# Patient Record
Sex: MALE | Race: BLACK OR AFRICAN AMERICAN | NOT HISPANIC OR LATINO
[De-identification: names, ages, dates, MRNs, and addresses within clinical notes are randomized per-mention and may not be internally consistent; named-entity substitution may affect disease eponyms.]

---

## 2017-03-03 ENCOUNTER — RX RENEWAL (OUTPATIENT)
Age: 57
End: 2017-03-03

## 2017-05-01 ENCOUNTER — RX RENEWAL (OUTPATIENT)
Age: 57
End: 2017-05-01

## 2017-05-26 ENCOUNTER — RX RENEWAL (OUTPATIENT)
Age: 57
End: 2017-05-26

## 2017-07-17 ENCOUNTER — APPOINTMENT (OUTPATIENT)
Dept: CARDIOLOGY | Facility: CLINIC | Age: 57
End: 2017-07-17

## 2017-07-31 ENCOUNTER — OUTPATIENT (OUTPATIENT)
Dept: OUTPATIENT SERVICES | Facility: HOSPITAL | Age: 57
LOS: 1 days | End: 2017-07-31

## 2017-08-11 ENCOUNTER — OUTPATIENT (OUTPATIENT)
Dept: OUTPATIENT SERVICES | Facility: HOSPITAL | Age: 57
LOS: 1 days | End: 2017-08-11

## 2017-08-17 DIAGNOSIS — Z01.20 ENCOUNTER FOR DENTAL EXAMINATION AND CLEANING WITHOUT ABNORMAL FINDINGS: ICD-10-CM

## 2017-08-21 ENCOUNTER — RX RENEWAL (OUTPATIENT)
Age: 57
End: 2017-08-21

## 2017-09-01 ENCOUNTER — OUTPATIENT (OUTPATIENT)
Dept: OUTPATIENT SERVICES | Facility: HOSPITAL | Age: 57
LOS: 1 days | End: 2017-09-01

## 2017-09-05 DIAGNOSIS — Z01.20 ENCOUNTER FOR DENTAL EXAMINATION AND CLEANING WITHOUT ABNORMAL FINDINGS: ICD-10-CM

## 2017-09-18 ENCOUNTER — OUTPATIENT (OUTPATIENT)
Dept: OUTPATIENT SERVICES | Facility: HOSPITAL | Age: 57
LOS: 1 days | End: 2017-09-18

## 2017-09-29 ENCOUNTER — OUTPATIENT (OUTPATIENT)
Dept: OUTPATIENT SERVICES | Facility: HOSPITAL | Age: 57
LOS: 1 days | End: 2017-09-29

## 2017-10-03 DIAGNOSIS — Z01.20 ENCOUNTER FOR DENTAL EXAMINATION AND CLEANING WITHOUT ABNORMAL FINDINGS: ICD-10-CM

## 2017-11-20 ENCOUNTER — RX RENEWAL (OUTPATIENT)
Age: 57
End: 2017-11-20

## 2017-12-04 ENCOUNTER — OUTPATIENT (OUTPATIENT)
Dept: OUTPATIENT SERVICES | Facility: HOSPITAL | Age: 57
LOS: 1 days | End: 2017-12-04

## 2017-12-06 DIAGNOSIS — Z01.20 ENCOUNTER FOR DENTAL EXAMINATION AND CLEANING WITHOUT ABNORMAL FINDINGS: ICD-10-CM

## 2017-12-18 ENCOUNTER — OUTPATIENT (OUTPATIENT)
Dept: OUTPATIENT SERVICES | Facility: HOSPITAL | Age: 57
LOS: 1 days | End: 2017-12-18

## 2017-12-19 DIAGNOSIS — Z01.20 ENCOUNTER FOR DENTAL EXAMINATION AND CLEANING WITHOUT ABNORMAL FINDINGS: ICD-10-CM

## 2018-03-01 ENCOUNTER — OUTPATIENT (OUTPATIENT)
Dept: OUTPATIENT SERVICES | Facility: HOSPITAL | Age: 58
LOS: 1 days | End: 2018-03-01

## 2018-03-16 DIAGNOSIS — R69 ILLNESS, UNSPECIFIED: ICD-10-CM

## 2018-05-29 ENCOUNTER — RX RENEWAL (OUTPATIENT)
Age: 58
End: 2018-05-29

## 2018-06-01 ENCOUNTER — RX RENEWAL (OUTPATIENT)
Age: 58
End: 2018-06-01

## 2018-06-06 ENCOUNTER — OUTPATIENT (OUTPATIENT)
Dept: OUTPATIENT SERVICES | Facility: HOSPITAL | Age: 58
LOS: 1 days | End: 2018-06-06

## 2018-06-12 DIAGNOSIS — Z01.20 ENCOUNTER FOR DENTAL EXAMINATION AND CLEANING WITHOUT ABNORMAL FINDINGS: ICD-10-CM

## 2018-07-25 ENCOUNTER — OUTPATIENT (OUTPATIENT)
Dept: OUTPATIENT SERVICES | Facility: HOSPITAL | Age: 58
LOS: 1 days | End: 2018-07-25

## 2018-07-27 DIAGNOSIS — Z01.20 ENCOUNTER FOR DENTAL EXAMINATION AND CLEANING WITHOUT ABNORMAL FINDINGS: ICD-10-CM

## 2018-10-01 ENCOUNTER — OUTPATIENT (OUTPATIENT)
Dept: OUTPATIENT SERVICES | Facility: HOSPITAL | Age: 58
LOS: 1 days | End: 2018-10-01
Payer: MEDICAID

## 2018-10-01 PROCEDURE — G9001: CPT

## 2018-10-05 ENCOUNTER — APPOINTMENT (OUTPATIENT)
Dept: ENDOVASCULAR SURGERY | Facility: CLINIC | Age: 58
End: 2018-10-05

## 2018-10-11 DIAGNOSIS — Z71.89 OTHER SPECIFIED COUNSELING: ICD-10-CM

## 2018-10-17 ENCOUNTER — RX RENEWAL (OUTPATIENT)
Age: 58
End: 2018-10-17

## 2018-11-12 ENCOUNTER — APPOINTMENT (OUTPATIENT)
Dept: CARDIOLOGY | Facility: CLINIC | Age: 58
End: 2018-11-12
Payer: MEDICARE

## 2018-11-12 VITALS
DIASTOLIC BLOOD PRESSURE: 78 MMHG | WEIGHT: 166 LBS | HEART RATE: 70 BPM | BODY MASS INDEX: 22 KG/M2 | OXYGEN SATURATION: 100 % | SYSTOLIC BLOOD PRESSURE: 154 MMHG | HEIGHT: 73 IN

## 2018-11-12 PROCEDURE — 93000 ELECTROCARDIOGRAM COMPLETE: CPT

## 2018-11-12 PROCEDURE — 99213 OFFICE O/P EST LOW 20 MIN: CPT

## 2018-11-12 RX ORDER — ENALAPRIL MALEATE 20 MG/1
20 TABLET ORAL EVERY OTHER DAY
Qty: 15 | Refills: 3 | Status: DISCONTINUED | COMMUNITY
Start: 2018-05-08 | End: 2018-11-12

## 2018-11-12 NOTE — HISTORY OF PRESENT ILLNESS
[FreeTextEntry1] : Lost for 2 years to followup but seeing his nephrologist. Overall feels well. No complaints. Taking his medicaitons and now on Labetalol and Nifedipine for BP control. BP slightly elevated but no complaints

## 2018-11-12 NOTE — DISCUSSION/SUMMARY
[FreeTextEntry1] : 58 year old man with HTN here for followup. Never went for repat TTE in 2016 but no complaints\par -increase Labetalol to 300 mg TID\par -continue nifidepine\par -FU in 6 monhts

## 2018-11-12 NOTE — PHYSICAL EXAM
[General Appearance - Well Developed] : well developed [Normal Appearance] : normal appearance [Well Groomed] : well groomed [General Appearance - Well Nourished] : well nourished [No Deformities] : no deformities [General Appearance - In No Acute Distress] : no acute distress [Normal Jugular Venous A Waves Present] : normal jugular venous A waves present [Normal Jugular Venous V Waves Present] : normal jugular venous V waves present [No Jugular Venous Hunt A Waves] : no jugular venous hunt A waves [Respiration, Rhythm And Depth] : normal respiratory rhythm and effort [Exaggerated Use Of Accessory Muscles For Inspiration] : no accessory muscle use [Auscultation Breath Sounds / Voice Sounds] : lungs were clear to auscultation bilaterally [Heart Rate And Rhythm] : heart rate and rhythm were normal [Heart Sounds] : normal S1 and S2 [Murmurs] : no murmurs present [Abdomen Soft] : soft [Abdomen Tenderness] : non-tender [Abdomen Mass (___ Cm)] : no abdominal mass palpated [Abnormal Walk] : normal gait [Gait - Sufficient For Exercise Testing] : the gait was sufficient for exercise testing [Nail Clubbing] : no clubbing of the fingernails [Cyanosis, Localized] : no localized cyanosis [Petechial Hemorrhages (___cm)] : no petechial hemorrhages [] : no ischemic changes [FreeTextEntry1] : Left AV fistula [Oriented To Time, Place, And Person] : oriented to person, place, and time [Affect] : the affect was normal [Mood] : the mood was normal [No Anxiety] : not feeling anxious

## 2018-11-12 NOTE — REVIEW OF SYSTEMS
[Shortness Of Breath] : no shortness of breath [Dyspnea on exertion] : not dyspnea during exertion [Chest Pain] : no chest pain [see HPI] : see HPI [Negative] : Heme/Lymph

## 2018-11-29 ENCOUNTER — APPOINTMENT (OUTPATIENT)
Dept: VASCULAR SURGERY | Facility: CLINIC | Age: 58
End: 2018-11-29

## 2018-12-03 ENCOUNTER — RX RENEWAL (OUTPATIENT)
Age: 58
End: 2018-12-03

## 2018-12-11 ENCOUNTER — RX RENEWAL (OUTPATIENT)
Age: 58
End: 2018-12-11

## 2018-12-11 RX ORDER — RAMELTEON 8 MG/1
8 TABLET, FILM COATED ORAL
Qty: 30 | Refills: 3 | Status: DISCONTINUED | COMMUNITY
Start: 2017-12-12 | End: 2018-12-11

## 2018-12-12 DIAGNOSIS — G47.00 INSOMNIA, UNSPECIFIED: ICD-10-CM

## 2018-12-17 ENCOUNTER — RX RENEWAL (OUTPATIENT)
Age: 58
End: 2018-12-17

## 2018-12-24 ENCOUNTER — RX RENEWAL (OUTPATIENT)
Age: 58
End: 2018-12-24

## 2018-12-26 ENCOUNTER — RX RENEWAL (OUTPATIENT)
Age: 58
End: 2018-12-26

## 2018-12-31 ENCOUNTER — RX RENEWAL (OUTPATIENT)
Age: 58
End: 2018-12-31

## 2019-02-25 ENCOUNTER — RX RENEWAL (OUTPATIENT)
Age: 59
End: 2019-02-25

## 2019-03-01 ENCOUNTER — OUTPATIENT (OUTPATIENT)
Dept: OUTPATIENT SERVICES | Facility: HOSPITAL | Age: 59
LOS: 1 days | End: 2019-03-01
Payer: MEDICARE

## 2019-03-11 ENCOUNTER — OUTPATIENT (OUTPATIENT)
Dept: OUTPATIENT SERVICES | Facility: HOSPITAL | Age: 59
LOS: 1 days | End: 2019-03-11
Payer: MEDICARE

## 2019-03-11 ENCOUNTER — APPOINTMENT (OUTPATIENT)
Dept: RADIOLOGY | Facility: IMAGING CENTER | Age: 59
End: 2019-03-11
Payer: MEDICARE

## 2019-03-11 DIAGNOSIS — Z00.8 ENCOUNTER FOR OTHER GENERAL EXAMINATION: ICD-10-CM

## 2019-03-11 PROCEDURE — 71046 X-RAY EXAM CHEST 2 VIEWS: CPT | Mod: 26

## 2019-03-11 PROCEDURE — 71046 X-RAY EXAM CHEST 2 VIEWS: CPT

## 2019-03-25 ENCOUNTER — INPATIENT (INPATIENT)
Facility: HOSPITAL | Age: 59
LOS: 2 days | Discharge: HOME | End: 2019-03-28
Attending: HOSPITALIST | Admitting: HOSPITALIST
Payer: MEDICARE

## 2019-03-25 ENCOUNTER — APPOINTMENT (OUTPATIENT)
Dept: CT IMAGING | Facility: HOSPITAL | Age: 59
End: 2019-03-25

## 2019-03-25 VITALS
SYSTOLIC BLOOD PRESSURE: 184 MMHG | HEART RATE: 67 BPM | OXYGEN SATURATION: 100 % | DIASTOLIC BLOOD PRESSURE: 98 MMHG | RESPIRATION RATE: 22 BRPM

## 2019-03-25 DIAGNOSIS — N18.6 END STAGE RENAL DISEASE: ICD-10-CM

## 2019-03-25 DIAGNOSIS — R06.02 SHORTNESS OF BREATH: ICD-10-CM

## 2019-03-25 DIAGNOSIS — I16.0 HYPERTENSIVE URGENCY: ICD-10-CM

## 2019-03-25 LAB
ALBUMIN SERPL ELPH-MCNC: 4.5 G/DL — SIGNIFICANT CHANGE UP (ref 3.3–5)
ALP SERPL-CCNC: 121 U/L — HIGH (ref 40–120)
ALT FLD-CCNC: 20 U/L — SIGNIFICANT CHANGE UP (ref 4–41)
ANION GAP SERPL CALC-SCNC: 18 MMO/L — HIGH (ref 7–14)
AST SERPL-CCNC: 29 U/L — SIGNIFICANT CHANGE UP (ref 4–40)
BASE EXCESS BLDV CALC-SCNC: 5.4 MMOL/L — SIGNIFICANT CHANGE UP
BASOPHILS # BLD AUTO: 0.03 K/UL — SIGNIFICANT CHANGE UP (ref 0–0.2)
BASOPHILS NFR BLD AUTO: 0.8 % — SIGNIFICANT CHANGE UP (ref 0–2)
BILIRUB SERPL-MCNC: 0.4 MG/DL — SIGNIFICANT CHANGE UP (ref 0.2–1.2)
BLOOD GAS VENOUS - CREATININE: 10.6 MG/DL — HIGH (ref 0.5–1.3)
BUN SERPL-MCNC: 30 MG/DL — HIGH (ref 7–23)
CALCIUM SERPL-MCNC: 9.8 MG/DL — SIGNIFICANT CHANGE UP (ref 8.4–10.5)
CHLORIDE BLDV-SCNC: 95 MMOL/L — LOW (ref 96–108)
CHLORIDE SERPL-SCNC: 93 MMOL/L — LOW (ref 98–107)
CK MB BLD-MCNC: 1.9 — SIGNIFICANT CHANGE UP (ref 0–2.5)
CK MB BLD-MCNC: 3.87 NG/ML — SIGNIFICANT CHANGE UP (ref 1–6.6)
CK SERPL-CCNC: 203 U/L — HIGH (ref 30–200)
CO2 SERPL-SCNC: 24 MMOL/L — SIGNIFICANT CHANGE UP (ref 22–31)
CREAT SERPL-MCNC: 9.35 MG/DL — HIGH (ref 0.5–1.3)
EOSINOPHIL # BLD AUTO: 0.19 K/UL — SIGNIFICANT CHANGE UP (ref 0–0.5)
EOSINOPHIL NFR BLD AUTO: 4.9 % — SIGNIFICANT CHANGE UP (ref 0–6)
GAS PNL BLDV: 135 MMOL/L — LOW (ref 136–146)
GLUCOSE BLDV-MCNC: 93 — SIGNIFICANT CHANGE UP (ref 70–99)
GLUCOSE SERPL-MCNC: 90 MG/DL — SIGNIFICANT CHANGE UP (ref 70–99)
HCO3 BLDV-SCNC: 28 MMOL/L — HIGH (ref 20–27)
HCT VFR BLD CALC: 38.1 % — LOW (ref 39–50)
HCT VFR BLDV CALC: 37.6 % — LOW (ref 39–51)
HGB BLD-MCNC: 11.4 G/DL — LOW (ref 13–17)
HGB BLDV-MCNC: 12.2 G/DL — LOW (ref 13–17)
IMM GRANULOCYTES NFR BLD AUTO: 0.3 % — SIGNIFICANT CHANGE UP (ref 0–1.5)
INR BLD: 1.03 — SIGNIFICANT CHANGE UP (ref 0.88–1.17)
LACTATE BLDV-MCNC: 1.6 MMOL/L — SIGNIFICANT CHANGE UP (ref 0.5–2)
LYMPHOCYTES # BLD AUTO: 1.1 K/UL — SIGNIFICANT CHANGE UP (ref 1–3.3)
LYMPHOCYTES # BLD AUTO: 28.6 % — SIGNIFICANT CHANGE UP (ref 13–44)
MCHC RBC-ENTMCNC: 24.8 PG — LOW (ref 27–34)
MCHC RBC-ENTMCNC: 29.9 % — LOW (ref 32–36)
MCV RBC AUTO: 83 FL — SIGNIFICANT CHANGE UP (ref 80–100)
MONOCYTES # BLD AUTO: 0.52 K/UL — SIGNIFICANT CHANGE UP (ref 0–0.9)
MONOCYTES NFR BLD AUTO: 13.5 % — SIGNIFICANT CHANGE UP (ref 2–14)
NEUTROPHILS # BLD AUTO: 1.99 K/UL — SIGNIFICANT CHANGE UP (ref 1.8–7.4)
NEUTROPHILS NFR BLD AUTO: 51.9 % — SIGNIFICANT CHANGE UP (ref 43–77)
NRBC # FLD: 0 K/UL — LOW (ref 25–125)
PCO2 BLDV: 50 MMHG — SIGNIFICANT CHANGE UP (ref 41–51)
PH BLDV: 7.4 PH — SIGNIFICANT CHANGE UP (ref 7.32–7.43)
PLATELET # BLD AUTO: 220 K/UL — SIGNIFICANT CHANGE UP (ref 150–400)
PMV BLD: 10.3 FL — SIGNIFICANT CHANGE UP (ref 7–13)
PO2 BLDV: 26 MMHG — LOW (ref 35–40)
POTASSIUM BLDV-SCNC: 4.7 MMOL/L — HIGH (ref 3.4–4.5)
POTASSIUM SERPL-MCNC: 5 MMOL/L — SIGNIFICANT CHANGE UP (ref 3.5–5.3)
POTASSIUM SERPL-SCNC: 5 MMOL/L — SIGNIFICANT CHANGE UP (ref 3.5–5.3)
PROT SERPL-MCNC: 8.4 G/DL — HIGH (ref 6–8.3)
PROTHROM AB SERPL-ACNC: 11.5 SEC — SIGNIFICANT CHANGE UP (ref 9.8–13.1)
RBC # BLD: 4.59 M/UL — SIGNIFICANT CHANGE UP (ref 4.2–5.8)
RBC # FLD: 15.7 % — HIGH (ref 10.3–14.5)
SAO2 % BLDV: 44.1 % — LOW (ref 60–85)
SODIUM SERPL-SCNC: 135 MMOL/L — SIGNIFICANT CHANGE UP (ref 135–145)
TROPONIN T, HIGH SENSITIVITY: 324 NG/L — CRITICAL HIGH (ref ?–14)
TROPONIN T, HIGH SENSITIVITY: 360 NG/L — CRITICAL HIGH (ref ?–14)
WBC # BLD: 3.84 K/UL — SIGNIFICANT CHANGE UP (ref 3.8–10.5)
WBC # FLD AUTO: 3.84 K/UL — SIGNIFICANT CHANGE UP (ref 3.8–10.5)

## 2019-03-25 PROCEDURE — 71046 X-RAY EXAM CHEST 2 VIEWS: CPT | Mod: 26

## 2019-03-25 PROCEDURE — 99223 1ST HOSP IP/OBS HIGH 75: CPT

## 2019-03-25 PROCEDURE — 71250 CT THORAX DX C-: CPT | Mod: 26

## 2019-03-25 PROCEDURE — 99222 1ST HOSP IP/OBS MODERATE 55: CPT | Mod: GC

## 2019-03-25 PROCEDURE — 99222 1ST HOSP IP/OBS MODERATE 55: CPT

## 2019-03-25 RX ORDER — LABETALOL HCL 100 MG
300 TABLET ORAL THREE TIMES A DAY
Qty: 0 | Refills: 0 | Status: DISCONTINUED | OUTPATIENT
Start: 2019-03-25 | End: 2019-03-27

## 2019-03-25 RX ORDER — HYDRALAZINE HCL 50 MG
10 TABLET ORAL ONCE
Qty: 0 | Refills: 0 | Status: COMPLETED | OUTPATIENT
Start: 2019-03-25 | End: 2019-03-25

## 2019-03-25 RX ORDER — CALCIUM ACETATE 667 MG
1334 TABLET ORAL
Qty: 0 | Refills: 0 | Status: DISCONTINUED | OUTPATIENT
Start: 2019-03-25 | End: 2019-03-28

## 2019-03-25 RX ORDER — LABETALOL HCL 100 MG
1 TABLET ORAL
Qty: 0 | Refills: 0 | COMMUNITY

## 2019-03-25 RX ORDER — ISOSORBIDE DINITRATE 5 MG/1
10 TABLET ORAL ONCE
Qty: 0 | Refills: 0 | Status: COMPLETED | OUTPATIENT
Start: 2019-03-25 | End: 2019-03-25

## 2019-03-25 RX ORDER — NIFEDIPINE 30 MG
90 TABLET, EXTENDED RELEASE 24 HR ORAL DAILY
Qty: 0 | Refills: 0 | Status: DISCONTINUED | OUTPATIENT
Start: 2019-03-25 | End: 2019-03-26

## 2019-03-25 RX ORDER — NIFEDIPINE 30 MG
90 TABLET, EXTENDED RELEASE 24 HR ORAL ONCE
Qty: 0 | Refills: 0 | Status: COMPLETED | OUTPATIENT
Start: 2019-03-25 | End: 2019-03-25

## 2019-03-25 RX ORDER — ASPIRIN/CALCIUM CARB/MAGNESIUM 324 MG
325 TABLET ORAL ONCE
Qty: 0 | Refills: 0 | Status: COMPLETED | OUTPATIENT
Start: 2019-03-25 | End: 2019-03-25

## 2019-03-25 RX ADMIN — Medication 10 MILLIGRAM(S): at 11:32

## 2019-03-25 RX ADMIN — Medication 325 MILLIGRAM(S): at 13:30

## 2019-03-25 RX ADMIN — Medication 90 MILLIGRAM(S): at 15:40

## 2019-03-25 RX ADMIN — ISOSORBIDE DINITRATE 10 MILLIGRAM(S): 5 TABLET ORAL at 15:40

## 2019-03-25 RX ADMIN — Medication 10 MILLIGRAM(S): at 13:30

## 2019-03-25 NOTE — CONSULT NOTE ADULT - ASSESSMENT
58 year old male with history of ESRD on HD TTS (Nephrologist: Dr. Valdovinos, Center: [facility]), HTN, Hepatitis C, latent TB, CHF who presented to the hospital with SOB and HTN, found to be fluid overloaded. Nephrology consulted as patient is ESRD on HD.    ESRD on HD  Patient is ESRD on HD and gets dialyzed TTS. Patient to be dialyzed today and tomorrow for fluid removal. Continue to monitor electrolytes and fluid balance along with BP. Monitor weight pre and post dialysis.      HTN Urgency  Patient presented in HTN urgency likely volume-mediated. Patient states he has been taking his medication without missing doses so I do not believe there is a non-compliance issue. However he does state he has been drinking more water and eating more salt so I believe this is causing volume expansion in this patient. He makes minimal urine and has been taking less fluid off during dialysis which all could have lead to this admission. Patient does not have very impressive LE swelling, however does have extremely diminished lung sounds, B-lines on US along with non-collapsable IVC on US exam as well. This all suggests volume as the problem for this HTN. Will continue to monitor BP during dialysis as well as symptoms.

## 2019-03-25 NOTE — H&P ADULT - PROBLEM SELECTOR PLAN 4
oupt surveillance in 3-6 months will likely require oupt surveillance in 3-6 months, but would confirm with pulmonology

## 2019-03-25 NOTE — CONSULT NOTE ADULT - ATTENDING COMMENTS
will arrange for HD today and then again to his scheduled HD tomorrow  discussed with the dialysis unit and will be added to the schedule

## 2019-03-25 NOTE — H&P ADULT - PROBLEM SELECTOR PLAN 3
-will order TTE -will order TTE  -cardiology documented carotid bruits auscultated; will order BL duplex -will order TTE  -troponinemia likely stemming from esrd, no indication to terat for ACS per cardiology  -cardiology documented carotid bruits auscultated; will order BL duplex

## 2019-03-25 NOTE — H&P ADULT - NSICDXPASTMEDICALHX_GEN_ALL_CORE_FT
PAST MEDICAL HISTORY:  CHF (congestive heart failure) Mild    Chronic renal failure     End Stage Renal Disease on Dialysis since 2009 - Tues, Thurs, Sat    Hepatitis C     HTN (hypertension)     TB (tuberculosis) Latent
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
## 2019-03-25 NOTE — ED ADULT NURSE NOTE - OBJECTIVE STATEMENT
p./t is a 58y old male with hx ESRD on HD, last dialysis treatment on Saturday c/o of cough and mild sob for few  days, p/t denies any chest pain

## 2019-03-25 NOTE — ED PROVIDER NOTE - OBJECTIVE STATEMENT
57 y/o male pmh htn, ESRD on HD t.th.sat c/o sob x1 day. Pt had dialysis on Saturday, completed entire session. Pt admits to developing sob last night while resting at home. Pt admits to worsening sob when lying down, states was unable to sleep due to sob. Pt admits to feeling cold. Denies chest pain, diaphoresis, palpitations, abd pain, n/v/d, numbness, tingling, weakness, dizziness, syncope, fever or chills. Denies recent travel or sick contacts. Denies URI symptoms. Of note, pt had CXR x1 week ago which showed 2.3cm cyst in R lung, pt has script fot CT chest today.

## 2019-03-25 NOTE — H&P ADULT - PROBLEM SELECTOR PLAN 1
-pt to undergo dialysis this evening  -will resume usual home BP med regimen, and adjust as needed  -reinforce appropriate salt/fluid intake

## 2019-03-25 NOTE — H&P ADULT - PROBLEM SELECTOR PLAN 2
-pt to undergo dialysis this evening  -will resume usual home BP med regimen, and adjust as needed -pt to undergo dialysis this evening  -will resume usual home BP med regimen, and adjust as needed    Of note, V5 with delta-wave like findings. Given non-shortened [place] interval, and confinement to 1 lead, ok to continue av gume blocking agents. Discussed with cardiology NP overnight. EKG reviewed by full cardiology team in daytime. Will repeat ekg in am -pt to undergo dialysis this evening  -will resume usual home BP med regimen, and adjust as needed    Of note, EKG shows lead V5 with delta-wave like findings. Given non-shortened RI interval, and confinement to 1 lead, ok to continue av gume blocking agents. Discussed with cardiology NP overnight. EKG already reviewed by full cardiology team in daytime. Will repeat ekg in am

## 2019-03-25 NOTE — ED PROVIDER NOTE - ATTENDING CONTRIBUTION TO CARE
Bong: 59 yo male with a h/o htn, ESRD on HD (T, Th, S) last dialyzed on Saturday c/o SOB that began last night. SOB is worse with laying flat. No associated chest pain, LE edema or pain. No abdominal pain, nausea or vomiting. No cough, fevers or chills. Pt endorsing that he thinks they took off less fluid then usual at his last HD. Exam: GENERAL: well appearing, NAD, HEENT: MMM, PERRLA, CARDIO: +S1/S2, no murmurs, rubs or gallops, LUNGS: CTA B/L, no wheezing, rales or rhonchi, + tachypnea with minimal exertion ABD: soft, nontender, BSx4 quadrants, no guarding or rigidity. EXT: No LE edema or calf TTP, 2+ distal pulses x 4 extremities. NEURO: AxOx3, SKIN: no rashes or lesions, well perfused A/P- 59 yo male with SOB. Lungs CTA b/l, so less likely fluid overload. no PE risk factors or hypoxia but pt tachypneic and no chest pain with unremarkable EKG. will obtain cbc, cmp, troponin, CXR, and reassess.

## 2019-03-25 NOTE — H&P ADULT - NSHPREVIEWOFSYSTEMS_GEN_ALL_CORE
Review of Systems:   CONSTITUTIONAL: No fever, weight loss, or fatigue  EYES: No eye pain, visual disturbances, or discharge  ENMT:  No difficulty hearing, tinnitus, vertigo; No sinus or throat pain  NECK: No pain or stiffness  RESPIRATORY:  cough, orthopnea  CARDIOVASCULAR: No chest pain currently, No palpitations, dizziness, or leg swelling  GASTROINTESTINAL: No abdominal or epigastric pain. No nausea, vomiting, or hematemesis; No diarrhea or constipation. No melena or hematochezia.  NEUROLOGICAL: No headaches, memory loss, loss of strength, numbness, or tremors  SKIN: No itching, burning, rashes, or lesions   LYMPH NODES: No enlarged glands  ENDOCRINE: No heat or cold intolerance; No hair loss  MUSCULOSKELETAL: No joint pain or swelling; No muscle, back, or extremity pain

## 2019-03-25 NOTE — CONSULT NOTE ADULT - SUBJECTIVE AND OBJECTIVE BOX
[facility] Division of Kidney Diseases & Hypertension  INITIAL CONSULT NOTE  [phone]--------------------------------------------------------------------------------  HPI: 58 year old male with history of ESRD on HD TTS (Nephrologist: Dr. Valdovinos, Center: [facility]), HTN, Hepatitis C, latent TB, CHF who presented to the hospital with SOB and HTN. Patient stated for the last week he has noticed that he has had more trouble breathing and has not been able to sleep or lay flat. He states that one week ago his outpatient dialysis dry weight was changed from 69 kg to 70 kg and he also thinks he has been eating more salt and drinking more water in the last week as well. He notes that he believes that because they increased his dry weight, they have not been pulling off ass much fluid from him, and thus he started to get short of breath. Otherwise patient complains of some chest pain. No other symptoms noted. Nephrology consulted as patient is ESRD on HD.    Patient seen and examined at bedside. He appears to be in mild/moderate distress, cannot lay flat and BP noted to be 170-230s. He states he called dialysis unit to come in early however they did not have a chair for him and so he came to the hospital in the hopes of being dialyzed early.        PAST HISTORY  --------------------------------------------------------------------------------  PAST MEDICAL & SURGICAL HISTORY:  CHF (congestive heart failure): Mild  TB (tuberculosis): Latent  Hepatitis C  Chronic renal failure  HTN (hypertension)  End Stage Renal Disease on Dialysis: since 2009 - [facility]a, Sat  S/P appendectomy: at age 7  AVF (arteriovenous fistula): placed 2009 - Left side    FAMILY HISTORY:  No pertinent family history in first degree relatives    PAST SOCIAL HISTORY: No recent drug use noted. Patient has high salt diet.    ALLERGIES & MEDICATIONS  --------------------------------------------------------------------------------  Allergies    adhesives (Other)  No Known Drug Allergies    Intolerances      Standing Inpatient Medications    PRN Inpatient Medications      REVIEW OF SYSTEMS  --------------------------------------------------------------------------------  Gen: No  fevers/chills, weakness  Head/Eyes/Ears/Mouth: No headache; Normal hearing; Normal vision w/o blurriness;   Respiratory: Has dyspnea  CV: Mild right-sided chest pain  GI: No abdominal pain, diarrhea, constipation, nausea, vomiting  : Makes minimal urine  MSK: No joint pain/swelling;   Neuro: No dizziness/lightheadedness, weakness, seizures    All other systems were reviewed and are negative, except as noted.    VITALS/PHYSICAL EXAM  --------------------------------------------------------------------------------  T(C): 36.7 (03-25-19 @ 15:57), Max: 37.1 (03-25-19 @ 08:49)  HR: 72 (03-25-19 @ 15:57) (64 - 72)  BP: 145/72 (03-25-19 @ 15:57) (145/72 - 246/100)  RR: 18 (03-25-19 @ 15:57) (18 - 22)  SpO2: 100% (03-25-19 @ 15:57) (100% - 100%)  Wt(kg): --        Physical Exam:  	Gen: Mild distress, sitting up  	HEENT: Anicteric  	Pulm: Diminished lung sounds.  	CV:  S1S2  	Abd: +BS, soft   	Ext: 1+ edema of LE  	Neuro: No focal deficits  	Skin: Warm  	Vascular access: LUE AVF    LABS/STUDIES  --------------------------------------------------------------------------------              11.4   3.84  >-----------<  220      [03-25-19 @ 08:37]              38.1     135  |  93  |  30  ----------------------------<  90      [03-25-19 @ 08:37]  5.0   |  24  |  9.35        Ca     9.8     [03-25-19 @ 08:37]    TPro  8.4  /  Alb  4.5  /  TBili  0.4  /  DBili  x   /  AST  29  /  ALT  20  /  AlkPhos  121  [03-25-19 @ 08:37]    PT/INR: PT 11.5 , INR 1.03       [03-25-19 @ 08:37]          [03-25-19 @ 10:30]    Creatinine Trend:  SCr 9.35 [03-25 @ 08:37]

## 2019-03-25 NOTE — CONSULT NOTE ADULT - ASSESSMENT
58M HTN, ESRD on HD complaining of SOB for the past week - most notably orthopnea w/ ER exam reveal hypertension to 220's / 110's, systolic murmur and b/l carotid bruits.    #SOB - classic complaint of orthopnea. Significant HTN may be driving this. Patient mildly fluid up on exam via HJR notable on exam. Prior echo notable for eccentric hypertrophy w/ can occur in a chronically fluid overloaded state. Lungs CTA  - recommend administering hydralazine and isordil to help bring his pressures down. Re-evaluate symptoms after BP   - patient with notable systolic murmur --> possible AS however may have concomitant carotid artery stenosis (un-equal bruit). Would check and echo. Previous echo does not note significant valvulopathy.  - hST downtrended. Patient w/o chest pain. Likely chronically elevated in setting of ESRD.    TOM Oconnor MD  Cardiology Fellow  [id number] 58M HTN, ESRD on HD complaining of SOB for the past week - most notably orthopnea w/ ER exam reveal hypertension to 220's / 110's, systolic murmur and b/l carotid bruits.    #SOB - classic complaint of orthopnea. Fluid overload w/ significant HTN may be driving this. Patient mildly fluid up on exam via HJR notable on exam. Prior echo notable for eccentric hypertrophy w/ can occur in a chronically fluid overloaded state. Lungs CTA  - recommend administering hydralazine and isordil to help bring his pressures down. Re-evaluate symptoms after BP   - patient with notable systolic murmur --> possible AS however may have concomitant carotid artery stenosis (un-equal bruit). Would check and echo. Previous echo does not note significant valvulopathy.  - please check echo and carotid duplex  - hST downtrended. Patient w/o chest pain. Likely chronically elevated in setting of ESRD.    TOM Oconnor MD  Cardiology Fellow  [id number]

## 2019-03-25 NOTE — CONSULT NOTE ADULT - SUBJECTIVE AND OBJECTIVE BOX
Patient seen and evaluated @ [facility] ER   Chief Complaint: SOB    HPI:  58M pmhx HTN, ESRD on HD complaining of SOB for the past week. He notes that his main complaint is difficulty breathing when sleeping that forces him to sit up. Reports no sleep because of it. He notes being compliant with his meds and dialysis -- last session on Saturday, completed entire session. Notes he recently had his dry weight increased from 69kg to 70kg. He also notes chronic dyspnea with ascension of stairs -- flat terrain causes no symptoms. Denies and chest pain, palpitations or pressures recently. No URI symptoms.     PMH:   CHF (congestive heart failure)  TB (tuberculosis)  Hepatitis C  Chronic renal failure  HTN (hypertension)  End Stage Renal Disease on Dialysis  Benign Essential Hypertension    PSH:   S/P appendectomy  AVF (arteriovenous fistula)    Medications:   hydrALAZINE Injectable 10 milliGRAM(s) IV Push Once  isosorbide   dinitrate Tablet (ISORDIL) 10 milliGRAM(s) Oral once  NIFEdipine XL 90 milliGRAM(s) Oral Once    Allergies:  adhesives (Other)  No Known Drug Allergies    FAMILY HISTORY:  No pertinent family history in first degree relatives    Social History:  Smoking: Former  Alcohol: Social  Drugs: no    Review of Systems:  REVIEW OF SYSTEMS:    CONSTITUTIONAL: No weakness, fevers or chills  EYES/ENT: No visual changes;  No dysphagia  NECK: No pain or stiffness  RESPIRATORY: No cough, wheezing, hemoptysis; No shortness of breath  CARDIOVASCULAR: No chest pain or palpitations; No lower extremity edema  GASTROINTESTINAL: No abdominal or epigastric pain. No nausea, vomiting, or hematemesis; No diarrhea or constipation. No melena or hematochezia.  BACK: No back pain  GENITOURINARY: No dysuria, frequency or hematuria  NEUROLOGICAL: No numbness or weakness  SKIN: No itching, burning, rashes, or lesions   All other review of systems is negative unless indicated above.  [x ] 10 point review of systems is otherwise negative except as mentioned above            [ ]Unable to obtain    Physical Exam:  T(C): 36.2 (03-25-19 @ 10:39), Max: 37.1 (03-25-19 @ 08:49)  HR: 65 (03-25-19 @ 12:57) (64 - 67)  BP: 227/83 (03-25-19 @ 12:57) (184/98 - 246/100)  RR: 18 (03-25-19 @ 12:57) (18 - 22)  SpO2: 100% (03-25-19 @ 12:57) (100% - 100%)  Wt(kg): --  GENERAL: No acute distress, well-developed  HEAD:  Atraumatic, Normocephalic  ENT: EOMI, PERRLA, conjunctiva and sclera clear, Neck supple, +HJR , moist mucosa  CHEST/LUNG: Clear to auscultation bilaterally; No wheeze, equal breath sounds bilaterally   BACK: No spinal tenderness  HEART: +s1s2 / + systolic murmur w/ b/l carotid bruits (L>R)  ABDOMEN: Soft, Nontender, Nondistended; Bowel sounds present  EXTREMITIES:  No clubbing, cyanosis, or edema  PSYCH: Nl behavior, nl affect  NEUROLOGY: AAOx3, non-focal, cranial nerves intact  SKIN: Normal color, No rashes or lesions      Daily     Daily     Cardiovascular Diagnostic Testing:  ECG: Probably NSR     Echo:    Stress Testing:    Cath:    Interpretation of Telemetry:    Imaging:    Labs:                        11.4   3.84  )-----------( 220      ( 25 Mar 2019 08:37 )             38.1     03-25    135  |  93<L>  |  30<H>  ----------------------------<  90  5.0   |  24  |  9.35<H>    Ca    9.8      25 Mar 2019 08:37    TPro  8.4<H>  /  Alb  4.5  /  TBili  0.4  /  DBili  x   /  AST  29  /  ALT  20  /  AlkPhos  121<H>  03-25    PT/INR - ( 25 Mar 2019 08:37 )   PT: 11.5 SEC;   INR: 1.03       CARDIAC MARKERS ( 25 Mar 2019 10:30 )  x     / x     / 203 u/L / 3.87 ng/mL / x

## 2019-03-25 NOTE — ED ADULT NURSE REASSESSMENT NOTE - NS ED NURSE REASSESS COMMENT FT1
Pt received awake and alert b/p elevated md aware no orders received at this time. Pt sinus o monitor. Pt denies chest pain reports sob when lying flat and with activity. Pt  appears comfortable in bed 02 sat 100 room air.  Pt with left av fistula in place. awaiting ct of chest results at this time.

## 2019-03-25 NOTE — H&P ADULT - NSHPLABSRESULTS_GEN_ALL_CORE
Vital Signs Last 24 Hrs  T(C): 36.9 (25 Mar 2019 19:55), Max: 37.1 (25 Mar 2019 08:49)  T(F): 98.4 (25 Mar 2019 19:55), Max: 98.7 (25 Mar 2019 08:49)  HR: 73 (25 Mar 2019 19:55) (64 - 77)  BP: 154/83 (25 Mar 2019 19:55) (145/72 - 246/100)  BP(mean): --  RR: 18 (25 Mar 2019 19:55) (18 - 22)  SpO2: 100% (25 Mar 2019 19:33) (100% - 100%)                            11.4   3.84  )-----------( 220      ( 25 Mar 2019 08:37 )             38.1     03-25    135  |  93<L>  |  30<H>  ----------------------------<  90  5.0   |  24  |  9.35<H>    Ca    9.8      25 Mar 2019 08:37    TPro  8.4<H>  /  Alb  4.5  /  TBili  0.4  /  DBili  x   /  AST  29  /  ALT  20  /  AlkPhos  121<H>  03-25    CAPILLARY BLOOD GLUCOSE        PT/INR - ( 25 Mar 2019 08:37 )   PT: 11.5 SEC;   INR: 1.03

## 2019-03-25 NOTE — ED PROVIDER NOTE - PMH
CHF (congestive heart failure)  Mild  Chronic renal failure    End Stage Renal Disease on Dialysis  since 2009 - Tues, Thurs, Sat  Hepatitis C    HTN (hypertension)    TB (tuberculosis)  Latent

## 2019-03-25 NOTE — H&P ADULT - PROBLEM SELECTOR PLAN 6
IMPROVE VTE Individual Risk Assessment    RISK                                                          Points  [] Previous VTE                                           3  [] Thrombophilia                                        2  [] Lower limb paralysis                              2   [] Current Cancer                                       2   [] Immobilization > 24 hrs                        1  [] ICU/CCU stay > 24 hours                       1  [] Age > 60                                                   1    IMPROVE VTE Score: 0  no scd given slightly more swollen left leg; will order duplex

## 2019-03-25 NOTE — H&P ADULT - NSHPPHYSICALEXAM_GEN_ALL_CORE
PHYSICAL EXAM:      Constitutional: NAD, well-groomed, well-developed  HEENT:  EOMI, Normal Hearing  Neck: No LAD, No JVD  Back: Normal spine flexure, No CVA tenderness  Respiratory: CTAB  Cardiovascular: S1 and S2, RRR  Gastrointestinal: BS+, soft, NT/ND  Extremities: No peripheral edema  Vascular: 2+ peripheral pulses  Neurological: A/O x 3, no focal deficits  Psychiatric: Normal mood, normal affect  Musculoskeletal: 5/5 strength b/l upper and lower extremities  Skin: No rashes

## 2019-03-25 NOTE — ED ADULT TRIAGE NOTE - CHIEF COMPLAINT QUOTE
Pt walk in c/o Shortness of breath since Last night worsening, Dialysis Pt. Last treatment Saturday.  Left Av fistula with Bruit/thrill. Denies HA dizziness CP palpitation

## 2019-03-25 NOTE — H&P ADULT - NSICDXPASTSURGICALHX_GEN_ALL_CORE_FT
PAST SURGICAL HISTORY:  AVF (arteriovenous fistula) placed 2009 - Left side    S/P appendectomy at age 7

## 2019-03-25 NOTE — H&P ADULT - HISTORY OF PRESENT ILLNESS
58M pmhx HTN, ESRD on HD complaining of SOB for the past week. He notes that his main complaint is difficulty breathing when sleeping that forces him to sit up. Reports no sleep because of it. He notes being compliant with his meds and dialysis -- last session on Saturday, completed entire session. Notes he recently had his dry weight increased from 69kg to 70kg. Notes recent URI symptoms, denies current fever, RVP negative. CT chest negative for fluid overload,  HS Trop initially 360, then 324 58M pmhx HTN, ESRD on HD complaining of SOB for the past week. He notes that his main complaint is difficulty breathing when sleeping that forces him to sit up. Reports no sleep because of it. Initial , after 2 rounds IV hydralazine, nifedipine x1, isordil x1 . 2016 TTE with LVH, but normal bivntricular function, no valvulopathy. Pt endorses  compliance with his meds and dialysis -- last session on Saturday, completed entire session.  Notes he recently had his dry weight increased from 69kg to 70kg. Notes recent URI symptoms, denies current fever, RVP negative. CT chest negative for fluid overload, + right upper  lobe pulmonary nodules, largest 2.5 x 1.5 cm. Reports his fluid and salt intake increased over last several weeks. HS Trop initially 360, then 324. Chest pain only when supine. EKG NSR. Denies unilateral limb swelling or h/o blood clots, hemoptysis

## 2019-03-26 DIAGNOSIS — R74.8 ABNORMAL LEVELS OF OTHER SERUM ENZYMES: ICD-10-CM

## 2019-03-26 DIAGNOSIS — Z29.9 ENCOUNTER FOR PROPHYLACTIC MEASURES, UNSPECIFIED: ICD-10-CM

## 2019-03-26 DIAGNOSIS — R06.01 ORTHOPNEA: ICD-10-CM

## 2019-03-26 DIAGNOSIS — Z79.899 OTHER LONG TERM (CURRENT) DRUG THERAPY: ICD-10-CM

## 2019-03-26 DIAGNOSIS — I10 ESSENTIAL (PRIMARY) HYPERTENSION: ICD-10-CM

## 2019-03-26 DIAGNOSIS — R91.1 SOLITARY PULMONARY NODULE: ICD-10-CM

## 2019-03-26 LAB
ALBUMIN SERPL ELPH-MCNC: 4 G/DL — SIGNIFICANT CHANGE UP (ref 3.3–5)
ALP SERPL-CCNC: 108 U/L — SIGNIFICANT CHANGE UP (ref 40–120)
ALT FLD-CCNC: 16 U/L — SIGNIFICANT CHANGE UP (ref 4–41)
ANION GAP SERPL CALC-SCNC: 11 MMO/L — SIGNIFICANT CHANGE UP (ref 7–14)
AST SERPL-CCNC: 27 U/L — SIGNIFICANT CHANGE UP (ref 4–40)
BILIRUB SERPL-MCNC: 0.4 MG/DL — SIGNIFICANT CHANGE UP (ref 0.2–1.2)
BUN SERPL-MCNC: 22 MG/DL — SIGNIFICANT CHANGE UP (ref 7–23)
CALCIUM SERPL-MCNC: 9.4 MG/DL — SIGNIFICANT CHANGE UP (ref 8.4–10.5)
CHLORIDE SERPL-SCNC: 95 MMOL/L — LOW (ref 98–107)
CO2 SERPL-SCNC: 30 MMOL/L — SIGNIFICANT CHANGE UP (ref 22–31)
CREAT SERPL-MCNC: 7.42 MG/DL — HIGH (ref 0.5–1.3)
GLUCOSE SERPL-MCNC: 85 MG/DL — SIGNIFICANT CHANGE UP (ref 70–99)
HCT VFR BLD CALC: 37.8 % — LOW (ref 39–50)
HGB BLD-MCNC: 11.7 G/DL — LOW (ref 13–17)
MAGNESIUM SERPL-MCNC: 2.1 MG/DL — SIGNIFICANT CHANGE UP (ref 1.6–2.6)
MCHC RBC-ENTMCNC: 24.7 PG — LOW (ref 27–34)
MCHC RBC-ENTMCNC: 31 % — LOW (ref 32–36)
MCV RBC AUTO: 79.9 FL — LOW (ref 80–100)
NRBC # FLD: 0 K/UL — SIGNIFICANT CHANGE UP (ref 0–0)
PHOSPHATE SERPL-MCNC: 3.2 MG/DL — SIGNIFICANT CHANGE UP (ref 2.5–4.5)
PLATELET # BLD AUTO: 224 K/UL — SIGNIFICANT CHANGE UP (ref 150–400)
PMV BLD: 9.8 FL — SIGNIFICANT CHANGE UP (ref 7–13)
POTASSIUM SERPL-MCNC: 4.7 MMOL/L — SIGNIFICANT CHANGE UP (ref 3.5–5.3)
POTASSIUM SERPL-SCNC: 4.7 MMOL/L — SIGNIFICANT CHANGE UP (ref 3.5–5.3)
PROT SERPL-MCNC: 7.6 G/DL — SIGNIFICANT CHANGE UP (ref 6–8.3)
RBC # BLD: 4.73 M/UL — SIGNIFICANT CHANGE UP (ref 4.2–5.8)
RBC # FLD: 15.8 % — HIGH (ref 10.3–14.5)
SODIUM SERPL-SCNC: 136 MMOL/L — SIGNIFICANT CHANGE UP (ref 135–145)
WBC # BLD: 3.49 K/UL — LOW (ref 3.8–10.5)
WBC # FLD AUTO: 3.49 K/UL — LOW (ref 3.8–10.5)

## 2019-03-26 PROCEDURE — 99233 SBSQ HOSP IP/OBS HIGH 50: CPT | Mod: GC

## 2019-03-26 PROCEDURE — 93970 EXTREMITY STUDY: CPT | Mod: 26

## 2019-03-26 PROCEDURE — 93306 TTE W/DOPPLER COMPLETE: CPT | Mod: 26

## 2019-03-26 PROCEDURE — 99232 SBSQ HOSP IP/OBS MODERATE 35: CPT

## 2019-03-26 PROCEDURE — 99232 SBSQ HOSP IP/OBS MODERATE 35: CPT | Mod: GC

## 2019-03-26 PROCEDURE — 93880 EXTRACRANIAL BILAT STUDY: CPT | Mod: 26

## 2019-03-26 RX ORDER — DOCUSATE SODIUM 100 MG
100 CAPSULE ORAL
Qty: 0 | Refills: 0 | Status: DISCONTINUED | OUTPATIENT
Start: 2019-03-26 | End: 2019-03-28

## 2019-03-26 RX ORDER — SENNA PLUS 8.6 MG/1
2 TABLET ORAL AT BEDTIME
Qty: 0 | Refills: 0 | Status: DISCONTINUED | OUTPATIENT
Start: 2019-03-26 | End: 2019-03-28

## 2019-03-26 RX ORDER — NIFEDIPINE 30 MG
120 TABLET, EXTENDED RELEASE 24 HR ORAL DAILY
Qty: 0 | Refills: 0 | Status: DISCONTINUED | OUTPATIENT
Start: 2019-03-27 | End: 2019-03-28

## 2019-03-26 RX ORDER — NIFEDIPINE 30 MG
30 TABLET, EXTENDED RELEASE 24 HR ORAL ONCE
Qty: 0 | Refills: 0 | Status: COMPLETED | OUTPATIENT
Start: 2019-03-26 | End: 2019-03-26

## 2019-03-26 RX ADMIN — Medication 1334 MILLIGRAM(S): at 14:33

## 2019-03-26 RX ADMIN — Medication 30 MILLIGRAM(S): at 23:08

## 2019-03-26 RX ADMIN — Medication 1334 MILLIGRAM(S): at 18:31

## 2019-03-26 RX ADMIN — Medication 300 MILLIGRAM(S): at 14:33

## 2019-03-26 RX ADMIN — Medication 90 MILLIGRAM(S): at 06:34

## 2019-03-26 RX ADMIN — SENNA PLUS 2 TABLET(S): 8.6 TABLET ORAL at 23:09

## 2019-03-26 RX ADMIN — Medication 100 MILLIGRAM(S): at 23:09

## 2019-03-26 RX ADMIN — Medication 300 MILLIGRAM(S): at 06:34

## 2019-03-26 RX ADMIN — Medication 300 MILLIGRAM(S): at 23:08

## 2019-03-26 RX ADMIN — Medication 1334 MILLIGRAM(S): at 09:32

## 2019-03-26 NOTE — PROGRESS NOTE ADULT - PROBLEM SELECTOR PLAN 5
IMPROVE VTE Individual Risk Assessment  RISK                                                          Points  [] Previous VTE                                           3  [] Thrombophilia                                        2  [] Lower limb paralysis                              2   [] Current Cancer                                       2   [] Immobilization > 24 hrs                        1  [] ICU/CCU stay > 24 hours                       1  [] Age > 60                                                   1    IMPROVE VTE Score: 0  no scd given slightly more swollen left leg; will order duplex IMPROVE VTE Individual Risk Assessment  RISK                                                          Points  [] Previous VTE                                           3  [] Thrombophilia                                        2  [] Lower limb paralysis                              2   [] Current Cancer                                       2   [] Immobilization > 24 hrs                        1  [] ICU/CCU stay > 24 hours                       1  [] Age > 60                                                   1    IMPROVE VTE Score: 0

## 2019-03-26 NOTE — PROGRESS NOTE ADULT - PROBLEM SELECTOR PLAN 1
Presented in symptomatic hypertensive urgency. BP improved s/p doses of IV hydralazine and HD yesterday evening, but elevated this morning  - continue nifedipine 90mg XL daily  - continue labetalol 300mg TID, may need to increase for optimal BP control    Of note, EKG shows lead V5 with delta-wave like findings. Given non-shortened CT interval, and confinement to 1 lead, ok to continue av gume blocking agents. Discussed with cardiology NP overnight. EKG already reviewed by full cardiology team in daytime. Will repeat ekg in am Presented in symptomatic hypertensive urgency. BP improved s/p doses of IV hydralazine and HD yesterday evening, but elevated this morning.  - increase to nifedipine 120mg XL daily  - continue labetalol 300mg TID, may need to increase for optimal BP control  - continue routine HD    Of note, EKG shows lead V5 with delta-wave like findings. Given non-shortened WA interval, and confinement to 1 lead, ok to continue av gume blocking agents. Discussed with cardiology NP overnight. EKG already reviewed by full cardiology team in daytime. Will repeat ekg in am

## 2019-03-26 NOTE — PROGRESS NOTE ADULT - ASSESSMENT
58M HTN, ESRD on HD complaining of SOB for the past week and admitted with hypertensive urgency and mild fluid overload    #SOB - orthopnea appears to be resolved. Exam w/ no notable JVD / HJR today. Lungs CTA. Vascular ultrasound appreciated.   - cont with dialysis as per renal  - remains hypertensive. Would consider ACE-I and/or Coreg to lessen pill burden.     Signing off for now. Please call with questions.    TOM Oconnor MD

## 2019-03-26 NOTE — PROGRESS NOTE ADULT - PROBLEM SELECTOR PLAN 2
Elevated but relatively stable troponin levels. Suspect poor excretion due to ESRD plus component of demand cardiac ischemia in the setting of hypertensive urgency.   Last TTE done in 2016 --> LVEF 67%  - obtain TTE and carotid duplex Elevated but relatively stable troponin levels. Pt without ACS. Suspect poor excretion due to ESRD plus component of demand cardiac ischemia in the setting of hypertensive urgency. No arrhythmic events on telemetry noted.   Last TTE done in 2016 --> LVEF 67%. Carotid duplex negative for hemodynamically  significant stenoses  - f/u TTE  - d/c telemetry.

## 2019-03-26 NOTE — PROGRESS NOTE ADULT - ASSESSMENT
58 year old male with history of ESRD on HD TTS (Nephrologist: Dr. Valdovinos, [facility]), HTN, Hepatitis C, latent TB, CHF who presented to the hospital with SOB and HTN, found to be fluid overloaded. Nephrology consulted as patient is ESRD on HD.    ESRD on HD  Patient is ESRD on HD and gets dialyzed TTS. Patient to be dialyzed today on schedule.  continue to monitor electrolytes and fluid balance along with BP. Monitor weight pre and post dialysis.  weight improved      HTN Urgency  bp improved continue ultrafiltration with HD.  if adding ACE I then would need to monitor potassium closely.

## 2019-03-26 NOTE — PROGRESS NOTE ADULT - SUBJECTIVE AND OBJECTIVE BOX
Interval History: Feels much better after dialysis yesterday    Review Of Systems:  Constitutional: [ ] Fever [ ] Chills [ ] Fatigue [ ] Weight change   HEENT: [ ] Blurred vision [ ] Eye Pain [ ] Headache [ ] Runny nose [ ] Sore Throat   Respiratory: [ ] Cough [ ] Wheezing [ ] Shortness of breath  Cardiovascular: [ ] Chest Pain [ ] Palpitations [ ] GALLAGHER [ ] PND [ ] Orthopnea  Gastrointestinal: [ ] Abdominal Pain [ ] Diarrhea [ ] Constipation [ ] Hemorrhoids [ ] Nausea [ ] Vomiting  Genitourinary: [ ] Nocturia [ ] Dysuria [ ] Incontinence  Extremities: [ ] Swelling [ ] Joint Pain  Neurologic: [ ] Focal deficit [ ] Paresthesias [ ] Syncope  Lymphatic: [ ] Swelling [ ] Lymphadenopathy   Skin: [ ] Rash [ ] Ecchymoses [ ] Wounds [ ] Lesions  Psychiatry: [ ] Depression [ ] Suicidal/Homicidal Ideation [ ] Anxiety [ ] Sleep Disturbances  [ x] 10 point review of systems is otherwise negative except as mentioned above            [ ]Unable to obtain    Medications:  calcium acetate 1334 milliGRAM(s) Oral three times a day with meals  labetalol 300 milliGRAM(s) Oral three times a day  NIFEdipine XL 90 milliGRAM(s) Oral daily      Vitals:  ICU Vital Signs Last 24 Hrs  T(C): 36.9 (26 Mar 2019 09:35), Max: 37.1 (26 Mar 2019 00:21)  T(F): 98.4 (26 Mar 2019 09:35), Max: 98.7 (26 Mar 2019 00:21)  HR: 65 (26 Mar 2019 10:09) (65 - 81)  BP: 182/99 (26 Mar 2019 10:09) (145/72 - 227/83)  BP(mean): --  ABP: --  ABP(mean): --  RR: 18 (26 Mar 2019 10:09) (18 - 18)  SpO2: 100% (26 Mar 2019 10:09) (98% - 100%)    Daily     Daily   I&O's Summary    25 Mar 2019 07:01  -  26 Mar 2019 07:00  --------------------------------------------------------  IN: 300 mL / OUT: 3000 mL / NET: -2700 mL        Physical Exam:  Appearance:  NAD [  ] Intubated [  ] Sedated  HENT: NC/AT  Cardiovascular: S1, S2, [  ] LE Edema Present, [  ] JVD Present  Respiratory: Clear to auscultation bilaterally,  [   ] Transmitted Breath Sounds From Vent/Trach  Gastrointestinal: Soft, Non-tender, Non-distended, BS+  Psychiatry: [ x ] AAOx3  [x   ] Follows Commands  [   ] Unable to assess  Skin: Intact,  [  ] Line Sites CDI, [  ] Wound sites CDI    Labs:                        11.7   3.49  )-----------( 224      ( 26 Mar 2019 07:05 )             37.8     03-26    136  |  95<L>  |  22  ----------------------------<  85  4.7   |  30  |  7.42<H>    Ca    9.4      26 Mar 2019 07:05  Phos  3.2     03-26  Mg     2.1     03-26    TPro  7.6  /  Alb  4.0  /  TBili  0.4  /  DBili  x   /  AST  27  /  ALT  16  /  AlkPhos  108  03-26    PT/INR - ( 25 Mar 2019 08:37 )   PT: 11.5 SEC;   INR: 1.03          CARDIAC MARKERS ( 25 Mar 2019 10:30 )  x     / x     / 203 u/L / 3.87 ng/mL / x        Interpretation of Telemetry:

## 2019-03-26 NOTE — PROGRESS NOTE ADULT - PROBLEM SELECTOR PLAN 3
Gets routine HD on T/Th/Sat. Received HD last night with removal of   -will resume usual home BP med regimen, and adjust as needed  -reinforce appropriate salt/fluid intake Gets routine HD on T/Th/Sat. Received HD last night   - plan for routine HD today. Renal following, recs reviewed and appreciated.  - reinforce appropriate salt/fluid intake  - continue calcium acetate for hyperphosphatemia

## 2019-03-26 NOTE — PROGRESS NOTE ADULT - ASSESSMENT
Mr. Lopez is a 59 yo man with PMHx notable for HTN and ESRD on HD with worsening orthopnea and dyspnea in the setting of hypertensive urgency. Mr. Lopez is a 59 yo man with PMHx notable for HTN and ESRD on HD with worsening orthopnea and dyspnea in the setting of hypertensive urgency. Patient still with suboptimal control of BP but symptomatically improved. Mr. Lopez is a 57 yo man with PMHx notable for HTN and ESRD on HD with worsening orthopnea and dyspnea in the setting of hypertensive urgency. Patient still with suboptimal control of BP but symptomatically improved. LE venous duplex negative for DVT.

## 2019-03-26 NOTE — PROGRESS NOTE ADULT - SUBJECTIVE AND OBJECTIVE BOX
[facility] Division of Kidney Diseases & Hypertension  FOLLOW UP NOTE  --------------------------------------------------------------------------------    HPI: 58 year old male with history of ESRD on HD TTS (Nephrologist: Dr. Valdovinos, Center: [facility] ([facility])), HTN, Hepatitis C, latent TB, CHF who presented to the hospital with SOB and HTN. Patient stated for the last week he has noticed that he has had more trouble breathing and has not been able to sleep or lay flat. He states that one week ago his outpatient dialysis dry weight was changed from 69 kg to 70 kg and he also thinks he has been eating more salt and drinking more water in the last week as well. He notes that he believes that because they increased his dry weight, they have not been pulling off ass much fluid from him, and thus he started to get short of breath. Otherwise patient complains of some chest pain. No other symptoms noted. Nephrology consulted as patient is ESRD on HD.      Patient seen and evaluated at bedside this morning.  The patient is feeling so much better than yesterday.  His breathing is substantially better.  No cp this morning.      PAST HISTORY  --------------------------------------------------------------------------------  No significant changes to PMH, PSH, FHx, SHx, unless otherwise noted    ALLERGIES & MEDICATIONS  --------------------------------------------------------------------------------  Allergies    adhesives (Other)  No Known Drug Allergies    Intolerances      Standing Inpatient Medications  calcium acetate 1334 milliGRAM(s) Oral three times a day with meals  labetalol 300 milliGRAM(s) Oral three times a day  NIFEdipine XL 30 milliGRAM(s) Oral once    PRN Inpatient Medications      REVIEW OF SYSTEMS  --------------------------------------------------------------------------------  Gen: no fever  Respiratory: no sob  CV: no cp  GI: no abd pain  MSK: no pain    VITALS/PHYSICAL EXAM  --------------------------------------------------------------------------------  T(C): 36.9 (03-26-19 @ 09:35), Max: 37.1 (03-26-19 @ 00:21)  HR: 65 (03-26-19 @ 10:09) (65 - 81)  BP: 182/99 (03-26-19 @ 10:09) (145/72 - 227/83)  RR: 18 (03-26-19 @ 10:09) (18 - 18)  SpO2: 100% (03-26-19 @ 10:09) (98% - 100%)  CVP(mm Hg): --        03-25-19 @ 07:01  -  03-26-19 @ 07:00  --------------------------------------------------------  IN: 300 mL / OUT: 3000 mL / NET: -2700 mL      Physical Exam:    	Gen: no distress  	HEENT: Anicteric  	Pulm: cta bl  	CV:  S1S2  	Abd: +BS, soft   	Ext: 1+ edema of LE  	Neuro: No focal deficits  	Skin: Warm  	Vascular access: LUE AVF    LABS/STUDIES  --------------------------------------------------------------------------------              11.7   3.49  >-----------<  224      [03-26-19 @ 07:05]              37.8     136  |  95  |  22  ----------------------------<  85      [03-26-19 @ 07:05]  4.7   |  30  |  7.42        Ca     9.4     [03-26-19 @ 07:05]      Mg     2.1     [03-26-19 @ 07:05]      Phos  3.2     [03-26-19 @ 07:05]    TPro  7.6  /  Alb  4.0  /  TBili  0.4  /  DBili  x   /  AST  27  /  ALT  16  /  AlkPhos  108  [03-26-19 @ 07:05]    PT/INR: PT 11.5 , INR 1.03       [03-25-19 @ 08:37]          [03-25-19 @ 10:30]    Creatinine Trend:  SCr 7.42 [03-26 @ 07:05]  SCr 9.35 [03-25 @ 08:37]

## 2019-03-26 NOTE — PROGRESS NOTE ADULT - SUBJECTIVE AND OBJECTIVE BOX
Patient is a 58y old  Male who presents with a chief complaint of orthopnea (25 Mar 2019 20:20)    SUBJECTIVE / OVERNIGHT EVENTS:      MEDICATIONS  (STANDING):  calcium acetate 1334 milliGRAM(s) Oral three times a day with meals  labetalol 300 milliGRAM(s) Oral three times a day  NIFEdipine XL 90 milliGRAM(s) Oral daily        Vital Signs Last 24 Hrs  T(C): 36.9 (26 Mar 2019 09:35), Max: 37.1 (26 Mar 2019 00:21)  T(F): 98.4 (26 Mar 2019 09:35), Max: 98.7 (26 Mar 2019 00:21)  HR: 67 (26 Mar 2019 09:35) (65 - 81)  BP: 182/84 (26 Mar 2019 09:35) (145/72 - 246/100)  BP(mean): --  RR: 18 (26 Mar 2019 09:35) (18 - 18)  SpO2: 100% (26 Mar 2019 09:35) (98% - 100%)  CAPILLARY BLOOD GLUCOSE        I&O's Summary    25 Mar 2019 07:01  -  26 Mar 2019 07:00  --------------------------------------------------------  IN: 300 mL / OUT: 3000 mL / NET: -2700 mL          PHYSICAL EXAM  GENERAL: NAD, well-developed  HEAD:  Atraumatic, Normocephalic  EYES: EOMI, PERRLA, conjunctiva and sclera clear  NECK: Supple, No JVD  CHEST/LUNG: Clear to auscultation bilaterally; No wheeze  HEART: Regular rate and rhythm; No murmurs, rubs, or gallops  ABDOMEN: Soft, Nontender, Nondistended; Bowel sounds present  EXTREMITIES:  2+ Peripheral Pulses, No clubbing, cyanosis, or edema  PSYCH: AAOx3  SKIN: No rashes or lesions    LABS:                        11.7   3.49  )-----------( 224      ( 26 Mar 2019 07:05 )             37.8     03-26    136  |  95<L>  |  22  ----------------------------<  85  4.7   |  30  |  7.42<H>    Ca    9.4      26 Mar 2019 07:05  Phos  3.2     03-26  Mg     2.1     03-26    TPro  7.6  /  Alb  4.0  /  TBili  0.4  /  DBili  x   /  AST  27  /  ALT  16  /  AlkPhos  108  03-26    PT/INR - ( 25 Mar 2019 08:37 )   PT: 11.5 SEC;   INR: 1.03            CARDIAC MARKERS ( 25 Mar 2019 10:30 )  x     / x     / 203 u/L / 3.87 ng/mL / x              RADIOLOGY & ADDITIONAL TESTS:    Imaging Personally Reviewed:  Consultant(s) Notes Reviewed:    Care Discussed with Consultants/Other Providers: Patient is a 58y old  Male who presents with a chief complaint of orthopnea (25 Mar 2019 20:20)    SUBJECTIVE / OVERNIGHT EVENTS:  Patient seen and examined. Feels better compared to presentation. He comments that BP is still high and suffers from constipation. He reports no BMs for over 3 days. He says his constipation may be the cause of his intermittent chest pain.    MEDICATIONS  (STANDING):  calcium acetate 1334 milliGRAM(s) Oral three times a day with meals  labetalol 300 milliGRAM(s) Oral three times a day  NIFEdipine XL 90 milliGRAM(s) Oral daily        Vital Signs Last 24 Hrs  T(C): 36.9 (26 Mar 2019 09:35), Max: 37.1 (26 Mar 2019 00:21)  T(F): 98.4 (26 Mar 2019 09:35), Max: 98.7 (26 Mar 2019 00:21)  HR: 67 (26 Mar 2019 09:35) (65 - 81)  BP: 182/84 (26 Mar 2019 09:35) (145/72 - 246/100)  BP(mean): --  RR: 18 (26 Mar 2019 09:35) (18 - 18)  SpO2: 100% (26 Mar 2019 09:35) (98% - 100%)        I&O's Summary    25 Mar 2019 07:01  -  26 Mar 2019 07:00  --------------------------------------------------------  IN: 300 mL / OUT: 3000 mL / NET: -2700 mL          PHYSICAL EXAM  GENERAL: thin framed in NAD, well-developed  CHEST/LUNG: Clear to auscultation bilaterally; No wheeze  HEART: Regular rate and rhythm; No murmurs, rubs, or gallops  ABDOMEN: Soft, Nontender, Nondistended; Bowel sounds present  EXTREMITIES:  2+ Peripheral Pulses, No clubbing, cyanosis, or edema. Left forearm aneursymal AV fistua with palpable thrill  PSYCH: AAOx3      LABS:                        11.7   3.49  )-----------( 224      ( 26 Mar 2019 07:05 )             37.8     03-26    136  |  95<L>  |  22  ----------------------------<  85  4.7   |  30  |  7.42<H>    Ca    9.4      26 Mar 2019 07:05  Phos  3.2     03-26  Mg     2.1     03-26    TPro  7.6  /  Alb  4.0  /  TBili  0.4  /  DBili  x   /  AST  27  /  ALT  16  /  AlkPhos  108  03-26    PT/INR - ( 25 Mar 2019 08:37 )   PT: 11.5 SEC;   INR: 1.03         CARDIAC MARKERS ( 25 Mar 2019 10:30 )  x     / x     / 203 u/L / 3.87 ng/mL / x          RADIOLOGY & ADDITIONAL TESTS:    Procedure: Duplex Real-time grayscale/color Doppler  ultrasonography used to interrogate extracranial arteries bilaterally.  Indications: Occlusion and stenosis of bilateral carotid  arteries (I65.23)  ------------------------------------------------------------------------  VELOCITY:  ------------------------------------------------------------------------  RIGHT:    Subclavian: 91 / 7    Prox CCA: 66/8    Mid CCA: 63/10    Dist CCA: 64/13    Prox ICA: 44/13    Mid ICA: 58 / 17    Distal ICA: 79 / 25    ECA: 57 / 8    Vertebral: 50 / 15    ICA/CCA: 1.2  ------------------------------------------------------------------------    Subclavian: Normal    CCA Plaque: Mild intimal    ICA Plaque: Mild intimal    Vertebral: Antegrade flow  ------------------------------------------------------------------------  LEFT:    Subclavian: 104 / 31    Prox CCA: 83/8    Mid CCA: 67/10    Dist CCA: 66/10    Prox ICA: 42/13    Mid ICA: 69 / 17    Distal ICA: 76 / 23    ECA: 76 /    Vertebral: 51 / 8    ICA/CCA: 0.9  ------------------------------------------------------------------------    Subclavian: Normal    CCA Plaque: Mild intimal    ICA Plaque: Mild intimal    Vertebral: Antegrade flow  ------------------------------------------------------------------------  Right Findings: Right Common Carotid Artery:  There is  mild intimal thickening noted throughout the right common  carotid artery. No hemodynamically significant stenosis.  Right Internal Carotid Artery:  B-mode and spectral  analyses consistent with a diameter reduction of 16-49%.  There is mild heterogenous plaque within the proximal  vessel. No hemodynamically significant stenosis.  No evidence of significant atherosclerosis in the external carotid artery.  Right Vertebral Artery:  Antegrade flow with normal velocities.  Left Findings: Left Common Carotid Artery:  There is mild  intimal thickening noted throughout the vessel. No  hemodynamically significant stenosis.  Left Internal Carotid Artery:  B-mode and spectral  analyses consistent with diameter reduction of 16-49%.  There is mild heterogenous plaque within the proximal  vessel. No hemodynamically significant stenosis.  No evidence of significant atherosclerosis in the external  carotid artery.  Left Vertebral Artery:  Antegrade flow with normal velocities.  ------------------------------------------------------------------------  Summary/Impressions:  Mild heterogenous plaque noted within the proximal right  and left internal carotid arteries, consistent with 16-49%  stenoses in both proximal vessels.  No hemodynamically  significant stenoses noted.      Procedure: Real-time grayscale and color Duplex  ultrasonography was used to interrogate the deep veins of  the bilateral lower extremities.  Indications: Localized swelling, mass and lump, lower  limb, bilateral (R22.43)  ------------------------------------------------------------------------  RESULT:  ------------------------------------------------------------------------  RIGHT:  Deep venous thrombosis: No  Superficial venous thrombosis: No  Deep venous insufficiency: No  Superficial venous insufficiency: No  ------------------------------------------------------------------------  LEFT:  Deep venous thrombosis: No  Superficial venous thrombosis: No  Deep venous insufficiency: No  Superficial venous insufficiency: No  ------------------------------------------------------------------------  Right Findings: No evidence of thrombosis in the right  lower extremity.  The right common femoral, femoral,  popliteal, gastrocnemius, posterior tibial, and peroneal  veins appear sonographically normal and compressible  without evidence of thrombosis.  Normal phasic Doppler waveforms noted in the right common  femoral vein.  No evidence of superficial vein thrombosis.  The right  great saphenous and short saphenous veins are patent, and  demonstrate full compressibility.  No evidence of deep and superficial venous insufficiency  in the right lower extremity.  Normal valve closure times  (VCT) with proximal and distal augmentation noted within  the common femoral, femoral, popliteal, great saphenous,  and short saphenous veins.  Left Findings: No evidence of thrombosis in the left lower  extremity.  The left common femoral, femoral, popliteal,  gastrocnemius, posterior tibial, and peroneal veins appear  sonographically normal and compressible without evidence  of thrombosis.  Normal phasic Doppler waveforms noted in the left common  femoral vein.  No evidence of superficial vein thrombosis.  The left  great saphenous and short saphenous veins are patent, and  demonstrate full compressibility.  No evidence of deep and superficial venous insufficiency  in the left lower extremity.  Normal valve closure times  (VCT) with proximal and distal augmentation noted within  the common femoral, femoral, popliteal, great saphenous,  and short saphenous veins.  ------------------------------------------------------------------------  Summary/Impressions:  1.  No evidence of deep vein thrombosis in the right and left lower extremities.  2.  No evidence of deep and superficial venous  insufficiency noted in the right and left lower extremities.    Imaging Personally Reviewed:  Consultant(s) Notes Reviewed:    Care Discussed with Consultants/Other Providers:

## 2019-03-26 NOTE — PROGRESS NOTE ADULT - PROBLEM SELECTOR PLAN 4
will likely require oupt surveillance in 3-6 months, but would confirm with pulmonology will likely require oupt surveillance in 3-6 months

## 2019-03-27 DIAGNOSIS — Z71.89 OTHER SPECIFIED COUNSELING: ICD-10-CM

## 2019-03-27 DIAGNOSIS — R06.00 DYSPNEA, UNSPECIFIED: ICD-10-CM

## 2019-03-27 PROCEDURE — 93010 ELECTROCARDIOGRAM REPORT: CPT

## 2019-03-27 PROCEDURE — 99233 SBSQ HOSP IP/OBS HIGH 50: CPT

## 2019-03-27 RX ORDER — POLYETHYLENE GLYCOL 3350 17 G/17G
17 POWDER, FOR SOLUTION ORAL DAILY
Qty: 0 | Refills: 0 | Status: DISCONTINUED | OUTPATIENT
Start: 2019-03-27 | End: 2019-03-28

## 2019-03-27 RX ORDER — LABETALOL HCL 100 MG
400 TABLET ORAL THREE TIMES A DAY
Qty: 0 | Refills: 0 | Status: DISCONTINUED | OUTPATIENT
Start: 2019-03-27 | End: 2019-03-28

## 2019-03-27 RX ORDER — HYDRALAZINE HCL 50 MG
25 TABLET ORAL EVERY 8 HOURS
Qty: 0 | Refills: 0 | Status: DISCONTINUED | OUTPATIENT
Start: 2019-03-27 | End: 2019-03-28

## 2019-03-27 RX ADMIN — Medication 1334 MILLIGRAM(S): at 17:50

## 2019-03-27 RX ADMIN — Medication 1334 MILLIGRAM(S): at 08:10

## 2019-03-27 RX ADMIN — Medication 100 MILLIGRAM(S): at 05:35

## 2019-03-27 RX ADMIN — Medication 120 MILLIGRAM(S): at 05:36

## 2019-03-27 RX ADMIN — Medication 100 MILLIGRAM(S): at 17:50

## 2019-03-27 RX ADMIN — Medication 400 MILLIGRAM(S): at 22:55

## 2019-03-27 RX ADMIN — Medication 300 MILLIGRAM(S): at 08:10

## 2019-03-27 RX ADMIN — Medication 5 MILLIGRAM(S): at 14:11

## 2019-03-27 RX ADMIN — Medication 25 MILLIGRAM(S): at 15:00

## 2019-03-27 RX ADMIN — SENNA PLUS 2 TABLET(S): 8.6 TABLET ORAL at 22:54

## 2019-03-27 RX ADMIN — Medication 300 MILLIGRAM(S): at 12:55

## 2019-03-27 RX ADMIN — Medication 25 MILLIGRAM(S): at 22:54

## 2019-03-27 RX ADMIN — Medication 1334 MILLIGRAM(S): at 12:55

## 2019-03-27 NOTE — PROGRESS NOTE ADULT - SUBJECTIVE AND OBJECTIVE BOX
Patient is a 58y old  Male who presents with a chief complaint of orthopnea (26 Mar 2019 12:30)      SUBJECTIVE / OVERNIGHT EVENTS: Pt c/o shortness of breath when he lays down.     MEDICATIONS  (STANDING):  calcium acetate 1334 milliGRAM(s) Oral three times a day with meals  docusate sodium 100 milliGRAM(s) Oral two times a day  labetalol 300 milliGRAM(s) Oral three times a day  NIFEdipine  milliGRAM(s) Oral daily  senna 2 Tablet(s) Oral at bedtime    MEDICATIONS  (PRN):      Vital Signs Last 24 Hrs  T(C): 36.7 (27 Mar 2019 12:54), Max: 36.7 (26 Mar 2019 14:32)  T(F): 98 (27 Mar 2019 12:54), Max: 98.1 (26 Mar 2019 14:32)  HR: 69 (27 Mar 2019 12:54) (63 - 76)  BP: 185/84 (27 Mar 2019 12:54) (156/93 - 191/100)  BP(mean): --  RR: 18 (27 Mar 2019 12:54) (18 - 18)  SpO2: 100% (27 Mar 2019 12:54) (100% - 100%)  CAPILLARY BLOOD GLUCOSE        I&O's Summary    26 Mar 2019 07:01  -  27 Mar 2019 07:00  --------------------------------------------------------  IN: 400 mL / OUT: 2800 mL / NET: -2400 mL        PHYSICAL EXAM:  GENERAL: NAD  HEAD:  Atraumatic, Normocephalic  EYES: EOMI, PERRLA, conjunctiva and sclera clear  NECK: Supple, no JVD  CHEST/LUNG: Clear to auscultation bilaterally; No wheeze  HEART: Regular rate and rhythm; No murmurs, rubs, or gallops  ABDOMEN: Soft, Nontender, Nondistended; Bowel sounds present  EXTREMITIES:  2+ Peripheral Pulses, No clubbing, cyanosis, or edema  PSYCH: AAOx3  NEUROLOGY: non-focal  SKIN: No rashes or lesions    LABS:                        11.7   3.49  )-----------( 224      ( 26 Mar 2019 07:05 )             37.8     03-26    136  |  95<L>  |  22  ----------------------------<  85  4.7   |  30  |  7.42<H>    Ca    9.4      26 Mar 2019 07:05  Phos  3.2     03-26  Mg     2.1     03-26    TPro  7.6  /  Alb  4.0  /  TBili  0.4  /  DBili  x   /  AST  27  /  ALT  16  /  AlkPhos  108  03-26              RADIOLOGY & ADDITIONAL TESTS:    Imaging Personally Reviewed:  TTE:  1. Mitral annular calcification, otherwise normal mitral  valve. Mild mitral regurgitation.  2. Calcified trileaflet aortic valve with mildly decreased  opening. Peak transaortic valve gradient equals 22 mm Hg,  mean transaortic valve gradient equals 12 mm Hg, consistent  with mild aortic stenosis. Mild aortic regurgitation.  3. Moderately dilated left atrium.  LA volume index = 47  cc/m2.  4. Mild left ventricular enlargement.  5. Normal left ventricular systolic function. No segmental  wall motion abnormalities.  6. Normal right ventricular size and function.    Consultant(s) Notes Reviewed:      Care Discussed with Consultants/Other Providers: spoke w/ Dr Key from renal, will add hydralazine for better BP control

## 2019-03-27 NOTE — PROGRESS NOTE ADULT - PROBLEM SELECTOR PLAN 1
Hypertensive urgency. BP improved s/p HD, but elevated this morning.  - continue nifedipine 120mg XL daily  - increase Labetalol 400mg TID   - start Hydralazine 25mg PO q8h   - continue UF with HD  - monitor BP

## 2019-03-27 NOTE — PROGRESS NOTE ADULT - PROBLEM SELECTOR PLAN 6
IMPROVE VTE Individual Risk Assessment  RISK                                                          Points  [] Previous VTE                                           3  [] Thrombophilia                                        2  [] Lower limb paralysis                              2   [] Current Cancer                                       2   [] Immobilization > 24 hrs                        1  [] ICU/CCU stay > 24 hours                       1  [] Age > 60                                                   1    IMPROVE VTE Score: 0

## 2019-03-27 NOTE — PROGRESS NOTE ADULT - PROBLEM SELECTOR PLAN 3
Elevated but relatively stable troponin levels. Pt without ACS. Suspect poor excretion due to ESRD plus component of demand cardiac ischemia in the setting of hypertensive urgency. No arrhythmic events on telemetry noted.   - TTE w/ normal LV function, no WMA, mild valvular disease

## 2019-03-27 NOTE — PROGRESS NOTE ADULT - PROBLEM SELECTOR PLAN 4
Gets routine HD on T/Th/Sat. Received extra HD on Monday.   - Routine HD T/TH/St   - reinforce appropriate salt/fluid intake  - continue calcium acetate for hyperphosphatemia

## 2019-03-27 NOTE — PROGRESS NOTE ADULT - PROBLEM SELECTOR PLAN 2
- persistent dyspnea likely d/t uncontrolled HTN   - blood pressure control, adding Hydralazine for afterload reduction   - does not appear fluid overloaded, UF w/ HD as per renal   - TTE unremarkable   - LE dopplers negative for DVT   - monitor symptoms

## 2019-03-27 NOTE — PROGRESS NOTE ADULT - ASSESSMENT
Mr. Lopez is a 59 yo man with PMHx notable for HTN and ESRD on HD with worsening orthopnea and dyspnea in the setting of hypertensive urgency. Patient still with suboptimal control of BP but symptomatically improved. LE venous duplex negative for DVT.

## 2019-03-28 ENCOUNTER — TRANSCRIPTION ENCOUNTER (OUTPATIENT)
Age: 59
End: 2019-03-28

## 2019-03-28 VITALS
TEMPERATURE: 98 F | DIASTOLIC BLOOD PRESSURE: 83 MMHG | SYSTOLIC BLOOD PRESSURE: 148 MMHG | RESPIRATION RATE: 18 BRPM | HEART RATE: 61 BPM

## 2019-03-28 LAB
ANION GAP SERPL CALC-SCNC: 15 MMO/L — HIGH (ref 7–14)
BUN SERPL-MCNC: 30 MG/DL — HIGH (ref 7–23)
CALCIUM SERPL-MCNC: 9.6 MG/DL — SIGNIFICANT CHANGE UP (ref 8.4–10.5)
CHLORIDE SERPL-SCNC: 93 MMOL/L — LOW (ref 98–107)
CO2 SERPL-SCNC: 27 MMOL/L — SIGNIFICANT CHANGE UP (ref 22–31)
CREAT SERPL-MCNC: 8.64 MG/DL — HIGH (ref 0.5–1.3)
GLUCOSE SERPL-MCNC: 87 MG/DL — SIGNIFICANT CHANGE UP (ref 70–99)
HCT VFR BLD CALC: 36.2 % — LOW (ref 39–50)
HGB BLD-MCNC: 11.2 G/DL — LOW (ref 13–17)
MAGNESIUM SERPL-MCNC: 2.2 MG/DL — SIGNIFICANT CHANGE UP (ref 1.6–2.6)
MCHC RBC-ENTMCNC: 24.8 PG — LOW (ref 27–34)
MCHC RBC-ENTMCNC: 30.9 % — LOW (ref 32–36)
MCV RBC AUTO: 80.1 FL — SIGNIFICANT CHANGE UP (ref 80–100)
NRBC # FLD: 0 K/UL — SIGNIFICANT CHANGE UP (ref 0–0)
PHOSPHATE SERPL-MCNC: 3.8 MG/DL — SIGNIFICANT CHANGE UP (ref 2.5–4.5)
PLATELET # BLD AUTO: 211 K/UL — SIGNIFICANT CHANGE UP (ref 150–400)
PMV BLD: 10.7 FL — SIGNIFICANT CHANGE UP (ref 7–13)
POTASSIUM SERPL-MCNC: 4.5 MMOL/L — SIGNIFICANT CHANGE UP (ref 3.5–5.3)
POTASSIUM SERPL-SCNC: 4.5 MMOL/L — SIGNIFICANT CHANGE UP (ref 3.5–5.3)
RBC # BLD: 4.52 M/UL — SIGNIFICANT CHANGE UP (ref 4.2–5.8)
RBC # FLD: 15.7 % — HIGH (ref 10.3–14.5)
SODIUM SERPL-SCNC: 135 MMOL/L — SIGNIFICANT CHANGE UP (ref 135–145)
WBC # BLD: 3.26 K/UL — LOW (ref 3.8–10.5)
WBC # FLD AUTO: 3.26 K/UL — LOW (ref 3.8–10.5)

## 2019-03-28 PROCEDURE — 99232 SBSQ HOSP IP/OBS MODERATE 35: CPT

## 2019-03-28 PROCEDURE — 99239 HOSP IP/OBS DSCHRG MGMT >30: CPT

## 2019-03-28 RX ORDER — LABETALOL HCL 100 MG
1 TABLET ORAL
Qty: 0 | Refills: 0 | COMMUNITY

## 2019-03-28 RX ORDER — HYDRALAZINE HCL 50 MG
1 TABLET ORAL
Qty: 90 | Refills: 0
Start: 2019-03-28 | End: 2019-04-26

## 2019-03-28 RX ORDER — NIFEDIPINE 30 MG
1 TABLET, EXTENDED RELEASE 24 HR ORAL
Qty: 0 | Refills: 0 | COMMUNITY

## 2019-03-28 RX ORDER — LABETALOL HCL 100 MG
2 TABLET ORAL
Qty: 180 | Refills: 0
Start: 2019-03-28 | End: 2019-04-26

## 2019-03-28 RX ORDER — NIFEDIPINE 30 MG
2 TABLET, EXTENDED RELEASE 24 HR ORAL
Qty: 60 | Refills: 0
Start: 2019-03-28 | End: 2019-04-26

## 2019-03-28 RX ORDER — LABETALOL HCL 100 MG
3 TABLET ORAL
Qty: 0 | Refills: 0 | DISCHARGE
Start: 2019-03-28 | End: 2019-04-26

## 2019-03-28 RX ADMIN — Medication 120 MILLIGRAM(S): at 11:55

## 2019-03-28 RX ADMIN — Medication 25 MILLIGRAM(S): at 11:55

## 2019-03-28 RX ADMIN — Medication 400 MILLIGRAM(S): at 11:55

## 2019-03-28 NOTE — DISCHARGE NOTE PROVIDER - NSDCCPCAREPLAN_GEN_ALL_CORE_FT
PRINCIPAL DISCHARGE DIAGNOSIS  Diagnosis: SOB (shortness of breath)  Assessment and Plan of Treatment: It was believed that your shortness of breath was secodnary to your elevated blood pressure. Your echocardiogram, carotid dopplers and lower extremity dopplers were normal. Please follow up with your primary care doctor.      SECONDARY DISCHARGE DIAGNOSES  Diagnosis: Hypertensive urgency  Assessment and Plan of Treatment: Your blood pressure was very high when you came into the hospital. Your medications were adjusted. TAKE THE NEW MEDICATIONS AS PRESCRIBED- Labetalaol 400mg three times aday, Hydralazine 25mg every 8 hours, Nifedipine 120mg once a day. It is very important that you follow up with your primary care doctor within 5 days of discharge for blood pressure management and further care.    Diagnosis: ESRD on hemodialysis  Assessment and Plan of Treatment: Your dialysis has been reinstated. Close follow up with your nephrologist and primary care physician.    Diagnosis: Lung nodule  Assessment and Plan of Treatment: On your CT scan of the chest, we found a few scattered cysts in the periphery of the right upper lobe, largest measuring 2.5 x 1.5 cm. This should be followed up outpatient and may require repeat imaging. Please discuss with your primary care doctor.

## 2019-03-28 NOTE — DISCHARGE NOTE PROVIDER - NSDCFUADDINST_GEN_ALL_CORE_FT
Please follow up with your primary care physician within 5 days of discharge. Your blood pressure should be closely monitored. Take all medications as prescribed. Read all the discharge note carefully. For new or worsening symptoms, please return to the hospital.

## 2019-03-28 NOTE — PROGRESS NOTE ADULT - SUBJECTIVE AND OBJECTIVE BOX
[facility] DIVISION OF KIDNEY DISEASES AND HYPERTENSION -- FOLLOW UP NOTE  --------------------------------------------------------------------------------    Patient seen and examined. Had HD this morning without any complications. Currently feels well and wants to go home.         PAST HISTORY  --------------------------------------------------------------------------------  No significant changes to PMH, PSH, FHx, SHx, unless otherwise noted    ALLERGIES & MEDICATIONS  --------------------------------------------------------------------------------  Allergies    adhesives (Other)  No Known Drug Allergies    Intolerances      Standing Inpatient Medications  calcium acetate 1334 milliGRAM(s) Oral three times a day with meals  docusate sodium 100 milliGRAM(s) Oral two times a day  hydrALAZINE 25 milliGRAM(s) Oral every 8 hours  labetalol 400 milliGRAM(s) Oral three times a day  NIFEdipine  milliGRAM(s) Oral daily  polyethylene glycol 3350 17 Gram(s) Oral daily  senna 2 Tablet(s) Oral at bedtime    PRN Inpatient Medications      REVIEW OF SYSTEMS  --------------------------------------------------------------------------------  Gen: No weight changes, fatigue, fevers/chills, weakness  Skin: No rashes  Head/Eyes/Ears/Mouth: No headache; Normal hearing; Normal vision   Respiratory: No dyspnea, cough, wheezing  CV: No chest pain, PND, orthopnea  GI: No abdominal pain, diarrhea, constipation, nausea, vomiting  MSK: No joint pain/swelling; no back pain; no edema  Neuro: No dizziness/lightheadedness, weakness  Heme: No easy bruising or bleeding  Endo: No heat/cold intolerance    All other systems were reviewed and are negative, except as noted.    VITALS/PHYSICAL EXAM  --------------------------------------------------------------------------------  T(C): 36.8 (03-28-19 @ 09:20), Max: 36.8 (03-27-19 @ 22:48)  HR: 61 (03-28-19 @ 09:20) (61 - 69)  BP: 148/83 (03-28-19 @ 09:25) (138/77 - 195/81)  RR: 18 (03-28-19 @ 09:20) (18 - 18)  SpO2: 100% (03-28-19 @ 09:20) (100% - 100%)  Wt(kg): --        Physical Exam:  	Gen: NAD, well-appearing  	HEENT: PERRL, supple neck, clear oropharynx  	Pulm: CTA B/L  	CV: RRR, S1S2; no rub  	Back: No spinal or CVA tenderness  	Abd: +BS, soft, nontender/nondistended  	: No suprapubic tenderness  	UE: Warm,  no edema  	LE: Warm,  no edema  	Neuro: No focal deficits, intact gait  	Psych: Normal affect and mood  	Skin: Warm, without rashes  	Vascular access: L UE AVF with aneurysms + thrill and + bruit    LABS/STUDIES  --------------------------------------------------------------------------------              11.2   3.26  >-----------<  211      [03-28-19 @ 06:12]              36.2     135  |  93  |  30  ----------------------------<  87      [03-28-19 @ 06:12]  4.5   |  27  |  8.64        Ca     9.6     [03-28-19 @ 06:12]      Mg     2.2     [03-28-19 @ 06:12]      Phos  3.8     [03-28-19 @ 06:12]        Creatinine Trend:  SCr 8.64 [03-28 @ 06:12]  SCr 7.42 [03-26 @ 07:05]  SCr 9.35 [03-25 @ 08:37]

## 2019-03-28 NOTE — PROGRESS NOTE ADULT - PROBLEM SELECTOR PROBLEM 1
ESRD on hemodialysis
ESRD on hemodialysis
Essential hypertension
Hypertensive urgency
Hypertensive urgency

## 2019-03-28 NOTE — PROGRESS NOTE ADULT - PROBLEM SELECTOR PLAN 1
Hypertensive urgency resolved. BP has improved but remains elevated at times.   - continue nifedipine 120mg XL daily  - continue Labetalol 400mg TID   - Continue Hydralazine 25mg PO q8h   - continue UF with HD  - monitor BP

## 2019-03-28 NOTE — DISCHARGE NOTE PROVIDER - HOSPITAL COURSE
HPI: 58M pmhx HTN, ESRD on HD complaining of SOB for the past week. He notes that his main complaint is difficulty breathing when sleeping that forces him to sit up. Reports no sleep because of it. Initial , after 2 rounds IV hydralazine, nifedipine x1, isordil x1 . 2016 TTE with LVH, but normal bivntricular function, no valvulopathy. Pt endorses  compliance with his meds and dialysis -- last session on Saturday, completed entire session.  Notes he recently had his dry weight increased from 69kg to 70kg. Notes recent URI symptoms, denies current fever, RVP negative. CT chest negative for fluid overload, + right upper  lobe pulmonary nodules, largest 2.5 x 1.5 cm. Reports his fluid and salt intake increased over last several weeks. HS Trop initially 360, then 324. Chest pain only when supine. EKG NSR.     Admitted to telemetry service for dyspnea and HTN urgency.         -Hypertensive urgency- improved with HD and HTN medication adjustment.     -Dyspnea- persistent dyspnea likely d/t uncontrolled HTN. TTE unremarkable. LE dopplers negative. BP regimen adjusted.     -Troponin level elevated- Elevated but relatively stable troponin levels. Pt without ACS. Suspect poor excretion due to ESRD plus component of demand cardiac ischemia in the setting of hypertensive urgency. No arrhythmic events on telemetry noted. TTE w/ normal LV function, no WMA, mild valvular disease.     -ESRD on hemodialysis- Gets routine HD on T/Th/Sat. Last inpatient dialysis on 3/28/19. Outpatient dialysis reinstated prior to discharge.     -Lung nodule on CT scan as below- Will require outpatient surveillance in 3-6 months.         Imaging:     CXR: Borderline cardiomegaly with clear lungs.        TTE:    1. Mitral annular calcification, otherwise normal mitral valve. Mild mitral regurgitation.    2. Calcified trileaflet aortic valve with mildly decreased opening. Peak transaortic valve gradient equals 22 mm Hg, mean transaortic valve gradient equals 12 mm Hg, consistent with mild aortic stenosis. Mild aortic regurgitation.    3. Moderately dilated left atrium.  LA volume index = 47cc/m2.    4. Mild left ventricular enlargement.    5. Normal left ventricular systolic function. No segmental wall motion abnormalities.    6. Normal right ventricular size and function.    EF 59%        CT CHEST:    A few scattered cysts in the periphery of the right upper lobe, largest measuring 2.5 x 1.5 cm. Mild bibasilar linear atelectasis. No focal pulmonary consolidation or pulmonary nodule. Patent central airways.        LE US:    1.  No evidence of deep vein thrombosis in the right and left lower extremities.    2.  No evidence of deep and superficial venous insufficiency noted in the right and left lower extremities.        Carotid US: Mild heterogenous plaque noted within the proximal right and left internal carotid arteries, consistent with 16-49% stenoses in both proximal vessels.  No hemodynamically significant stenoses noted.        Case discussed with Dr. Gould on 3/28/19. The patient is medically stable for discharge and has appropriate follow up. HPI: 58M pmhx HTN, ESRD on HD complaining of SOB for the past week. He notes that his main complaint is difficulty breathing when sleeping that forces him to sit up. Reports no sleep because of it. Initial , after 2 rounds IV hydralazine, nifedipine x1, isordil x1 . 2016 TTE with LVH, but normal bivntricular function, no valvulopathy. Pt endorses  compliance with his meds and dialysis -- last session on Saturday, completed entire session.  Notes he recently had his dry weight increased from 69kg to 70kg. Notes recent URI symptoms, denies current fever, RVP negative. CT chest negative for fluid overload, + right upper  lobe pulmonary nodules, largest 2.5 x 1.5 cm. Reports his fluid and salt intake increased over last several weeks. HS Trop initially 360, then 324. Chest pain only when supine. EKG NSR.     Admitted to telemetry service for dyspnea and HTN urgency.         -Hypertensive urgency- improved with UF and HTN medication adjustment. Dose of Labetalol and Procardia were increased, Hydralazine was added.     -Dyspnea- persistent dyspnea likely d/t uncontrolled HTN. TTE unremarkable. LE dopplers negative. BP regimen adjusted and dyspnea resolved.     -Troponin level elevated- Elevated but relatively stable troponin levels. Pt without ACS. Suspect poor excretion due to ESRD plus component of demand cardiac ischemia in the setting of hypertensive urgency. No arrhythmic events on telemetry noted. TTE w/ normal LV function, no WMA, mild valvular disease.     -ESRD on hemodialysis- Gets routine HD on T/Th/Sat. Last inpatient dialysis on 3/28/19. Outpatient dialysis reinstated prior to discharge.     -Lung nodule on CT scan as below- Will require outpatient surveillance in 3-6 months.         Imaging:     CXR: Borderline cardiomegaly with clear lungs.        TTE:    1. Mitral annular calcification, otherwise normal mitral valve. Mild mitral regurgitation.    2. Calcified trileaflet aortic valve with mildly decreased opening. Peak transaortic valve gradient equals 22 mm Hg, mean transaortic valve gradient equals 12 mm Hg, consistent with mild aortic stenosis. Mild aortic regurgitation.    3. Moderately dilated left atrium.  LA volume index = 47cc/m2.    4. Mild left ventricular enlargement.    5. Normal left ventricular systolic function. No segmental wall motion abnormalities.    6. Normal right ventricular size and function.    EF 59%        CT CHEST:    A few scattered cysts in the periphery of the right upper lobe, largest measuring 2.5 x 1.5 cm. Mild bibasilar linear atelectasis. No focal pulmonary consolidation or pulmonary nodule. Patent central airways.        LE US:    1.  No evidence of deep vein thrombosis in the right and left lower extremities.    2.  No evidence of deep and superficial venous insufficiency noted in the right and left lower extremities.        Carotid US: Mild heterogenous plaque noted within the proximal right and left internal carotid arteries, consistent with 16-49% stenoses in both proximal vessels.  No hemodynamically significant stenoses noted.        Case discussed with Dr. Gould on 3/28/19. The patient is medically stable for discharge and has appropriate follow up.

## 2019-03-28 NOTE — PROGRESS NOTE ADULT - PROBLEM SELECTOR PLAN 2
- persistent dyspnea likely d/t uncontrolled HTN, now resolved   - blood pressure control as above   - UF w/ HD as per renal   - TTE unremarkable   - LE dopplers negative for DVT   - monitor symptoms

## 2019-03-28 NOTE — PROGRESS NOTE ADULT - ASSESSMENT
58 year old male with history of ESRD on HD TTS (Nephrologist: Dr. Valdovinos, Center: [facility]), HTN, Hepatitis C, latent TB, CHF who presented to the hospital with SOB and HTN, found to be fluid overloaded. Nephrology consulted as patient is ESRD on HD.    ESRD on HD  Patient is ESRD on HD and gets dialyzed TTS. Patient was dialyzed today without any issues. Weight improved. Will follow upfor next session on Saturday at [facility].      HTN Urgency  BP improved after ultrafiltration with HD.

## 2019-03-28 NOTE — DISCHARGE NOTE NURSING/CASE MANAGEMENT/SOCIAL WORK - NSDCCRNAME_GEN_ALL_CORE_FT
[facility] [address] [place] [address]  on a TTS 2ndshift.resume tx on Saturday 03/30/2019

## 2019-03-28 NOTE — PROGRESS NOTE ADULT - PROBLEM SELECTOR PROBLEM 2
Hypertensive urgency
Hypertensive urgency
Dyspnea, unspecified type
Dyspnea, unspecified type
Troponin level elevated

## 2019-03-28 NOTE — PROGRESS NOTE ADULT - SUBJECTIVE AND OBJECTIVE BOX
Patient is a 58y old  Male who presents with a chief complaint of orthopnea (28 Mar 2019 11:39)      SUBJECTIVE / OVERNIGHT EVENTS: Patient seen this AM at 10:00. Had a bowel movement and feels much better, no shortness of breath.     MEDICATIONS  (STANDING):  calcium acetate 1334 milliGRAM(s) Oral three times a day with meals  docusate sodium 100 milliGRAM(s) Oral two times a day  hydrALAZINE 25 milliGRAM(s) Oral every 8 hours  labetalol 400 milliGRAM(s) Oral three times a day  NIFEdipine  milliGRAM(s) Oral daily  polyethylene glycol 3350 17 Gram(s) Oral daily  senna 2 Tablet(s) Oral at bedtime    MEDICATIONS  (PRN):      Vital Signs Last 24 Hrs  T(C): 36.8 (28 Mar 2019 09:25), Max: 36.8 (27 Mar 2019 22:48)  T(F): 98.2 (28 Mar 2019 09:25), Max: 98.3 (27 Mar 2019 22:48)  HR: 61 (28 Mar 2019 09:25) (61 - 67)  BP: 148/83 (28 Mar 2019 09:25) (138/77 - 195/81)  BP(mean): --  RR: 18 (28 Mar 2019 09:25) (18 - 18)  SpO2: 100% (28 Mar 2019 09:20) (100% - 100%)  CAPILLARY BLOOD GLUCOSE        I&O's Summary    28 Mar 2019 07:01  -  28 Mar 2019 16:09  --------------------------------------------------------  IN: 500 mL / OUT: 1700 mL / NET: -1200 mL        PHYSICAL EXAM:  GENERAL: NAD, well-developed  HEAD:  Atraumatic, Normocephalic  EYES: EOMI, PERRLA, conjunctiva and sclera clear  NECK: Supple, No JVD  CHEST/LUNG: Clear to auscultation bilaterally; No wheeze  HEART: Regular rate and rhythm; No murmurs, rubs, or gallops  ABDOMEN: Soft, Nontender, Nondistended; Bowel sounds present  EXTREMITIES:  2+ Peripheral Pulses, No clubbing, cyanosis, or edema  PSYCH: AAOx3  NEUROLOGY: non-focal  SKIN: No rashes or lesions    LABS:                        11.2   3.26  )-----------( 211      ( 28 Mar 2019 06:12 )             36.2     03-28    135  |  93<L>  |  30<H>  ----------------------------<  87  4.5   |  27  |  8.64<H>    Ca    9.6      28 Mar 2019 06:12  Phos  3.8     03-28  Mg     2.2     03-28                RADIOLOGY & ADDITIONAL TESTS:    Imaging Personally Reviewed:    Consultant(s) Notes Reviewed:      Care Discussed with Consultants/Other Providers:

## 2019-04-04 DIAGNOSIS — Z76.89 PERSONS ENCOUNTERING HEALTH SERVICES IN OTHER SPECIFIED CIRCUMSTANCES: ICD-10-CM

## 2019-05-31 ENCOUNTER — NON-APPOINTMENT (OUTPATIENT)
Age: 59
End: 2019-05-31

## 2019-05-31 ENCOUNTER — APPOINTMENT (OUTPATIENT)
Dept: CARDIOLOGY | Facility: CLINIC | Age: 59
End: 2019-05-31
Payer: MEDICARE

## 2019-05-31 VITALS
SYSTOLIC BLOOD PRESSURE: 165 MMHG | BODY MASS INDEX: 19.75 KG/M2 | TEMPERATURE: 98 F | HEART RATE: 65 BPM | OXYGEN SATURATION: 100 % | WEIGHT: 149 LBS | RESPIRATION RATE: 15 BRPM | HEIGHT: 73 IN | DIASTOLIC BLOOD PRESSURE: 83 MMHG

## 2019-05-31 PROCEDURE — 99214 OFFICE O/P EST MOD 30 MIN: CPT

## 2019-05-31 PROCEDURE — 93000 ELECTROCARDIOGRAM COMPLETE: CPT

## 2019-05-31 NOTE — REVIEW OF SYSTEMS
[see HPI] : see HPI [Negative] : Heme/Lymph [Shortness Of Breath] : no shortness of breath [Dyspnea on exertion] : not dyspnea during exertion [Chest Pain] : no chest pain

## 2019-05-31 NOTE — PHYSICAL EXAM
[General Appearance - Well Developed] : well developed [Normal Appearance] : normal appearance [Well Groomed] : well groomed [General Appearance - Well Nourished] : well nourished [No Deformities] : no deformities [General Appearance - In No Acute Distress] : no acute distress [Normal Jugular Venous A Waves Present] : normal jugular venous A waves present [Normal Jugular Venous V Waves Present] : normal jugular venous V waves present [No Jugular Venous Hunt A Waves] : no jugular venous hunt A waves [Respiration, Rhythm And Depth] : normal respiratory rhythm and effort [Exaggerated Use Of Accessory Muscles For Inspiration] : no accessory muscle use [Auscultation Breath Sounds / Voice Sounds] : lungs were clear to auscultation bilaterally [Heart Rate And Rhythm] : heart rate and rhythm were normal [Heart Sounds] : normal S1 and S2 [Murmurs] : no murmurs present [Abdomen Soft] : soft [Abdomen Tenderness] : non-tender [Abdomen Mass (___ Cm)] : no abdominal mass palpated [Abnormal Walk] : normal gait [Gait - Sufficient For Exercise Testing] : the gait was sufficient for exercise testing [Nail Clubbing] : no clubbing of the fingernails [Cyanosis, Localized] : no localized cyanosis [Petechial Hemorrhages (___cm)] : no petechial hemorrhages [] : no ischemic changes [Oriented To Time, Place, And Person] : oriented to person, place, and time [Affect] : the affect was normal [Mood] : the mood was normal [No Anxiety] : not feeling anxious [FreeTextEntry1] : Left AV fistula

## 2019-05-31 NOTE — DISCUSSION/SUMMARY
[FreeTextEntry1] : 59 year old with hypertension\par Continue Labetalol 400 TID\par Continue Nifedipine 60 mg daily\par FU in 6 months

## 2019-05-31 NOTE — HISTORY OF PRESENT ILLNESS
[FreeTextEntry1] : Here to followup\par No complaints\par On Labetalol 400 mg TID and Nifedipine 60 mg daily\par No hydralazine (could not tolerate)\par Had TTE in March 2019 that showed normal LV function

## 2019-06-01 PROCEDURE — G9005: CPT

## 2019-06-06 ENCOUNTER — RX CHANGE (OUTPATIENT)
Age: 59
End: 2019-06-06

## 2019-06-18 ENCOUNTER — RX RENEWAL (OUTPATIENT)
Age: 59
End: 2019-06-18

## 2019-07-31 ENCOUNTER — APPOINTMENT (OUTPATIENT)
Dept: HEPATOLOGY | Facility: CLINIC | Age: 59
End: 2019-07-31
Payer: MEDICARE

## 2019-07-31 ENCOUNTER — APPOINTMENT (OUTPATIENT)
Dept: HEPATOLOGY | Facility: CLINIC | Age: 59
End: 2019-07-31

## 2019-07-31 ENCOUNTER — LABORATORY RESULT (OUTPATIENT)
Age: 59
End: 2019-07-31

## 2019-07-31 VITALS
SYSTOLIC BLOOD PRESSURE: 173 MMHG | BODY MASS INDEX: 19.88 KG/M2 | DIASTOLIC BLOOD PRESSURE: 84 MMHG | HEIGHT: 73 IN | HEART RATE: 67 BPM | WEIGHT: 150 LBS | RESPIRATION RATE: 15 BRPM | TEMPERATURE: 99.5 F

## 2019-07-31 PROCEDURE — 99205 OFFICE O/P NEW HI 60 MIN: CPT

## 2019-07-31 NOTE — PHYSICAL EXAM
[General Appearance - Alert] : alert [General Appearance - Well Nourished] : well nourished [Sclera] : the sclera and conjunctiva were normal [Outer Ear] : the ears and nose were normal in appearance [Neck Appearance] : the appearance of the neck was normal [Heart Rate And Rhythm] : heart rate was normal and rhythm regular [Apical Impulse] : the apical impulse was normal [Arterial Pulses Carotid] : carotid pulses were normal with no bruits [Heart Sounds] : normal S1 and S2 [Heart Sounds Gallop] : no gallops [Bowel Sounds] : normal bowel sounds [Abdomen Tenderness] : non-tender [Abdomen Soft] : soft [Abdomen Mass (___ Cm)] : no abdominal mass palpated [Abnormal Walk] : normal gait [Abdomen Hernia] : no hernia was discovered [No CVA Tenderness] : no ~M costovertebral angle tenderness [Skin Color & Pigmentation] : normal skin color and pigmentation [] : no rash [Cranial Nerves] : cranial nerves 2-12 were intact [Sensation] : the sensory exam was normal to light touch and pinprick [Oriented To Time, Place, And Person] : oriented to person, place, and time [Motor Exam] : the motor exam was normal [Affect] : the affect was normal [FreeTextEntry1] : Left arm AV fistula

## 2019-07-31 NOTE — HISTORY OF PRESENT ILLNESS
[FreeTextEntry1] : 59 year old male with past medical hx of ESRD on HD (Tues/Thur/Sat), HTN, hx of hepatitis C. He was apparently told to have hepatitis C in 2009 when he started having HD.\par Never been treated for HCV. He is also unsure he has active hepatitis C or not.\par He reports that his nephrologist suggested renal transplant in the past but he declined as he was afraid potential complications. He is now willing get re-evaluated.\par \par He gives hx of tattoo about 40 years ago. Denies any IVDU. He had multiple partners longtime ago. He is unsure how he acquired hepatitis C.\par \par Denies alcohol or smoking.\par \par Hx of appendectomy 8 years ago.\par No family hx of liver disease.

## 2019-07-31 NOTE — ASSESSMENT
[FreeTextEntry1] : Hx of hepatitis C, nevet been treated ( no recent records to confirm active HCV), ESRD on HD, HTN\par \par PLAN\par - Screen for hepatitis A, B, and C. I will also check HCV RNA, and HCV genotype\par -Schedule an US of the abdomen\par -Schedule a Fibroscan\par -I will refer for evaluation for renal transplant. He is willing to pursue.\par -If no significant fibrosis, and patient is a candidate for kidney transplant than potentially HCV can be treated post kidney transplant. \par -Patient will follow up with his PCP for o medical problem.\par We will review options for treatment plan after obtaining US abdomen and Fibroscan.

## 2019-08-01 LAB
ALBUMIN SERPL ELPH-MCNC: 4.4 G/DL
ALP BLD-CCNC: 92 U/L
ALT SERPL-CCNC: 20 U/L
ANION GAP SERPL CALC-SCNC: 15 MMOL/L
AST SERPL-CCNC: 30 U/L
BASOPHILS # BLD AUTO: 0.02 K/UL
BASOPHILS NFR BLD AUTO: 0.5 %
BILIRUB SERPL-MCNC: 0.3 MG/DL
BUN SERPL-MCNC: 30 MG/DL
CALCIUM SERPL-MCNC: 10 MG/DL
CHLORIDE SERPL-SCNC: 98 MMOL/L
CO2 SERPL-SCNC: 27 MMOL/L
CREAT SERPL-MCNC: 9.64 MG/DL
EOSINOPHIL # BLD AUTO: 0.16 K/UL
EOSINOPHIL NFR BLD AUTO: 3.8 %
GLUCOSE SERPL-MCNC: 84 MG/DL
HBV CORE IGG+IGM SER QL: NONREACTIVE
HBV SURFACE AB SER QL: NONREACTIVE
HBV SURFACE AG SER QL: NONREACTIVE
HCT VFR BLD CALC: 40.5 %
HCV RNA SERPL NAA DL=5-ACNC: ABNORMAL IU/ML
HCV RNA SERPL NAA+PROBE-LOG IU: 5.37 LOGIU/ML
HEPATITIS A IGG ANTIBODY: NONREACTIVE
HGB BLD-MCNC: 12.3 G/DL
IMM GRANULOCYTES NFR BLD AUTO: 0.2 %
INR PPP: 1.05 RATIO
LYMPHOCYTES # BLD AUTO: 1.51 K/UL
LYMPHOCYTES NFR BLD AUTO: 35.8 %
MAN DIFF?: NORMAL
MCHC RBC-ENTMCNC: 25.7 PG
MCHC RBC-ENTMCNC: 30.4 GM/DL
MCV RBC AUTO: 84.6 FL
MONOCYTES # BLD AUTO: 0.82 K/UL
MONOCYTES NFR BLD AUTO: 19.4 %
NEUTROPHILS # BLD AUTO: 1.7 K/UL
NEUTROPHILS NFR BLD AUTO: 40.3 %
PLATELET # BLD AUTO: 216 K/UL
POTASSIUM SERPL-SCNC: 5 MMOL/L
PROT SERPL-MCNC: 7.6 G/DL
PT BLD: 12 SEC
RBC # BLD: 4.79 M/UL
RBC # FLD: 16.2 %
SODIUM SERPL-SCNC: 140 MMOL/L
WBC # FLD AUTO: 4.22 K/UL

## 2019-08-05 LAB — HCV GENTYP BLD NAA+PROBE: NORMAL

## 2019-08-09 LAB
HCV AB SER QL: REACTIVE
HCV S/CO RATIO: 13.39 S/CO

## 2019-08-23 ENCOUNTER — OUTPATIENT (OUTPATIENT)
Dept: OUTPATIENT SERVICES | Facility: HOSPITAL | Age: 59
LOS: 1 days | End: 2019-08-23
Payer: MEDICARE

## 2019-08-23 ENCOUNTER — APPOINTMENT (OUTPATIENT)
Dept: ULTRASOUND IMAGING | Facility: IMAGING CENTER | Age: 59
End: 2019-08-23
Payer: MEDICARE

## 2019-08-23 DIAGNOSIS — I10 ESSENTIAL (PRIMARY) HYPERTENSION: ICD-10-CM

## 2019-08-23 PROCEDURE — 76700 US EXAM ABDOM COMPLETE: CPT

## 2019-08-23 PROCEDURE — 76700 US EXAM ABDOM COMPLETE: CPT | Mod: 26

## 2019-08-26 ENCOUNTER — APPOINTMENT (OUTPATIENT)
Dept: PULMONOLOGY | Facility: CLINIC | Age: 59
End: 2019-08-26
Payer: MEDICARE

## 2019-08-26 VITALS
RESPIRATION RATE: 18 BRPM | TEMPERATURE: 98 F | HEART RATE: 63 BPM | HEIGHT: 73 IN | SYSTOLIC BLOOD PRESSURE: 165 MMHG | OXYGEN SATURATION: 99 % | BODY MASS INDEX: 20.41 KG/M2 | DIASTOLIC BLOOD PRESSURE: 82 MMHG | WEIGHT: 154 LBS

## 2019-08-26 VITALS — BODY MASS INDEX: 20.41 KG/M2 | WEIGHT: 154 LBS | HEIGHT: 73 IN

## 2019-08-26 DIAGNOSIS — R93.89 ABNORMAL FINDINGS ON DIAGNOSTIC IMAGING OF OTHER SPECIFIED BODY STRUCTURES: ICD-10-CM

## 2019-08-26 PROCEDURE — 94010 BREATHING CAPACITY TEST: CPT

## 2019-08-26 PROCEDURE — 94726 PLETHYSMOGRAPHY LUNG VOLUMES: CPT

## 2019-08-26 PROCEDURE — 94729 DIFFUSING CAPACITY: CPT

## 2019-08-26 PROCEDURE — ZZZZZ: CPT

## 2019-08-26 PROCEDURE — 99204 OFFICE O/P NEW MOD 45 MIN: CPT | Mod: 25

## 2019-08-26 NOTE — REVIEW OF SYSTEMS
[Dyspnea] : dyspnea [Hypertension] : ~T hypertension [As Noted in HPI] : as noted in HPI [Heartburn] : heartburn [Negative] : Sleep Disorder

## 2019-08-26 NOTE — HISTORY OF PRESENT ILLNESS
[FreeTextEntry1] : 58yo M with PMH ESRD, HCV, HTN presents for f/u to abnormal chest CT obtained in 3/2019 while hospitalized at [facility] with orthopnea 2/2 fluid overload and renal failure.\par \par ~~~~~\par 3/25/19 Chest CT:\par LUNGS AND LARGE AIRWAYS: A few scattered in the periphery of the right upper \par lobe, largest measuring 2.5 x 1.5 cm. Mild bibasilar linear atelectasis. No \par focal pulmonary consolidation or pulmonary nodule. Patent central airways. \par ~~~~~\par \par He presents with dyspnea on exertion and orthopnea. He denies fevers, chills, chest pain, cough, wheeze. Denies history of lung disease. Lifelong nonsmoker. He notes that sometimes when he lays down, particularly during dialysis, he gets tightness in his chest and frequently belches when he sits up. He notes some heartburn and reflux.

## 2019-08-26 NOTE — END OF VISIT
[FreeTextEntry3] : I directly supervised nurse practitioner Alexander Whalen and was present during key points of his history and physical. I agree with his history, physical and assessment.\par

## 2019-08-26 NOTE — PHYSICAL EXAM
[Normal Appearance] : normal appearance [General Appearance - Well Developed] : well developed [General Appearance - Well Nourished] : well nourished [No Deformities] : no deformities [Well Groomed] : well groomed [General Appearance - In No Acute Distress] : no acute distress [Normal Conjunctiva] : the conjunctiva exhibited no abnormalities [Eyelids - No Xanthelasma] : the eyelids demonstrated no xanthelasmas [Normal Oropharynx] : normal oropharynx [Neck Appearance] : the appearance of the neck was normal [Thyroid Diffuse Enlargement] : the thyroid was not enlarged [Neck Cervical Mass (___cm)] : no neck mass was observed [Jugular Venous Distention Increased] : there was no jugular-venous distention [Thyroid Nodule] : there were no palpable thyroid nodules [Heart Rate And Rhythm] : heart rate and rhythm were normal [Heart Sounds] : normal S1 and S2 [Murmurs] : no murmurs present [Respiration, Rhythm And Depth] : normal respiratory rhythm and effort [Exaggerated Use Of Accessory Muscles For Inspiration] : no accessory muscle use [Auscultation Breath Sounds / Voice Sounds] : lungs were clear to auscultation bilaterally [Abdomen Soft] : soft [Abdomen Tenderness] : non-tender [Abdomen Mass (___ Cm)] : no abdominal mass palpated [Abnormal Walk] : normal gait [Nail Clubbing] : no clubbing of the fingernails [Gait - Sufficient For Exercise Testing] : the gait was sufficient for exercise testing [Petechial Hemorrhages (___cm)] : no petechial hemorrhages [Cyanosis, Localized] : no localized cyanosis [Skin Turgor] : normal skin turgor [Skin Color & Pigmentation] : normal skin color and pigmentation [] : no rash [Deep Tendon Reflexes (DTR)] : deep tendon reflexes were 2+ and symmetric [Sensation] : the sensory exam was normal to light touch and pinprick [No Focal Deficits] : no focal deficits [Impaired Insight] : insight and judgment were intact [Oriented To Time, Place, And Person] : oriented to person, place, and time [Affect] : the affect was normal

## 2019-08-26 NOTE — ASSESSMENT
[FreeTextEntry1] : 1. Abnormal chest CT: Findings consistent with blebs. No associated pathology, no need for further monitoring or treatment.\par \par 2. ?GERD: Advised patient that if symptoms of chest burning and tightness while lying down continues, he consults GI for treatment of GERD.

## 2019-09-03 ENCOUNTER — APPOINTMENT (OUTPATIENT)
Dept: HEPATOLOGY | Facility: CLINIC | Age: 59
End: 2019-09-03

## 2019-09-09 ENCOUNTER — RX RENEWAL (OUTPATIENT)
Age: 59
End: 2019-09-09

## 2019-09-12 ENCOUNTER — RX RENEWAL (OUTPATIENT)
Age: 59
End: 2019-09-12

## 2019-09-23 ENCOUNTER — CHART COPY (OUTPATIENT)
Age: 59
End: 2019-09-23

## 2019-09-23 ENCOUNTER — APPOINTMENT (OUTPATIENT)
Dept: HEPATOLOGY | Facility: CLINIC | Age: 59
End: 2019-09-23
Payer: MEDICARE

## 2019-09-23 VITALS
SYSTOLIC BLOOD PRESSURE: 142 MMHG | RESPIRATION RATE: 16 BRPM | WEIGHT: 148 LBS | HEIGHT: 73 IN | TEMPERATURE: 97.7 F | BODY MASS INDEX: 19.61 KG/M2 | HEART RATE: 73 BPM | DIASTOLIC BLOOD PRESSURE: 71 MMHG

## 2019-09-23 PROCEDURE — ZZZZZ: CPT

## 2019-09-23 PROCEDURE — 99214 OFFICE O/P EST MOD 30 MIN: CPT | Mod: 25

## 2019-09-23 PROCEDURE — 91200 LIVER ELASTOGRAPHY: CPT

## 2019-09-25 NOTE — PHYSICAL EXAM
[General Appearance - Alert] : alert [General Appearance - Well Nourished] : well nourished [Sclera] : the sclera and conjunctiva were normal [Outer Ear] : the ears and nose were normal in appearance [Neck Appearance] : the appearance of the neck was normal [Apical Impulse] : the apical impulse was normal [Heart Rate And Rhythm] : heart rate was normal and rhythm regular [Heart Sounds] : normal S1 and S2 [Heart Sounds Gallop] : no gallops [Arterial Pulses Carotid] : carotid pulses were normal with no bruits [Bowel Sounds] : normal bowel sounds [Abdomen Soft] : soft [Abdomen Tenderness] : non-tender [Abdomen Mass (___ Cm)] : no abdominal mass palpated [Abdomen Hernia] : no hernia was discovered [No CVA Tenderness] : no ~M costovertebral angle tenderness [Abnormal Walk] : normal gait [] : no rash [Skin Color & Pigmentation] : normal skin color and pigmentation [Cranial Nerves] : cranial nerves 2-12 were intact [Sensation] : the sensory exam was normal to light touch and pinprick [Motor Exam] : the motor exam was normal [Oriented To Time, Place, And Person] : oriented to person, place, and time [Affect] : the affect was normal [FreeTextEntry1] : Left arm AV fistula

## 2019-09-25 NOTE — ASSESSMENT
[FreeTextEntry1] : 59 year old male with past medical hx of ESRD on HD, HTN, hepatitis C, genotype 1b, low grade fibrosis ( F0-F1).\par \par PLAN\par -If no significant fibrosis, and patient is a candidate for kidney transplant than potentially HCV can be treated post kidney transplant. However patient is very adamant to get treated now. Risks and benefits discussed, and he will willing to move forward. Will plan for Zepatier x 12 weeks vs Mavyret x 8 weeks depending on what his insurance provider approves. Either is fine with me.\par \par -Patient will follow up with his PCP for o medical problem.\par \par \par RTC 4 weeks after starting treatment. Follow up with be coordinated by Catina Haskins, Clinical Pharmacist.

## 2019-09-25 NOTE — HISTORY OF PRESENT ILLNESS
[FreeTextEntry1] : 59 year old male with past medical hx of ESRD on HD (Tues/Thur/Sat), HTN, hx of hepatitis C. He was apparently told to have hepatitis C in 2009 when he started having HD.\par \par He reports that his nephrologist suggested renal transplant in the past but he declined as he was afraid potential complications. He is now willing get re-evaluated.\par \par He gives hx of tattoo about 40 years ago. Denies any IVDU. He had multiple partners longtime ago. He is unsure how he acquired hepatitis C.\par \par Denies alcohol or smoking.\par \par Hx of appendectomy 8 years ago.\par \par No family hx of liver disease. \par 9-: Since last seen he had labs done that showed, GT1B, and HCV viral load of 831134 IU/mL. He remains immune to hepatitis B. \par US of the abdomen  (Aug 24 2019) showed unremarkable appearance of the liver. \par Liver elastograhy ( 9/23/2019) showed  a median liver stiffness score of 6.6, and CAP of 192.  This is c/w F0-F1.

## 2019-10-01 ENCOUNTER — EMERGENCY (EMERGENCY)
Facility: HOSPITAL | Age: 59
LOS: 1 days | Discharge: LEFT BEFORE DISCHARGE | End: 2019-10-01
Admitting: EMERGENCY MEDICINE

## 2019-10-01 ENCOUNTER — OUTPATIENT (OUTPATIENT)
Dept: OUTPATIENT SERVICES | Facility: HOSPITAL | Age: 59
LOS: 1 days | End: 2019-10-01

## 2019-10-01 VITALS
OXYGEN SATURATION: 99 % | DIASTOLIC BLOOD PRESSURE: 89 MMHG | RESPIRATION RATE: 18 BRPM | SYSTOLIC BLOOD PRESSURE: 183 MMHG | TEMPERATURE: 98 F | HEART RATE: 60 BPM

## 2019-10-01 NOTE — ED ADULT TRIAGE NOTE - CHIEF COMPLAINT QUOTE
Pt brought in by EMS from dialysis, was able to complete dialysis but started having chest pain. Pt noted to be hypertensive in triage but states he did not take medications today. Pt denies SOB. Pt states after burping the chest pain resolved.

## 2019-10-02 DIAGNOSIS — Z71.89 OTHER SPECIFIED COUNSELING: ICD-10-CM

## 2019-10-15 DIAGNOSIS — Z86.19 PERSONAL HISTORY OF OTHER INFECTIOUS AND PARASITIC DISEASES: ICD-10-CM

## 2019-11-22 ENCOUNTER — RX RENEWAL (OUTPATIENT)
Age: 59
End: 2019-11-22

## 2019-12-06 ENCOUNTER — APPOINTMENT (OUTPATIENT)
Dept: CARDIOLOGY | Facility: CLINIC | Age: 59
End: 2019-12-06
Payer: MEDICARE

## 2019-12-06 ENCOUNTER — NON-APPOINTMENT (OUTPATIENT)
Age: 59
End: 2019-12-06

## 2019-12-06 VITALS
HEART RATE: 61 BPM | BODY MASS INDEX: 19.61 KG/M2 | OXYGEN SATURATION: 99 % | HEIGHT: 73 IN | SYSTOLIC BLOOD PRESSURE: 164 MMHG | WEIGHT: 148 LBS | RESPIRATION RATE: 16 BRPM | DIASTOLIC BLOOD PRESSURE: 82 MMHG

## 2019-12-06 PROCEDURE — 93000 ELECTROCARDIOGRAM COMPLETE: CPT

## 2019-12-06 PROCEDURE — 99213 OFFICE O/P EST LOW 20 MIN: CPT

## 2019-12-06 NOTE — DISCUSSION/SUMMARY
[FreeTextEntry1] : 59 year old man with HTN ESRD on treatment for  hep C\par Doing well\par No complaints\par Continue present meds\par HD per renal\par FU in 6 months

## 2019-12-06 NOTE — PHYSICAL EXAM
[General Appearance - Well Developed] : well developed [Normal Appearance] : normal appearance [General Appearance - Well Nourished] : well nourished [Well Groomed] : well groomed [General Appearance - In No Acute Distress] : no acute distress [No Deformities] : no deformities [No Jugular Venous Hunt A Waves] : no jugular venous hunt A waves [Normal Jugular Venous V Waves Present] : normal jugular venous V waves present [Normal Jugular Venous A Waves Present] : normal jugular venous A waves present [Exaggerated Use Of Accessory Muscles For Inspiration] : no accessory muscle use [Respiration, Rhythm And Depth] : normal respiratory rhythm and effort [Heart Sounds] : normal S1 and S2 [Auscultation Breath Sounds / Voice Sounds] : lungs were clear to auscultation bilaterally [Heart Rate And Rhythm] : heart rate and rhythm were normal [Murmurs] : no murmurs present [Abdomen Tenderness] : non-tender [Abdomen Soft] : soft [Gait - Sufficient For Exercise Testing] : the gait was sufficient for exercise testing [Abnormal Walk] : normal gait [Abdomen Mass (___ Cm)] : no abdominal mass palpated [Cyanosis, Localized] : no localized cyanosis [Nail Clubbing] : no clubbing of the fingernails [Oriented To Time, Place, And Person] : oriented to person, place, and time [] : no ischemic changes [FreeTextEntry1] : Left AV fistula [Petechial Hemorrhages (___cm)] : no petechial hemorrhages [Mood] : the mood was normal [No Anxiety] : not feeling anxious [Affect] : the affect was normal

## 2019-12-09 LAB
ALBUMIN SERPL ELPH-MCNC: 4.6 G/DL
ALP BLD-CCNC: 137 U/L
ALT SERPL-CCNC: 9 U/L
ANION GAP SERPL CALC-SCNC: 16 MMOL/L
AST SERPL-CCNC: 18 U/L
BILIRUB SERPL-MCNC: 0.3 MG/DL
BUN SERPL-MCNC: 30 MG/DL
CALCIUM SERPL-MCNC: 10.4 MG/DL
CHLORIDE SERPL-SCNC: 96 MMOL/L
CO2 SERPL-SCNC: 26 MMOL/L
CREAT SERPL-MCNC: 8.97 MG/DL
HCV RNA SERPL NAA DL=5-ACNC: NOT DETECTED IU/ML
HCV RNA SERPL NAA+PROBE-LOG IU: NOT DETECTED LOG10IU/ML
POTASSIUM SERPL-SCNC: 4.5 MMOL/L
PROT SERPL-MCNC: 7.5 G/DL
SODIUM SERPL-SCNC: 138 MMOL/L

## 2019-12-13 ENCOUNTER — RX RENEWAL (OUTPATIENT)
Age: 59
End: 2019-12-13

## 2020-03-09 ENCOUNTER — APPOINTMENT (OUTPATIENT)
Dept: HEPATOLOGY | Facility: CLINIC | Age: 60
End: 2020-03-09
Payer: MEDICARE

## 2020-03-09 VITALS
SYSTOLIC BLOOD PRESSURE: 154 MMHG | BODY MASS INDEX: 20.54 KG/M2 | TEMPERATURE: 98.4 F | DIASTOLIC BLOOD PRESSURE: 71 MMHG | RESPIRATION RATE: 16 BRPM | HEART RATE: 72 BPM | WEIGHT: 155 LBS | HEIGHT: 73 IN

## 2020-03-09 DIAGNOSIS — B18.2 CHRONIC VIRAL HEPATITIS C: ICD-10-CM

## 2020-03-09 PROCEDURE — 99214 OFFICE O/P EST MOD 30 MIN: CPT

## 2020-03-09 RX ORDER — GLECAPREVIR AND PIBRENTASVIR 40; 100 MG/1; MG/1
100-40 TABLET, FILM COATED ORAL
Qty: 84 | Refills: 1 | Status: DISCONTINUED | COMMUNITY
Start: 2019-10-18 | End: 2020-03-09

## 2020-03-09 RX ORDER — VIT B COMP NO.3/FOLIC/C/BIOTIN 1 MG-60 MG
1 TABLET ORAL DAILY
Qty: 30 | Refills: 5 | Status: DISCONTINUED | COMMUNITY
Start: 2018-04-12 | End: 2020-03-09

## 2020-03-09 RX ORDER — TRAZODONE HYDROCHLORIDE 100 MG/1
100 TABLET ORAL
Qty: 90 | Refills: 3 | Status: DISCONTINUED | COMMUNITY
Start: 2018-12-12 | End: 2020-03-09

## 2020-03-10 ENCOUNTER — FORM ENCOUNTER (OUTPATIENT)
Age: 60
End: 2020-03-10

## 2020-03-11 ENCOUNTER — OUTPATIENT (OUTPATIENT)
Dept: OUTPATIENT SERVICES | Facility: HOSPITAL | Age: 60
LOS: 1 days | End: 2020-03-11
Payer: MEDICARE

## 2020-03-11 ENCOUNTER — APPOINTMENT (OUTPATIENT)
Dept: RADIOLOGY | Facility: IMAGING CENTER | Age: 60
End: 2020-03-11
Payer: MEDICARE

## 2020-03-11 DIAGNOSIS — N18.6 END STAGE RENAL DISEASE: ICD-10-CM

## 2020-03-11 PROCEDURE — 71046 X-RAY EXAM CHEST 2 VIEWS: CPT

## 2020-03-11 PROCEDURE — 71046 X-RAY EXAM CHEST 2 VIEWS: CPT | Mod: 26

## 2020-03-12 LAB
ALBUMIN SERPL ELPH-MCNC: 4.6 G/DL
ALP BLD-CCNC: 108 U/L
ALT SERPL-CCNC: 12 U/L
ANION GAP SERPL CALC-SCNC: 20 MMOL/L
AST SERPL-CCNC: 23 U/L
BASOPHILS # BLD AUTO: 0.05 K/UL
BASOPHILS NFR BLD AUTO: 1 %
BILIRUB SERPL-MCNC: 0.3 MG/DL
BUN SERPL-MCNC: 45 MG/DL
CALCIUM SERPL-MCNC: 10.3 MG/DL
CHLORIDE SERPL-SCNC: 95 MMOL/L
CO2 SERPL-SCNC: 25 MMOL/L
CREAT SERPL-MCNC: 11.33 MG/DL
EOSINOPHIL # BLD AUTO: 0.14 K/UL
EOSINOPHIL NFR BLD AUTO: 2.9 %
GLUCOSE SERPL-MCNC: 77 MG/DL
HCT VFR BLD CALC: 38.5 %
HCV RNA SERPL NAA DL=5-ACNC: NOT DETECTED IU/ML
HCV RNA SERPL NAA+PROBE-LOG IU: NOT DETECTED LOG10IU/ML
HGB BLD-MCNC: 11.9 G/DL
IMM GRANULOCYTES NFR BLD AUTO: 0.2 %
INR PPP: 1.09 RATIO
LYMPHOCYTES # BLD AUTO: 1.57 K/UL
LYMPHOCYTES NFR BLD AUTO: 32.8 %
MAN DIFF?: NORMAL
MCHC RBC-ENTMCNC: 24.8 PG
MCHC RBC-ENTMCNC: 30.9 GM/DL
MCV RBC AUTO: 80.2 FL
MONOCYTES # BLD AUTO: 0.69 K/UL
MONOCYTES NFR BLD AUTO: 14.4 %
NEUTROPHILS # BLD AUTO: 2.33 K/UL
NEUTROPHILS NFR BLD AUTO: 48.7 %
PLATELET # BLD AUTO: 233 K/UL
POTASSIUM SERPL-SCNC: 4.9 MMOL/L
PROT SERPL-MCNC: 7.9 G/DL
PT BLD: 12.3 SEC
RBC # BLD: 4.8 M/UL
RBC # FLD: 15.7 %
SODIUM SERPL-SCNC: 141 MMOL/L
WBC # FLD AUTO: 4.79 K/UL

## 2020-03-15 LAB
AFPL3 RESULTS RECEIVED: NORMAL
ALPHA-1-FETOPROTEIN-L3: NORMAL %
ALPHA-1-FETOPROTEIN: 0.9 NG/ML

## 2020-03-23 ENCOUNTER — APPOINTMENT (OUTPATIENT)
Dept: HEPATOLOGY | Facility: CLINIC | Age: 60
End: 2020-03-23

## 2020-09-01 ENCOUNTER — LABORATORY RESULT (OUTPATIENT)
Age: 60
End: 2020-09-01

## 2020-09-01 ENCOUNTER — APPOINTMENT (OUTPATIENT)
Dept: DISASTER EMERGENCY | Facility: CLINIC | Age: 60
End: 2020-09-01

## 2020-09-04 ENCOUNTER — APPOINTMENT (OUTPATIENT)
Dept: ENDOVASCULAR SURGERY | Facility: CLINIC | Age: 60
End: 2020-09-04
Payer: MEDICARE

## 2020-09-09 ENCOUNTER — FORM ENCOUNTER (OUTPATIENT)
Age: 60
End: 2020-09-09

## 2020-09-09 ENCOUNTER — APPOINTMENT (OUTPATIENT)
Dept: HEPATOLOGY | Facility: CLINIC | Age: 60
End: 2020-09-09

## 2020-09-10 ENCOUNTER — APPOINTMENT (OUTPATIENT)
Dept: ENDOVASCULAR SURGERY | Facility: CLINIC | Age: 60
End: 2020-09-10
Payer: MEDICARE

## 2020-09-10 VITALS
HEIGHT: 73 IN | HEART RATE: 69 BPM | SYSTOLIC BLOOD PRESSURE: 164 MMHG | WEIGHT: 155 LBS | OXYGEN SATURATION: 99 % | TEMPERATURE: 97.9 F | RESPIRATION RATE: 15 BRPM | BODY MASS INDEX: 20.54 KG/M2 | DIASTOLIC BLOOD PRESSURE: 80 MMHG

## 2020-09-10 PROCEDURE — 36901Z: CUSTOM

## 2020-09-10 NOTE — PAST MEDICAL HISTORY
[FreeTextEntry1] : Malignant Hyperthermia Screening Tool and Risk of Bleeding Assessment \par \par Mr. KLAUS DUNN denies family of unexpected death following Anesthesia or Exercise.\par Denies Family history of Malignant Hyperthermia, Muscle or Neuromuscular disorder and High Temperature  following exercise.\par \par Mr. KLAUS DUNN denies history of Muscle Spasm, Dark or Chocolate- Colored and Unanticipated fever immediately following anaesthesia or serious exercise.\par Mr. KLAUS DUNN also denies bleeding tendencies/Risks of Bleeding.\par

## 2020-09-10 NOTE — HISTORY OF PRESENT ILLNESS
[] : left radiocephalic fistula [FreeTextEntry1] : alert and oriented x 3 \par \par  no reported falls \par Covid testing - not detected  9/1/2020\par  took blood pressure meds this morning  [FreeTextEntry4] : yesterday [FreeTextEntry5] : yesterday at 11 pm [FreeTextEntry6] : Dr German

## 2020-11-09 ENCOUNTER — NON-APPOINTMENT (OUTPATIENT)
Age: 60
End: 2020-11-09

## 2020-11-09 ENCOUNTER — APPOINTMENT (OUTPATIENT)
Dept: CARDIOLOGY | Facility: CLINIC | Age: 60
End: 2020-11-09
Payer: MEDICARE

## 2020-11-09 VITALS
TEMPERATURE: 97.9 F | HEART RATE: 66 BPM | DIASTOLIC BLOOD PRESSURE: 79 MMHG | SYSTOLIC BLOOD PRESSURE: 155 MMHG | BODY MASS INDEX: 20.58 KG/M2 | HEIGHT: 73 IN | OXYGEN SATURATION: 100 %

## 2020-11-09 VITALS — BODY MASS INDEX: 20.58 KG/M2 | WEIGHT: 156 LBS

## 2020-11-09 PROCEDURE — 93000 ELECTROCARDIOGRAM COMPLETE: CPT

## 2020-11-09 PROCEDURE — 99214 OFFICE O/P EST MOD 30 MIN: CPT

## 2020-11-09 NOTE — DISCUSSION/SUMMARY
[FreeTextEntry1] : 59 year old man with HTN ESRD on treatment for  hep C\par Doing well\par No complaints\par Continue present meds (Increased Nifedipine to 90 mg BID and increased Labetalol to 600 mg TID)\par HD per renal\par FU in 6 months

## 2020-11-09 NOTE — HISTORY OF PRESENT ILLNESS
[FreeTextEntry1] : Here for followup\par Currently on meds for Hep C-->will know if curative in March 2020\par On Labetalol 400 TID and Nifedipine 60 mg BID\par EKG reviewed and normal\par No chest pain, dyspnea or edema

## 2021-02-16 ENCOUNTER — RX RENEWAL (OUTPATIENT)
Age: 61
End: 2021-02-16

## 2021-02-24 ENCOUNTER — OUTPATIENT (OUTPATIENT)
Dept: OUTPATIENT SERVICES | Facility: HOSPITAL | Age: 61
LOS: 1 days | End: 2021-02-24
Payer: MEDICARE

## 2021-02-24 VITALS
HEIGHT: 73 IN | RESPIRATION RATE: 15 BRPM | OXYGEN SATURATION: 100 % | TEMPERATURE: 98 F | HEART RATE: 67 BPM | WEIGHT: 156.97 LBS | SYSTOLIC BLOOD PRESSURE: 165 MMHG | DIASTOLIC BLOOD PRESSURE: 81 MMHG

## 2021-02-24 DIAGNOSIS — Z01.818 ENCOUNTER FOR OTHER PREPROCEDURAL EXAMINATION: ICD-10-CM

## 2021-02-24 DIAGNOSIS — N18.6 END STAGE RENAL DISEASE: ICD-10-CM

## 2021-02-24 LAB
ANION GAP SERPL CALC-SCNC: 14 MMOL/L — SIGNIFICANT CHANGE UP (ref 5–17)
BUN SERPL-MCNC: 41 MG/DL — HIGH (ref 7–23)
CALCIUM SERPL-MCNC: 9.6 MG/DL — SIGNIFICANT CHANGE UP (ref 8.4–10.5)
CHLORIDE SERPL-SCNC: 94 MMOL/L — LOW (ref 96–108)
CO2 SERPL-SCNC: 29 MMOL/L — SIGNIFICANT CHANGE UP (ref 22–31)
CREAT SERPL-MCNC: 8.06 MG/DL — HIGH (ref 0.5–1.3)
GLUCOSE SERPL-MCNC: 100 MG/DL — HIGH (ref 70–99)
HCT VFR BLD CALC: 36.4 % — LOW (ref 39–50)
HGB BLD-MCNC: 11.1 G/DL — LOW (ref 13–17)
MCHC RBC-ENTMCNC: 23 PG — LOW (ref 27–34)
MCHC RBC-ENTMCNC: 30.5 GM/DL — LOW (ref 32–36)
MCV RBC AUTO: 75.5 FL — LOW (ref 80–100)
NRBC # BLD: 0 /100 WBCS — SIGNIFICANT CHANGE UP (ref 0–0)
PLATELET # BLD AUTO: 292 K/UL — SIGNIFICANT CHANGE UP (ref 150–400)
POTASSIUM SERPL-MCNC: 5.3 MMOL/L — SIGNIFICANT CHANGE UP (ref 3.5–5.3)
POTASSIUM SERPL-SCNC: 5.3 MMOL/L — SIGNIFICANT CHANGE UP (ref 3.5–5.3)
RBC # BLD: 4.82 M/UL — SIGNIFICANT CHANGE UP (ref 4.2–5.8)
RBC # FLD: 18.6 % — HIGH (ref 10.3–14.5)
SODIUM SERPL-SCNC: 137 MMOL/L — SIGNIFICANT CHANGE UP (ref 135–145)
WBC # BLD: 5.81 K/UL — SIGNIFICANT CHANGE UP (ref 3.8–10.5)
WBC # FLD AUTO: 5.81 K/UL — SIGNIFICANT CHANGE UP (ref 3.8–10.5)

## 2021-02-24 PROCEDURE — 85027 COMPLETE CBC AUTOMATED: CPT

## 2021-02-24 PROCEDURE — G0463: CPT

## 2021-02-24 PROCEDURE — 80048 BASIC METABOLIC PNL TOTAL CA: CPT

## 2021-02-24 NOTE — H&P PST ADULT - HISTORY OF PRESENT ILLNESS
58M pmhx HTN, ESRD on HD complaining of SOB for the past week. He notes that his main complaint is difficulty breathing when sleeping that forces him to sit up. Reports no sleep because of it. Initial , after 2 rounds IV hydralazine, nifedipine x1, isordil x1 . 2016 TTE with LVH, but normal bivntricular function, no valvulopathy. Pt endorses  compliance with his meds and dialysis -- last session on Saturday, completed entire session.  Notes he recently had his dry weight increased from 69kg to 70kg. Notes recent URI symptoms, denies current fever, RVP negative. CT chest negative for fluid overload, + right upper  lobe pulmonary nodules, largest 2.5 x 1.5 cm. Reports his fluid and salt intake increased over last several weeks. HS Trop initially 360, then 324. Chest pain only when supine. EKG NSR. Denies unilateral limb swelling or h/o blood clots, hemoptysis    T Th Sat 61 YO M PMH ESRD on HD T/Th/Sat, CHF, HTN, presents to PST for REPAIR LEFT ARM AV FISTULA ANEURYSMS 3/10/21. Denies any palpitations, N/V, Covid symptoms (fever, chills, cough) or exposure.      ***Covid test scheduled for 3/7/21 at [address]

## 2021-03-05 ENCOUNTER — RESULT REVIEW (OUTPATIENT)
Age: 61
End: 2021-03-05

## 2021-03-05 ENCOUNTER — APPOINTMENT (OUTPATIENT)
Dept: ENDOVASCULAR SURGERY | Facility: CLINIC | Age: 61
End: 2021-03-05
Payer: MEDICARE

## 2021-03-05 VITALS
RESPIRATION RATE: 16 BRPM | HEIGHT: 73 IN | DIASTOLIC BLOOD PRESSURE: 89 MMHG | HEART RATE: 79 BPM | BODY MASS INDEX: 20.67 KG/M2 | WEIGHT: 156 LBS | TEMPERATURE: 98.3 F | SYSTOLIC BLOOD PRESSURE: 175 MMHG

## 2021-03-05 PROCEDURE — 36902Z: CUSTOM

## 2021-03-05 NOTE — HISTORY OF PRESENT ILLNESS
[] : left radiocephalic fistula [FreeTextEntry1] : alert and oriented x 3 \par accompanied by\par  no reported falls \par  took blood pressure meds this morning \par scheduled for aneurysm resection next week [FreeTextEntry4] : yesterday [FreeTextEntry5] : yesterday at 11 pm [FreeTextEntry6] : Dr German

## 2021-03-05 NOTE — PAST MEDICAL HISTORY
[Increasing age ( >40 years old)] : Increasing age ( >40 years old) [No therapy indicated for cases scheduled for less than one hour] : No therapy indicated for cases scheduled for less than one hour. [FreeTextEntry1] : Malignant Hyperthermia Screening Tool and Risk of Bleeding Assessment \par \par Mr. KLAUS DUNN denies family of unexpected death following Anesthesia or Exercise.\par Denies Family history of Malignant Hyperthermia, Muscle or Neuromuscular disorder and High Temperature  following exercise.\par \par Mr. KLAUS DUNN denies history of Muscle Spasm, Dark or Chocolate- Colored and Unanticipated fever immediately following anaesthesia or serious exercise.\par Mr. KLAUS DUNN also denies bleeding tendencies/Risks of Bleeding.\par

## 2021-03-07 ENCOUNTER — APPOINTMENT (OUTPATIENT)
Dept: DISASTER EMERGENCY | Facility: CLINIC | Age: 61
End: 2021-03-07

## 2021-03-07 LAB — SARS-COV-2 N GENE NPH QL NAA+PROBE: NOT DETECTED

## 2021-03-08 ENCOUNTER — NON-APPOINTMENT (OUTPATIENT)
Age: 61
End: 2021-03-08

## 2021-03-08 ENCOUNTER — APPOINTMENT (OUTPATIENT)
Dept: CARDIOLOGY | Facility: CLINIC | Age: 61
End: 2021-03-08
Payer: MEDICARE

## 2021-03-08 VITALS
OXYGEN SATURATION: 98 % | HEIGHT: 73 IN | SYSTOLIC BLOOD PRESSURE: 156 MMHG | DIASTOLIC BLOOD PRESSURE: 82 MMHG | BODY MASS INDEX: 21.64 KG/M2 | TEMPERATURE: 97 F | HEART RATE: 71 BPM

## 2021-03-08 VITALS — BODY MASS INDEX: 21.64 KG/M2 | WEIGHT: 164 LBS

## 2021-03-08 PROCEDURE — 99214 OFFICE O/P EST MOD 30 MIN: CPT

## 2021-03-08 PROCEDURE — 93000 ELECTROCARDIOGRAM COMPLETE: CPT

## 2021-03-08 NOTE — DISCUSSION/SUMMARY
[FreeTextEntry1] : 59 year old man with HTN ESRD on treatment for  hep C, now with dyspnea\par #HTN- On Labetalol 400 TID and Nifedipine 60 mg BID. BP is stable\par #Dyspnea and edema- He is complaining of shortness of breath band some edema despite increased HD sessions. He is cutting out salt; but will order a pharm nuc given his HTN/CKD\par #if nuclear stress test is ok, can proceed with surgery.

## 2021-03-08 NOTE — HISTORY OF PRESENT ILLNESS
[FreeTextEntry1] : Here for followup\par Currently on meds for Hep C-->will know if curative in March 2020\par #HTN- On Labetalol 400 TID and Nifedipine 60 mg BID. BP is stable\par #Dyspnea and edema- He is complaining of shortness of breath band some edema despite increased HD sessions. He is cutting out salt; but will order a pharm nuc given his HTN/CKD\par \par

## 2021-03-10 ENCOUNTER — APPOINTMENT (OUTPATIENT)
Dept: VASCULAR SURGERY | Facility: HOSPITAL | Age: 61
End: 2021-03-10

## 2021-03-19 ENCOUNTER — OUTPATIENT (OUTPATIENT)
Dept: OUTPATIENT SERVICES | Facility: HOSPITAL | Age: 61
LOS: 1 days | End: 2021-03-19

## 2021-03-19 ENCOUNTER — APPOINTMENT (OUTPATIENT)
Dept: CV DIAGNOSTICS | Facility: HOSPITAL | Age: 61
End: 2021-03-19
Payer: MEDICARE

## 2021-03-19 DIAGNOSIS — Z01.818 ENCOUNTER FOR OTHER PREPROCEDURAL EXAMINATION: ICD-10-CM

## 2021-03-19 DIAGNOSIS — R06.89 OTHER ABNORMALITIES OF BREATHING: ICD-10-CM

## 2021-03-22 PROCEDURE — 78452 HT MUSCLE IMAGE SPECT MULT: CPT | Mod: 26

## 2021-03-22 PROCEDURE — 93016 CV STRESS TEST SUPVJ ONLY: CPT | Mod: GC

## 2021-03-22 PROCEDURE — 93018 CV STRESS TEST I&R ONLY: CPT | Mod: GC

## 2021-03-24 ENCOUNTER — APPOINTMENT (OUTPATIENT)
Dept: VASCULAR SURGERY | Facility: CLINIC | Age: 61
End: 2021-03-24

## 2021-03-26 ENCOUNTER — NON-APPOINTMENT (OUTPATIENT)
Age: 61
End: 2021-03-26

## 2021-03-31 ENCOUNTER — OUTPATIENT (OUTPATIENT)
Dept: OUTPATIENT SERVICES | Facility: HOSPITAL | Age: 61
LOS: 1 days | End: 2021-03-31
Payer: MEDICARE

## 2021-03-31 ENCOUNTER — APPOINTMENT (OUTPATIENT)
Dept: RADIOLOGY | Facility: IMAGING CENTER | Age: 61
End: 2021-03-31
Payer: MEDICARE

## 2021-03-31 ENCOUNTER — RESULT REVIEW (OUTPATIENT)
Age: 61
End: 2021-03-31

## 2021-03-31 DIAGNOSIS — Z00.8 ENCOUNTER FOR OTHER GENERAL EXAMINATION: ICD-10-CM

## 2021-03-31 DIAGNOSIS — R76.11 NONSPECIFIC REACTION TO TUBERCULIN SKIN TEST W/OUT ACTIVE TUBERCULOSIS: ICD-10-CM

## 2021-03-31 PROCEDURE — 71046 X-RAY EXAM CHEST 2 VIEWS: CPT

## 2021-03-31 PROCEDURE — 71046 X-RAY EXAM CHEST 2 VIEWS: CPT | Mod: 26

## 2021-04-11 ENCOUNTER — APPOINTMENT (OUTPATIENT)
Dept: DISASTER EMERGENCY | Facility: CLINIC | Age: 61
End: 2021-04-11

## 2021-04-12 LAB — SARS-COV-2 N GENE NPH QL NAA+PROBE: NOT DETECTED

## 2021-04-14 ENCOUNTER — OUTPATIENT (OUTPATIENT)
Dept: OUTPATIENT SERVICES | Facility: HOSPITAL | Age: 61
LOS: 1 days | Discharge: HOME | End: 2021-04-14
Payer: MEDICARE

## 2021-04-14 DIAGNOSIS — R07.9 CHEST PAIN, UNSPECIFIED: ICD-10-CM

## 2021-04-14 LAB
ANION GAP SERPL CALC-SCNC: 13 MMOL/L — SIGNIFICANT CHANGE UP (ref 7–14)
BUN SERPL-MCNC: 41 MG/DL — HIGH (ref 7–23)
CALCIUM SERPL-MCNC: 9.6 MG/DL — SIGNIFICANT CHANGE UP (ref 8.4–10.5)
CHLORIDE SERPL-SCNC: 96 MMOL/L — LOW (ref 98–107)
CO2 SERPL-SCNC: 28 MMOL/L — SIGNIFICANT CHANGE UP (ref 22–31)
CREAT SERPL-MCNC: 8.61 MG/DL — HIGH (ref 0.5–1.3)
GLUCOSE SERPL-MCNC: 97 MG/DL — SIGNIFICANT CHANGE UP (ref 70–99)
HCT VFR BLD CALC: 34.9 % — LOW (ref 39–50)
HGB BLD-MCNC: 10.5 G/DL — LOW (ref 13–17)
MCHC RBC-ENTMCNC: 22.5 PG — LOW (ref 27–34)
MCHC RBC-ENTMCNC: 30.1 GM/DL — LOW (ref 32–36)
MCV RBC AUTO: 74.7 FL — LOW (ref 80–100)
NRBC # BLD: 0 /100 WBCS — SIGNIFICANT CHANGE UP
NRBC # FLD: 0 K/UL — SIGNIFICANT CHANGE UP
PLATELET # BLD AUTO: 279 K/UL — SIGNIFICANT CHANGE UP (ref 150–400)
POTASSIUM SERPL-MCNC: 5.1 MMOL/L — SIGNIFICANT CHANGE UP (ref 3.5–5.3)
POTASSIUM SERPL-MCNC: 5.7 MMOL/L — HIGH (ref 3.5–5.3)
POTASSIUM SERPL-SCNC: 5.1 MMOL/L — SIGNIFICANT CHANGE UP (ref 3.5–5.3)
POTASSIUM SERPL-SCNC: 5.7 MMOL/L — HIGH (ref 3.5–5.3)
RBC # BLD: 4.67 M/UL — SIGNIFICANT CHANGE UP (ref 4.2–5.8)
RBC # FLD: 18.9 % — HIGH (ref 10.3–14.5)
SODIUM SERPL-SCNC: 137 MMOL/L — SIGNIFICANT CHANGE UP (ref 135–145)
WBC # BLD: 6.03 K/UL — SIGNIFICANT CHANGE UP (ref 3.8–10.5)
WBC # FLD AUTO: 6.03 K/UL — SIGNIFICANT CHANGE UP (ref 3.8–10.5)

## 2021-04-14 PROCEDURE — 93306 TTE W/DOPPLER COMPLETE: CPT | Mod: 26

## 2021-04-14 PROCEDURE — 93010 ELECTROCARDIOGRAM REPORT: CPT

## 2021-04-14 RX ORDER — ASPIRIN/CALCIUM CARB/MAGNESIUM 324 MG
81 TABLET ORAL DAILY
Refills: 0 | Status: DISCONTINUED | OUTPATIENT
Start: 2021-04-15 | End: 2021-04-29

## 2021-04-14 RX ORDER — SODIUM ZIRCONIUM CYCLOSILICATE 10 G/10G
10 POWDER, FOR SUSPENSION ORAL ONCE
Refills: 0 | Status: DISCONTINUED | OUTPATIENT
Start: 2021-04-14 | End: 2021-04-14

## 2021-04-14 RX ORDER — ASPIRIN/CALCIUM CARB/MAGNESIUM 324 MG
1 TABLET ORAL
Qty: 30 | Refills: 0
Start: 2021-04-14 | End: 2021-05-13

## 2021-04-14 RX ORDER — SODIUM CHLORIDE 9 MG/ML
3 INJECTION INTRAMUSCULAR; INTRAVENOUS; SUBCUTANEOUS EVERY 8 HOURS
Refills: 0 | Status: DISCONTINUED | OUTPATIENT
Start: 2021-04-14 | End: 2021-04-29

## 2021-04-14 RX ORDER — ATORVASTATIN CALCIUM 80 MG/1
1 TABLET, FILM COATED ORAL
Qty: 30 | Refills: 0
Start: 2021-04-14 | End: 2021-05-13

## 2021-04-14 RX ORDER — CALCIUM CARBONATE 500(1250)
2 TABLET ORAL
Qty: 0 | Refills: 0 | DISCHARGE

## 2021-04-14 RX ADMIN — SODIUM CHLORIDE 3 MILLILITER(S): 9 INJECTION INTRAMUSCULAR; INTRAVENOUS; SUBCUTANEOUS at 16:08

## 2021-04-14 NOTE — H&P CARDIOLOGY - GASTROINTESTINAL DETAILS
Updated Zeynep and note sent to Dr. Bales also . Edna Jaime LPN ....................12/27/2018  5:17 PM     normal/soft/nontender/no distention/bowel sounds normal/no rebound tenderness/no guarding/no rigidity

## 2021-04-14 NOTE — H&P CARDIOLOGY - OTHER
03/19/21 NST: IMPRESSIONS: Abnormal Study. Myocardial Perfusion SPECT results are abnormal. Review of raw data shows: The study is of adequate   technical quality The left ventricle was markedly dilated at baseline. There is a small, moderate to severe defect in the inferior wall that is fixed, suggestive of infarct. No clear evidence of ischemia. Post-stress gated wall motion analysis was performed (LVEF = 31 %;LVEDV = 292 ml.). There is severe inferior wall hypokinesis. The remaining segments were moderately diffusely hypocontractile. The RV appeared enlarged and mildly diffusely hypocontractile.

## 2021-04-14 NOTE — CHART NOTE - NSCHARTNOTEFT_GEN_A_CORE
Patient's lab noted to have a K level of 5.7(not hemolyzed) and patient had HD full session yesterday 4/13. Will check repeat K level. Spoke with primary nephrologist Dr. Valdovinos and recommended to give 1x dose of Lokelma 10mg prior to discharge. Patient is to have his scheduled HD tomorrow 4/15. Instructed patient that he must monitor his potassium intake and to go for HD as scheduled in the morning. As per Dr. Lux patient can be discharged home tonight as he did not received any sedation during the cardiac cath. Patient's lab noted to have a K level of 5.7(not hemolyzed) and patient had HD full session yesterday 4/13. Will check repeat K level. Spoke with primary nephrologist Dr. Valdovinos and recommended to give 1x dose of Lokelma 10mg prior to discharge. Patient is to have his scheduled HD tomorrow 4/15. Instructed patient that he must monitor his potassium intake and to go for HD as scheduled in the morning. As per Dr. Lux patient can be discharged home tonight with car service as he did not received any sedation during the cardiac cath. Patient's lab noted to have a K level of 5.7(not hemolyzed) and patient had HD full session yesterday 4/13. Will check repeat K level. Spoke with primary nephrologist Dr. Valdovinos and recommended to give 1x dose of Lokelma 10mg prior to discharge. Patient is to have his scheduled HD tomorrow 4/15. Instructed patient that he must monitor his potassium intake and to go for HD as scheduled in the morning. As per Dr. Lux patient can be discharged home tonight with car service as he did not receive any sedation during the cardiac cath. Patient's lab noted to have a K level of 5.7(not hemolyzed) and patient had HD full session yesterday 4/13. Will check repeat K level. Spoke with primary nephrologist Dr. Valdovinos and recommended to give 1x dose of Lokelma 10mg prior to discharge. Patient is to have his scheduled HD tomorrow 4/15. Instructed patient that he must monitor his potassium intake and to go for HD as scheduled in the morning. As per Dr. Lux patient can be discharged home tonight with car service as he did not receive any sedation during the cardiac cath.    ADDENDUM: Repeat K noted to be 5.1. Will hold off on Lokelma for now as patient is to get scheduled HD tomorrow morning.

## 2021-04-14 NOTE — CHART NOTE - NSCHARTNOTEFT_GEN_A_CORE
cath results reviewed with Dr KARLO Lux, TTE to be done prior to discharge and patient to be medically managed for single vessel CAD. To start daily ASA and statin therapy which was e-prescribed to patients preferred pharmacy of choice. Patient advised to follow up with his cardiologist in 1-2 weeks.   K noted to be 5.7, not hemolyzed, last HD session 4/13; repeat K ordered for confirm; if still hyperkalemic will discuss plan for possible lokelma prior to discharge in anticipation for HD as outpatient tomorrow

## 2021-04-14 NOTE — H&P CARDIOLOGY - HISTORY OF PRESENT ILLNESS
60 year old M with PMH of ESRD on HD (Tues/Thur/Sat and last HD on Tuesday 4/13 for 3hr and 15 mins), HTN, Hepatitis C(s/p completed his antiviral treatment for hepatitis with Mavyret from 10/21/19 and completed 8 weeks of treatment on 12/21/19) presented today to [facility] for LHC for abnormal stress test and TTE showing an EF of 31%. Patient stated that he has been having GALLAGHER for few months and is only able to walk 2-3 blocks and then would have to stop multiple times to catch his breath. Patient stated that he told his cardiologist this and was told to get a stress test which came back positive for ischemia with a low EF. Patient stated that he was scheduled for a vascular procedure to reduce the swelling in his left AVF but that is put off due to the abnormal stress test. Patient otherwise denied any CP, fevers, chills, N/V/D/C, abdominal pain, melena, hematochezia, recent travel, sick contact, body aches, cough, pleuritic or positional chest pain.     COVID PCR not detected on 04/11/21

## 2021-04-20 ENCOUNTER — NON-APPOINTMENT (OUTPATIENT)
Age: 61
End: 2021-04-20

## 2021-04-30 ENCOUNTER — RX RENEWAL (OUTPATIENT)
Age: 61
End: 2021-04-30

## 2021-05-11 ENCOUNTER — APPOINTMENT (OUTPATIENT)
Dept: DISASTER EMERGENCY | Facility: CLINIC | Age: 61
End: 2021-05-11

## 2021-05-11 LAB — SARS-COV-2 N GENE NPH QL NAA+PROBE: NOT DETECTED

## 2021-05-14 ENCOUNTER — APPOINTMENT (OUTPATIENT)
Dept: CV DIAGNOSITCS | Facility: HOSPITAL | Age: 61
End: 2021-05-14

## 2021-05-14 ENCOUNTER — OUTPATIENT (OUTPATIENT)
Dept: OUTPATIENT SERVICES | Facility: HOSPITAL | Age: 61
LOS: 1 days | End: 2021-05-14

## 2021-05-14 DIAGNOSIS — I35.0 NONRHEUMATIC AORTIC (VALVE) STENOSIS: ICD-10-CM

## 2021-05-21 ENCOUNTER — APPOINTMENT (OUTPATIENT)
Dept: CARDIOLOGY | Facility: CLINIC | Age: 61
End: 2021-05-21
Payer: MEDICARE

## 2021-05-21 VITALS — BODY MASS INDEX: 20.45 KG/M2 | WEIGHT: 155 LBS

## 2021-05-21 VITALS
HEART RATE: 66 BPM | TEMPERATURE: 97.2 F | OXYGEN SATURATION: 100 % | BODY MASS INDEX: 20.45 KG/M2 | DIASTOLIC BLOOD PRESSURE: 84 MMHG | SYSTOLIC BLOOD PRESSURE: 166 MMHG | HEIGHT: 73 IN

## 2021-05-21 PROCEDURE — 93000 ELECTROCARDIOGRAM COMPLETE: CPT

## 2021-05-21 PROCEDURE — 99215 OFFICE O/P EST HI 40 MIN: CPT

## 2021-05-21 RX ORDER — NIFEDIPINE 90 MG/1
90 TABLET, EXTENDED RELEASE ORAL
Qty: 180 | Refills: 3 | Status: DISCONTINUED | COMMUNITY
Start: 2019-06-06 | End: 2021-05-21

## 2021-05-21 NOTE — HISTORY OF PRESENT ILLNESS
[FreeTextEntry1] : TTE 4/14/2021:\par 1. Mitral annular calcification, otherwise normal mitral\par valve. Mild-moderate mitral regurgitation.\par 2. Calcified trileaflet aortic valve with decreased\par opening. Peak transaortic valve gradient equals 39 mm Hg,\par mean transaortic valve gradient equals 19 mm Hg, estimated\par aortic valve area equals 1.3 sqcm (by continuity equation),\par consistent with moderate aortic stenosis. Mild aortic\par regurgitation.\par 3. Moderately dilated left atrium.  LA volume index = 46\par cc/m2.\par 4. Mild left ventricular enlargement.\par 5. Low normal left ventricular systolic function. No\par segmental wall motion abnormalities.\par 6. Normal right ventricular size and function.\par \par 4/14/21:\par INTERVENTIONAL IMPRESSIONS: Severe stenosis LCX and RAMUS.\par Hemodynamics as listed.\par Unable to cross the aortic valve , valve appears to be very calcified.\par Recommend TTE to assess for aortic valve disease, will plan for\par revascularization based on echo findings.\par \par Upon further discussion with Dr. Lux and the fact that he could not cross the AV, he is concerned that the valve is more stenotic than the TTE lets on. He was scheduled for a SAVANA but it did not get done last week. Still with dyspnea. \par \par #HTN- On Labetalol 400 TID and Nifedipine 90 mg BID. BP is stable\par #Dyspnea and edema- He is complaining of shortness of breath band some edema despite increased HD sessions. \par Plan for SAVANA\par Then decision re: PCI versus CABG\par \par \par

## 2021-05-21 NOTE — DISCUSSION/SUMMARY
[FreeTextEntry1] : 59 year old man with HTN ESRD on treatment for  hep C, now with dyspnea. With significant stenosis of LCX and Ramus but with possible significant AS. \par #HTN- On Labetalol 400 TID and Nifedipine 90 mg BID. BP is stable\par #Dyspnea and edema- He is complaining of shortness of breath band some edema despite increased HD sessions. \par Plan for SAVANA\par Then decision re: PCI versus CABG\par #FU in 2 months

## 2021-05-26 ENCOUNTER — APPOINTMENT (OUTPATIENT)
Dept: DISASTER EMERGENCY | Facility: OTHER | Age: 61
End: 2021-05-26

## 2021-06-16 ENCOUNTER — APPOINTMENT (OUTPATIENT)
Dept: DISASTER EMERGENCY | Facility: OTHER | Age: 61
End: 2021-06-16

## 2021-08-21 LAB — HGB BLD-MCNC: 8.9 G/DL — LOW (ref 13–17)

## 2021-10-01 ENCOUNTER — OUTPATIENT (OUTPATIENT)
Dept: OUTPATIENT SERVICES | Facility: HOSPITAL | Age: 61
LOS: 1 days | End: 2021-10-01
Payer: MEDICARE

## 2021-10-19 ENCOUNTER — INPATIENT (INPATIENT)
Facility: HOSPITAL | Age: 61
LOS: 4 days | Discharge: HOME | End: 2021-10-24
Attending: INTERNAL MEDICINE | Admitting: INTERNAL MEDICINE
Payer: MEDICARE

## 2021-10-19 VITALS — HEIGHT: 73 IN

## 2021-10-19 DIAGNOSIS — I50.21 ACUTE SYSTOLIC (CONGESTIVE) HEART FAILURE: ICD-10-CM

## 2021-10-19 DIAGNOSIS — Z29.9 ENCOUNTER FOR PROPHYLACTIC MEASURES, UNSPECIFIED: ICD-10-CM

## 2021-10-19 DIAGNOSIS — I21.4 NON-ST ELEVATION (NSTEMI) MYOCARDIAL INFARCTION: ICD-10-CM

## 2021-10-19 DIAGNOSIS — N18.6 END STAGE RENAL DISEASE: ICD-10-CM

## 2021-10-19 DIAGNOSIS — E78.5 HYPERLIPIDEMIA, UNSPECIFIED: ICD-10-CM

## 2021-10-19 DIAGNOSIS — I10 ESSENTIAL (PRIMARY) HYPERTENSION: ICD-10-CM

## 2021-10-19 LAB
ALBUMIN SERPL ELPH-MCNC: 4.7 G/DL — SIGNIFICANT CHANGE UP (ref 3.3–5)
ALP SERPL-CCNC: 148 U/L — HIGH (ref 40–120)
ALT FLD-CCNC: 13 U/L — SIGNIFICANT CHANGE UP (ref 4–41)
ANION GAP SERPL CALC-SCNC: 15 MMOL/L — HIGH (ref 7–14)
APTT BLD: 34.3 SEC — SIGNIFICANT CHANGE UP (ref 27–36.3)
AST SERPL-CCNC: 18 U/L — SIGNIFICANT CHANGE UP (ref 4–40)
BASOPHILS # BLD AUTO: 0.03 K/UL — SIGNIFICANT CHANGE UP (ref 0–0.2)
BASOPHILS NFR BLD AUTO: 0.5 % — SIGNIFICANT CHANGE UP (ref 0–2)
BILIRUB SERPL-MCNC: 0.6 MG/DL — SIGNIFICANT CHANGE UP (ref 0.2–1.2)
BLOOD GAS VENOUS COMPREHENSIVE RESULT: SIGNIFICANT CHANGE UP
BUN SERPL-MCNC: 17 MG/DL — SIGNIFICANT CHANGE UP (ref 7–23)
CALCIUM SERPL-MCNC: 10 MG/DL — SIGNIFICANT CHANGE UP (ref 8.4–10.5)
CHLORIDE SERPL-SCNC: 96 MMOL/L — LOW (ref 98–107)
CK MB CFR SERPL CALC: 3.7 NG/ML — SIGNIFICANT CHANGE UP
CK MB CFR SERPL CALC: 4 NG/ML — SIGNIFICANT CHANGE UP
CO2 SERPL-SCNC: 27 MMOL/L — SIGNIFICANT CHANGE UP (ref 22–31)
CREAT SERPL-MCNC: 4.59 MG/DL — HIGH (ref 0.5–1.3)
EOSINOPHIL # BLD AUTO: 0.21 K/UL — SIGNIFICANT CHANGE UP (ref 0–0.5)
EOSINOPHIL NFR BLD AUTO: 3.2 % — SIGNIFICANT CHANGE UP (ref 0–6)
GLUCOSE SERPL-MCNC: 103 MG/DL — HIGH (ref 70–99)
HCT VFR BLD CALC: 30.5 % — LOW (ref 39–50)
HGB BLD-MCNC: 9.4 G/DL — LOW (ref 13–17)
IANC: 4.48 K/UL — SIGNIFICANT CHANGE UP (ref 1.5–8.5)
IMM GRANULOCYTES NFR BLD AUTO: 0.3 % — SIGNIFICANT CHANGE UP (ref 0–1.5)
INR BLD: 1.18 RATIO — HIGH (ref 0.88–1.16)
LYMPHOCYTES # BLD AUTO: 1.17 K/UL — SIGNIFICANT CHANGE UP (ref 1–3.3)
LYMPHOCYTES # BLD AUTO: 18 % — SIGNIFICANT CHANGE UP (ref 13–44)
MAGNESIUM SERPL-MCNC: 2.1 MG/DL — SIGNIFICANT CHANGE UP (ref 1.6–2.6)
MCHC RBC-ENTMCNC: 23.2 PG — LOW (ref 27–34)
MCHC RBC-ENTMCNC: 30.8 GM/DL — LOW (ref 32–36)
MCV RBC AUTO: 75.3 FL — LOW (ref 80–100)
MONOCYTES # BLD AUTO: 0.59 K/UL — SIGNIFICANT CHANGE UP (ref 0–0.9)
MONOCYTES NFR BLD AUTO: 9.1 % — SIGNIFICANT CHANGE UP (ref 2–14)
NEUTROPHILS # BLD AUTO: 4.48 K/UL — SIGNIFICANT CHANGE UP (ref 1.8–7.4)
NEUTROPHILS NFR BLD AUTO: 68.9 % — SIGNIFICANT CHANGE UP (ref 43–77)
NRBC # BLD: 0 /100 WBCS — SIGNIFICANT CHANGE UP
NRBC # FLD: 0 K/UL — SIGNIFICANT CHANGE UP
NT-PROBNP SERPL-SCNC: HIGH PG/ML
PLATELET # BLD AUTO: 289 K/UL — SIGNIFICANT CHANGE UP (ref 150–400)
POTASSIUM SERPL-MCNC: 3.8 MMOL/L — SIGNIFICANT CHANGE UP (ref 3.5–5.3)
POTASSIUM SERPL-SCNC: 3.8 MMOL/L — SIGNIFICANT CHANGE UP (ref 3.5–5.3)
PROT SERPL-MCNC: 8.3 G/DL — SIGNIFICANT CHANGE UP (ref 6–8.3)
PROTHROM AB SERPL-ACNC: 13.5 SEC — SIGNIFICANT CHANGE UP (ref 10.6–13.6)
RBC # BLD: 4.05 M/UL — LOW (ref 4.2–5.8)
RBC # FLD: 19.6 % — HIGH (ref 10.3–14.5)
SARS-COV-2 RNA SPEC QL NAA+PROBE: SIGNIFICANT CHANGE UP
SODIUM SERPL-SCNC: 138 MMOL/L — SIGNIFICANT CHANGE UP (ref 135–145)
TROPONIN T, HIGH SENSITIVITY RESULT: 460 NG/L — CRITICAL HIGH
TROPONIN T, HIGH SENSITIVITY RESULT: 502 NG/L — CRITICAL HIGH
WBC # BLD: 6.5 K/UL — SIGNIFICANT CHANGE UP (ref 3.8–10.5)
WBC # FLD AUTO: 6.5 K/UL — SIGNIFICANT CHANGE UP (ref 3.8–10.5)

## 2021-10-19 PROCEDURE — 99223 1ST HOSP IP/OBS HIGH 75: CPT | Mod: GC

## 2021-10-19 PROCEDURE — 99222 1ST HOSP IP/OBS MODERATE 55: CPT | Mod: GC

## 2021-10-19 PROCEDURE — 71045 X-RAY EXAM CHEST 1 VIEW: CPT | Mod: 26

## 2021-10-19 PROCEDURE — 99291 CRITICAL CARE FIRST HOUR: CPT | Mod: 25

## 2021-10-19 PROCEDURE — 93010 ELECTROCARDIOGRAM REPORT: CPT

## 2021-10-19 RX ORDER — ASPIRIN/CALCIUM CARB/MAGNESIUM 324 MG
81 TABLET ORAL DAILY
Refills: 0 | Status: DISCONTINUED | OUTPATIENT
Start: 2021-10-19 | End: 2021-10-25

## 2021-10-19 RX ORDER — LABETALOL HCL 100 MG
600 TABLET ORAL THREE TIMES A DAY
Refills: 0 | Status: DISCONTINUED | OUTPATIENT
Start: 2021-10-19 | End: 2021-10-25

## 2021-10-19 RX ORDER — NITROGLYCERIN 6.5 MG
0.4 CAPSULE, EXTENDED RELEASE ORAL ONCE
Refills: 0 | Status: COMPLETED | OUTPATIENT
Start: 2021-10-19 | End: 2021-10-19

## 2021-10-19 RX ORDER — NIFEDIPINE 30 MG
90 TABLET, EXTENDED RELEASE 24 HR ORAL
Refills: 0 | Status: DISCONTINUED | OUTPATIENT
Start: 2021-10-19 | End: 2021-10-25

## 2021-10-19 RX ORDER — CLOPIDOGREL BISULFATE 75 MG/1
75 TABLET, FILM COATED ORAL DAILY
Refills: 0 | Status: DISCONTINUED | OUTPATIENT
Start: 2021-10-20 | End: 2021-10-22

## 2021-10-19 RX ORDER — LABETALOL HCL 100 MG
200 TABLET ORAL ONCE
Refills: 0 | Status: COMPLETED | OUTPATIENT
Start: 2021-10-19 | End: 2021-10-19

## 2021-10-19 RX ORDER — CLOPIDOGREL BISULFATE 75 MG/1
600 TABLET, FILM COATED ORAL ONCE
Refills: 0 | Status: COMPLETED | OUTPATIENT
Start: 2021-10-19 | End: 2021-10-19

## 2021-10-19 RX ORDER — HEPARIN SODIUM 5000 [USP'U]/ML
4400 INJECTION INTRAVENOUS; SUBCUTANEOUS EVERY 6 HOURS
Refills: 0 | Status: DISCONTINUED | OUTPATIENT
Start: 2021-10-19 | End: 2021-10-20

## 2021-10-19 RX ORDER — HEPARIN SODIUM 5000 [USP'U]/ML
4400 INJECTION INTRAVENOUS; SUBCUTANEOUS ONCE
Refills: 0 | Status: COMPLETED | OUTPATIENT
Start: 2021-10-19 | End: 2021-10-19

## 2021-10-19 RX ORDER — ATORVASTATIN CALCIUM 80 MG/1
80 TABLET, FILM COATED ORAL AT BEDTIME
Refills: 0 | Status: DISCONTINUED | OUTPATIENT
Start: 2021-10-19 | End: 2021-10-25

## 2021-10-19 RX ORDER — SENNA PLUS 8.6 MG/1
2 TABLET ORAL AT BEDTIME
Refills: 0 | Status: DISCONTINUED | OUTPATIENT
Start: 2021-10-19 | End: 2021-10-25

## 2021-10-19 RX ORDER — HEPARIN SODIUM 5000 [USP'U]/ML
INJECTION INTRAVENOUS; SUBCUTANEOUS
Qty: 25000 | Refills: 0 | Status: DISCONTINUED | OUTPATIENT
Start: 2021-10-19 | End: 2021-10-20

## 2021-10-19 RX ORDER — NIFEDIPINE 30 MG
90 TABLET, EXTENDED RELEASE 24 HR ORAL ONCE
Refills: 0 | Status: COMPLETED | OUTPATIENT
Start: 2021-10-19 | End: 2021-10-19

## 2021-10-19 RX ORDER — CALCIUM ACETATE 667 MG
1334 TABLET ORAL
Refills: 0 | Status: DISCONTINUED | OUTPATIENT
Start: 2021-10-19 | End: 2021-10-25

## 2021-10-19 RX ADMIN — Medication 200 MILLIGRAM(S): at 14:40

## 2021-10-19 RX ADMIN — ATORVASTATIN CALCIUM 80 MILLIGRAM(S): 80 TABLET, FILM COATED ORAL at 22:08

## 2021-10-19 RX ADMIN — SENNA PLUS 2 TABLET(S): 8.6 TABLET ORAL at 21:17

## 2021-10-19 RX ADMIN — CLOPIDOGREL BISULFATE 600 MILLIGRAM(S): 75 TABLET, FILM COATED ORAL at 17:43

## 2021-10-19 RX ADMIN — HEPARIN SODIUM 900 UNIT(S)/HR: 5000 INJECTION INTRAVENOUS; SUBCUTANEOUS at 19:58

## 2021-10-19 RX ADMIN — Medication 0.4 MILLIGRAM(S): at 11:35

## 2021-10-19 RX ADMIN — HEPARIN SODIUM 4400 UNIT(S): 5000 INJECTION INTRAVENOUS; SUBCUTANEOUS at 19:58

## 2021-10-19 RX ADMIN — Medication 600 MILLIGRAM(S): at 21:17

## 2021-10-19 RX ADMIN — Medication 90 MILLIGRAM(S): at 14:41

## 2021-10-19 NOTE — H&P ADULT - PROBLEM SELECTOR PLAN 1
- Admit to medicine on telemetry  - Troponin 460 > 502  - EKG without obvious ST segment change  - Will Plavix load and heparinize patient for NSTEMI  - Start [id number] daily and Lipitor 40 daily  - House cardiology consult called  - Check TTE  - Defer ischemic eval to cardiology - will make NPO in event of Cath given known 95% stenosis of LCx  - Loud systolic murmur on exam - ?Aortic stenosis? - defer to cardio for further management after TTE

## 2021-10-19 NOTE — H&P ADULT - NSHPPHYSICALEXAM_GEN_ALL_CORE
PHYSICAL EXAM:  GENERAL: NAD, well-developed  HEAD:  Atraumatic, Normocephalic  EYES: EOMI, PERRLA, conjunctiva and sclera clear  NECK: Supple + KAROL  CHEST/LUNG: scant rales  HEART: Regular rate and rhythm +Systolic murmur, rubs, or gallops  ABDOMEN: Soft, Nontender, Nondistended; Bowel sounds present  EXTREMITIES:  2+ Peripheral Pulses, Left fore arm avf +thrill/burit, +pedal edema, No clubbing, cyanosis,   PSYCH: AAOx3  NEUROLOGY: non-focal  SKIN: No rashes or lesions

## 2021-10-19 NOTE — H&P ADULT - PROBLEM SELECTOR PLAN 2
- Patient clinially volume overloaded and CXR with evidence of volume overload  - Patient anuric currently  - Would utilize HD/PUF for volume management  - Daily weight and I+O  - Continue betablocker

## 2021-10-19 NOTE — ED ADULT NURSE NOTE - OBJECTIVE STATEMENT
Received pt to TRB A&OX4 ambulatory at baseline reports started having CP and SOB last night before bed, woke up this morning and pain persisted. Pt went to scheduled dialysis appointment, was fully dialyzed and felt mild improvement of symptoms. Reports current 3/10 CP aand mild exertional SOb. LAVF observed, right forearm 18G obtained. Labs sent, meds administered as ordered, will continue to monitor.

## 2021-10-19 NOTE — H&P ADULT - NSHPLABSRESULTS_GEN_ALL_CORE
9.4    6.50  )-----------( 289      ( 19 Oct 2021 11:46 )             30.5     10-19    138  |  96<L>  |  17  ----------------------------<  103<H>  3.8   |  27  |  4.59<H>    Ca    10.0      19 Oct 2021 11:46  Mg     2.10     10-19    TPro  8.3  /  Alb  4.7  /  TBili  0.6  /  DBili  x   /  AST  18  /  ALT  13  /  AlkPhos  148<H>  10-19    EKG NSR 81 c poor r wave progression, LVH    Trop 460 > 502  BNP > 56944  Lactate 3.8    CXR Mild asymmetric pulmonary vascular redistribution/congestion on the left.  New patchy right lower lung opacities, nonspecific findings, which could be due to atelectasis, pneumonia, or asymmetric pulmonary edema.  New bilateral small pleural effusions, larger on the right, with likely associated passive atelectasis.

## 2021-10-19 NOTE — H&P ADULT - HISTORY OF PRESENT ILLNESS
61 y.o male pmh of ESRD on HD (t/th/sat) via left forearm AVF, HTN, Hep C s/p treatment, CAD c LCx 95% stenosis presents with chest pain and sob. Patient states that he has had constant chest pain for 2 months that gets worse when he exerts himself, the degree of exertion required has decreased over time. At the same time he has SOB/GALLAGHER which also has gotten worse over time. + PND, + Edema, + cough with clear sputum. Chest pain typically is left lower chest up to 8/10, lasts 10 minutes at a time before resolving. Symptoms worse when laying flat, improve when upright. He has woken up on floor one time about a week ago. last HD was this AM prior to arrival, completed whole shift. Denies fever, chills, nausea, vomiting, abd pain, diarrhea, brbpr, melena, dysuria, hematuria, dizziness, syncope, loc, vision loss, blurry vision.

## 2021-10-19 NOTE — CONSULT NOTE ADULT - ASSESSMENT
This is a 62yo man with HTN, ESRD on iHD via LUE AVF, HepC s/p Mavyret, CAD (s/p LHC for abnormal stress test in 4/2021 with Dr. Joselin Lux revealing 95% lesion on pLCx, recommended medical management given chronic with normal LV function on TTE) who presents with several weeks of exertional chest pain and SOB with acute worsening starting yesterday morning. Found to have unstable angina vs NSTEMI.     #NSTEMI    Recommendations:  1. Aspirin 325 mg, clopidogrel 600 mg, and heparin bolus and drip per AVS protocol  2. Order TTE  3. Telemetry monitoring    4. Continue home antihypertensives   5. Plan for cardiac catheterization tomorrow. NPO at midnight. Case will be discussed with interventional cardiology.   6. Please consult nephrology regarding inpatient iHD.     Final recommendations pending attending attestation    Abdullahi Bernardo MD  Department of Cardiology  Cardiology Fellow, PGY4  Cell: [phone]  This is a 62yo man with HTN, ESRD on iHD via LUE AVF, HepC s/p Mavyret, CAD (s/p LHC for abnormal stress test in 4/2021 with Dr. Joselin Lux revealing 95% lesion on pLCx, recommended medical management given chronic with normal LV function on TTE) who presents with several weeks of exertional chest pain and SOB with acute worsening starting yesterday morning. Found to have unstable angina vs NSTEMI. Also concern for symptomatic AS likely underestimated on prior TTE.     #NSTEMI  #AS    Recommendations:  1. Aspirin 325 mg, clopidogrel 600 mg, and heparin bolus and drip per AVS protocol  2. Order TTE  3. Telemetry monitoring    4. Continue home antihypertensives   5. Patient needs SAVANA to assess aortic valve for AS. NPO at midnight.   6. Plan for cardiac catheterization after SAVANA.   7. Please consult nephrology regarding inpatient iHD.     Final recommendations pending attending attestation    Abdullahi Bernardo MD  Department of Cardiology  Cardiology Fellow, PGY4  Cell: [phone]  This is a 60yo man with HTN, ESRD on iHD via LUE AVF, HepC s/p Mavyret, CAD (s/p LHC for abnormal stress test in 4/2021 with Dr. Joselin Lux revealing 95% lesion on pLCx, no intervention done) who presents with several weeks of exertional chest pain and SOB with acute worsening starting yesterday morning. Found to have unstable angina vs NSTEMI. Also concern for symptomatic AS likely underestimated on prior TTE.     #NSTEMI  #AS    Recommendations:  1. Aspirin 325 mg, clopidogrel 600 mg, and heparin bolus and drip per AVS protocol  2. Order TTE  3. Telemetry monitoring    4. Continue home antihypertensives   5. Patient needs SAVANA to assess aortic valve for AS. NPO at midnight.   6. Plan for cardiac catheterization after SAVANA.   7. Please consult nephrology regarding inpatient iHD.     Final recommendations pending attending attestation    Abdullahi Bernardo MD  Department of Cardiology  Cardiology Fellow, PGY4  Cell: [phone]

## 2021-10-19 NOTE — CONSULT NOTE ADULT - SUBJECTIVE AND OBJECTIVE BOX
Cardiology Consult Note    Patient seen and evaluated at bedside    Chief Complaint: Chest pain    HPI:      PMHx:   Benign Essential Hypertension  End Stage Renal Disease on Dialysis  HTN (hypertension)  Chronic renal failure  Hepatitis C  TB (tuberculosis)  CHF (congestive heart failure)    PSHx:   AVF (arteriovenous fistula)  S/P appendectomy      Allergies:  adhesives (Other)  No Known Drug Allergies      Home Meds:    Current Medications:       FAMILY HISTORY:      Social History:  Smoking History:  Alcohol Use:  Drug Use:    REVIEW OF SYSTEMS:  Constitutional:     [x ] negative [ ] fevers [ ] chills [ ] weight loss [ ] weight gain  HEENT:                  [x ] negative [ ] dry eyes [ ] eye irritation [ ] postnasal drip [ ] nasal congestion  CV:                         [ x] negative  [ ] chest pain [ ] orthopnea [ ] palpitations [ ] murmur  Resp:                     [x ] negative [ ] cough [ ] shortness of breath [ ] dyspnea [ ] wheezing [ ] sputum [ ]hemoptysis  GI:                          [ x] negative [ ] nausea [ ] vomiting [ ] diarrhea [ ] constipation [ ] abd pain [ ] dysphagia   :                        [ x] negative [ ] dysuria [ ] nocturia [ ] hematuria [ ] increased urinary frequency  Musculoskeletal: [x ] negative [ ] back pain [ ] myalgias [ ] arthralgias [ ] fracture  Skin:                       [ x] negative [ ] rash [ ] itch  Neurological:        [ x] negative [ ] headache [ ] dizziness [ ] syncope [ ] weakness [ ] numbness  Psychiatric:           [ x] negative [ ] anxiety [ ] depression  Endocrine:            [ x] negative [ ] diabetes [ ] thyroid problem  Heme/Lymph:      [ x] negative [ ] anemia [ ] bleeding problem  Allergic/Immune: [ x] negative [ ] itchy eyes [ ] nasal discharge [ ] hives [ ] angioedema    [ x] All other systems negative  [ ] Unable to assess ROS due to      Physical Exam:  T(F): 97.6 (10-19), Max: 97.6 (10-19)  HR: 74 (10-19) (74 - 81)  BP: 168/99 (10-19) (168/99 - 175/89)  RR: 18 (10-19)  SpO2: 100% (10-19)  GENERAL: No acute distress, well-developed  HEAD:  Atraumatic, Normocephalic  ENT: EOMI, PERRLA, conjunctiva and sclera clear, Neck supple, No JVD, moist mucosa  CHEST/LUNG: Clear to auscultation bilaterally; No wheeze, equal breath sounds bilaterally   BACK: No spinal tenderness  HEART: Regular rate and rhythm; No murmurs, rubs, or gallops  ABDOMEN: Soft, Nontender, Nondistended; Bowel sounds present  EXTREMITIES:  No clubbing, cyanosis, or edema  PSYCH: Nl behavior, nl affect  NEUROLOGY: AAOx3, non-focal, cranial nerves intact  SKIN: Normal color, No rashes or lesions  LINES:    Cardiovascular Diagnostic Testing:    ECG: Personally reviewed:    Echo: Personally reviewed:    Stress Testing:    Cath:    Imaging:    CXR: Personally reviewed    Labs: Personally reviewed                        9.4    6.50  )-----------( 289      ( 19 Oct 2021 11:46 )             30.5     10-19    138  |  96<L>  |  17  ----------------------------<  103<H>  3.8   |  27  |  4.59<H>    Ca    10.0      19 Oct 2021 11:46  Mg     2.10     10-19    TPro  8.3  /  Alb  4.7  /  TBili  0.6  /  DBili  x   /  AST  18  /  ALT  13  /  AlkPhos  148<H>  10-19    PT/INR - ( 19 Oct 2021 11:46 )   PT: 13.5 sec;   INR: 1.18 ratio         PTT - ( 19 Oct 2021 11:46 )  PTT:34.3 sec  Serum Pro-Brain Natriuretic Peptide: >75586 pg/mL (10-19 @ 11:46)         Cardiology Consult Note    Patient seen and evaluated at bedside    Chief Complaint: Chest pain    HPI:  This is a 62yo man with HTN, ESRD on iHD via LUE AVF, HepC s/p Mavyret, CAD (s/p LHC for abnormal stress test in 4/2021 with Dr. Joselin Lux revealing 95% lesion on pLCx, recommended medical management) who presents with several weeks of exertional chest pain and SOB with acute worsening starting yesterday morning. Patient describes pain as burning and pressure like at the left side of his chest, worse with laying flat. Patient had HD today and chest pain persisted so dialysis center activated EMS.     In the ED patient was HDS, but ECG unchanged from priors. HS troponin 460 and CKMB 4.0. On evaluation patient reported 3/10 chest pain at rest.     PMHx:   Benign Essential Hypertension  End Stage Renal Disease on Dialysis  HTN (hypertension)  Chronic renal failure  Hepatitis C  TB (tuberculosis)  CHF (congestive heart failure)    PSHx:   AVF (arteriovenous fistula)  S/P appendectomy      Allergies:  adhesives (Other)  No Known Drug Allergies      Home Medications:  calcium acetate 667 mg oral capsule: 2 cap(s) orally 3 times a day (with meals) (19 Oct 2021 17:38)  calcium carbonate 500 mg (200 mg elemental calcium) oral tablet, chewable: 1 tab(s) orally once a day, As Needed (19 Oct 2021 17:38)  labetalol 200 mg oral tablet: 3 tab(s) orally 3 times a day (19 Oct 2021 17:38)  NIFEdipine 90 mg oral tablet, extended release: 1 tab(s) orally 2 times a day (19 Oct 2021 17:38)  Senna: orally once a day (at bedtime) (19 Oct 2021 17:38)    Current Medications:   MEDICATIONS  (STANDING):  clopidogrel Tablet 600 milliGRAM(s) Oral once    MEDICATIONS  (PRN):    FAMILY HISTORY:  Noncontributory    Social History:  Denies toxic habits    REVIEW OF SYSTEMS:  Constitutional:     [x ] negative [ ] fevers [ ] chills [ ] weight loss [ ] weight gain  HEENT:                  [x ] negative [ ] dry eyes [ ] eye irritation [ ] postnasal drip [ ] nasal congestion  CV:                         [ ] negative  [x] chest pain [ ] orthopnea [ ] palpitations [ ] murmur  Resp:                     [ ] negative [ ] cough [x] shortness of breath [ ] dyspnea [ ] wheezing [ ] sputum [ ]hemoptysis  GI:                          [ x] negative [ ] nausea [ ] vomiting [ ] diarrhea [ ] constipation [ ] abd pain [ ] dysphagia   :                        [ x] negative [ ] dysuria [ ] nocturia [ ] hematuria [ ] increased urinary frequency  Musculoskeletal: [x ] negative [ ] back pain [ ] myalgias [ ] arthralgias [ ] fracture  Skin:                       [ x] negative [ ] rash [ ] itch  Neurological:        [ x] negative [ ] headache [ ] dizziness [ ] syncope [ ] weakness [ ] numbness  Psychiatric:           [ x] negative [ ] anxiety [ ] depression  Endocrine:            [ x] negative [ ] diabetes [ ] thyroid problem  Heme/Lymph:      [ x] negative [ ] anemia [ ] bleeding problem  Allergic/Immune: [ x] negative [ ] itchy eyes [ ] nasal discharge [ ] hives [ ] angioedema    Physical Exam:  T(F): 97.6 (10-19), Max: 97.6 (10-19)  HR: 74 (10-19) (74 - 81)  BP: 168/99 (10-19) (168/99 - 175/89)  RR: 18 (10-19)  SpO2: 100% (10-19)  GENERAL: No acute distress, well-developed  HEAD:  Atraumatic, Normocephalic  ENT: EOMI, PERRLA, conjunctiva and sclera clear, Neck supple, No JVD, moist mucosa  CHEST/LUNG: Clear to auscultation bilaterally; No wheeze, equal breath sounds bilaterally   HEART: Regular rate and rhythm; 3/6 systolic murmur best appreciated at LSB, no rubs or gallops  ABDOMEN: Soft, Nontender, Nondistended; Bowel sounds present  EXTREMITIES:  No clubbing, cyanosis, or edema  PSYCH: Nl behavior, nl affect  NEUROLOGY: AAOx3, non-focal, cranial nerves intact  SKIN: Normal color, No rashes or lesions    Cardiovascular Diagnostic Testing:    ECG: Personally reviewed:  NSR, BRENT, LVH    Echo: Personally reviewed:  4/14/21  DIMENSIONS:  Dimensions:     Normal Values:  LA:     4.6 cm    2.0 - 4.0 cm  Ao:     3.7 cm    2.0 - 3.8 cm  SEPTUM: 0.7 cm    0.6 - 1.2 cm  PWT:    0.8 cm    0.6 - 1.1 cm  LVIDd:  6.3 cm    3.0 - 5.6 cm  LVIDs:  4.5 cm    1.8 - 4.0 cm  Derived Variables:  LVMI: 97 g/m2  RWT: 0.25  Fractional short: 29 %  Ejection Fraction (Teicholtz): 54 %  ------------------------------------------------------------------------  OBSERVATIONS:  Mitral Valve: Mitral annular calcification, otherwise  normal mitral valve. Mild-moderate mitral regurgitation.  Aortic Root: Normal aortic root.  Aortic Valve: Calcified trileaflet aortic valve with  decreased opening. Peak transaortic valve gradient equals  39 mm Hg, mean transaortic valve gradient equals 19 mm Hg,  estimated aortic valve area equals 1.3 sqcm (by continuity  equation), consistent with moderate aortic stenosis. Mild  aortic regurgitation.  Left Atrium:Moderately dilated left atrium.  LA volume  index = 46 cc/m2.  Left Ventricle: Low normal left ventricular systolic  function. No segmental wall motion abnormalities. Mild left  ventricular enlargement.  Right Heart: Normal right atrium. Normal rightventricular  size and function. Normal tricuspid valve. Mild tricuspid  regurgitation. Normal pulmonic valve.  Minimal pulmonic  regurgitation.  Pericardium/PleuraNormal pericardium with no pericardial  effusion.  ------------------------------------------------------------------------  CONCLUSIONS:  1. Mitral annular calcification, otherwise normal mitral  valve. Mild-moderate mitral regurgitation.  2. Calcified trileaflet aortic valve with decreased  opening. Peak transaortic valve gradient equals 39 mm Hg,  mean transaortic valve gradient equals 19 mm Hg, estimated  aortic valve area equals 1.3 sqcm (by continuity equation),  consistent with moderate aortic stenosis. Mild aortic  regurgitation.  3. Moderately dilated left atrium.  LA volume index = 46  cc/m2.  4. Mild left ventricular enlargement.  5. Low normal left ventricular systolic function. No  segmental wall motion abnormalities.  6. Normal right ventricular size and function.    Cath:  4/14/21  PROCEDURE:  --  Right heart catheterization.  --  Left coronary angiography.  --  Right coronary angiography.  --  Sonosite - Diagnostic.  TECHNIQUE: The risks and alternatives of the procedures and conscious  sedation were explained to the patient and informed consent was obtained.  Cardiac catheterization performed electively.  Local anesthetic given. Right femoral artery access. Local anesthetic  given. A 4fr Sheath [place] was inserted in the vessel, utilizing the  modified Seldinger technique. Right femoral vein access. Local anesthetic  given. A 5fr Sheath [place] was inserted in the vessel, utilizing the  modified Seldingertechnique. Right heart catheterization. The procedure  was performed utilizing a 5 Fr. X 110cm Bln Wedge Pressure Cath catheter  under fluoroscopic guidance. Measurements of pressures were obtained. Left  coronary artery angiography. The vessel was injected utilizing a 4 Fr JL  4.0 catheter and positioned in the vessel ostium under fluoroscopic  guidance. Angiography was performed in multiple projections using  hand-injection of contrast. Right coronary artery angiography. The vessel  was injected utilizing a 4 Fr JR 4.0 catheter and positioned in the vessel  ostia under fluoroscopic guidance. Angiography was performed in multiple  projections using hand-injection of contrast. Sonosite - Diagnostic.  RADIATION EXPOSURE: 6.1 min.  CONTRAST GIVEN: 120 ml Optiray.  MEDICATIONS GIVEN: 2% Lidocaine, 10 ml, subcutaneously. 0.9% Normal saline,  9 ml, IV.  VENTRICLES: No left ventriculogram was performed.  CORONARY VESSELS: The coronary circulation is right dominant.  LM:   --  LM: The vessel waslarge sized and mildly calcified. Angiography  showed mild atherosclerosis with no flow limiting lesions.  LAD:   --  LAD: Angiography showed mild atherosclerosis with no flow  limiting lesions.  --  D1: Angiography showed mild atherosclerosis with no flow limiting  lesions.  CX:   --  Proximal circumflex: There was a discrete 95 % stenosis. The  lesion was complex. There was BERNADINE grade 2 flow through the vessel  (partial perfusion).  RI:   --  Ostial ramus intermedius: There was a discrete 95 % stenosis at a  site with no prior intervention. There was BERNADINE grade 3 flow through the  vessel (brisk flow).  RCA:   --  RCA: Angiography showed mild atherosclerosis with no flow  limiting lesions.  COMPLICATIONS: [facility]  DIAGNOSTIC IMPRESSIONS: Severe stenosis LCX and RAMUS.  Hemodynamics as listed.  Unable to cross the aortic valve , valve appears to be very calcified.  Recommend TTE to assess for aortic valve disease, will plan for  revascularization based on echo findings.  INTERVENTIONAL IMPRESSIONS: Severe stenosis LCX and RAMUS.  Hemodynamics as listed.  Unable to cross the aortic valve , valve appears to be very calcified.  Recommend TTE to assess for aortic valve disease, will plan for  revascularization based on echo findings.  Prepared and signed by  Joselin Lux M.D.      Imaging:    CXR: Personally reviewed    Labs: Personally reviewed                        9.4    6.50  )-----------( 289      ( 19 Oct 2021 11:46 )             30.5     10-19    138  |  96<L>  |  17  ----------------------------<  103<H>  3.8   |  27  |  4.59<H>    Ca    10.0      19 Oct 2021 11:46  Mg     2.10     10-19    TPro  8.3  /  Alb  4.7  /  TBili  0.6  /  DBili  x   /  AST  18  /  ALT  13  /  AlkPhos  148<H>  10-19    PT/INR - ( 19 Oct 2021 11:46 )   PT: 13.5 sec;   INR: 1.18 ratio         PTT - ( 19 Oct 2021 11:46 )  PTT:34.3 sec  Serum Pro-Brain Natriuretic Peptide: >59314 pg/mL (10-19 @ 11:46)    Troponin T, High Sensitivity Result: 460  Troponin T, High Sensitivity Result: 502  CKMB Units: 4.0 ng/mL   CKMB Units: 3.7 ng/mL             Cardiology Consult Note    Patient seen and evaluated at bedside    Chief Complaint: Chest pain    HPI:  This is a 60yo man with HTN, ESRD on iHD via LUE AVF, HepC s/p Mavyret, CAD (s/p LHC for abnormal stress test in 4/2021 with Dr. Joselin Lux revealing 95% lesion on pLCx, recommended medical management for now given concern for severe AS) who presents with several weeks of exertional chest pain and SOB with acute worsening starting yesterday morning. Patient describes pain as burning and pressure like at the left side of his chest, worse with laying flat. Patient had HD today and chest pain persisted so dialysis center activated EMS.     In the ED patient was HDS, but ECG unchanged from priors. HS troponin 460 and CKMB 4.0. On evaluation patient reported 3/10 chest pain at rest.     PMHx:   Benign Essential Hypertension  End Stage Renal Disease on Dialysis  HTN (hypertension)  Chronic renal failure  Hepatitis C  TB (tuberculosis)  CHF (congestive heart failure)    PSHx:   AVF (arteriovenous fistula)  S/P appendectomy      Allergies:  adhesives (Other)  No Known Drug Allergies      Home Medications:  calcium acetate 667 mg oral capsule: 2 cap(s) orally 3 times a day (with meals) (19 Oct 2021 17:38)  calcium carbonate 500 mg (200 mg elemental calcium) oral tablet, chewable: 1 tab(s) orally once a day, As Needed (19 Oct 2021 17:38)  labetalol 200 mg oral tablet: 3 tab(s) orally 3 times a day (19 Oct 2021 17:38)  NIFEdipine 90 mg oral tablet, extended release: 1 tab(s) orally 2 times a day (19 Oct 2021 17:38)  Senna: orally once a day (at bedtime) (19 Oct 2021 17:38)    Current Medications:   MEDICATIONS  (STANDING):  clopidogrel Tablet 600 milliGRAM(s) Oral once    MEDICATIONS  (PRN):    FAMILY HISTORY:  Noncontributory    Social History:  Denies toxic habits    REVIEW OF SYSTEMS:  Constitutional:     [x ] negative [ ] fevers [ ] chills [ ] weight loss [ ] weight gain  HEENT:                  [x ] negative [ ] dry eyes [ ] eye irritation [ ] postnasal drip [ ] nasal congestion  CV:                         [ ] negative  [x] chest pain [ ] orthopnea [ ] palpitations [ ] murmur  Resp:                     [ ] negative [ ] cough [x] shortness of breath [ ] dyspnea [ ] wheezing [ ] sputum [ ]hemoptysis  GI:                          [ x] negative [ ] nausea [ ] vomiting [ ] diarrhea [ ] constipation [ ] abd pain [ ] dysphagia   :                        [ x] negative [ ] dysuria [ ] nocturia [ ] hematuria [ ] increased urinary frequency  Musculoskeletal: [x ] negative [ ] back pain [ ] myalgias [ ] arthralgias [ ] fracture  Skin:                       [ x] negative [ ] rash [ ] itch  Neurological:        [ x] negative [ ] headache [ ] dizziness [ ] syncope [ ] weakness [ ] numbness  Psychiatric:           [ x] negative [ ] anxiety [ ] depression  Endocrine:            [ x] negative [ ] diabetes [ ] thyroid problem  Heme/Lymph:      [ x] negative [ ] anemia [ ] bleeding problem  Allergic/Immune: [ x] negative [ ] itchy eyes [ ] nasal discharge [ ] hives [ ] angioedema    Physical Exam:  T(F): 97.6 (10-19), Max: 97.6 (10-19)  HR: 74 (10-19) (74 - 81)  BP: 168/99 (10-19) (168/99 - 175/89)  RR: 18 (10-19)  SpO2: 100% (10-19)  GENERAL: No acute distress, well-developed  HEAD:  Atraumatic, Normocephalic  ENT: EOMI, PERRLA, conjunctiva and sclera clear, Neck supple, No JVD, moist mucosa  CHEST/LUNG: Clear to auscultation bilaterally; No wheeze, equal breath sounds bilaterally   HEART: Regular rate and rhythm; 3/6 systolic murmur best appreciated at LSB, no rubs or gallops  ABDOMEN: Soft, Nontender, Nondistended; Bowel sounds present  EXTREMITIES:  No clubbing, cyanosis, or edema  PSYCH: Nl behavior, nl affect  NEUROLOGY: AAOx3, non-focal, cranial nerves intact  SKIN: Normal color, No rashes or lesions    Cardiovascular Diagnostic Testing:    ECG: Personally reviewed:  NSR, BRENT, LVH    Echo: Personally reviewed:  4/14/21  DIMENSIONS:  Dimensions:     Normal Values:  LA:     4.6 cm    2.0 - 4.0 cm  Ao:     3.7 cm    2.0 - 3.8 cm  SEPTUM: 0.7 cm    0.6 - 1.2 cm  PWT:    0.8 cm    0.6 - 1.1 cm  LVIDd:  6.3 cm    3.0 - 5.6 cm  LVIDs:  4.5 cm    1.8 - 4.0 cm  Derived Variables:  LVMI: 97 g/m2  RWT: 0.25  Fractional short: 29 %  Ejection Fraction (Teicholtz): 54 %  ------------------------------------------------------------------------  OBSERVATIONS:  Mitral Valve: Mitral annular calcification, otherwise  normal mitral valve. Mild-moderate mitral regurgitation.  Aortic Root: Normal aortic root.  Aortic Valve: Calcified trileaflet aortic valve with  decreased opening. Peak transaortic valve gradient equals  39 mm Hg, mean transaortic valve gradient equals 19 mm Hg,  estimated aortic valve area equals 1.3 sqcm (by continuity  equation), consistent with moderate aortic stenosis. Mild  aortic regurgitation.  Left Atrium:Moderately dilated left atrium.  LA volume  index = 46 cc/m2.  Left Ventricle: Low normal left ventricular systolic  function. No segmental wall motion abnormalities. Mild left  ventricular enlargement.  Right Heart: Normal right atrium. Normal rightventricular  size and function. Normal tricuspid valve. Mild tricuspid  regurgitation. Normal pulmonic valve.  Minimal pulmonic  regurgitation.  Pericardium/PleuraNormal pericardium with no pericardial  effusion.  ------------------------------------------------------------------------  CONCLUSIONS:  1. Mitral annular calcification, otherwise normal mitral  valve. Mild-moderate mitral regurgitation.  2. Calcified trileaflet aortic valve with decreased  opening. Peak transaortic valve gradient equals 39 mm Hg,  mean transaortic valve gradient equals 19 mm Hg, estimated  aortic valve area equals 1.3 sqcm (by continuity equation),  consistent with moderate aortic stenosis. Mild aortic  regurgitation.  3. Moderately dilated left atrium.  LA volume index = 46  cc/m2.  4. Mild left ventricular enlargement.  5. Low normal left ventricular systolic function. No  segmental wall motion abnormalities.  6. Normal right ventricular size and function.    Cath:  4/14/21  PROCEDURE:  --  Right heart catheterization.  --  Left coronary angiography.  --  Right coronary angiography.  --  Sonosite - Diagnostic.  TECHNIQUE: The risks and alternatives of the procedures and conscious  sedation were explained to the patient and informed consent was obtained.  Cardiac catheterization performed electively.  Local anesthetic given. Right femoral artery access. Local anesthetic  given. A 4fr Sheath [place] was inserted in the vessel, utilizing the  modified Seldinger technique. Right femoral vein access. Local anesthetic  given. A 5fr Sheath [place] was inserted in the vessel, utilizing the  modified Seldingertechnique. Right heart catheterization. The procedure  was performed utilizing a 5 Fr. X 110cm Bln Wedge Pressure Cath catheter  under fluoroscopic guidance. Measurements of pressures were obtained. Left  coronary artery angiography. The vessel was injected utilizing a 4 Fr JL  4.0 catheter and positioned in the vessel ostium under fluoroscopic  guidance. Angiography was performed in multiple projections using  hand-injection of contrast. Right coronary artery angiography. The vessel  was injected utilizing a 4 Fr JR 4.0 catheter and positioned in the vessel  ostia under fluoroscopic guidance. Angiography was performed in multiple  projections using hand-injection of contrast. Sonosite - Diagnostic.  RADIATION EXPOSURE: 6.1 min.  CONTRAST GIVEN: 120 ml Optiray.  MEDICATIONS GIVEN: 2% Lidocaine, 10 ml, subcutaneously. 0.9% Normal saline,  9 ml, IV.  VENTRICLES: No left ventriculogram was performed.  CORONARY VESSELS: The coronary circulation is right dominant.  LM:   --  LM: The vessel waslarge sized and mildly calcified. Angiography  showed mild atherosclerosis with no flow limiting lesions.  LAD:   --  LAD: Angiography showed mild atherosclerosis with no flow  limiting lesions.  --  D1: Angiography showed mild atherosclerosis with no flow limiting  lesions.  CX:   --  Proximal circumflex: There was a discrete 95 % stenosis. The  lesion was complex. There was BERNADINE grade 2 flow through the vessel  (partial perfusion).  RI:   --  Ostial ramus intermedius: There was a discrete 95 % stenosis at a  site with no prior intervention. There was BERNADINE grade 3 flow through the  vessel (brisk flow).  RCA:   --  RCA: Angiography showed mild atherosclerosis with no flow  limiting lesions.  [facility]S: [facility]  DIAGNOSTIC IMPRESSIONS: Severe stenosis LCX and RAMUS.  Hemodynamics as listed.  Unable to cross the aortic valve , valve appears to be very calcified.  Recommend TTE to assess for aortic valve disease, will plan for  revascularization based on echo findings.  INTERVENTIONAL IMPRESSIONS: Severe stenosis LCX and RAMUS.  Hemodynamics as listed.  Unable to cross the aortic valve , valve appears to be very calcified.  Recommend TTE to assess for aortic valve disease, will plan for  revascularization based on echo findings.  Prepared and signed by  Joselin Lux M.D.      Imaging:    CXR: Personally reviewed    Labs: Personally reviewed                        9.4    6.50  )-----------( 289      ( 19 Oct 2021 11:46 )             30.5     10-19    138  |  96<L>  |  17  ----------------------------<  103<H>  3.8   |  27  |  4.59<H>    Ca    10.0      19 Oct 2021 11:46  Mg     2.10     10-19    TPro  8.3  /  Alb  4.7  /  TBili  0.6  /  DBili  x   /  AST  18  /  ALT  13  /  AlkPhos  148<H>  10-19    PT/INR - ( 19 Oct 2021 11:46 )   PT: 13.5 sec;   INR: 1.18 ratio         PTT - ( 19 Oct 2021 11:46 )  PTT:34.3 sec  Serum Pro-Brain Natriuretic Peptide: >89722 pg/mL (10-19 @ 11:46)    Troponin T, High Sensitivity Result: 460  Troponin T, High Sensitivity Result: 502  CKMB Units: 4.0 ng/mL   CKMB Units: 3.7 ng/mL

## 2021-10-19 NOTE — ED ADULT NURSE NOTE - CHIEF COMPLAINT QUOTE
pt coming from dialysis, c/o chest pain since last.  pt received full dialysis and dialysis center called 911.  pt was found to be a STEMI by EMS and received asa 324mg

## 2021-10-19 NOTE — ED PROVIDER NOTE - NS ED ROS FT
Constitutional: no fevers; no chills  HEENT: no visual changes, no sore throat, no rhinorrhea  CV: cp; no palpitations  Resp: sob; no cough  GI: no abd pain, no nausea, no vomiting, no diarrhea, no constipation  : no dysuria, no hematuria  MSK: no myalgias; no arthralgias  skin: no rashes  neuro: no HA, no numbness; no weakness, no tingling  ROS statement: all other ROS negative except as per HPI

## 2021-10-19 NOTE — ED ADULT TRIAGE NOTE - CHIEF COMPLAINT QUOTE
pt coming from dialysis, c/o chest pain since last.  pt received full dialysis and dialysis center called 911. pt coming from dialysis, c/o chest pain since last.  pt received full dialysis and dialysis center called 911.  pt was found to be a STEMI by EMS and received asa 324mg

## 2021-10-19 NOTE — H&P ADULT - ASSESSMENT
61 y.o male pmh of ESRD on HD (t/th/sat) via left forearm AVF, HTN, Hep C s/p treatment, CAD c LCx 95% stenosis presents with chest pain and sob admitted with NSTEMI.

## 2021-10-19 NOTE — ED PROVIDER NOTE - CLINICAL SUMMARY MEDICAL DECISION MAKING FREE TEXT BOX
Koffi Mansfield MD. pt with esrd hd tthsat, htn, cad (95% lcx stenosis medically managed 4/2021 cath) presents for 1 day of sob, cp. cp constant, exertional. having worsening valerio and orthopnea. pt also hypertensive here. ekg w/o STEMI. pt already received asa 325 via ems prior to arrival. does not appear overtly fluid overloaded. still concern for acs, hf. less likely dissection or pe as pt is not hypoxic or tachycardic. will obtain labs, cxr, ekg, nitro, reassess

## 2021-10-19 NOTE — H&P ADULT - NSHPSOCIALHISTORY_GEN_ALL_CORE
Lives with roomates, previously worked as a manager for a security compnay  never smoked  no etoh  no recreational use

## 2021-10-19 NOTE — H&P ADULT - ATTENDING COMMENTS
62yo man with HTN, ESRD on HD via LUE AVF, HepC s/p Mavyret, CAD (s/p LHC for abnormal stress test in 4/2021 with Dr. Joselin Lux revealing 95% lesion on pLCx, no intervention done) who presents with several weeks of exertional chest pain and SOB with acute worsening starting yesterday morning. Found to have unstable angina vs NSTEMI. Also concern for symptomatic AS likely underestimated on prior TTE.  CXR c/w volume overload.   HS trop >500  -heparin gtt, plavix and aspirin load  -npo post midnight for SAVANA to assess AS, planned for cath after SAVANA  - dapt, high intensity statin, c/w home htn agents  -house nephrology to follow for dialysis

## 2021-10-19 NOTE — ED PROVIDER NOTE - OBJECTIVE STATEMENT
62 yo M PMHx ESRD on HD (T/Th/Sat, last dialyzed TODAY), HTN, CAD (s/p cath 4/2021 with 95% LCx stenosis), HCV (s/p tx), presents to ED c/o left sided chest pain and SOB since last night. The CP is worse w/ exertion and has been having gradual worsening GALLAGHER. Pt continued to have CP today and completed dialysis however, CP persisted and thus, EMS was called. He has endorses orthopnea. Denies nausea, vomiting, diaphoresis, dizziness, fevers, cough. EMS provided  prior to arrival.

## 2021-10-19 NOTE — H&P ADULT - NSHPREVIEWOFSYSTEMS_GEN_ALL_CORE
CONSTITUTIONAL: No fever; No chills; No night sweats; No weight loss; No fatigue  EYES: No eye pain; No blurry vision  ENMT:  No difficulty hearing; No sinus or throat pain  NECK: No pain or stiffness  RESPIRATORY: see above  CARDIOVASCULAR: See above  GASTROINTESTINAL: No abdominal pain; No nausea; No vomiting; No hematemesis; No diarrhea; No constipation. No melena or hematochezia.  GENITOURINARY: No dysuria; No frequency; No hematuria; No incontinence  NEUROLOGICAL: No headaches; No loss of strength; No numbness  SKIN: No itching/burning; No rashes or lesions   MUSCULOSKELETAL: No joint pain or swelling; No muscle, back, or extremity pain  HEME/LYMPH: No easy bruising, or bleeding gums

## 2021-10-19 NOTE — ED PROVIDER NOTE - ATTENDING CONTRIBUTION TO CARE
I (Eulogio) agree with above, I performed a history and physical. Counseled charissa medical staff, physician assistant, and/or medical student on medical decision making as documented. Medical decisions and treatment interventions were made in real time during the patient encounter. Additionally and/or with the following exceptions: the patient presents to the emergency department with chest pain since last night. Was brought in as a STEMI notification, however ekg obtained immediately upon arrival did not meet stemi criteria. The patient has known CAD which apparently was non-stentable as per chart review. Pain did not improve with nitro, was 3/10 while in the ED. Trops elevated to ~500, cardiology consulted and the patient was admitted for possible cath vs. medical management of NSTEMI. The patient's condition was not amenable to outpatient treatment due either the lack of feasibility of outpatient care coordination, possibility for further decompensation with adverse outcome if discharge, or treatments and diagnostic  modalities only available during an inpatient hospitalization.

## 2021-10-19 NOTE — ED PROVIDER NOTE - PHYSICAL EXAMINATION
PHYSICAL EXAM:  GENERAL: non-toxic appearing; in no respiratory distress  HEAD Atraumatic, Normocephalic  NECK: No JVD; trachea midline  EYES: PERRL, EOMs intact b/l w/out deficits; normal conjunctiva  CHEST/LUNG: bibasilar crackles;   HEART: RRR no murmur/gallops/rubs  ABDOMEN: +BS, soft, NT, ND  EXTREMITIES: No LE edema, +2 radial pulses b/l, +2 DP/PT pulses b/l  MUSCULOSKELETAL: FROM of all 4 extremities;  NERVOUS SYSTEM:  A&Ox3, No motor deficits or sensory deficits; CNII-XII intact; no focal neurologic deficits  SKIN:  Warm and dry as visualized

## 2021-10-20 DIAGNOSIS — E83.39 OTHER DISORDERS OF PHOSPHORUS METABOLISM: ICD-10-CM

## 2021-10-20 DIAGNOSIS — N18.9 CHRONIC KIDNEY DISEASE, UNSPECIFIED: ICD-10-CM

## 2021-10-20 LAB
APTT BLD: 41.5 SEC — HIGH (ref 27–36.3)
APTT BLD: 44.2 SEC — HIGH (ref 27–36.3)
HCT VFR BLD CALC: 26.5 % — LOW (ref 39–50)
HGB BLD-MCNC: 8.3 G/DL — LOW (ref 13–17)
MCHC RBC-ENTMCNC: 23.6 PG — LOW (ref 27–34)
MCHC RBC-ENTMCNC: 31.3 GM/DL — LOW (ref 32–36)
MCV RBC AUTO: 75.3 FL — LOW (ref 80–100)
NRBC # BLD: 0 /100 WBCS — SIGNIFICANT CHANGE UP
NRBC # FLD: 0 K/UL — SIGNIFICANT CHANGE UP
PLATELET # BLD AUTO: 237 K/UL — SIGNIFICANT CHANGE UP (ref 150–400)
RBC # BLD: 3.52 M/UL — LOW (ref 4.2–5.8)
RBC # FLD: 19.3 % — HIGH (ref 10.3–14.5)
WBC # BLD: 5.05 K/UL — SIGNIFICANT CHANGE UP (ref 3.8–10.5)
WBC # FLD AUTO: 5.05 K/UL — SIGNIFICANT CHANGE UP (ref 3.8–10.5)

## 2021-10-20 PROCEDURE — 99222 1ST HOSP IP/OBS MODERATE 55: CPT | Mod: GC

## 2021-10-20 PROCEDURE — 99233 SBSQ HOSP IP/OBS HIGH 50: CPT

## 2021-10-20 PROCEDURE — 99231 SBSQ HOSP IP/OBS SF/LOW 25: CPT | Mod: GC

## 2021-10-20 RX ORDER — ERYTHROPOIETIN 10000 [IU]/ML
10000 INJECTION, SOLUTION INTRAVENOUS; SUBCUTANEOUS
Refills: 0 | Status: DISCONTINUED | OUTPATIENT
Start: 2021-10-20 | End: 2021-10-25

## 2021-10-20 RX ORDER — HEPARIN SODIUM 5000 [USP'U]/ML
5000 INJECTION INTRAVENOUS; SUBCUTANEOUS EVERY 8 HOURS
Refills: 0 | Status: DISCONTINUED | OUTPATIENT
Start: 2021-10-20 | End: 2021-10-25

## 2021-10-20 RX ADMIN — Medication 1334 MILLIGRAM(S): at 17:45

## 2021-10-20 RX ADMIN — Medication 81 MILLIGRAM(S): at 12:07

## 2021-10-20 RX ADMIN — HEPARIN SODIUM 1050 UNIT(S)/HR: 5000 INJECTION INTRAVENOUS; SUBCUTANEOUS at 02:49

## 2021-10-20 RX ADMIN — Medication 90 MILLIGRAM(S): at 05:40

## 2021-10-20 RX ADMIN — CLOPIDOGREL BISULFATE 75 MILLIGRAM(S): 75 TABLET, FILM COATED ORAL at 12:07

## 2021-10-20 RX ADMIN — Medication 600 MILLIGRAM(S): at 14:18

## 2021-10-20 RX ADMIN — Medication 90 MILLIGRAM(S): at 17:45

## 2021-10-20 RX ADMIN — Medication 600 MILLIGRAM(S): at 21:18

## 2021-10-20 RX ADMIN — HEPARIN SODIUM 5000 UNIT(S): 5000 INJECTION INTRAVENOUS; SUBCUTANEOUS at 21:20

## 2021-10-20 RX ADMIN — Medication 1334 MILLIGRAM(S): at 12:08

## 2021-10-20 RX ADMIN — ATORVASTATIN CALCIUM 80 MILLIGRAM(S): 80 TABLET, FILM COATED ORAL at 21:19

## 2021-10-20 RX ADMIN — Medication 600 MILLIGRAM(S): at 05:40

## 2021-10-20 NOTE — DIETITIAN INITIAL EVALUATION ADULT. - ORAL INTAKE PTA/DIET HISTORY
Patient reports good appetite and PO intake PTA. Patient reports regular interaction with RD at dialysis, verbalizes understanding of renal restrictions. Denies use of oral nutrition/micronutrient supplements at home. Confirms NKFA.

## 2021-10-20 NOTE — CONSULT NOTE ADULT - PROBLEM SELECTOR RECOMMENDATION 9
Pt. with ESRD on HD three times a week (TTS) presented to [facility] for CP and SOB. Last HD was on Tuesday 10/19 via LUE AVF. Pt. clinically stable. HD consent obtained from pt, placed in chart. Labs reviewed. Will arrange for HD tomorrow. Dose meds as per HD. Pt. with ESRD on HD three times a week (TTS) presented to [facility] for CP and SOB. Last HD was on Tuesday 10/19 via LUE AVF. Pt. clinically stable. HD consent obtained from pt, placed in chart. Labs reviewed. Will arrange for maintenance HD tomorrow. Dose meds as per HD

## 2021-10-20 NOTE — DIETITIAN INITIAL EVALUATION ADULT. - OTHER INFO
Patient reports good appetite in-house. Endorses constipation, states his last BM was 10/18. Noted order for senna. States no difficulty chewing/swallowing. Patient states his usual body weight is 72.7kg consistent with dosing weight of 72.6kg. Patient amenable to addition of oral nutrition supplement to optimize PO intake for increased nutrient needs on hemodialysis. Low sodium nutrition therapy provided. Discussed limiting salt intake by refraining from adding salt to foods, limiting eating out, and avoiding foods high in sodium.

## 2021-10-20 NOTE — PROGRESS NOTE ADULT - SUBJECTIVE AND OBJECTIVE BOX
Patient seen and examined at bedside.    Overnight Events:   Pt feels well, no chest pain, pressure, shortness of breath or palpitation. NPO for SAVANA today     REVIEW OF SYSTEMS:  Constitutional:     [x ] negative [ ] fevers [ ] chills [ ] weight loss [ ] weight gain  HEENT:                  [x ] negative [ ] dry eyes [ ] eye irritation [ ] postnasal drip [ ] nasal congestion  CV:                         [ x] negative  [ ] chest pain [ ] orthopnea [ ] palpitations [ ] murmur  Resp:                     [ x] negative [ ] cough [ ] shortness of breath [ ] dyspnea [ ] wheezing [ ] sputum [ ]hemoptysis  GI:                          [ x] negative [ ] nausea [ ] vomiting [ ] diarrhea [ ] constipation [ ] abd pain [ ] dysphagia   :                        [ x] negative [ ] dysuria [ ] nocturia [ ] hematuria [ ] increased urinary frequency  Musculoskeletal: [ x] negative [ ] back pain [ ] myalgias [ ] arthralgias [ ] fracture  Skin:                       [ x] negative [ ] rash [ ] itch  Neurological:        [x ] negative [ ] headache [ ] dizziness [ ] syncope [ ] weakness [ ] numbness  Psychiatric:           [ x] negative [ ] anxiety [ ] depression  Endocrine:            [ x] negative [ ] diabetes [ ] thyroid problem  Heme/Lymph:      [ x] negative [ ] anemia [ ] bleeding problem  Allergic/Immune: [ x] negative [ ] itchy eyes [ ] nasal discharge [ ] hives [ ] angioedema    [ x] All other systems negative  [ ] Unable to assess ROS due to    Current Meds:  aspirin enteric coated 81 milliGRAM(s) Oral daily  atorvastatin 80 milliGRAM(s) Oral at bedtime  calcium acetate 1334 milliGRAM(s) Oral three times a day with meals  clopidogrel Tablet 75 milliGRAM(s) Oral daily  heparin   Injectable 4400 Unit(s) IV Push every 6 hours PRN  heparin  Infusion.  Unit(s)/Hr IV Continuous <Continuous>  labetalol 600 milliGRAM(s) Oral three times a day  NIFEdipine XL 90 milliGRAM(s) Oral two times a day  senna 2 Tablet(s) Oral at bedtime      PAST MEDICAL & SURGICAL HISTORY:  End Stage Renal Disease on Dialysis  since 2009 - Tues, Thurs, Sat    HTN (hypertension)    Chronic renal failure    Hepatitis C    TB (tuberculosis)  Latent    CHF (congestive heart failure)  Mild    AVF (arteriovenous fistula)  placed 2009 - Left side    S/P appendectomy  at age 7        Vitals:  T(F): 98.6 (10-20), Max: 98.6 (10-20)  HR: 70 (10-20) (70 - 81)  BP: 134/77 (10-20) (134/77 - 175/89)  RR: 17 (10-20)  SpO2: 100% (10-20)  I&O's Summary    19 Oct 2021 07:01  -  20 Oct 2021 07:00  --------------------------------------------------------  IN: 52.5 mL / OUT: 200 mL / NET: -147.5 mL        Physical Exam:  Appearance: No acute distress  HENT: No JVD   Cardiovascular: RRR, S1/S2, + systolic murmur at the base   Respiratory: CTABL  Gastrointestinal: soft, NT ND, +BS  Musculoskeletal: No clubbing, no edema   Neurologic: Non-focal  Skin: No rashes, ecchymoses, or cyanosis                          8.3    5.05  )-----------( 237      ( 20 Oct 2021 01:56 )             26.5     10-19    138  |  96<L>  |  17  ----------------------------<  103<H>  3.8   |  27  |  4.59<H>    Ca    10.0      19 Oct 2021 11:46  Mg     2.10     10-19    TPro  8.3  /  Alb  4.7  /  TBili  0.6  /  DBili  x   /  AST  18  /  ALT  13  /  AlkPhos  148<H>  10-19    PT/INR - ( 19 Oct 2021 11:46 )   PT: 13.5 sec;   INR: 1.18 ratio         PTT - ( 20 Oct 2021 01:56 )  PTT:44.2 sec  CARDIAC MARKERS ( 19 Oct 2021 13:08 )  x     / x     / x     / x     / 3.7 ng/mL  CARDIAC MARKERS ( 19 Oct 2021 11:46 )  x     / x     / x     / x     / 4.0 ng/mL      Serum Pro-Brain Natriuretic Peptide: >43540 pg/mL (10-19 @ 11:46)          Cardiovascular Testings:   4/14/21  DIMENSIONS:  Dimensions:     Normal Values:  LA:     4.6 cm    2.0 - 4.0 cm  Ao:     3.7 cm    2.0 - 3.8 cm  SEPTUM: 0.7 cm    0.6 - 1.2 cm  PWT:    0.8 cm    0.6 - 1.1 cm  LVIDd:  6.3 cm    3.0 - 5.6 cm  LVIDs:  4.5 cm    1.8 - 4.0 cm  Derived Variables:  LVMI: 97 g/m2  RWT: 0.25  Fractional short: 29 %  Ejection Fraction (Teicholtz): 54 %  ------------------------------------------------------------------------  OBSERVATIONS:  Mitral Valve: Mitral annular calcification, otherwise  normal mitral valve. Mild-moderate mitral regurgitation.  Aortic Root: Normal aortic root.  Aortic Valve: Calcified trileaflet aortic valve with  decreased opening. Peak transaortic valve gradient equals  39 mm Hg, mean transaortic valve gradient equals 19 mm Hg,  estimated aortic valve area equals 1.3 sqcm (by continuity  equation), consistent with moderate aortic stenosis. Mild  aortic regurgitation.  Left Atrium:Moderately dilated left atrium.  LA volume  index = 46 cc/m2.  Left Ventricle: Low normal left ventricular systolic  function. No segmental wall motion abnormalities. Mild left  ventricular enlargement.  Right Heart: Normal right atrium. Normal rightventricular  size and function. Normal tricuspid valve. Mild tricuspid  regurgitation. Normal pulmonic valve.  Minimal pulmonic  regurgitation.  Pericardium/PleuraNormal pericardium with no pericardial  effusion.  ------------------------------------------------------------------------  CONCLUSIONS:  1. Mitral annular calcification, otherwise normal mitral  valve. Mild-moderate mitral regurgitation.  2. Calcified trileaflet aortic valve with decreased  opening. Peak transaortic valve gradient equals 39 mm Hg,  mean transaortic valve gradient equals 19 mm Hg, estimated  aortic valve area equals 1.3 sqcm (by continuity equation),  consistent with moderate aortic stenosis. Mild aortic  regurgitation.  3. Moderately dilated left atrium.  LA volume index = 46  cc/m2.  4. Mild left ventricular enlargement.  5. Low normal left ventricular systolic function. No  segmental wall motion abnormalities.  6. Normal right ventricular size and function.    Cath:  4/14/21  PROCEDURE:  --  Right heart catheterization.  --  Left coronary angiography.  --  Right coronary angiography.  --  Sonosite - Diagnostic.  TECHNIQUE: The risks and alternatives of the procedures and conscious  sedation were explained to the patient and informed consent was obtained.  Cardiac catheterization performed electively.  Local anesthetic given. Right femoral artery access. Local anesthetic  given. A 4fr [place] was inserted in the vessel, utilizing the  modified Seldinger technique. Right femoral vein access. Local anesthetic  given. A 5fr [place] was inserted in the vessel, utilizing the  modified Seldingertechnique. Right heart catheterization. The procedure  was performed utilizing a 5 Fr. X 110cm Bln Wedge Pressure Cath catheter  under fluoroscopic guidance. Measurements of pressures were obtained. Left  coronary artery angiography. The vessel was injected utilizing a 4 Fr JL  4.0 catheter and positioned in the vessel ostium under fluoroscopic  guidance. Angiography was performed in multiple projections using  hand-injection of contrast. Right coronary artery angiography. The vessel  was injected utilizing a 4 Fr JR 4.0 catheter and positioned in the vessel  ostia under fluoroscopic guidance. Angiography was performed in multiple  projections using hand-injection of contrast. Sonosite - Diagnostic.  RADIATION EXPOSURE: 6.1 min.  CONTRAST GIVEN: 120 ml Optiray.  MEDICATIONS GIVEN: 2% Lidocaine, 10 ml, subcutaneously. 0.9% Normal saline,  9 ml, IV.  VENTRICLES: No left ventriculogram was performed.  CORONARY VESSELS: The coronary circulation is right dominant.  LM:   --  LM: The vessel waslarge sized and mildly calcified. Angiography  showed mild atherosclerosis with no flow limiting lesions.  LAD:   --  LAD: Angiography showed mild atherosclerosis with no flow  limiting lesions.  --  D1: Angiography showed mild atherosclerosis with no flow limiting  lesions.  CX:   --  Proximal circumflex: There was a discrete 95 % stenosis. The  lesion was complex. There was BERNADINE grade 2 flow through the vessel  (partial perfusion).  RI:   --  Ostial ramus intermedius: There was a discrete 95 % stenosis at a  site with no prior intervention. There was BERNADINE grade 3 flow through the  vessel (brisk flow).  RCA:   --  RCA: Angiography showed mild atherosclerosis with no flow  limiting lesions.  COM[facility]S: [facility]  DIAGNOSTIC IMPRESSIONS: Severe stenosis LCX and RAMUS.  Hemodynamics as listed.  Unable to cross the aortic valve , valve appears to be very calcified.  Recommend TTE to assess for aortic valve disease, will plan for  revascularization based on echo findings.  INTERVENTIONAL IMPRESSIONS: Severe stenosis LCX and RAMUS.  Hemodynamics as listed.  Unable to cross the aortic valve , valve appears to be very calcified.  Recommend TTE to assess for aortic valve disease, will plan for  revascularization based on echo findings.  Prepared and signed by  Joselin Lux M.D.    Interpretation of Telemetry: SR with GUYs

## 2021-10-20 NOTE — CONSULT NOTE ADULT - PROBLEM SELECTOR RECOMMENDATION 4
Continue current dose phos binders. Monitor phos level.    If any questions, please feel free to contact me     Karla Barboza  Nephrology Fellow  [facility] Pager: [phone]  [facility] Pager: [phone] Pt. on oral phoshate binders with meals. Check serum phosphorus.    If any questions, please feel free to contact me     Karla Barboza  Nephrology Fellow  [facility] Pager: [phone]  [facility] Pager: [phone]

## 2021-10-20 NOTE — PROGRESS NOTE ADULT - ASSESSMENT
61 M with ESRD on iHD via LUE AVF, HTN, Hep C s/p Mavyret, CAD with stable anginal symptoms and known stenosis in the pCx and Ramus unrevascularized due to concurrent mod-severe AS pending further imaging to guide hybrid revascularization plan.     1. Elevated troponin in the setting severe VHD   -on heparin gtt,  cp free currently   -NPO for SAVANA today for AS assessment     2. Stable angina with known stenosis in Cx and Ramus   -would cont DAPT with ASA and Plavix at this time, if elected for CABG/AVR based on SAVANA result today, would DC plavix at that time   -cont medical therapy and statin     3. HTN in the setting of ESRD   -cont Procardia XL 90 mg BID and Labetalol 600 mg TID   -nephrology following     Thank you for allowing us to participate in the care of your patient. If you have any questions or concerns please do not hesitate to contact us 24/7.   All Cardiology service information can be found 24/7 on [email or web address], password: debi Adrian MD  PGY-5 Cardiology Fellow, [facility]-NS/STEVE   61 M with ESRD on iHD via LUE AVF, HTN, Hep C s/p Mavyret, CAD with stable anginal symptoms and known stenosis in the pCx and Ramus unrevascularized due to concurrent mod-severe AS pending further imaging to guide hybrid revascularization plan.     1. Elevated troponin in the setting severe VHD   -cp free currently   -NPO for SAVANA today pending anesthesia vs tomorrow for AS assessment     2. Stable angina with known stenosis in Cx and Ramus   -would cont DAPT with ASA and Plavix at this time, if elected for CABG/AVR based on SAVANA result today, would DC plavix at that time   -cont medical therapy and statin     3. HTN in the setting of ESRD   -cont Procardia XL 90 mg BID and Labetalol 600 mg TID   -nephrology following     Thank you for allowing us to participate in the care of your patient. If you have any questions or concerns please do not hesitate to contact us 24/7.   All Cardiology service information can be found 24/7 on [email or web address], password: debi Adrian MD  PGY-5 Cardiology Fellow, [facility]-NS/STEVE

## 2021-10-20 NOTE — DIETITIAN INITIAL EVALUATION ADULT. - PROBLEM SELECTOR PLAN 1
- Admit to medicine on telemetry  - Troponin 460 > 502  - EKG without obvious ST segment change  - Will Plavix load and heparinize patient for NSTEMI  - Start [id number] daily and Lipitor 40 daily  - House cardiology consult called  - Check TTE  - Defer ischemic eval to cardiology - will make NPO in event of Cath given known 95% stenosis of LCx  - Loud systolic murmur on exam - ?Aortic stenosis? - defer to cardio for further management after TTE

## 2021-10-20 NOTE — PROGRESS NOTE ADULT - PROBLEM SELECTOR PLAN 2
- Patient clinially volume overloaded and CXR with evidence of volume overload  - Patient anuric currently  - Would utilize HD/PUF for volume management, nephrology consult appreciated   - Daily weight and I+O  - Continue betablocker

## 2021-10-20 NOTE — DIETITIAN NUTRITION RISK NOTIFICATION - TREATMENT: THE FOLLOWING DIET HAS BEEN RECOMMENDED
Diet, NPO after Midnight:      NPO Start Date: 20-Oct-2021,   NPO Start Time: 23:59 (10-20-21 @ 11:06) [Active]  Diet, DASH/TLC:   Sodium & Cholesterol Restricted (10-20-21 @ 10:52) [Active]

## 2021-10-20 NOTE — CONSULT NOTE ADULT - PROBLEM SELECTOR RECOMMENDATION 2
BP at target range. Monitor BP on current BP medication. Low salt diet. BP at target range. Low salt diet. Monitor BP on current BP medications

## 2021-10-20 NOTE — CONSULT NOTE ADULT - ATTENDING COMMENTS
The patient was seen and examined with the Cardiology Consultation Teaching Service.    He is a 61-year-old man with coronary artery disease (known severe lesion in the proximal CX and Ramus), stage 5 chronic kidney disease on hemodialysis, and hypertension who presented with acute chest pain and dyspnea. He underwent hemodialysis today, but his chest pain persisted.     He reports that he has frequent typical angina symptoms with walking less than one block or upstairs. This has been going on for several months and seems to be progressive. He believes there may be a positional element to his chest pain, and that it is worse when he reclines and improves when he sits up.     Angiography was performed in April, and severe proximal lesions in the CX and ramus were identified. The aortic valve appeared heavily calcified and could not be crossed in the cath lab. PCI was deferred due to concerns for significant aortic valve disease, and the possible need for AVR. Subsequent echocardiography demonstrated moderate AS.    He denies chest pain currently at rest.  No current dyspnea; no orthopnea or PND.  No dizziness or palpitations; no syncope    Comfortable-appearing man in no acute distress  Alert and oriented  Afebrile  Vital signs stable  Hypertensive  JVP is not elevated  Clear lungs  Loud, harsh systolic murmur at the base  Soft, non-tender, non-distended abdomen  Extremities are warm and perfused  No peripheral edema     Microcytic anemia  GFR 13  hs-troponin 502  CK-MB is normal  pro-BNP >56K    ECG demonstrates sinus rhythm with LVH and secondary ST-T changes  CXR with mild pulmonary congestion    Echocardiography in April demonstrated borderline LV systolic function. The LA was moderately dilated. There is moderate AS and mild-moderate MR.     Impression and Recommendations:  61-year-old man with coronary artery disease (known severe lesion in the proximal CX and Ramus), stage 5 chronic kidney disease on hemodialysis, moderate aortic stenosis, and hypertension who presents after several months of persistent typical angina and dyspnea on exertion, now with a more persistent episode that did not resolve with hemodialysis.    The patient has known coronary disease that is not revascularized, and his chest pain syndrome is certainly angina. Based on his prior records, I suspect that this is continuation of the patient's prior stable albeit severe coronary disease, and his valve disease. Currently his ECG does not definitively demonstrate ischemic changes, and while his troponin is elevated, this is less specific in dialysis patients. His CK-MB is normal. His pro-BNP is also markedly elevated and his CXR demonstrates mild pulmonary congestion, and this is after HD today.    Although the prior echocardiography grades the patient's AS at moderate, there is still concern regarding the severity of the AS given the inability to cross in the lab. Plan for SAVANA to evaluate further. This will help determine whether CABG+AVR or PCI is appropriate.     Continue aspirin and high-potency statin. Hold off unfractionated heparin, but would start if recurrent chest pain occurs. Would defer further clopidogrel as CABG/AVR may be indicated. Continue labetalol and nifedipine for blood pressure control.     Sae Ramirez MD  Cardiology  [phone]
Pt. with ESRD on HD TIW. Pt. clinically stable during rounds today. Labs reviewed. Plan for maintenance HD tomorrow. Monitor BP and labs.

## 2021-10-20 NOTE — CONSULT NOTE ADULT - PROBLEM SELECTOR RECOMMENDATION 3
Patient with anemia in the setting of ESRD. Hemoglobin below target range. Will resume Retacrit 10,000 unit TIW with HD. Monitor hemoglobin. Patient with anemia in the setting of ESRD. Hemoglobin below target range. Will resume Retacrit 10,000 unit TIW with HD. Monitor hemoglobin

## 2021-10-20 NOTE — DIETITIAN INITIAL EVALUATION ADULT. - CHIEF COMPLAINT
The patient is a 61 y.o male pmh of ESRD on HD (t/th/sat) via left forearm AVF, HTN, Hep C s/p treatment, CAD c LCx 95% stenosis presents with chest pain and sob admitted with NSTEMI.

## 2021-10-20 NOTE — PROGRESS NOTE ADULT - PROBLEM SELECTOR PLAN 1
- Admit to medicine on telemetry  - Troponin 460 > 502  - EKG without obvious ST segment change  - Will Plavix load and heparinize patient for NSTEMI, cardiology consult appreciated , stop heparin gtt  - Start [id number] daily and Lipitor 40 daily  -plan for SAVANA to evaluate degree of AS in am

## 2021-10-20 NOTE — CONSULT NOTE ADULT - SUBJECTIVE AND OBJECTIVE BOX
[facility] DIVISION OF KIDNEY DISEASES AND HYPERTENSION -- [phone]  -- INITIAL CONSULT NOTE  --------------------------------------------------------------------------------  HPI: Mr Lopez is a 60 y/o male with past medical history of ESRD on HD TTS, hypertension, CAD s/p cath, HCV s/p treatment presented to [facility] from dialysis center c/o of worsening SOB and chest pain. Admitted for further work up. Nephrology consulted for ESRD management. Patient gets HD at [facility], nephrologist Dr Bonifacio Castro. Last HD was on 10/19/21.     Patient was seen and examined at bedside. Reported feeling ok. Denies SOB, fever, chills, nausea, vomiting, diarrhea or LE edema.     PAST HISTORY  --------------------------------------------------------------------------------  PAST MEDICAL & SURGICAL HISTORY:  End Stage Renal Disease on Dialysis  since 2009 - Tues, Thurs, Sat    HTN (hypertension)    Chronic renal failure    Hepatitis C    TB (tuberculosis)  Latent    CHF (congestive heart failure)  Mild    AVF (arteriovenous fistula)  placed 2009 - Left side    S/P appendectomy  at age 7    FAMILY HISTORY:    PAST SOCIAL HISTORY:    ALLERGIES & MEDICATIONS  --------------------------------------------------------------------------------  Allergies    adhesives (Other)  No Known Drug Allergies    Intolerances    Standing Inpatient Medications  aspirin enteric coated 81 milliGRAM(s) Oral daily  atorvastatin 80 milliGRAM(s) Oral at bedtime  calcium acetate 1334 milliGRAM(s) Oral three times a day with meals  clopidogrel Tablet 75 milliGRAM(s) Oral daily  labetalol 600 milliGRAM(s) Oral three times a day  NIFEdipine XL 90 milliGRAM(s) Oral two times a day  senna 2 Tablet(s) Oral at bedtime    PRN Inpatient Medications    REVIEW OF SYSTEMS  --------------------------------------------------------------------------------  Gen: No fevers/chills  Respiratory: No dyspnea, cough  CV: + chest pain  GI: No abdominal pain, diarrhea  MSK: No  edema    All other systems were reviewed and are negative, except as noted.    VITALS/PHYSICAL EXAM  --------------------------------------------------------------------------------  T(C): 37 (10-20-21 @ 05:35), Max: 37 (10-20-21 @ 05:35)  HR: 70 (10-20-21 @ 05:35) (70 - 80)  BP: 134/77 (10-20-21 @ 05:35) (134/77 - 169/90)  RR: 17 (10-20-21 @ 05:35) (16 - 20)  SpO2: 100% (10-20-21 @ 05:35) (98% - 100%)  Wt(kg): --  Height (cm): 185.4 (10-19-21 @ 11:35)  Weight (kg): 72.575 (10-19-21 @ 18:36)  BMI (kg/m2): 21.1 (10-19-21 @ 18:36)  BSA (m2): 1.96 (10-19-21 @ 18:36)    10-19-21 @ 07:01  -  10-20-21 @ 07:00  --------------------------------------------------------  IN: 52.5 mL / OUT: 200 mL / NET: -147.5 mL    Physical Exam:  	Gen: NAD  	HEENT: MMM  	Pulm: CTA B/L, no crackles or wheezing noted   	CV: S1S2  	Abd: Soft, +BS   	Ext: No LE edema B/L  	Neuro: Awake  	Skin: Warm and dry  	Vascular access: LUE AVF with good bruit and palpable thrills     LABS/STUDIES  --------------------------------------------------------------------------------              8.3    5.05  >-----------<  237      [10-20-21 @ 01:56]              26.5     138  |  96  |  17  ----------------------------<  103      [10-19-21 @ 11:46]  3.8   |  27  |  4.59        Ca     10.0     [10-19-21 @ 11:46]      Mg     2.10     [10-19-21 @ 11:46]    TPro  8.3  /  Alb  4.7  /  TBili  0.6  /  DBili  x   /  AST  18  /  ALT  13  /  AlkPhos  148  [10-19-21 @ 11:46]    PT/INR: PT 13.5 , INR 1.18       [10-19-21 @ 11:46]  PTT: 41.5       [10-20-21 @ 09:38]      Creatinine Trend:  SCr 4.59 [10-19 @ 11:46]             [facility] DIVISION OF KIDNEY DISEASES AND HYPERTENSION -- [phone]  -- INITIAL CONSULT NOTE  --------------------------------------------------------------------------------  HPI: Mr Lopez is a 60 y/o male with past medical history of ESRD on HD TTS, hypertension, CAD s/p cath, HCV s/p treatment presented to [facility] from dialysis center c/o of worsening SOB and chest pain. Admitted for further work up. Nephrology consulted for ESRD management. Patient gets HD at [facility], nephrologist Dr Bonifacio Castro. Last HD was on 10/19/21.     Patient was seen and examined at bedside. Reported feeling ok. Denies SOB, fever, chills, nausea, vomiting, diarrhea or LE edema.     PAST HISTORY  --------------------------------------------------------------------------------  PAST MEDICAL & SURGICAL HISTORY:  End Stage Renal Disease on Dialysis  since 2009 - Tues, Thurs, Sat    HTN (hypertension)    Chronic renal failure    Hepatitis C    TB (tuberculosis)  Latent    CHF (congestive heart failure)  Mild    AVF (arteriovenous fistula)  placed 2009 - Left side    S/P appendectomy  at age 7    FAMILY HISTORY:    PAST SOCIAL HISTORY:    ALLERGIES & MEDICATIONS  --------------------------------------------------------------------------------  Allergies    adhesives (Other)  No Known Drug Allergies    Intolerances    Standing Inpatient Medications  aspirin enteric coated 81 milliGRAM(s) Oral daily  atorvastatin 80 milliGRAM(s) Oral at bedtime  calcium acetate 1334 milliGRAM(s) Oral three times a day with meals  clopidogrel Tablet 75 milliGRAM(s) Oral daily  labetalol 600 milliGRAM(s) Oral three times a day  NIFEdipine XL 90 milliGRAM(s) Oral two times a day  senna 2 Tablet(s) Oral at bedtime    PRN Inpatient Medications    REVIEW OF SYSTEMS  --------------------------------------------------------------------------------  Gen: No fevers/chills  Respiratory: No dyspnea, cough  CV: + chest pain  GI: No abdominal pain, diarrhea  MSK: No  edema    All other systems were reviewed and are negative, except as noted.    VITALS/PHYSICAL EXAM  --------------------------------------------------------------------------------  T(C): 37 (10-20-21 @ 05:35), Max: 37 (10-20-21 @ 05:35)  HR: 70 (10-20-21 @ 05:35) (70 - 80)  BP: 134/77 (10-20-21 @ 05:35) (134/77 - 169/90)  RR: 17 (10-20-21 @ 05:35) (16 - 20)  SpO2: 100% (10-20-21 @ 05:35) (98% - 100%)  Wt(kg): --  Height (cm): 185.4 (10-19-21 @ 11:35)  Weight (kg): 72.575 (10-19-21 @ 18:36)  BMI (kg/m2): 21.1 (10-19-21 @ 18:36)  BSA (m2): 1.96 (10-19-21 @ 18:36)    10-19-21 @ 07:01  -  10-20-21 @ 07:00  --------------------------------------------------------  IN: 52.5 mL / OUT: 200 mL / NET: -147.5 mL    Physical Exam:  	Gen: NAD  	HEENT: MMM  	Pulm: CTA B/L, no crackles or wheezing noted   	CV: S1S2  	Abd: Soft, +BS   	Ext: No LE edema B/L  	Neuro: Awake  	Skin: Warm and dry  	Vascular access: LUE AVF with good bruit and palpable thrills     LABS/STUDIES  --------------------------------------------------------------------------------              8.3    5.05  >-----------<  237      [10-20-21 @ 01:56]              26.5     138  |  96  |  17  ----------------------------<  103      [10-19-21 @ 11:46]  3.8   |  27  |  4.59        Ca     10.0     [10-19-21 @ 11:46]      Mg     2.10     [10-19-21 @ 11:46]    TPro  8.3  /  Alb  4.7  /  TBili  0.6  /  DBili  x   /  AST  18  /  ALT  13  /  AlkPhos  148  [10-19-21 @ 11:46]    Creatinine Trend:  SCr 4.59 [10-19 @ 11:46]

## 2021-10-20 NOTE — PROGRESS NOTE ADULT - ATTENDING COMMENTS
The patient was seen and examined with the Cardiology Consultation Teaching Service.     No overnight events    No chest pain  No dyspnea, orthopnea or PND  No palpitations or dizziness     Comfortable-appearing man in no acute distress  Alert and oriented  Afebrile  Vital signs stable  Hypertensive  JVP is not elevated  Clear lungs  Harsh late-peaking systolic murmur at base  Extremities are warm and perfused  No peripheral edema    Worsening microcytic anemia  GFR 13    hs-troponin 502  CK-MB is normal  pro-BNP >56K    ECG demonstrates sinus rhythm with LVH and secondary ST-T changes  Telemetry demonstrates sinus rhythm without significant arrhythmia    Echocardiography in April demonstrated borderline LV systolic function. The LA was moderately dilated. There is moderate AS and mild-moderate MR.     Impression and Recommendations:  61-year-old man with coronary artery disease (known severe lesion in the proximal CX and Ramus), stage 5 chronic kidney disease on hemodialysis, moderate aortic stenosis, and hypertension who presents after several months of persistent typical angina and dyspnea on exertion, now with a more persistent episode that did not resolve with hemodialysis.    The patient has known coronary disease that is not revascularized, and his chest pain syndrome is certainly angina. Based on his prior records, I suspect that this is continuation of the patient's prior stable albeit severe coronary disease, and his valve disease. Currently his ECG does not definitively demonstrate ischemic changes, and while his troponin is elevated, this is less specific in dialysis patients. His CK-MB is normal. His pro-BNP is also markedly elevated. His breathing is stable.    Although the prior echocardiography grades the patient's AS at moderate, there is still concern regarding the severity of the AS given the inability to cross in the lab. Plan for [facility] to evaluate further. This will help determine whether CABG+AVR or PCI is appropriate.     Continue aspirin and high-potency statin. Stop heparin. Would defer further clopidogrel as CABG/AVR may be indicated. Continue labetalol and nifedipine for blood pressure control.     Sae Ramirez MD  Cardiology  [phone] The patient was seen and examined with the Cardiology Consultation Teaching Service.     No overnight events    No chest pain  No dyspnea, orthopnea or PND  No palpitations or dizziness     Comfortable-appearing man in no acute distress  Alert and oriented  Afebrile  Vital signs stable  Hypertensive  JVP is not elevated  Clear lungs  Harsh late-peaking systolic murmur at base  Extremities are warm and perfused  No peripheral edema    Worsening microcytic anemia  GFR 13    hs-troponin 502  CK-MB is normal  pro-BNP >56K    ECG demonstrates sinus rhythm with LVH and secondary ST-T changes  Telemetry demonstrates sinus rhythm without significant arrhythmia    Echocardiography in April demonstrated borderline LV systolic function. The LA was moderately dilated. There is moderate AS and mild-moderate MR.     Impression and Recommendations:  61-year-old man with coronary artery disease (known severe lesion in the proximal CX and Ramus), stage 5 chronic kidney disease on hemodialysis, moderate aortic stenosis, and hypertension who presents after several months of persistent typical angina and dyspnea on exertion, now with a more persistent episode that did not resolve with hemodialysis.    The patient has known coronary disease that is not revascularized, and his chest pain syndrome is certainly angina. Based on his prior records, I suspect that this is continuation of the patient's prior stable albeit severe coronary disease, and his valve disease. Currently his ECG does not definitively demonstrate ischemic changes, and while his troponin is elevated, this is less specific in dialysis patients. His CK-MB is normal. His pro-BNP is also markedly elevated. His breathing is stable.    Although the prior echocardiography grades the patient's AS at moderate, there is still concern regarding the severity of the AS given the inability to cross in the lab. Plan for [facility] to evaluate further. This will help determine whether CABG+AVR or PCI is appropriate.     Continue aspirin and high-potency statin. Stop heparin. Would defer further clopidogrel as CABG/AVR may be indicated. Continue labetalol and nifedipine for blood pressure control.     Sae Ramirez MD  Cardiology  [phone].

## 2021-10-20 NOTE — PROGRESS NOTE ADULT - SUBJECTIVE AND OBJECTIVE BOX
Patient is feeling good   D/w cardiology no need for Heparin GTT  Plan to [facility] tomorrow     .  VITAL SIGNS:  T(C): 36.5 (10-20-21 @ 11:55), Max: 37 (10-20-21 @ 05:35)  T(F): 97.7 (10-20-21 @ 11:55), Max: 98.6 (10-20-21 @ 05:35)  HR: 63 (10-20-21 @ 11:55) (63 - 80)  BP: 108/63 (10-20-21 @ 11:55) (108/63 - 154/88)  BP(mean): --  RR: 17 (10-20-21 @ 11:55) (16 - 20)  SpO2: 97% (10-20-21 @ 11:55) (97% - 100%)  Wt(kg): --    PHYSICAL EXAM:    Constitutional: WDWN resting comfortably in bed; NAD  Head: NC/AT  Eyes: PERRL, EOMI, clear conjunctiva  Neck: supple; no JVD or thyromegaly  Respiratory: CTA B/L; no W/R/R, no retractions  Cardiac: +S1/S2 loud systolic murmur   Gastrointestinal: soft, NT/ND; no rebound or guarding; +BSx4  Genitourinary: normal external genitalia  Back: spine midline, no bony tenderness or step-offs; no CVAT B/L  Extremities: WWP, no clubbing or cyanosis; no peripheral edema  Musculoskeletal: NROM x4; no joint swelling, tenderness or erythema  Neurologic: AAOx3; CNII-XII grossly intact; no focal deficits  Psychiatric: affect and characteristics of appearance, verbalizations, behaviors are appropriate    .  LABS:                         8.3    5.05  )-----------( 237      ( 20 Oct 2021 01:56 )             26.5     10-19    138  |  96<L>  |  17  ----------------------------<  103<H>  3.8   |  27  |  4.59<H>    Ca    10.0      19 Oct 2021 11:46  Mg     2.10     10-19    TPro  8.3  /  Alb  4.7  /  TBili  0.6  /  DBili  x   /  AST  18  /  ALT  13  /  AlkPhos  148<H>  10-19    PT/INR - ( 19 Oct 2021 11:46 )   PT: 13.5 sec;   INR: 1.18 ratio         PTT - ( 20 Oct 2021 09:38 )  PTT:41.5 sec    CARDIAC MARKERS ( 19 Oct 2021 13:08 )  x     / x     / x     / x     / 3.7 ng/mL  CARDIAC MARKERS ( 19 Oct 2021 11:46 )  x     / x     / x     / x     / 4.0 ng/mL            RADIOLOGY, EKG & ADDITIONAL TESTS: Reviewed.

## 2021-10-20 NOTE — DIETITIAN INITIAL EVALUATION ADULT. - PERTINENT MEDS FT
MEDICATIONS  (STANDING):  aspirin enteric coated 81 milliGRAM(s) Oral daily  atorvastatin 80 milliGRAM(s) Oral at bedtime  calcium acetate 1334 milliGRAM(s) Oral three times a day with meals  clopidogrel Tablet 75 milliGRAM(s) Oral daily  epoetin hillary-epbx (RETACRIT) Injectable 47937 Unit(s) IV Push <User Schedule>  heparin   Injectable 5000 Unit(s) SubCutaneous every 8 hours  labetalol 600 milliGRAM(s) Oral three times a day  NIFEdipine XL 90 milliGRAM(s) Oral two times a day  senna 2 Tablet(s) Oral at bedtime    MEDICATIONS  (PRN):

## 2021-10-21 LAB
ANION GAP SERPL CALC-SCNC: 15 MMOL/L — HIGH (ref 7–14)
ANION GAP SERPL CALC-SCNC: 19 MMOL/L — HIGH (ref 7–14)
BUN SERPL-MCNC: 21 MG/DL — SIGNIFICANT CHANGE UP (ref 7–23)
BUN SERPL-MCNC: 50 MG/DL — HIGH (ref 7–23)
CALCIUM SERPL-MCNC: 9.3 MG/DL — SIGNIFICANT CHANGE UP (ref 8.4–10.5)
CALCIUM SERPL-MCNC: 9.6 MG/DL — SIGNIFICANT CHANGE UP (ref 8.4–10.5)
CHLORIDE SERPL-SCNC: 95 MMOL/L — LOW (ref 98–107)
CHLORIDE SERPL-SCNC: 95 MMOL/L — LOW (ref 98–107)
CO2 SERPL-SCNC: 22 MMOL/L — SIGNIFICANT CHANGE UP (ref 22–31)
CO2 SERPL-SCNC: 27 MMOL/L — SIGNIFICANT CHANGE UP (ref 22–31)
CREAT SERPL-MCNC: 3.45 MG/DL — HIGH (ref 0.5–1.3)
CREAT SERPL-MCNC: 8.9 MG/DL — HIGH (ref 0.5–1.3)
GLUCOSE SERPL-MCNC: 82 MG/DL — SIGNIFICANT CHANGE UP (ref 70–99)
GLUCOSE SERPL-MCNC: 98 MG/DL — SIGNIFICANT CHANGE UP (ref 70–99)
HCT VFR BLD CALC: 26.8 % — LOW (ref 39–50)
HGB BLD-MCNC: 8.2 G/DL — LOW (ref 13–17)
MAGNESIUM SERPL-MCNC: 2 MG/DL — SIGNIFICANT CHANGE UP (ref 1.6–2.6)
MAGNESIUM SERPL-MCNC: 2.1 MG/DL — SIGNIFICANT CHANGE UP (ref 1.6–2.6)
MCHC RBC-ENTMCNC: 23.4 PG — LOW (ref 27–34)
MCHC RBC-ENTMCNC: 30.6 GM/DL — LOW (ref 32–36)
MCV RBC AUTO: 76.4 FL — LOW (ref 80–100)
NRBC # BLD: 0 /100 WBCS — SIGNIFICANT CHANGE UP
NRBC # FLD: 0 K/UL — SIGNIFICANT CHANGE UP
PHOSPHATE SERPL-MCNC: 3 MG/DL — SIGNIFICANT CHANGE UP (ref 2.5–4.5)
PHOSPHATE SERPL-MCNC: 4.2 MG/DL — SIGNIFICANT CHANGE UP (ref 2.5–4.5)
PLATELET # BLD AUTO: 260 K/UL — SIGNIFICANT CHANGE UP (ref 150–400)
POTASSIUM SERPL-MCNC: 4.4 MMOL/L — SIGNIFICANT CHANGE UP (ref 3.5–5.3)
POTASSIUM SERPL-MCNC: 5.2 MMOL/L — SIGNIFICANT CHANGE UP (ref 3.5–5.3)
POTASSIUM SERPL-SCNC: 4.4 MMOL/L — SIGNIFICANT CHANGE UP (ref 3.5–5.3)
POTASSIUM SERPL-SCNC: 5.2 MMOL/L — SIGNIFICANT CHANGE UP (ref 3.5–5.3)
RBC # BLD: 3.51 M/UL — LOW (ref 4.2–5.8)
RBC # FLD: 18.9 % — HIGH (ref 10.3–14.5)
SODIUM SERPL-SCNC: 136 MMOL/L — SIGNIFICANT CHANGE UP (ref 135–145)
SODIUM SERPL-SCNC: 137 MMOL/L — SIGNIFICANT CHANGE UP (ref 135–145)
WBC # BLD: 5.2 K/UL — SIGNIFICANT CHANGE UP (ref 3.8–10.5)
WBC # FLD AUTO: 5.2 K/UL — SIGNIFICANT CHANGE UP (ref 3.8–10.5)

## 2021-10-21 PROCEDURE — 93325 DOPPLER ECHO COLOR FLOW MAPG: CPT | Mod: 26,GC

## 2021-10-21 PROCEDURE — 90935 HEMODIALYSIS ONE EVALUATION: CPT | Mod: GC

## 2021-10-21 PROCEDURE — 99232 SBSQ HOSP IP/OBS MODERATE 35: CPT

## 2021-10-21 PROCEDURE — 93320 DOPPLER ECHO COMPLETE: CPT | Mod: 26,GC

## 2021-10-21 PROCEDURE — 93312 ECHO TRANSESOPHAGEAL: CPT | Mod: 26

## 2021-10-21 PROCEDURE — 99231 SBSQ HOSP IP/OBS SF/LOW 25: CPT | Mod: GC

## 2021-10-21 RX ORDER — CHLORHEXIDINE GLUCONATE 213 G/1000ML
1 SOLUTION TOPICAL DAILY
Refills: 0 | Status: DISCONTINUED | OUTPATIENT
Start: 2021-10-21 | End: 2021-10-25

## 2021-10-21 RX ADMIN — ERYTHROPOIETIN 10000 UNIT(S): 10000 INJECTION, SOLUTION INTRAVENOUS; SUBCUTANEOUS at 07:44

## 2021-10-21 RX ADMIN — Medication 90 MILLIGRAM(S): at 21:03

## 2021-10-21 RX ADMIN — Medication 81 MILLIGRAM(S): at 21:03

## 2021-10-21 RX ADMIN — CHLORHEXIDINE GLUCONATE 1 APPLICATION(S): 213 SOLUTION TOPICAL at 12:36

## 2021-10-21 RX ADMIN — HEPARIN SODIUM 5000 UNIT(S): 5000 INJECTION INTRAVENOUS; SUBCUTANEOUS at 13:00

## 2021-10-21 RX ADMIN — HEPARIN SODIUM 5000 UNIT(S): 5000 INJECTION INTRAVENOUS; SUBCUTANEOUS at 21:12

## 2021-10-21 RX ADMIN — Medication 600 MILLIGRAM(S): at 12:59

## 2021-10-21 RX ADMIN — CLOPIDOGREL BISULFATE 75 MILLIGRAM(S): 75 TABLET, FILM COATED ORAL at 21:03

## 2021-10-21 RX ADMIN — Medication 600 MILLIGRAM(S): at 21:03

## 2021-10-21 RX ADMIN — ATORVASTATIN CALCIUM 80 MILLIGRAM(S): 80 TABLET, FILM COATED ORAL at 21:03

## 2021-10-21 NOTE — PROGRESS NOTE ADULT - SUBJECTIVE AND OBJECTIVE BOX
[facility] DIVISION OF KIDNEY DISEASES AND HYPERTENSION -- FOLLOW UP NOTE  --------------------------------------------------------------------------------  HPI: Mr Lopez is a 60 y/o male with past medical history of ESRD on HD TTS, hypertension, CAD s/p cath, HCV s/p treatment presented to [facility] from dialysis center c/o of worsening SOB and chest pain. Admitted for further work up. Currently being evaluated by cardiology and is planed for SAVANA today. Being seen by nephrology for ESRD management. Last HD prior to admission was on 10/19/21.     Patient was seen and examined at bedside during hemodialysis session. Said that he developed worsening SOB overnight, which is stable during HD. O2 sat 98% on room air. Endorse still having chest pain that has been there since admission. Denies fever, chills, nausea, vomiting, diarrhea or LE edema.     PAST HISTORY  --------------------------------------------------------------------------------  No significant changes to PMH, PSH, FHx, SHx, unless otherwise noted    ALLERGIES & MEDICATIONS  --------------------------------------------------------------------------------  Allergies    adhesives (Other)  No Known Drug Allergies    Intolerances    Standing Inpatient Medications  aspirin enteric coated 81 milliGRAM(s) Oral daily  atorvastatin 80 milliGRAM(s) Oral at bedtime  calcium acetate 1334 milliGRAM(s) Oral three times a day with meals  clopidogrel Tablet 75 milliGRAM(s) Oral daily  epoetin hillary-epbx (RETACRIT) Injectable 92775 Unit(s) IV Push <User Schedule>  heparin   Injectable 5000 Unit(s) SubCutaneous every 8 hours  labetalol 600 milliGRAM(s) Oral three times a day  NIFEdipine XL 90 milliGRAM(s) Oral two times a day  senna 2 Tablet(s) Oral at bedtime    PRN Inpatient Medications    REVIEW OF SYSTEMS  --------------------------------------------------------------------------------  Gen: No fevers/chills  Skin: No rashes  Respiratory: + dyspnea  CV: + chest pain  GI: No abdominal pain, diarrhea  MSK: No  edema    All other systems were reviewed and are negative, except as noted.    VITALS/PHYSICAL EXAM  --------------------------------------------------------------------------------  T(C): 36.6 (10-21-21 @ 06:20), Max: 37 (10-21-21 @ 06:02)  HR: 69 (10-21-21 @ 06:20) (63 - 94)  BP: 137/70 (10-21-21 @ 06:20) (108/63 - 137/71)  RR: 20 (10-21-21 @ 06:20) (17 - 20)  SpO2: 98% (10-21-21 @ 06:02) (97% - 98%)  Wt(kg): --  Height (cm): 185.4 (10-19-21 @ 11:35)  Weight (kg): 72.575 (10-19-21 @ 18:36)  BMI (kg/m2): 21.1 (10-19-21 @ 18:36)  BSA (m2): 1.96 (10-19-21 @ 18:36)      10-20-21 @ 07:01  -  10-21-21 @ 07:00  --------------------------------------------------------  IN: 550 mL / OUT: 185 mL / NET: 365 mL    Physical Exam:  	Gen: NAD, able to speak in full sentences  	HEENT: MMM  	Pulm: b/l mild crackles noted  	CV: S1S2+  	Abd: Soft, +BS   	Ext: No LE edema B/L  	Neuro: Awake  	Skin: Warm and dry  	Vascular access: LUE AVF functioning with no issues during HD session     LABS/STUDIES  --------------------------------------------------------------------------------              8.2    5.20  >-----------<  260      [10-21-21 @ 07:27]              26.8     138  |  96  |  17  ----------------------------<  103      [10-19-21 @ 11:46]  3.8   |  27  |  4.59        Ca     10.0     [10-19-21 @ 11:46]      Mg     2.10     [10-19-21 @ 11:46]    TPro  8.3  /  Alb  4.7  /  TBili  0.6  /  DBili  x   /  AST  18  /  ALT  13  /  AlkPhos  148  [10-19-21 @ 11:46]    PT/INR: PT 13.5 , INR 1.18       [10-19-21 @ 11:46]  PTT: 41.5       [10-20-21 @ 09:38]      Creatinine Trend:  SCr 4.59 [10-19 @ 11:46] [facility] DIVISION OF KIDNEY DISEASES AND HYPERTENSION -- FOLLOW UP NOTE  --------------------------------------------------------------------------------  HPI: Mr Lopez is a 60 y/o male with past medical history of ESRD on HD TTS, hypertension, CAD s/p cath, HCV s/p treatment presented to [facility] from dialysis center c/o of worsening SOB and chest pain. Admitted for further work up. Currently being evaluated by cardiology and is planed for SAVANA today. Being seen by nephrology for ESRD management. Last HD prior to admission was on 10/19/21.     Patient was seen and examined at bedside during hemodialysis session. Said that he developed worsening SOB overnight, which is stable during HD. O2 sat 98% on room air. Endorse still having chest pain that has been there since admission, worsen when he lays flat and improve after sitting up. Denies fever, chills, nausea, vomiting, diarrhea or LE edema.     PAST HISTORY  --------------------------------------------------------------------------------  No significant changes to PMH, PSH, FHx, SHx, unless otherwise noted    ALLERGIES & MEDICATIONS  --------------------------------------------------------------------------------  Allergies    adhesives (Other)  No Known Drug Allergies    Intolerances    Standing Inpatient Medications  aspirin enteric coated 81 milliGRAM(s) Oral daily  atorvastatin 80 milliGRAM(s) Oral at bedtime  calcium acetate 1334 milliGRAM(s) Oral three times a day with meals  clopidogrel Tablet 75 milliGRAM(s) Oral daily  epoetin hillary-epbx (RETACRIT) Injectable 89886 Unit(s) IV Push <User Schedule>  heparin   Injectable 5000 Unit(s) SubCutaneous every 8 hours  labetalol 600 milliGRAM(s) Oral three times a day  NIFEdipine XL 90 milliGRAM(s) Oral two times a day  senna 2 Tablet(s) Oral at bedtime    PRN Inpatient Medications    REVIEW OF SYSTEMS  --------------------------------------------------------------------------------  Gen: No fevers/chills  Skin: No rashes  Respiratory: + dyspnea  CV: + chest pain  GI: No abdominal pain, diarrhea  MSK: No  edema    All other systems were reviewed and are negative, except as noted.    VITALS/PHYSICAL EXAM  --------------------------------------------------------------------------------  T(C): 36.6 (10-21-21 @ 06:20), Max: 37 (10-21-21 @ 06:02)  HR: 69 (10-21-21 @ 06:20) (63 - 94)  BP: 137/70 (10-21-21 @ 06:20) (108/63 - 137/71)  RR: 20 (10-21-21 @ 06:20) (17 - 20)  SpO2: 98% (10-21-21 @ 06:02) (97% - 98%)  Wt(kg): --  Height (cm): 185.4 (10-19-21 @ 11:35)  Weight (kg): 72.575 (10-19-21 @ 18:36)  BMI (kg/m2): 21.1 (10-19-21 @ 18:36)  BSA (m2): 1.96 (10-19-21 @ 18:36)      10-20-21 @ 07:01  -  10-21-21 @ 07:00  --------------------------------------------------------  IN: 550 mL / OUT: 185 mL / NET: 365 mL    Physical Exam:  	Gen: NAD, able to speak in full sentences  	HEENT: MMM  	Pulm: b/l mild crackles noted  	CV: S1S2+  	Abd: Soft, +BS   	Ext: No LE edema B/L  	Neuro: Awake  	Skin: Warm and dry  	Vascular access: LUE AVF functioning with no issues during HD session     LABS/STUDIES  --------------------------------------------------------------------------------              8.2    5.20  >-----------<  260      [10-21-21 @ 07:27]              26.8     138  |  96  |  17  ----------------------------<  103      [10-19-21 @ 11:46]  3.8   |  27  |  4.59        Ca     10.0     [10-19-21 @ 11:46]      Mg     2.10     [10-19-21 @ 11:46]    TPro  8.3  /  Alb  4.7  /  TBili  0.6  /  DBili  x   /  AST  18  /  ALT  13  /  AlkPhos  148  [10-19-21 @ 11:46]    PT/INR: PT 13.5 , INR 1.18       [10-19-21 @ 11:46]  PTT: 41.5       [10-20-21 @ 09:38]      Creatinine Trend:  SCr 4.59 [10-19 @ 11:46] [facility] DIVISION OF KIDNEY DISEASES AND HYPERTENSION -- FOLLOW UP NOTE  --------------------------------------------------------------------------------  HPI: Mr Lopez is a 62 y/o male with past medical history of ESRD on HD TTS, hypertension, CAD s/p cath, HCV s/p treatment presented to [facility] from dialysis center c/o of worsening SOB and chest pain. Admitted for further work up. Currently being evaluated by cardiology and is planed for SAVANA today. Being seen by nephrology for ESRD management. Last HD prior to admission was on 10/19/21.     Patient was seen and examined at bedside during hemodialysis session. Said that he developed worsening SOB overnight, which is stable during HD. O2 sat 98% on room air. Endorse still having chest pain that has been there since admission, worsen when he lays flat and improve after sitting up. Denies fever, chills, nausea, vomiting, diarrhea or LE edema.     PAST HISTORY  --------------------------------------------------------------------------------  No significant changes to PMH, PSH, FHx, SHx, unless otherwise noted    ALLERGIES & MEDICATIONS  --------------------------------------------------------------------------------  Allergies    adhesives (Other)  No Known Drug Allergies    Intolerances    Standing Inpatient Medications  aspirin enteric coated 81 milliGRAM(s) Oral daily  atorvastatin 80 milliGRAM(s) Oral at bedtime  calcium acetate 1334 milliGRAM(s) Oral three times a day with meals  clopidogrel Tablet 75 milliGRAM(s) Oral daily  epoetin hillary-epbx (RETACRIT) Injectable 78056 Unit(s) IV Push <User Schedule>  heparin   Injectable 5000 Unit(s) SubCutaneous every 8 hours  labetalol 600 milliGRAM(s) Oral three times a day  NIFEdipine XL 90 milliGRAM(s) Oral two times a day  senna 2 Tablet(s) Oral at bedtime    PRN Inpatient Medications    REVIEW OF SYSTEMS  --------------------------------------------------------------------------------  Gen: No fevers/chills  Skin: No rashes  Respiratory: + dyspnea  CV: + chest pain  GI: No abdominal pain, diarrhea  MSK: No  edema    All other systems were reviewed and are negative, except as noted.    VITALS/PHYSICAL EXAM  --------------------------------------------------------------------------------  T(C): 36.6 (10-21-21 @ 06:20), Max: 37 (10-21-21 @ 06:02)  HR: 69 (10-21-21 @ 06:20) (63 - 94)  BP: 137/70 (10-21-21 @ 06:20) (108/63 - 137/71)  RR: 20 (10-21-21 @ 06:20) (17 - 20)  SpO2: 98% (10-21-21 @ 06:02) (97% - 98%)  Wt(kg): --  Height (cm): 185.4 (10-19-21 @ 11:35)  Weight (kg): 72.575 (10-19-21 @ 18:36)  BMI (kg/m2): 21.1 (10-19-21 @ 18:36)  BSA (m2): 1.96 (10-19-21 @ 18:36)    10-20-21 @ 07:01  -  10-21-21 @ 07:00  --------------------------------------------------------  IN: 550 mL / OUT: 185 mL / NET: 365 mL    Physical Exam:  	Gen: NAD, able to speak in full sentences  	HEENT: MMM  	Pulm: b/l mild crackles noted  	CV: S1S2+  	Abd: Soft, +BS   	Ext: No LE edema B/L  	Neuro: Awake  	Skin: Warm and dry  	Vascular access: LUE AVF functioning with no issues during HD session     LABS/STUDIES  --------------------------------------------------------------------------------              8.2    5.20  >-----------<  260      [10-21-21 @ 07:27]              26.8     138  |  96  |  17  ----------------------------<  103      [10-19-21 @ 11:46]  3.8   |  27  |  4.59        Ca     10.0     [10-19-21 @ 11:46]      Mg     2.10     [10-19-21 @ 11:46]    TPro  8.3  /  Alb  4.7  /  TBili  0.6  /  DBili  x   /  AST  18  /  ALT  13  /  AlkPhos  148  [10-19-21 @ 11:46]    Creatinine Trend:  SCr 4.59 [10-19 @ 11:46]

## 2021-10-21 NOTE — PROGRESS NOTE ADULT - PROBLEM SELECTOR PLAN 2
BP at target range. Monitor BP on current BP medication. Low salt diet. Patient with anemia in the setting of ESRD. Hemoglobin below target range. Continue Retacrit 10,000 unit TIW with HD. Monitor hemoglobin

## 2021-10-21 NOTE — PROGRESS NOTE ADULT - ATTENDING COMMENTS
The patient was seen and examined with the Cardiology Consultation Teaching Service.   He notes that he experienced worsening orthopnea and dyspnea overnight.    No chest pain  No palpitations or dizziness   Awaiting SAVANA    Comfortable-appearing man in no acute distress  Seen while undergoing hemodialysis  Alert and oriented  Afebrile  Vital signs stable  JVP is not elevated  Clear lungs  Normal heart sounds  Extremities are warm and perfused  No peripheral edema     Stable microcytic anemia  GFR 18    hs-troponin 502  CK-MB is normal  pro-BNP >56K    Telemetry demonstrates sinus rhythm without significant arrhythmia    Echocardiography in April demonstrated borderline LV systolic function. The LA was moderately dilated. There is moderate AS and mild-moderate MR.     Impression and Recommendations:  61-year-old man with coronary artery disease (known severe lesion in the proximal CX and Ramus), stage 5 chronic kidney disease on hemodialysis, moderate aortic stenosis, and hypertension who presents after several months of persistent typical angina and dyspnea on exertion, now with a more persistent episode that did not resolve with hemodialysis.    The patient has known coronary disease that is not revascularized, and his chest pain syndrome is certainly angina. Based on his prior records, I suspect that this is continuation of the patient's prior stable albeit severe coronary disease, and his valve disease. Currently his ECG does not definitively demonstrate ischemic changes, and while his troponin is elevated, this is less specific in dialysis patients. His CK-MB is normal. His pro-BNP is also markedly elevated. His breathing is stable.    Although the prior echocardiography grades the patient's AS at moderate, there is still concern regarding the severity of the AS given the inability to cross in the lab. SAVANA planned today. This will help determine whether CABG+AVR or PCI is appropriate.     Continue aspirin and high-potency statin. Stop heparin. Would defer further clopidogrel as CABG/AVR may be indicated. Continue labetalol and nifedipine for blood pressure control.     Sae Ramirez MD  Cardiology  [phone]

## 2021-10-21 NOTE — PROGRESS NOTE ADULT - PROBLEM SELECTOR PLAN 2
- Patient clinically volume overloaded and CXR with evidence of volume overload  - Patient anuric currently  - Would utilize HD/PUF for volume management, nephrology consult appreciated   - Daily weight and I+O  - Continue betablocker

## 2021-10-21 NOTE — PROGRESS NOTE ADULT - SUBJECTIVE AND OBJECTIVE BOX
Patient is clinically better after HD today  Pending SAVANA    .  VITAL SIGNS:  T(C): 36.5 (10-21-21 @ 12:50), Max: 37 (10-21-21 @ 06:02)  T(F): 97.7 (10-21-21 @ 12:50), Max: 98.6 (10-21-21 @ 06:02)  HR: 70 (10-21-21 @ 12:50) (65 - 94)  BP: 156/70 (10-21-21 @ 12:50) (134/62 - 156/74)  BP(mean): --  RR: 18 (10-21-21 @ 12:50) (17 - 20)  SpO2: 100% (10-21-21 @ 12:50) (97% - 100%)  Wt(kg): --    PHYSICAL EXAM:    Constitutional: WDWN resting comfortably in bed; NAD  Head: NC/AT  Eyes: PERRL, EOMI, clear conjunctiva  Neck: supple; no JVD or thyromegaly  Respiratory: Diminished breath sounds B/L   Cardiac: +S1/S2; loud syst murmur   Gastrointestinal: soft, NT/ND; no rebound or guarding; +BSx4  Genitourinary: normal external genitalia  Back: spine midline, no bony tenderness or step-offs; no CVAT B/L  Extremities: WWP, no clubbing or cyanosis; no peripheral edema  Musculoskeletal: NROM x4; no joint swelling, tenderness or erythema  Neurologic: AAOx3; CNII-XII grossly intact; no focal deficits  Psychiatric: affect and characteristics of appearance, verbalizations, behaviors are appropriate    .  LABS:                         8.2    5.20  )-----------( 260      ( 21 Oct 2021 07:27 )             26.8     10-21    137  |  95<L>  |  21  ----------------------------<  82  5.2   |  27  |  3.45<H>    Ca    9.6      21 Oct 2021 13:19  Phos  3.0     10-21  Mg     2.00     10-21      PTT - ( 20 Oct 2021 09:38 )  PTT:41.5 sec              RADIOLOGY, EKG & ADDITIONAL TESTS: Reviewed.

## 2021-10-21 NOTE — PROGRESS NOTE ADULT - PROBLEM SELECTOR PLAN 3
Patient with anemia in the setting of ESRD. Hemoglobin below target range. Continue Retacrit 10,000 unit TIW with HD. Monitor hemoglobin. Pt. on oral phosphate binders with meals. Check serum phosphorus.    If any questions, please feel free to contact me     Karla Barboza  Nephrology Fellow  [facility] Pager: [phone]  [facility] Pager: [phone]

## 2021-10-21 NOTE — PROGRESS NOTE ADULT - ASSESSMENT
61 M with ESRD on iHD via LUE AVF, HTN, Hep C s/p Mavyret, CAD with stable anginal symptoms and known stenosis in the pCx and Ramus unrevascularized due to concurrent mod-severe AS pending further imaging to guide hybrid revascularization plan.     #CAD  #AS    Recommendations:   61 M with ESRD on iHD via LUE AVF, HTN, Hep C s/p Mavyret, CAD with stable anginal symptoms and known stenosis in the pCx and Ramus unrevascularized due to concurrent mod-severe AS pending further imaging to guide hybrid revascularization plan.     #CAD  #AS    Recommendations:  1. NPO for SAVANA to assess aortic valve  2. Continue DAPT with aspirin and clopidogrel 61 M with ESRD on iHD via LUE AVF, HTN, Hep C s/p Mavyret, CAD with stable anginal symptoms and known stenosis in the pCx and Ramus unrevascularized due to concurrent mod-severe AS pending further imaging to guide hybrid revascularization plan.     #CAD  #AS  #HTN i/s/o ESRD    Recommendations:  1. NPO for SAVANA to assess aortic valve  2. Continue DAPT with aspirin and clopidogrel for now, if elected for CABG/AVR based on SAVANA result then would discontinue clopidogrel  3. Continue atorvastatin 80 mg daily   4. Continue nifedipine XL 90 mg BID and labetalol 600 mg TID for HTN    Final recommendations per attending attestation     Abdullahi Bernardo MD  Department of Cardiology  Cardiology Fellow, PGY4  Cell: [phone]

## 2021-10-21 NOTE — PROGRESS NOTE ADULT - ATTENDING COMMENTS
Pt. with ESRD on HD TIW. Pt. seen during HD today. /78 at time of HD rounds. Pt. tolerating HD. BP stable during HD rounds. AVF functioning well. Pt. with anemia, on TONEY treatment. Monitor BP and labs. Assessment and plan for ESRD/HD discussed with patient.

## 2021-10-21 NOTE — PROGRESS NOTE ADULT - PROBLEM SELECTOR PLAN 1
Pt. with ESRD on HD three times a week (TTS) presented to [facility] for CP and SOB. Last HD prior to admission was on Tuesday 10/19 via LUE AVF. Pt. clinically stable. Currently getting HD. Labs reviewed. Dose meds as per HD. Pt. with ESRD on HD three times a week (TTS) presented to [facility] for CP and SOB. Last HD prior to admission was on Tuesday 10/19 via LUE AVF. Pt. seen and examined during HD today. BP stable during HD rounds. LUE AVF functioning well during HD. Dose meds as per HD.

## 2021-10-21 NOTE — PROGRESS NOTE ADULT - PROBLEM SELECTOR PLAN 1
- Admit to medicine on telemetry  - Troponin 460 > 502  - EKG without obvious ST segment change  - s/p Plavix load and heparinize patient for NSTEMI, cardiology consult appreciated , pending SAVANA  - Started [id number] daily and Lipitor 40 daily  -plan for SAVANA to evaluate degree of AS in am

## 2021-10-21 NOTE — PROGRESS NOTE ADULT - SUBJECTIVE AND OBJECTIVE BOX
Cardiology Consult Progress Note    Patient seen and examined at bedside.    Brief HPI:  62yo man with HTN, ESRD on iHD via LUE AVF, HepC s/p Mavyret, CAD (s/p LHC for abnormal stress test in 4/2021 with Dr. Joselin Lux revealing 95% lesion on pLCx, no intervention done) who presents with CP and SOB. Found to have elevated troponins. Concern for symptomatic AS and CAD.     Interval Events:   - NPO yesterday for SAVANA    Review Of Systems: No chest pain, shortness of breath, or palpitations            Current Meds:  aspirin enteric coated 81 milliGRAM(s) Oral daily  atorvastatin 80 milliGRAM(s) Oral at bedtime  calcium acetate 1334 milliGRAM(s) Oral three times a day with meals  chlorhexidine 4% Liquid 1 Application(s) Topical daily  clopidogrel Tablet 75 milliGRAM(s) Oral daily  epoetin hillary-epbx (RETACRIT) Injectable 84710 Unit(s) IV Push <User Schedule>  heparin   Injectable 5000 Unit(s) SubCutaneous every 8 hours  labetalol 600 milliGRAM(s) Oral three times a day  NIFEdipine XL 90 milliGRAM(s) Oral two times a day  senna 2 Tablet(s) Oral at bedtime    Vitals:  T(F): 97.9 (10-21), Max: 98.6 (10-21)  HR: 69 (10-21) (63 - 94)  BP: 137/70 (10-21) (108/63 - 137/71)  RR: 20 (10-21)  SpO2: 98% (10-21)  I&O's Summary    20 Oct 2021 07:01  -  21 Oct 2021 07:00  --------------------------------------------------------  IN: 550 mL / OUT: 185 mL / NET: 365 mL      Physical Exam:  Appearance: No acute distress  HENT: No JVD   Cardiovascular: RRR, S1/S2, + systolic murmur at the base   Respiratory: CTABL  Gastrointestinal: soft, NT ND, +BS  Musculoskeletal: No clubbing, no edema   Neurologic: Non-focal  Skin: No rashes, ecchymoses, or cyanosis                          8.2    5.20  )-----------( 260      ( 21 Oct 2021 07:27 )             26.8     10-21    136  |  95<L>  |  x   ----------------------------<  x   4.4   |  x   |  x     Ca    10.0      19 Oct 2021 11:46  Phos  4.2     10-21  Mg     2.10     10-21    TPro  8.3  /  Alb  4.7  /  TBili  0.6  /  DBili  x   /  AST  18  /  ALT  13  /  AlkPhos  148<H>  10-19    PT/INR - ( 19 Oct 2021 11:46 )   PT: 13.5 sec;   INR: 1.18 ratio         PTT - ( 20 Oct 2021 09:38 )  PTT:41.5 sec  CARDIAC MARKERS ( 19 Oct 2021 13:08 )  502 ng/L / x     / x     / x     / x     / 3.7 ng/mL  CARDIAC MARKERS ( 19 Oct 2021 11:46 )  460 ng/L / x     / x     / x     / x     / 4.0 ng/mL    Serum Pro-Brain Natriuretic Peptide: >32635 pg/mL (10-19 @ 11:46)    Echo:  4/14/21  DIMENSIONS:  Dimensions:     Normal Values:  LA:     4.6 cm    2.0 - 4.0 cm  Ao:     3.7 cm    2.0 - 3.8 cm  SEPTUM: 0.7 cm    0.6 - 1.2 cm  PWT:    0.8 cm    0.6 - 1.1 cm  LVIDd:  6.3 cm    3.0 - 5.6 cm  LVIDs:  4.5 cm    1.8 - 4.0 cm  Derived Variables:  LVMI: 97 g/m2  RWT: 0.25  Fractional short: 29 %  Ejection Fraction (Teicholtz): 54 %  ------------------------------------------------------------------------  OBSERVATIONS:  Mitral Valve: Mitral annular calcification, otherwise  normal mitral valve. Mild-moderate mitral regurgitation.  Aortic Root: Normal aortic root.  Aortic Valve: Calcified trileaflet aortic valve with  decreased opening. Peak transaortic valve gradient equals  39 mm Hg, mean transaortic valve gradient equals 19 mm Hg,  estimated aortic valve area equals 1.3 sqcm (by continuity  equation), consistent with moderate aortic stenosis. Mild  aortic regurgitation.  Left Atrium:Moderately dilated left atrium.  LA volume  index = 46 cc/m2.  Left Ventricle: Low normal left ventricular systolic  function. No segmental wall motion abnormalities. Mild left  ventricular enlargement.  Right Heart: Normal right atrium. Normal rightventricular  size and function. Normal tricuspid valve. Mild tricuspid  regurgitation. Normal pulmonic valve.  Minimal pulmonic  regurgitation.  Pericardium/PleuraNormal pericardium with no pericardial  effusion.  ------------------------------------------------------------------------  CONCLUSIONS:  1. Mitral annular calcification, otherwise normal mitral  valve. Mild-moderate mitral regurgitation.  2. Calcified trileaflet aortic valve with decreased  opening. Peak transaortic valve gradient equals 39 mm Hg,  mean transaortic valve gradient equals 19 mm Hg, estimated  aortic valve area equals 1.3 sqcm (by continuity equation),  consistent with moderate aortic stenosis. Mild aortic  regurgitation.  3. Moderately dilated left atrium.  LA volume index = 46  cc/m2.  4. Mild left ventricular enlargement.  5. Low normal left ventricular systolic function. No  segmental wall motion abnormalities.  6. Normal right ventricular size and function.    Cath:  4/14/21  PROCEDURE:  --  Right heart catheterization.  --  Left coronary angiography.  --  Right coronary angiography.  --  Sonosite - Diagnostic.  TECHNIQUE: The risks and alternatives of the procedures and conscious  sedation were explained to the patient and informed consent was obtained.  Cardiac catheterization performed electively.  Local anesthetic given. Right femoral artery access. Local anesthetic  given. A 4fr Sheath [place] was inserted in the vessel, utilizing the  modified Seldinger technique. Right femoral vein access. Local anesthetic  given. A 5fr Sheath [place] was inserted in the vessel, utilizing the  modified Seldingertechnique. Right heart catheterization. The procedure  was performed utilizing a 5 Fr. X 110cm Bln Wedge Pressure Cath catheter  under fluoroscopic guidance. Measurements of pressures were obtained. Left  coronary artery angiography. The vessel was injected utilizing a 4 Fr JL  4.0 catheter and positioned in the vessel ostium under fluoroscopic  guidance. Angiography was performed in multiple projections using  hand-injection of contrast. Right coronary artery angiography. The vessel  was injected utilizing a 4 Fr JR 4.0 catheter and positioned in the vessel  ostia under fluoroscopic guidance. Angiography was performed in multiple  projections using hand-injection of contrast. Sonosite - Diagnostic.  RADIATION EXPOSURE: 6.1 min.  CONTRAST GIVEN: 120 ml Optiray.  MEDICATIONS GIVEN: 2% Lidocaine, 10 ml, subcutaneously. 0.9% Normal saline,  9 ml, IV.  VENTRICLES: No left ventriculogram was performed.  CORONARY VESSELS: The coronary circulation is right dominant.  LM:   --  LM: The vessel waslarge sized and mildly calcified. Angiography  showed mild atherosclerosis with no flow limiting lesions.  LAD:   --  LAD: Angiography showed mild atherosclerosis with no flow  limiting lesions.  --  D1: Angiography showed mild atherosclerosis with no flow limiting  lesions.  CX:   --  Proximal circumflex: There was a discrete 95 % stenosis. The  lesion was complex. There was BERNADINE grade 2 flow through the vessel  (partial perfusion).  RI:   --  Ostial ramus intermedius: There was a discrete 95 % stenosis at a  site with no prior intervention. There was BERNADINE grade 3 flow through the  vessel (brisk flow).  RCA:   --  RCA: Angiography showed mild atherosclerosis with no flow  limiting lesions.  [facility]S: [facility]  DIAGNOSTIC IMPRESSIONS: Severe stenosis LCX and RAMUS.  Hemodynamics as listed.  Unable to cross the aortic valve , valve appears to be very calcified.  Recommend TTE to assess for aortic valve disease, will plan for  revascularization based on echo findings.  INTERVENTIONAL IMPRESSIONS: Severe stenosis LCX and RAMUS.  Hemodynamics as listed.  Unable to cross the aortic valve , valve appears to be very calcified.  Recommend TTE to assess for aortic valve disease, will plan for  revascularization based on echo findings.  Prepared and signed by  Joselin Lux M.D.    Interpretation of Telemetry: SR with PVCs    Cardiology Consult Progress Note    Patient seen and examined at bedside.    Brief HPI:  62yo man with HTN, ESRD on iHD via LUE AVF, HepC s/p Mavyret, CAD (s/p LHC for abnormal stress test in 4/2021 with Dr. Joselin Lux revealing 95% lesion on pLCx, no intervention done) who presents with CP and SOB. Found to have elevated troponins. Concern for symptomatic AS and CAD.     Interval Events:   - NPO yesterday for SAVANA, but delay so patient did not get SAVANA    Review Of Systems: No chest pain, shortness of breath, or palpitations            Current Meds:  aspirin enteric coated 81 milliGRAM(s) Oral daily  atorvastatin 80 milliGRAM(s) Oral at bedtime  calcium acetate 1334 milliGRAM(s) Oral three times a day with meals  chlorhexidine 4% Liquid 1 Application(s) Topical daily  clopidogrel Tablet 75 milliGRAM(s) Oral daily  epoetin hillary-epbx (RETACRIT) Injectable 24235 Unit(s) IV Push <User Schedule>  heparin   Injectable 5000 Unit(s) SubCutaneous every 8 hours  labetalol 600 milliGRAM(s) Oral three times a day  NIFEdipine XL 90 milliGRAM(s) Oral two times a day  senna 2 Tablet(s) Oral at bedtime    Vitals:  T(F): 97.9 (10-21), Max: 98.6 (10-21)  HR: 69 (10-21) (63 - 94)  BP: 137/70 (10-21) (108/63 - 137/71)  RR: 20 (10-21)  SpO2: 98% (10-21)  I&O's Summary    20 Oct 2021 07:01  -  21 Oct 2021 07:00  --------------------------------------------------------  IN: 550 mL / OUT: 185 mL / NET: 365 mL      Physical Exam:  Appearance: No acute distress  HENT: No JVD   Cardiovascular: RRR, S1/S2, + systolic murmur at the base   Respiratory: CTABL  Gastrointestinal: soft, NT ND, +BS  Musculoskeletal: No clubbing, no edema   Neurologic: Non-focal  Skin: No rashes, ecchymoses, or cyanosis                          8.2    5.20  )-----------( 260      ( 21 Oct 2021 07:27 )             26.8     10-21    136  |  95<L>  |  x   ----------------------------<  x   4.4   |  x   |  x     Ca    10.0      19 Oct 2021 11:46  Phos  4.2     10-21  Mg     2.10     10-21    TPro  8.3  /  Alb  4.7  /  TBili  0.6  /  DBili  x   /  AST  18  /  ALT  13  /  AlkPhos  148<H>  10-19    PT/INR - ( 19 Oct 2021 11:46 )   PT: 13.5 sec;   INR: 1.18 ratio         PTT - ( 20 Oct 2021 09:38 )  PTT:41.5 sec  CARDIAC MARKERS ( 19 Oct 2021 13:08 )  502 ng/L / x     / x     / x     / x     / 3.7 ng/mL  CARDIAC MARKERS ( 19 Oct 2021 11:46 )  460 ng/L / x     / x     / x     / x     / 4.0 ng/mL    Serum Pro-Brain Natriuretic Peptide: >74433 pg/mL (10-19 @ 11:46)    Echo:  4/14/21  DIMENSIONS:  Dimensions:     Normal Values:  LA:     4.6 cm    2.0 - 4.0 cm  Ao:     3.7 cm    2.0 - 3.8 cm  SEPTUM: 0.7 cm    0.6 - 1.2 cm  PWT:    0.8 cm    0.6 - 1.1 cm  LVIDd:  6.3 cm    3.0 - 5.6 cm  LVIDs:  4.5 cm    1.8 - 4.0 cm  Derived Variables:  LVMI: 97 g/m2  RWT: 0.25  Fractional short: 29 %  Ejection Fraction (Teicholtz): 54 %  ------------------------------------------------------------------------  OBSERVATIONS:  Mitral Valve: Mitral annular calcification, otherwise  normal mitral valve. Mild-moderate mitral regurgitation.  Aortic Root: Normal aortic root.  Aortic Valve: Calcified trileaflet aortic valve with  decreased opening. Peak transaortic valve gradient equals  39 mm Hg, mean transaortic valve gradient equals 19 mm Hg,  estimated aortic valve area equals 1.3 sqcm (by continuity  equation), consistent with moderate aortic stenosis. Mild  aortic regurgitation.  Left Atrium:Moderately dilated left atrium.  LA volume  index = 46 cc/m2.  Left Ventricle: Low normal left ventricular systolic  function. No segmental wall motion abnormalities. Mild left  ventricular enlargement.  Right Heart: Normal right atrium. Normal rightventricular  size and function. Normal tricuspid valve. Mild tricuspid  regurgitation. Normal pulmonic valve.  Minimal pulmonic  regurgitation.  Pericardium/PleuraNormal pericardium with no pericardial  effusion.  ------------------------------------------------------------------------  CONCLUSIONS:  1. Mitral annular calcification, otherwise normal mitral  valve. Mild-moderate mitral regurgitation.  2. Calcified trileaflet aortic valve with decreased  opening. Peak transaortic valve gradient equals 39 mm Hg,  mean transaortic valve gradient equals 19 mm Hg, estimated  aortic valve area equals 1.3 sqcm (by continuity equation),  consistent with moderate aortic stenosis. Mild aortic  regurgitation.  3. Moderately dilated left atrium.  LA volume index = 46  cc/m2.  4. Mild left ventricular enlargement.  5. Low normal left ventricular systolic function. No  segmental wall motion abnormalities.  6. Normal right ventricular size and function.    Cath:  4/14/21  PROCEDURE:  --  Right heart catheterization.  --  Left coronary angiography.  --  Right coronary angiography.  --  Sonosite - Diagnostic.  TECHNIQUE: The risks and alternatives of the procedures and conscious  sedation were explained to the patient and informed consent was obtained.  Cardiac catheterization performed electively.  Local anesthetic given. Right femoral artery access. Local anesthetic  given. A 4fr Sheath [place] was inserted in the vessel, utilizing the  modified Seldinger technique. Right femoral vein access. Local anesthetic  given. A 5fr Sheath [place] was inserted in the vessel, utilizing the  modified Seldingertechnique. Right heart catheterization. The procedure  was performed utilizing a 5 Fr. X 110cm Bln Wedge Pressure Cath catheter  under fluoroscopic guidance. Measurements of pressures were obtained. Left  coronary artery angiography. The vessel was injected utilizing a 4 Fr JL  4.0 catheter and positioned in the vessel ostium under fluoroscopic  guidance. Angiography was performed in multiple projections using  hand-injection of contrast. Right coronary artery angiography. The vessel  was injected utilizing a 4 Fr JR 4.0 catheter and positioned in the vessel  ostia under fluoroscopic guidance. Angiography was performed in multiple  projections using hand-injection of contrast. Sonosite - Diagnostic.  RADIATION EXPOSURE: 6.1 min.  CONTRAST GIVEN: 120 ml Optiray.  MEDICATIONS GIVEN: 2% Lidocaine, 10 ml, subcutaneously. 0.9% Normal saline,  9 ml, IV.  VENTRICLES: No left ventriculogram was performed.  CORONARY VESSELS: The coronary circulation is right dominant.  LM:   --  LM: The vessel waslarge sized and mildly calcified. Angiography  showed mild atherosclerosis with no flow limiting lesions.  LAD:   --  LAD: Angiography showed mild atherosclerosis with no flow  limiting lesions.  --  D1: Angiography showed mild atherosclerosis with no flow limiting  lesions.  CX:   --  Proximal circumflex: There was a discrete 95 % stenosis. The  lesion was complex. There was BERNADINE grade 2 flow through the vessel  (partial perfusion).  RI:   --  Ostial ramus intermedius: There was a discrete 95 % stenosis at a  site with no prior intervention. There was BERNADINE grade 3 flow through the  vessel (brisk flow).  RCA:   --  RCA: Angiography showed mild atherosclerosis with no flow  limiting lesions.  COMPLICATIONS: [facility]  DIAGNOSTIC IMPRESSIONS: Severe stenosis LCX and RAMUS.  Hemodynamics as listed.  Unable to cross the aortic valve , valve appears to be very calcified.  Recommend TTE to assess for aortic valve disease, will plan for  revascularization based on echo findings.  INTERVENTIONAL IMPRESSIONS: Severe stenosis LCX and RAMUS.  Hemodynamics as listed.  Unable to cross the aortic valve , valve appears to be very calcified.  Recommend TTE to assess for aortic valve disease, will plan for  revascularization based on echo findings.  Prepared and signed by  Joselin Lux M.D.    Interpretation of Telemetry: SR with PVCs    no discharge, no irritation, no pain, no redness, and no visual changes.

## 2021-10-22 ENCOUNTER — APPOINTMENT (OUTPATIENT)
Dept: CARDIOLOGY | Facility: CLINIC | Age: 61
End: 2021-10-22

## 2021-10-22 ENCOUNTER — TRANSCRIPTION ENCOUNTER (OUTPATIENT)
Age: 61
End: 2021-10-22

## 2021-10-22 LAB
ANION GAP SERPL CALC-SCNC: 17 MMOL/L — HIGH (ref 7–14)
BUN SERPL-MCNC: 36 MG/DL — HIGH (ref 7–23)
CALCIUM SERPL-MCNC: 9.6 MG/DL — SIGNIFICANT CHANGE UP (ref 8.4–10.5)
CHLORIDE SERPL-SCNC: 93 MMOL/L — LOW (ref 98–107)
CO2 SERPL-SCNC: 24 MMOL/L — SIGNIFICANT CHANGE UP (ref 22–31)
CREAT SERPL-MCNC: 7.12 MG/DL — HIGH (ref 0.5–1.3)
GLUCOSE BLDC GLUCOMTR-MCNC: 103 MG/DL — HIGH (ref 70–99)
GLUCOSE SERPL-MCNC: 95 MG/DL — SIGNIFICANT CHANGE UP (ref 70–99)
HCT VFR BLD CALC: 27.9 % — LOW (ref 39–50)
HGB BLD-MCNC: 8.8 G/DL — LOW (ref 13–17)
MAGNESIUM SERPL-MCNC: 2.1 MG/DL — SIGNIFICANT CHANGE UP (ref 1.6–2.6)
MCHC RBC-ENTMCNC: 23.8 PG — LOW (ref 27–34)
MCHC RBC-ENTMCNC: 31.5 GM/DL — LOW (ref 32–36)
MCV RBC AUTO: 75.6 FL — LOW (ref 80–100)
MRSA PCR RESULT.: SIGNIFICANT CHANGE UP
NRBC # BLD: 0 /100 WBCS — SIGNIFICANT CHANGE UP
NRBC # FLD: 0 K/UL — SIGNIFICANT CHANGE UP
PHOSPHATE SERPL-MCNC: 4 MG/DL — SIGNIFICANT CHANGE UP (ref 2.5–4.5)
PLATELET # BLD AUTO: 254 K/UL — SIGNIFICANT CHANGE UP (ref 150–400)
POTASSIUM SERPL-MCNC: 5.1 MMOL/L — SIGNIFICANT CHANGE UP (ref 3.5–5.3)
POTASSIUM SERPL-SCNC: 5.1 MMOL/L — SIGNIFICANT CHANGE UP (ref 3.5–5.3)
RBC # BLD: 3.69 M/UL — LOW (ref 4.2–5.8)
RBC # FLD: 19.2 % — HIGH (ref 10.3–14.5)
S AUREUS DNA NOSE QL NAA+PROBE: SIGNIFICANT CHANGE UP
SODIUM SERPL-SCNC: 134 MMOL/L — LOW (ref 135–145)
WBC # BLD: 6.23 K/UL — SIGNIFICANT CHANGE UP (ref 3.8–10.5)
WBC # FLD AUTO: 6.23 K/UL — SIGNIFICANT CHANGE UP (ref 3.8–10.5)

## 2021-10-22 PROCEDURE — 99152 MOD SED SAME PHYS/QHP 5/>YRS: CPT

## 2021-10-22 PROCEDURE — 99232 SBSQ HOSP IP/OBS MODERATE 35: CPT

## 2021-10-22 PROCEDURE — 99232 SBSQ HOSP IP/OBS MODERATE 35: CPT | Mod: GC

## 2021-10-22 PROCEDURE — 93456 R HRT CORONARY ARTERY ANGIO: CPT | Mod: 26

## 2021-10-22 RX ORDER — SENNA PLUS 8.6 MG/1
0 TABLET ORAL
Qty: 0 | Refills: 0 | DISCHARGE

## 2021-10-22 RX ORDER — ASPIRIN/CALCIUM CARB/MAGNESIUM 324 MG
1 TABLET ORAL
Qty: 0 | Refills: 0 | DISCHARGE
Start: 2021-10-22

## 2021-10-22 RX ORDER — CLOPIDOGREL BISULFATE 75 MG/1
1 TABLET, FILM COATED ORAL
Qty: 0 | Refills: 0 | DISCHARGE
Start: 2021-10-22

## 2021-10-22 RX ORDER — NIFEDIPINE 30 MG
1 TABLET, EXTENDED RELEASE 24 HR ORAL
Qty: 0 | Refills: 0 | DISCHARGE

## 2021-10-22 RX ORDER — ATORVASTATIN CALCIUM 80 MG/1
1 TABLET, FILM COATED ORAL
Qty: 0 | Refills: 0 | DISCHARGE
Start: 2021-10-22

## 2021-10-22 RX ORDER — ERYTHROPOIETIN 10000 [IU]/ML
10000 INJECTION, SOLUTION INTRAVENOUS; SUBCUTANEOUS
Qty: 0 | Refills: 0 | DISCHARGE
Start: 2021-10-22

## 2021-10-22 RX ORDER — HEPARIN SODIUM 5000 [USP'U]/ML
5000 INJECTION INTRAVENOUS; SUBCUTANEOUS
Qty: 0 | Refills: 0 | DISCHARGE
Start: 2021-10-22

## 2021-10-22 RX ORDER — LABETALOL HCL 100 MG
2 TABLET ORAL
Qty: 0 | Refills: 0 | DISCHARGE
Start: 2021-10-22

## 2021-10-22 RX ORDER — SENNA PLUS 8.6 MG/1
2 TABLET ORAL
Qty: 0 | Refills: 0 | DISCHARGE
Start: 2021-10-22

## 2021-10-22 RX ORDER — NIFEDIPINE 30 MG
1 TABLET, EXTENDED RELEASE 24 HR ORAL
Qty: 0 | Refills: 0 | DISCHARGE
Start: 2021-10-22

## 2021-10-22 RX ORDER — CALCIUM ACETATE 667 MG
2 TABLET ORAL
Qty: 0 | Refills: 0 | DISCHARGE

## 2021-10-22 RX ORDER — CALCIUM ACETATE 667 MG
1334 TABLET ORAL
Qty: 0 | Refills: 0 | DISCHARGE
Start: 2021-10-22

## 2021-10-22 RX ORDER — CALCIUM CARBONATE 500(1250)
1 TABLET ORAL
Qty: 0 | Refills: 0 | DISCHARGE

## 2021-10-22 RX ADMIN — CHLORHEXIDINE GLUCONATE 1 APPLICATION(S): 213 SOLUTION TOPICAL at 13:36

## 2021-10-22 RX ADMIN — CLOPIDOGREL BISULFATE 75 MILLIGRAM(S): 75 TABLET, FILM COATED ORAL at 13:39

## 2021-10-22 RX ADMIN — ATORVASTATIN CALCIUM 80 MILLIGRAM(S): 80 TABLET, FILM COATED ORAL at 21:58

## 2021-10-22 RX ADMIN — HEPARIN SODIUM 5000 UNIT(S): 5000 INJECTION INTRAVENOUS; SUBCUTANEOUS at 13:38

## 2021-10-22 RX ADMIN — Medication 600 MILLIGRAM(S): at 21:57

## 2021-10-22 RX ADMIN — Medication 81 MILLIGRAM(S): at 13:37

## 2021-10-22 RX ADMIN — Medication 1334 MILLIGRAM(S): at 17:12

## 2021-10-22 RX ADMIN — HEPARIN SODIUM 5000 UNIT(S): 5000 INJECTION INTRAVENOUS; SUBCUTANEOUS at 21:59

## 2021-10-22 RX ADMIN — HEPARIN SODIUM 5000 UNIT(S): 5000 INJECTION INTRAVENOUS; SUBCUTANEOUS at 05:28

## 2021-10-22 RX ADMIN — Medication 1334 MILLIGRAM(S): at 13:37

## 2021-10-22 RX ADMIN — Medication 600 MILLIGRAM(S): at 05:28

## 2021-10-22 NOTE — DISCHARGE NOTE PROVIDER - HOSPITAL COURSE
Systems:   Constitutional: No major weight gain or loss, fatigue, weakness, night sweats or fever. HEENT: No new vision difficulties or ringing in the ears. Respiratory: No new SOB, PND, orthopnea or cough. Cardiovascular: See HPI , denies CP  GI: No n/v, diarrhea, constipation, abdominal pain or changes in bowel habits. No melena, no hematochezia  : No urinary frequency, urgency, incontinence, hematuria or dysuria. Skin: No cyanosis or skin lesions. Musculoskeletal: No new muscle or joint pain. Neurological: No syncope. Psychiatric: No anxiety, insomnia or depression    Current Outpatient Medications   Medication Sig Dispense Refill    HYDROcodone-acetaminophen (NORCO) 7.5-325 MG per tablet Take 1 tablet by mouth every 8 hours as needed for Pain for up to 30 days. Intended supply: 30 days 90 tablet 0    oxybutynin (DITROPAN-XL) 10 MG extended release tablet TAKE ONE TABLET BY MOUTH DAILY 90 tablet 0    atorvastatin (LIPITOR) 20 MG tablet TAKE ONE TABLET BY MOUTH DAILY 90 tablet 1    carvedilol (COREG) 6.25 MG tablet TAKE ONE TABLET BY MOUTH TWICE A DAY WITH MEALS 180 tablet 0    lisinopril (PRINIVIL;ZESTRIL) 5 MG tablet Take 1 tablet by mouth 2 times daily 180 tablet 3    loratadine (CLARITIN) 10 MG capsule Take 10 mg by mouth daily      diclofenac (CATAFLAM) 50 MG tablet Take 1 tablet by mouth 2 times daily as needed for Pain 60 tablet 2    aspirin 81 MG tablet Take 81 mg by mouth daily      ferrous sulfate 325 (65 FE) MG tablet Take 325 mg by mouth daily (with breakfast)       No current facility-administered medications for this visit.         No Known Allergies      PHYSICAL EXAMINATION:  [ INSTRUCTIONS:  \"[x]\" Indicates a positive item  \"[]\" Indicates a negative item  -- DELETE ALL ITEMS NOT EXAMINED]  Vital Signs: (As obtained by patient/caregiver or practitioner observation)      Constitutional: [x] Appears well-developed and well-nourished [x] No apparent distress      [] Abnormal- Mental status  [x] Alert and awake  [x] Oriented to person/place/time [x]Able to follow commands      Eyes:  EOM    [x]  Normal  [] Abnormal-  Sclera  [x]  Normal  [] Abnormal -         Discharge [x]  None visible  [] Abnormal -    HENT:   [x] Normocephalic, atraumatic. [] Abnormal   [] Mouth/Throat: Mucous membranes are moist.     External Ears [] Normal  [] Abnormal-     Neck: [x] No visualized mass     Pulmonary/Chest: [x] Respiratory effort normal.  [x] No visualized signs of difficulty breathing or respiratory distress        [] Abnormal-      Musculoskeletal:   [x] Normal gait with no signs of ataxia         [x] Normal range of motion of neck        [] Abnormal-       Neurological:        [x] No Facial Asymmetry (Cranial nerve 7 motor function) (limited exam to video visit)          [x] No gaze palsy        [] Abnormal-         Skin:        [x] No significant exanthematous lesions or discoloration noted on facial skin         [] Abnormal-            Psychiatric:       [x] Normal Affect [x] No Hallucinations        [] Abnormal-     Other pertinent observable physical exam findings-     Labs:   Lab Results   Component Value Date    WBC 8.0 04/24/2019    HGB 13.4 04/24/2019    HCT 39.3 04/24/2019    MCV 91.8 04/24/2019     04/24/2019     Lab Results   Component Value Date     07/27/2020    K 4.5 07/27/2020     07/27/2020    CO2 24 07/27/2020    BUN 9 07/27/2020    CREATININE 0.7 07/27/2020    GLUCOSE 107 07/27/2020    CALCIUM 9.1 07/27/2020      Lab Results   Component Value Date    TRIG 115 09/21/2017    HDL 57 07/27/2020    HDL 66 10/05/2011    LDLCALC 102 07/27/2020    LABVLDL 17 07/27/2020       Diagnostics:    EKG:    3/19/19 Sinus Rhythm with left bundle branch block. 9/14/20 not completed due to VV    ECHO 3/23/16  Summary  Left ventricular size is mildly increased. Normal left ventricular wall  thickness. Hypokinesis of the anteroseptal, and mid septal wall.  Left  ventricular function is reduced with ejection fraction estimated at 35-40%. There is reversal of E/A inflow velocities across the mitral valve  suggesting impaired left ventricular relaxation. ECHO 6/1/18  Left ventricular cavity size is normal. Normal left ventricular wall  thickness. Ejection fraction is visually estimated to be 50-55%. No regional  wall motion abnormalities are noted. Normal diastolic function. Mitral valve leaflets appear mildly thickened. Trivial mitral regurgitation. The aortic valve is mildly thickened/calcified but opens well with normal gradients. Trivial aortic regurgitation. Mild tricuspid regurgitation. IVC size is normal (<2.1cm) and collapses > 50% with respiration consistent  with normal RA pressure (3mmHg). Estimated pulmonary artery systolic pressure is normal at 26 mmHg assuming a  right atrial pressure of 3 mmHg. [facility] 9/25/2015:  1. Right coronary artery rises from the right sinus of Valsalva. It is a moderate-sized artery. It divides into the posterior descending and posterolateral branches. Right coronary artery and its branches are free of atherosclerosis. 2. Left main trunk is short and _____ divided into left anterior descending and left circumflex artery. Left main trunk appears to have mild atherosclerotic plaquing but it does not appear hemodynamically significant. 3. Left anterior descending artery: It is a moderate-sized artery. It descends into the intraventricular sulcus and wraps around the apex of the heart. The left anterior descending and its branches are free of any atherosclerosis. 4. Left circumflex artery. It arises from the left main trunk. It is a  moderate-sized artery and gives rise to a moderate-sized obtuse marginal branch. Left circumflex artery and its branches are free of atherosclerosis. 5. Left ventriculogram reveals a left ventricular enlargement with global left ventricular systolic dysfunction.  Estimated ejection fraction is about  30% to was evaluated per Dr. Timbo Perdomo and was offered a flutter/fibrillation ablation, she did not proceed with ablation. She also stopped taking her Eliquis. Today, she continues to deny breakthrough AFIB, symptoms or TIA/CVA like symptoms. I have once again discussed with her increased risk of a stroke without taking the Eliquis since her chads Vasc score is at least 3. She has declined both eliquis and ablation therapy. Will continue to monitor. Nonischemic cardiomyopathy. She denies any heart failure sounding symptoms and does not appear in any discuss during this virtual visit. She will continue her current dose of Coreg and Lisinopril. LVEF is 55-60% on most recent echo from 3/2020. NYHA Class II    Coronary artery disease  She denies any symptoms of angina today. . Continue with medical management and risk factor modification including aspirin, statin, and B-blocker. BMP 7/27/20 wnl except glucose 107. Essential hypertension. Blood pressure is stable per patient's report 108/64. Continue current medical therapy. BMP 7/27/20 stable. Hyperlipidemia. Last lipid profile from 7/27/20 wnl except LDLc 102, LFT wnl. Will continue Lipitor 20 mg daily. Will discuss about getting repeat lipid profile during the next visit    Shortness of breath. No evidence of heart failure or reported symptoms. Syncope  Reported episodes of lightheadedness and dizziness. We decreased her coreg to 3.125 mg BID with symptoms resolution. She denies any further episodes of near syncope or syncope. Fatigue  She reports her fatigue is stable and walking for exercise. Follow up in 6 months. Instructed to obtain flu vaccine this season and annually. Thank you very much for allowing me to participate in the care of your patient. Please do not hesitate to contact me if you have any questions.     Sincerely,  Sandeep Mcmullen MD      [address] [place] [address], [address] [place] [id number]  Ph: [phone]  Fax: [phone]        9/14/2020    Alia Kim is a 78 y.o. female being evaluated by a Virtual Visit (video visit) encounter to address concerns as mentioned above. A caregiver was present when appropriate. Due to this being a TeleHealth encounter (During EDRQU-78 public health emergency), evaluation of the following organ systems was limited: Vitals/Constitutional/EENT/Resp/CV/GI//MS/Neuro/Skin/Heme-Lymph-Imm. Pursuant to the emergency declaration under the [address] [place] [address], [address] [place] [address] and the [employer] Act, this Virtual Visit was conducted with patient's (and/or legal guardian's) consent, to reduce the patient's risk of exposure to COVID-19 and provide necessary medical care. The patient (and/or legal guardian) has also been advised to contact this office for worsening conditions or problems, and seek emergency medical treatment and/or call 911 if deemed necessary. Patient identification was verified at the start of the visit: Yes    Total time spent on this encounter: 10 minutes    Services were provided through a video synchronous discussion virtually to substitute for in-person clinic visit. Patient and provider were located at their individual homes. --Dawna Garcia RN on 9/14/2020 at 11:45 AM    An electronic signature was used to authenticate this note. This note was scribed in the presence of Dr. Abel Ventura MD by Dawna Garcia RN. 61 y.o male PMHx of ESRD on HD (t/th/sat) via left forearm AVF, HTN, Hep C s/p treatment, CAD c LCx 95% stenosis presents with chest pain and sob admitted with NSTEMI.     NSTEMI (non-ST elevation myocardial infarction).   ·  Plan: telemetry- S billy, PVC's   - Troponin 460 > 502  - EKG without obvious ST segment change  - s/p Plavix load and heparinize for NSTEMI, cardiology consult appreciated ,   s/p  SAVANA with severe aortic stenosis  - Started [id number] daily and Lipitor 40 daily  -plan for CAD work-up per cardiology.  -s/p Cardiac Cath --------------------------------     Acute systolic heart failure.   ·  Plan: - Patient clinically volume overloaded and CXR with evidence of volume overload  - Patient anuric currently  - Would utilize HD/PUF for volume management, nephrology consult appreciated   - Daily weight and I+O  - Continue betablocker.    ESRD (end stage renal disease).   ·  Plan: - House nephrology following   - Next HD  to plan   - Renal replacement diet.    Essential hypertension.   ·  Plan: - Continue home labetalol    Hyperlipidemia.   ·  Plan: - Start statin  - low fat diet  - check lipids.    Prophylactic measure.   ·  Plan: - DVT px - Heparin sq q8 H.    DISPO--------------------------------------------------For possible transfer to [facility] for CTS eval    61 y.o male PMHx of ESRD on HD (t/th/sat) via left forearm AVF, HTN, Hep C s/p treatment, CAD c LCx 95% stenosis presents with chest pain and sob admitted with NSTEMI.     NSTEMI (non-ST elevation myocardial infarction).   ·  Plan: telemetry- S billy, PVC's   - Troponin 460 > 502  - EKG without obvious ST segment change  - s/p Plavix load and heparinize for NSTEMI, cardiology consult appreciated ,   s/p  SAVANA with severe aortic stenosis  - Started [id number] daily and Lipitor 40 daily  -plan for CAD work-up per cardiology.  -s/p Cardiac Cath --------------------------------     Acute systolic heart failure.   ·  Plan: - Patient clinically volume overloaded and CXR with evidence of volume overload  - Patient anuric currently  - Would utilize HD/PUF for volume management, nephrology consult appreciated   - Daily weight and I+O  - Continue betablocker.    ESRD (end stage renal disease).   ·  Plan: - House nephrology following   - Next HD  to plan   - Renal replacement diet.    Essential hypertension.   ·  Plan: - Continue home labetalol    Hyperlipidemia.   ·  Plan: - Start statin  - low fat diet  - check lipids.    Prophylactic measure.   ·  Plan: - DVT px - Heparin sq q8 H.    DISPO:     Pt transferred to [facility] 10/24 for CTSx evaluation of 2v CABG vs AVR  Accepting Physician: Dr. Joaquin  Location: [address] [place] [address]     61 y.o male PMHx of ESRD on HD (t/th/sat) via left forearm AVF, HTN, Hep C s/p treatment, CAD c LCx 95% stenosis presents with chest pain and sob admitted with NSTEMI.     NSTEMI (non-ST elevation myocardial infarction).   ·  Plan: telemetry- S billy, PVC's   - Troponin 460 > 502  - EKG without obvious ST segment change  - s/p Plavix load and heparinize for NSTEMI, cardiology consult appreciated ,   s/p  SAVANA with severe aortic stenosis  - Started [id number] daily and Lipitor 40 daily  -plan for CAD work-up per cardiology.  -s/p Cardiac Cath 10/22-- Severe stenosis ostial Ramus and ostial LCX; Patient has severe symptomatic AS. Recommend CTS evaluation for CABG/AVR       Acute systolic heart failure.   ·  Plan: - Patient clinically volume overloaded and CXR with evidence of volume overload  - Patient anuric currently  - Would utilize HD/PUF for volume management, nephrology consult appreciated   - Daily weight and I+O  - Continue betablocker.    ESRD (end stage renal disease).   ·  Plan: - House nephrology following   - Next HD  to plan   - Renal replacement diet.    Essential hypertension.   ·  Plan: - Continue home labetalol    Hyperlipidemia.   ·  Plan: - Start statin  - low fat diet  - check lipids.    Prophylactic measure.   ·  Plan: - DVT px - Heparin sq q8 H.    DISPO:     Pt transferred to [facility] 10/24 for CTSx evaluation of 2v CABG vs AVR  Accepting Physician: Dr. Joaquin  Location: [address] [place] [address]

## 2021-10-22 NOTE — DISCHARGE NOTE PROVIDER - DETAILS OF MALNUTRITION DIAGNOSIS/DIAGNOSES
This patient has been assessed with a concern for Malnutrition and was treated during this hospitalization for the following Nutrition diagnosis/diagnoses:     -  10/20/2021: Moderate protein-calorie malnutrition

## 2021-10-22 NOTE — PROGRESS NOTE ADULT - PROBLEM SELECTOR PLAN 1
telemetry- S billy, PVC's   - Troponin 460 > 502  - EKG without obvious ST segment change  - s/p Plavix load and heparinize for NSTEMI, cardiology consult appreciated , s/p  SAVANA  - Started [id number] daily and Lipitor 40 daily  -plan for CAD work-up per cardiology

## 2021-10-22 NOTE — PROGRESS NOTE ADULT - ATTENDING COMMENTS
The patient was seen and examined with the Cardiology Consultation Teaching Service.     No overnight events    No chest pain  No dyspnea, orthopnea or PND  No palpitations or dizziness     SAVANA demonstrated severe aortic stenosis    Comfortable-appearing man in no acute distress  Alert and oriented  Afebrile  Vital signs stable  JVP is not elevated  Clear lungs  Harsh systolic murmur at base  Extremities are warm and perfused  No peripheral edema     Stable microcytic anemia  Hyponatremia  Azotemia, GFR 7    hs-troponin 502  CK-MB is normal  pro-BNP >56K    Telemetry demonstrates sinus rhythm without significant arrhythmia    Echocardiography in April demonstrated borderline LV systolic function. The LA was moderately dilated. There is moderate AS and mild-moderate MR. SAVANA performed yesterday consistent with severe AS.    Impression and Recommendations:  61-year-old man with coronary artery disease (known severe lesion in the proximal CX and Ramus), stage 5 chronic kidney disease on hemodialysis, moderate aortic stenosis, and hypertension who presents after several months of persistent typical angina and dyspnea on exertion, now with a more persistent episode that did not resolve with hemodialysis.    Aortic stenosis is severe, supporting the notion that the patient would benefit from CABG+AVR.  In discussions with interventional cardiology, they wish to consider repeat coronary angiography prior to surgery.   Continue aspirin and high-potency statin. Continue labetalol and nifedipine for blood pressure control.     Interventional cardiology will discuss this plan with the patient and CV surgery.    Sae Ramirez MD  Cardiology  [phone]

## 2021-10-22 NOTE — CHART NOTE - NSCHARTNOTEFT_GEN_A_CORE
Pt is s/p cardiac cath via right femoral access. Patient denies pain, numbness, tingling, CP, SOB.     T(C): 36.9 (10-22-21 @ 21:12), Max: 36.9 (10-22-21 @ 04:52)  HR: 59 (10-22-21 @ 21:12) (56 - 78)  BP: 130/70 (10-22-21 @ 21:12) (100/60 - 130/70)  RR: 18 (10-22-21 @ 21:12) (17 - 19)  SpO2: 100% (10-22-21 @ 21:12) (99% - 100%)  VSS.     Site is stable with no hematoma, active bleed or swelling.   Dressing is clean/dry/intact.   DP pulse is palpable.   no femoral bruit    Will continue to monitor.

## 2021-10-22 NOTE — PROGRESS NOTE ADULT - ASSESSMENT
61 M with ESRD on iHD via LUE AVF, HTN, Hep C s/p Mavyret, CAD with stable anginal symptoms and known stenosis in the pCx and Ramus unrevascularized due to concurrent mod-severe AS. SAVANA done with severe AS.     #CAD  #Severe AS  #HTN i/s/o ESRD    Recommendations:  1. Continue DAPT with aspirin and clopidogrel for now, if elected for CABG/AVR based on SAVANA result then would discontinue clopidogrel.   2. Continue atorvastatin 80 mg daily.   3. Continue nifedipine XL 90 mg BID and labetalol 600 mg TID for HTN.   4. Case to be discussed with interventional cardiology attending.    Final recommendations per attending attestation     Abdullahi Bernardo MD  Department of Cardiology  Cardiology Fellow, PGY4  Cell: [phone]

## 2021-10-22 NOTE — DISCHARGE NOTE PROVIDER - PROVIDER TOKENS
PROVIDER:[TOKEN:[[id number]]],FREE:[LAST:[Primary Care Physician],PHONE:[(   )    -],FAX:[(   )    -],ADDRESS:[Please call and schedule outpatient follow up with your Primary Care Physician in 1 week for ongoing medical management]]

## 2021-10-22 NOTE — PROGRESS NOTE ADULT - SUBJECTIVE AND OBJECTIVE BOX
Cardiology Consult Progress Note    Patient seen and examined at bedside.    Brief HPI:  60yo man with HTN, ESRD on iHD via LUE AVF, HepC s/p Mavyret, CAD (s/p LHC for abnormal stress test in 4/2021 with Dr. Joselin Lux revealing 95% lesion on pLCx, no intervention done) who presents with CP and SOB. Found to have elevated troponins. Concern for symptomatic AS and CAD.     Overnight Events:   - SAVANA done to assess aortic valve. Mean gradient 21 mmHg, estimated MALCOM 0.9, c/w severe AS.    Review Of Systems: No chest pain, shortness of breath, or palpitations            Current Meds:  aspirin enteric coated 81 milliGRAM(s) Oral daily  atorvastatin 80 milliGRAM(s) Oral at bedtime  calcium acetate 1334 milliGRAM(s) Oral three times a day with meals  chlorhexidine 4% Liquid 1 Application(s) Topical daily  clopidogrel Tablet 75 milliGRAM(s) Oral daily  epoetin hillary-epbx (RETACRIT) Injectable 67536 Unit(s) IV Push <User Schedule>  heparin   Injectable 5000 Unit(s) SubCutaneous every 8 hours  labetalol 600 milliGRAM(s) Oral three times a day  NIFEdipine XL 90 milliGRAM(s) Oral two times a day  senna 2 Tablet(s) Oral at bedtime      Vitals:  T(F): 98.4 (10-22), Max: 98.6 (10-21)  HR: 78 (10-22) (65 - 78)  BP: 124/63 (10-22) (124/63 - 163/79)  RR: 19 (10-22)  SpO2: 100% (10-22)  I&O's Summary    21 Oct 2021 07:01  -  22 Oct 2021 07:00  --------------------------------------------------------  IN: 900 mL / OUT: 3900 mL / NET: -3000 mL    Physical Exam:  Appearance: No acute distress  HENT: No JVD   Cardiovascular: RRR, S1/S2, + systolic murmur at the base   Respiratory: CTABL  Gastrointestinal: soft, NT ND, +BS  Musculoskeletal: No clubbing, no edema   Neurologic: Non-focal  Skin: No rashes, ecchymoses, or cyanosis                          8.2    5.20  )-----------( 260      ( 21 Oct 2021 07:27 )             26.8     10-21    137  |  95<L>  |  21  ----------------------------<  82  5.2   |  27  |  3.45<H>    Ca    9.6      21 Oct 2021 13:19  Phos  3.0     10-21  Mg     2.00     10-21      PTT - ( 20 Oct 2021 09:38 )  PTT:41.5 sec  CARDIAC MARKERS ( 19 Oct 2021 13:08 )  502 ng/L / x     / x     / x     / x     / 3.7 ng/mL  CARDIAC MARKERS ( 19 Oct 2021 11:46 )  460 ng/L / x     / x     / x     / x     / 4.0 ng/mL      Serum Pro-Brain Natriuretic Peptide: >63434 pg/mL (10-19 @ 11:46)    Echo:  10/21/2021 SAVANA  CONCLUSIONS:  Limited study to evaluate the aortic valve.  Calcified trileaflet aortic valve with decreased opening.  The right coronary cusp is particularly thickened,  calcified, and immobile. Peak transaortic valve gradient  equals 42 mm Hg, mean transaortic valve gradient equals 21  mm Hg, estimated aortic valve area equals 0.9 sqcm (by  continuity equation and by planimetry), consistent with  severe aortic stenosis. Mild aortic regurgitation.

## 2021-10-22 NOTE — DISCHARGE NOTE PROVIDER - NSDCCPCAREPLAN_GEN_ALL_CORE_FT
PRINCIPAL DISCHARGE DIAGNOSIS  Diagnosis: Non-ST elevation MI (NSTEMI)  Assessment and Plan of Treatment: You had a NSTEMI, you were started on plavix, and aspirin and lipitor, and  evaluated by cardiology. Your transesophageal  echocardiogram revealed severe aortic stenosis , with plan for ?CTS eval      SECONDARY DISCHARGE DIAGNOSES  Diagnosis: Essential hypertension  Assessment and Plan of Treatment: Continue blood pressure medication regimen as directed. Monitor for any visual changes, headaches or dizziness.  Monitor blood pressure regularly.  Follow up with your PCP for further management for high blood pressure.      Diagnosis: ESRD (end stage renal disease)  Assessment and Plan of Treatment: Continue your hemodialysis as scheduled.    Diagnosis: Hyperlipidemia  Assessment and Plan of Treatment: Continue cholesterol control medications. Continue DASH diet. Follow up with your PCP within 1 week of discharge for further management and monitoring of lipid and cholesterol panels.      Diagnosis: Acute systolic heart failure  Assessment and Plan of Treatment: You came in  and found to be clinically fluid volume overloaded and CXR with evidence of volume overload. You had emergent dialysis to assist in offloading some fluids.   Continue recommended medication regimen. Monitor for signs/symptoms of fluid overload and electrolyte abnormalities, such as, shortness of breath, cough, swelling, chest discomfort, changes in heart rate, dizziness, fainting, or changes in mental status. Follow-up with your PCP/cardiologist outpatient after you've been discharged from the hospital.

## 2021-10-22 NOTE — DISCHARGE NOTE PROVIDER - CARE PROVIDERS DIRECT ADDRESSES
[email or web address]

## 2021-10-22 NOTE — PROGRESS NOTE ADULT - SUBJECTIVE AND OBJECTIVE BOX
Patient is s/p SAVANA, severe AS  He is frustrated , wants a cardiology plan, cardio follow up appreciated will d/w [facility] best plan of care     .  VITAL SIGNS:  T(C): 36.9 (10-22-21 @ 13:07), Max: 37 (10-21-21 @ 21:00)  T(F): 98.4 (10-22-21 @ 13:07), Max: 98.6 (10-21-21 @ 21:00)  HR: 56 (10-22-21 @ 13:07) (56 - 78)  BP: 112/60 (10-22-21 @ 13:07) (100/60 - 163/79)  BP(mean): --  RR: 18 (10-22-21 @ 13:07) (18 - 19)  SpO2: 100% (10-22-21 @ 13:07) (99% - 100%)  Wt(kg): --    PHYSICAL EXAM:    Constitutional: WDWN resting comfortably in bed; NAD  Head: NC/AT  Eyes: PERRL, EOMI, clear conjunctiva  Neck: supple; no JVD or thyromegaly  Respiratory: diminished breath sounds B/L   Cardiac: +S1/S2; RRR; loud systolic murmur   Gastrointestinal: soft, NT/ND; no rebound or guarding; +BSx4  Genitourinary: normal external genitalia  Back: spine midline, no bony tenderness or step-offs; no CVAT B/L  Extremities: WWP, no clubbing or cyanosis; no peripheral edema  Musculoskeletal: NROM x4; no joint swelling, tenderness or erythema  Neurologic: AAOx3; CNII-XII grossly intact; no focal deficits  Psychiatric: affect and characteristics of appearance, verbalizations, behaviors are appropriate    .  LABS:                         8.8    6.23  )-----------( 254      ( 22 Oct 2021 09:01 )             27.9     10-22    134<L>  |  93<L>  |  36<H>  ----------------------------<  95  5.1   |  24  |  7.12<H>    Ca    9.6      22 Oct 2021 09:01  Phos  4.0     10-22  Mg     2.10     10-22                    RADIOLOGY, EKG & ADDITIONAL TESTS: Reviewed.

## 2021-10-22 NOTE — DISCHARGE NOTE PROVIDER - NSDCMRMEDTOKEN_GEN_ALL_CORE_FT
aspirin 81 mg oral delayed release tablet: 1 tab(s) orally once a day  atorvastatin 80 mg oral tablet: 1 tab(s) orally once a day (at bedtime)  calcium acetate 667 mg oral tablet: 1334 milligram(s) orally 3 times a day (with meals)  clopidogrel 75 mg oral tablet: 1 tab(s) orally once a day  epoetin hillary: 53419 unit(s) injectable 3 times a week on dialysis days Tuesdays, Thursday, and Sarturday  heparin: 5000 unit(s) subcutaneous every 8 hours  labetalol 300 mg oral tablet: 2 tab(s) orally 3 times a day  NIFEdipine 90 mg oral tablet, extended release: 1 tab(s) orally 2 times a day  senna oral tablet: 2 tab(s) orally once a day (at bedtime)

## 2021-10-22 NOTE — DISCHARGE NOTE PROVIDER - CARE PROVIDER_API CALL
Sae Ramirez)  Cardiology; Internal Medicine  [address], [id number]  [place] [address]  Phone: [phone]  Fax: [phone]  Follow Up Time:     Primary Care Physician,   Please call and schedule outpatient follow up with your Primary Care Physician in 1 week for ongoing medical management  Phone: (   )    -  Fax: (   )    -  Follow Up Time:

## 2021-10-23 DIAGNOSIS — Z71.89 OTHER SPECIFIED COUNSELING: ICD-10-CM

## 2021-10-23 LAB
ANION GAP SERPL CALC-SCNC: 19 MMOL/L — HIGH (ref 7–14)
BUN SERPL-MCNC: 44 MG/DL — HIGH (ref 7–23)
CALCIUM SERPL-MCNC: 9.4 MG/DL — SIGNIFICANT CHANGE UP (ref 8.4–10.5)
CHLORIDE SERPL-SCNC: 92 MMOL/L — LOW (ref 98–107)
CO2 SERPL-SCNC: 23 MMOL/L — SIGNIFICANT CHANGE UP (ref 22–31)
CREAT SERPL-MCNC: 7.17 MG/DL — HIGH (ref 0.5–1.3)
GLUCOSE SERPL-MCNC: 92 MG/DL — SIGNIFICANT CHANGE UP (ref 70–99)
HCT VFR BLD CALC: 25.2 % — LOW (ref 39–50)
HGB BLD-MCNC: 8 G/DL — LOW (ref 13–17)
MAGNESIUM SERPL-MCNC: 2.1 MG/DL — SIGNIFICANT CHANGE UP (ref 1.6–2.6)
MCHC RBC-ENTMCNC: 23.5 PG — LOW (ref 27–34)
MCHC RBC-ENTMCNC: 31.7 GM/DL — LOW (ref 32–36)
MCV RBC AUTO: 74.1 FL — LOW (ref 80–100)
NRBC # BLD: 0 /100 WBCS — SIGNIFICANT CHANGE UP
NRBC # FLD: 0 K/UL — SIGNIFICANT CHANGE UP
PHOSPHATE SERPL-MCNC: 4.5 MG/DL — SIGNIFICANT CHANGE UP (ref 2.5–4.5)
PLATELET # BLD AUTO: 242 K/UL — SIGNIFICANT CHANGE UP (ref 150–400)
POTASSIUM SERPL-MCNC: 4.5 MMOL/L — SIGNIFICANT CHANGE UP (ref 3.5–5.3)
POTASSIUM SERPL-SCNC: 4.5 MMOL/L — SIGNIFICANT CHANGE UP (ref 3.5–5.3)
RBC # BLD: 3.4 M/UL — LOW (ref 4.2–5.8)
RBC # FLD: 18.7 % — HIGH (ref 10.3–14.5)
SODIUM SERPL-SCNC: 134 MMOL/L — LOW (ref 135–145)
WBC # BLD: 4.52 K/UL — SIGNIFICANT CHANGE UP (ref 3.8–10.5)
WBC # FLD AUTO: 4.52 K/UL — SIGNIFICANT CHANGE UP (ref 3.8–10.5)

## 2021-10-23 PROCEDURE — 99232 SBSQ HOSP IP/OBS MODERATE 35: CPT

## 2021-10-23 PROCEDURE — 90935 HEMODIALYSIS ONE EVALUATION: CPT | Mod: GC

## 2021-10-23 RX ADMIN — Medication 600 MILLIGRAM(S): at 21:56

## 2021-10-23 RX ADMIN — CHLORHEXIDINE GLUCONATE 1 APPLICATION(S): 213 SOLUTION TOPICAL at 12:52

## 2021-10-23 RX ADMIN — HEPARIN SODIUM 5000 UNIT(S): 5000 INJECTION INTRAVENOUS; SUBCUTANEOUS at 12:57

## 2021-10-23 RX ADMIN — Medication 600 MILLIGRAM(S): at 12:57

## 2021-10-23 RX ADMIN — Medication 81 MILLIGRAM(S): at 12:54

## 2021-10-23 RX ADMIN — Medication 90 MILLIGRAM(S): at 21:57

## 2021-10-23 RX ADMIN — ERYTHROPOIETIN 10000 UNIT(S): 10000 INJECTION, SOLUTION INTRAVENOUS; SUBCUTANEOUS at 08:29

## 2021-10-23 RX ADMIN — ATORVASTATIN CALCIUM 80 MILLIGRAM(S): 80 TABLET, FILM COATED ORAL at 21:57

## 2021-10-23 RX ADMIN — Medication 1334 MILLIGRAM(S): at 12:54

## 2021-10-23 RX ADMIN — HEPARIN SODIUM 5000 UNIT(S): 5000 INJECTION INTRAVENOUS; SUBCUTANEOUS at 21:57

## 2021-10-23 RX ADMIN — HEPARIN SODIUM 5000 UNIT(S): 5000 INJECTION INTRAVENOUS; SUBCUTANEOUS at 05:28

## 2021-10-23 NOTE — PROGRESS NOTE ADULT - PROBLEM SELECTOR PLAN 2
Patient with anemia in the setting of ESRD. Hemoglobin below target range. Continue Retacrit 10,000 unit TIW with HD. Monitor hemoglobin.

## 2021-10-23 NOTE — PROGRESS NOTE ADULT - SUBJECTIVE AND OBJECTIVE BOX
[facility] DIVISION OF KIDNEY DISEASES AND HYPERTENSION -- FOLLOW UP NOTE  --------------------------------------------------------------------------------  HPI: Mr Lopez is a 62 y/o male with past medical history of ESRD on HD TTS, hypertension, CAD s/p cath, HCV s/p treatment presented to [facility] from dialysis center c/o of worsening SOB and chest pain. Admitted for further work up. Currently being evaluated by cardiology. Pt. underwent SAVANA s/o severe AS and s/p cardiac cath via right femoral access. Being seen by nephrology for ESRD management. Last HD prior to admission was on 10/21/21.     Patient was seen and examined at bedside during hemodialysis session. Pt. stated of feeling well. O2 sat 98% on room air. Denies fever, chills, nausea, vomiting, diarrhea or LE edema.     PAST HISTORY  --------------------------------------------------------------------------------  No significant changes to PMH, PSH, FHx, SHx, unless otherwise noted    ALLERGIES & MEDICATIONS  --------------------------------------------------------------------------------  Allergies    adhesives (Other)  No Known Drug Allergies    Intolerances    Standing Inpatient Medications  aspirin enteric coated 81 milliGRAM(s) Oral daily  atorvastatin 80 milliGRAM(s) Oral at bedtime  calcium acetate 1334 milliGRAM(s) Oral three times a day with meals  chlorhexidine 4% Liquid 1 Application(s) Topical daily  epoetin hillary-epbx (RETACRIT) Injectable 94947 Unit(s) IV Push <User Schedule>  heparin   Injectable 5000 Unit(s) SubCutaneous every 8 hours  labetalol 600 milliGRAM(s) Oral three times a day  NIFEdipine XL 90 milliGRAM(s) Oral two times a day  senna 2 Tablet(s) Oral at bedtime    PRN Inpatient Medications    REVIEW OF SYSTEMS  --------------------------------------------------------------------------------  Gen: No fevers   Respiratory: No dyspnea  CV: No chest pain  GI: No abdominal pain  : No dysuria  MSK: No  edema  Neuro: no dizziness     All other systems were reviewed and are negative, except as noted.    VITALS/PHYSICAL EXAM  --------------------------------------------------------------------------------  T(C): 36.8 (10-23-21 @ 09:35), Max: 36.9 (10-22-21 @ 10:44)  HR: 58 (10-23-21 @ 09:35) (56 - 63)  BP: 150/80 (10-23-21 @ 09:35) (103/53 - 150/80)  RR: 17 (10-23-21 @ 09:35) (17 - 19)  SpO2: 100% (10-23-21 @ 09:35) (99% - 100%)  Wt(kg): --    10-22-21 @ 07:01  -  10-23-21 @ 07:00  --------------------------------------------------------  IN: 350 mL / OUT: 0 mL / NET: 350 mL    10-23-21 @ 07:01  -  10-23-21 @ 10:24  --------------------------------------------------------  IN: 400 mL / OUT: 2400 mL / NET: -2000 mL    Physical Exam:  	Gen: NAD, able to speak in full sentences  	HEENT: MMM  	Pulm: b/l mild crackles noted  	CV: S1S2+  	Abd: Soft, +BS   	Ext: No LE edema B/L  	Neuro: Awake  	Skin: Warm and dry  	Vascular access: LUE AVF functioning with no issues during HD session    LABS/STUDIES  --------------------------------------------------------------------------------              8.0    4.52  >-----------<  242      [10-23-21 @ 07:57]              25.2     134  |  92  |  44  ----------------------------<  92      [10-23-21 @ 07:57]  4.5   |  23  |  7.17        Ca     9.4     [10-23-21 @ 07:57]      Mg     2.10     [10-23-21 @ 07:57]      Phos  4.5     [10-23-21 @ 07:57]    Creatinine Trend:  SCr 7.17 [10-23 @ 07:57]  SCr 7.12 [10-22 @ 09:01]  SCr 3.45 [10-21 @ 13:19]  SCr 8.90 [10-21 @ 07:27]  SCr 4.59 [10-19 @ 11:46]

## 2021-10-23 NOTE — PROGRESS NOTE ADULT - SUBJECTIVE AND OBJECTIVE BOX
pending cardiology follow up for CAD pan   TTE- severe AS  ESRD on HD, nephrology following     .  VITAL SIGNS:  T(C): 36.9 (10-23-21 @ 12:13), Max: 36.9 (10-22-21 @ 21:12)  T(F): 98.5 (10-23-21 @ 12:13), Max: 98.5 (10-23-21 @ 12:13)  HR: 63 (10-23-21 @ 12:13) (58 - 63)  BP: 161/77 (10-23-21 @ 12:13) (130/70 - 161/77)  BP(mean): --  RR: 17 (10-23-21 @ 12:13) (17 - 18)  SpO2: 100% (10-23-21 @ 12:13) (100% - 100%)  Wt(kg): --    PHYSICAL EXAM:    Constitutional: WDWN resting comfortably in bed; NAD  Head: NC/AT  Eyes: PERRL, EOMI, clear conjunctiva  Neck: supple; no JVD or thyromegaly  Respiratory: diminished breath sounds   Cardiac: +S1/S2; RRR ; loud systolic murmur   Gastrointestinal: soft, NT/ND; no rebound or guarding; +BSx4  Genitourinary: normal external genitalia  Back: spine midline, no bony tenderness or step-offs; no CVAT B/L  Extremities: WWP, no clubbing or cyanosis; no peripheral edema  Musculoskeletal: NROM x4; no joint swelling, tenderness or erythema  Neurologic: AAOx3; CNII-XII grossly intact; no focal deficits  Psychiatric: affect and characteristics of appearance, verbalizations, behaviors are appropriate    .  LABS:                         8.0    4.52  )-----------( 242      ( 23 Oct 2021 07:57 )             25.2     10-23    134<L>  |  92<L>  |  44<H>  ----------------------------<  92  4.5   |  23  |  7.17<H>    Ca    9.4      23 Oct 2021 07:57  Phos  4.5     10-23  Mg     2.10     10-23                    RADIOLOGY, EKG & ADDITIONAL TESTS: Reviewed.

## 2021-10-23 NOTE — PROGRESS NOTE ADULT - PROBLEM SELECTOR PLAN 3
Pt. on oral phosphate binders with meals. Serum phosphorus is 4.5 today.    If you have any questions, please feel free to contact me  Ab Barron  Nephrology Fellow  [phone]  (After 5pm or on weekends please page the on-call fellow).

## 2021-10-23 NOTE — PROGRESS NOTE ADULT - PROBLEM SELECTOR PLAN 1
Pt. with ESRD on HD three times a week (TTS) presented to [facility] for CP and SOB. Last HD prior to admission was on Tuesday 10/21 via LUE AVF. Pt. seen and examined during HD today. BP stable during HD rounds. LUE AVF functioning well during HD. Dose meds as per HD.

## 2021-10-23 NOTE — PROGRESS NOTE ADULT - PROBLEM SELECTOR PLAN 1
telemetry- S billy, PVC's   - Troponin 460 > 502  - EKG without obvious ST segment change  - s/p Plavix load and heparinize for NSTEMI, cardiology consult appreciated , s/p  SAVANA  - Started [id number] daily and Lipitor 40 daily  -plan for CAD work-up per cardiology

## 2021-10-24 ENCOUNTER — INPATIENT (INPATIENT)
Facility: HOSPITAL | Age: 61
LOS: 8 days | Discharge: HOME HEALTH | DRG: 219 | End: 2021-11-02
Attending: THORACIC SURGERY (CARDIOTHORACIC VASCULAR SURGERY) | Admitting: THORACIC SURGERY (CARDIOTHORACIC VASCULAR SURGERY)
Payer: MEDICARE

## 2021-10-24 ENCOUNTER — TRANSCRIPTION ENCOUNTER (OUTPATIENT)
Age: 61
End: 2021-10-24

## 2021-10-24 VITALS
OXYGEN SATURATION: 92 % | DIASTOLIC BLOOD PRESSURE: 78 MMHG | HEART RATE: 52 BPM | SYSTOLIC BLOOD PRESSURE: 147 MMHG | TEMPERATURE: 98 F | RESPIRATION RATE: 16 BRPM

## 2021-10-24 VITALS
SYSTOLIC BLOOD PRESSURE: 134 MMHG | OXYGEN SATURATION: 100 % | RESPIRATION RATE: 16 BRPM | DIASTOLIC BLOOD PRESSURE: 88 MMHG | TEMPERATURE: 98 F | HEART RATE: 53 BPM

## 2021-10-24 DIAGNOSIS — I25.10 ATHEROSCLEROTIC HEART DISEASE OF NATIVE CORONARY ARTERY WITHOUT ANGINA PECTORIS: ICD-10-CM

## 2021-10-24 LAB — SARS-COV-2 RNA SPEC QL NAA+PROBE: SIGNIFICANT CHANGE UP

## 2021-10-24 PROCEDURE — 99232 SBSQ HOSP IP/OBS MODERATE 35: CPT

## 2021-10-24 PROCEDURE — 99223 1ST HOSP IP/OBS HIGH 75: CPT

## 2021-10-24 RX ADMIN — HEPARIN SODIUM 5000 UNIT(S): 5000 INJECTION INTRAVENOUS; SUBCUTANEOUS at 21:59

## 2021-10-24 RX ADMIN — Medication 81 MILLIGRAM(S): at 12:55

## 2021-10-24 RX ADMIN — Medication 600 MILLIGRAM(S): at 21:58

## 2021-10-24 RX ADMIN — Medication 1334 MILLIGRAM(S): at 17:37

## 2021-10-24 RX ADMIN — Medication 600 MILLIGRAM(S): at 14:52

## 2021-10-24 RX ADMIN — Medication 1334 MILLIGRAM(S): at 08:19

## 2021-10-24 RX ADMIN — CHLORHEXIDINE GLUCONATE 1 APPLICATION(S): 213 SOLUTION TOPICAL at 11:02

## 2021-10-24 RX ADMIN — HEPARIN SODIUM 5000 UNIT(S): 5000 INJECTION INTRAVENOUS; SUBCUTANEOUS at 06:25

## 2021-10-24 RX ADMIN — Medication 1334 MILLIGRAM(S): at 12:55

## 2021-10-24 NOTE — PROGRESS NOTE ADULT - SUBJECTIVE AND OBJECTIVE BOX
Patient Is pending transfer for CAD work-up  TTE- severe AS     .  VITAL SIGNS:  T(C): 36.6 (10-24-21 @ 12:03), Max: 36.6 (10-24-21 @ 06:25)  T(F): 97.9 (10-24-21 @ 12:03), Max: 97.9 (10-24-21 @ 12:03)  HR: 57 (10-24-21 @ 14:48) (57 - 90)  BP: 124/69 (10-24-21 @ 14:48) (117/66 - 153/85)  BP(mean): --  RR: 18 (10-24-21 @ 12:03) (18 - 18)  SpO2: 100% (10-24-21 @ 12:03) (100% - 100%)  Wt(kg): --    PHYSICAL EXAM:    Constitutional: WDWN resting comfortably in bed; NAD  Head: NC/AT  Eyes: PERRL, EOMI, clear conjunctiva  ENT: no nasal discharge; uvula midline, no oropharyngeal erythema or exudates; MMM  Neck: supple; no JVD or thyromegaly  Respiratory: diminished breath sounds B/L   Cardiac: +S1/S2; RRR; loud systolic murmur   Gastrointestinal: soft, NT/ND; no rebound or guarding; +BSx4  Genitourinary: normal external genitalia  Back: spine midline, no bony tenderness or step-offs; no CVAT B/L  Extremities: WWP, no clubbing or cyanosis; trace peripheral edema  Musculoskeletal: NROM x4; no joint swelling, tenderness or erythema  Vascular: 2+ radial, femoral, DP/PT pulses B/L  Neurologic: AAOx3; CNII-XII grossly intact; no focal deficits  Psychiatric: affect and characteristics of appearance, verbalizations, behaviors are appropriate

## 2021-10-24 NOTE — PROGRESS NOTE ADULT - PROBLEM SELECTOR PLAN 1
telemetry- S billy, PVC's   - Troponin 460 > 502  - EKG without obvious ST segment change  - s/p Plavix load and heparinize for NSTEMI, cardiology consult appreciated , s/p  SAVANA  - Started [id number] daily and Lipitor 40 daily  -plan for CAD work-up per cardiology

## 2021-10-24 NOTE — PROGRESS NOTE ADULT - PROBLEM SELECTOR PLAN 6
- DVT px - Heparin sq q8 H

## 2021-10-24 NOTE — PROGRESS NOTE ADULT - PROBLEM SELECTOR PROBLEM 4
Essential hypertension
Hyperphosphatemia
Essential hypertension
Essential hypertension

## 2021-10-24 NOTE — PROGRESS NOTE ADULT - PROBLEM SELECTOR PLAN 5
- Start statin  - low fat diet  - check lipids

## 2021-10-24 NOTE — PROGRESS NOTE ADULT - PROBLEM SELECTOR PROBLEM 1
NSTEMI (non-ST elevation myocardial infarction)
ESRD (end stage renal disease)
NSTEMI (non-ST elevation myocardial infarction)
ESRD (end stage renal disease)
NSTEMI (non-ST elevation myocardial infarction)

## 2021-10-24 NOTE — PROGRESS NOTE ADULT - REASON FOR ADMISSION
Chest pain and sob

## 2021-10-24 NOTE — PROGRESS NOTE ADULT - PROVIDER SPECIALTY LIST ADULT
Cardiology
Hospitalist
Nephrology
Hospitalist
Nephrology
Hospitalist

## 2021-10-24 NOTE — PROGRESS NOTE ADULT - PROBLEM SELECTOR PLAN 4
- Continue home labetalol  - d, NPO P MN for TTE
Pt. on oral phoshate binders with meals. Check serum phosphorus.    If any questions, please feel free to contact me     Karla Barboza  Nephrology Fellow  [facility] Pager: [phone]  [facility] Pager: [phone]
- Continue home labetalol  - dash diet as tolerated , NPO P MN for TTE tomorrow
- Continue home labetalol  - d, NPO P MN for TTE

## 2021-10-24 NOTE — PROGRESS NOTE ADULT - NUTRITIONAL ASSESSMENT
This patient has been assessed with a concern for Malnutrition and has been determined to have a diagnosis/diagnoses of Moderate protein-calorie malnutrition.    This patient is being managed with:   Diet DASH/TLC-  Sodium & Cholesterol Restricted  For patients receiving Renal Replacement - No Protein Restr No Conc K No Conc Phos Low Sodium (RENAL)  Entered: Oct 22 2021  8:43AM    
This patient has been assessed with a concern for Malnutrition and has been determined to have a diagnosis/diagnoses of Moderate protein-calorie malnutrition.    This patient is being managed with:   Diet NPO after Midnight-     NPO Start Date: 20-Oct-2021   NPO Start Time: 23:59  Entered: Oct 20 2021 11:06AM    Diet DASH/TLC-  Sodium & Cholesterol Restricted  Entered: Oct 20 2021 10:52AM    
This patient has been assessed with a concern for Malnutrition and has been determined to have a diagnosis/diagnoses of Moderate protein-calorie malnutrition.    This patient is being managed with:   Diet DASH/TLC-  Sodium & Cholesterol Restricted  For patients receiving Renal Replacement - No Protein Restr No Conc K No Conc Phos Low Sodium (RENAL)  Entered: Oct 22 2021  8:43AM    
This patient has been assessed with a concern for Malnutrition and has been determined to have a diagnosis/diagnoses of Moderate protein-calorie malnutrition.    This patient is being managed with:   Diet DASH/TLC-  Sodium & Cholesterol Restricted  For patients receiving Renal Replacement - No Protein Restr No Conc K No Conc Phos Low Sodium (RENAL)  Entered: Oct 22 2021  8:43AM

## 2021-10-25 DIAGNOSIS — I25.10 ATHEROSCLEROTIC HEART DISEASE OF NATIVE CORONARY ARTERY WITHOUT ANGINA PECTORIS: ICD-10-CM

## 2021-10-25 DIAGNOSIS — N18.6 END STAGE RENAL DISEASE: ICD-10-CM

## 2021-10-25 DIAGNOSIS — I10 ESSENTIAL (PRIMARY) HYPERTENSION: ICD-10-CM

## 2021-10-25 DIAGNOSIS — I35.0 NONRHEUMATIC AORTIC (VALVE) STENOSIS: ICD-10-CM

## 2021-10-25 LAB
A1C WITH ESTIMATED AVERAGE GLUCOSE RESULT: 4.7 % — SIGNIFICANT CHANGE UP (ref 4–5.6)
ALBUMIN SERPL ELPH-MCNC: 4.3 G/DL — SIGNIFICANT CHANGE UP (ref 3.3–5)
ALP SERPL-CCNC: 128 U/L — HIGH (ref 40–120)
ALT FLD-CCNC: 8 U/L — LOW (ref 10–45)
ANION GAP SERPL CALC-SCNC: 17 MMOL/L — SIGNIFICANT CHANGE UP (ref 5–17)
APTT BLD: 30 SEC — SIGNIFICANT CHANGE UP (ref 27.5–35.5)
AST SERPL-CCNC: 16 U/L — SIGNIFICANT CHANGE UP (ref 10–40)
BASOPHILS # BLD AUTO: 0.03 K/UL — SIGNIFICANT CHANGE UP (ref 0–0.2)
BASOPHILS NFR BLD AUTO: 0.7 % — SIGNIFICANT CHANGE UP (ref 0–2)
BILIRUB SERPL-MCNC: 0.2 MG/DL — SIGNIFICANT CHANGE UP (ref 0.2–1.2)
BLD GP AB SCN SERPL QL: NEGATIVE — SIGNIFICANT CHANGE UP
BUN SERPL-MCNC: 36 MG/DL — HIGH (ref 7–23)
CALCIUM SERPL-MCNC: 9.5 MG/DL — SIGNIFICANT CHANGE UP (ref 8.4–10.5)
CHLORIDE SERPL-SCNC: 93 MMOL/L — LOW (ref 96–108)
CHOLEST SERPL-MCNC: 143 MG/DL — SIGNIFICANT CHANGE UP
CO2 SERPL-SCNC: 24 MMOL/L — SIGNIFICANT CHANGE UP (ref 22–31)
CREAT SERPL-MCNC: 8.14 MG/DL — HIGH (ref 0.5–1.3)
EOSINOPHIL # BLD AUTO: 0.33 K/UL — SIGNIFICANT CHANGE UP (ref 0–0.5)
EOSINOPHIL NFR BLD AUTO: 7.9 % — HIGH (ref 0–6)
ESTIMATED AVERAGE GLUCOSE: 88 MG/DL — SIGNIFICANT CHANGE UP (ref 68–114)
FIBRINOGEN PPP-MCNC: 538 MG/DL — HIGH (ref 290–520)
GLUCOSE SERPL-MCNC: 92 MG/DL — SIGNIFICANT CHANGE UP (ref 70–99)
HCT VFR BLD CALC: 29.3 % — LOW (ref 39–50)
HCV AB S/CO SERPL IA: 13.5 S/CO — HIGH (ref 0–0.99)
HCV AB SERPL-IMP: REACTIVE
HDLC SERPL-MCNC: 34 MG/DL — LOW
HGB BLD-MCNC: 8.6 G/DL — LOW (ref 13–17)
IMM GRANULOCYTES NFR BLD AUTO: 0.2 % — SIGNIFICANT CHANGE UP (ref 0–1.5)
INR BLD: 1.09 RATIO — SIGNIFICANT CHANGE UP (ref 0.88–1.16)
LIPID PNL WITH DIRECT LDL SERPL: 77 MG/DL — SIGNIFICANT CHANGE UP
LYMPHOCYTES # BLD AUTO: 1.3 K/UL — SIGNIFICANT CHANGE UP (ref 1–3.3)
LYMPHOCYTES # BLD AUTO: 31.1 % — SIGNIFICANT CHANGE UP (ref 13–44)
MCHC RBC-ENTMCNC: 22.4 PG — LOW (ref 27–34)
MCHC RBC-ENTMCNC: 29.4 GM/DL — LOW (ref 32–36)
MCV RBC AUTO: 76.3 FL — LOW (ref 80–100)
MONOCYTES # BLD AUTO: 0.6 K/UL — SIGNIFICANT CHANGE UP (ref 0–0.9)
MONOCYTES NFR BLD AUTO: 14.4 % — HIGH (ref 2–14)
MRSA PCR RESULT.: SIGNIFICANT CHANGE UP
NEUTROPHILS # BLD AUTO: 1.91 K/UL — SIGNIFICANT CHANGE UP (ref 1.8–7.4)
NEUTROPHILS NFR BLD AUTO: 45.7 % — SIGNIFICANT CHANGE UP (ref 43–77)
NON HDL CHOLESTEROL: 109 MG/DL — SIGNIFICANT CHANGE UP
NRBC # BLD: 0 /100 WBCS — SIGNIFICANT CHANGE UP (ref 0–0)
NT-PROBNP SERPL-SCNC: HIGH PG/ML (ref 0–300)
PA ADP PRP-ACNC: 184 PRU — LOW (ref 194–417)
PLATELET # BLD AUTO: 266 K/UL — SIGNIFICANT CHANGE UP (ref 150–400)
POTASSIUM SERPL-MCNC: 4.4 MMOL/L — SIGNIFICANT CHANGE UP (ref 3.5–5.3)
POTASSIUM SERPL-SCNC: 4.4 MMOL/L — SIGNIFICANT CHANGE UP (ref 3.5–5.3)
PROT SERPL-MCNC: 7.7 G/DL — SIGNIFICANT CHANGE UP (ref 6–8.3)
PROTHROM AB SERPL-ACNC: 13 SEC — SIGNIFICANT CHANGE UP (ref 10.6–13.6)
RBC # BLD: 3.84 M/UL — LOW (ref 4.2–5.8)
RBC # FLD: 18.9 % — HIGH (ref 10.3–14.5)
RH IG SCN BLD-IMP: POSITIVE — SIGNIFICANT CHANGE UP
S AUREUS DNA NOSE QL NAA+PROBE: SIGNIFICANT CHANGE UP
SODIUM SERPL-SCNC: 134 MMOL/L — LOW (ref 135–145)
T4 FREE SERPL-MCNC: 1.3 NG/DL — SIGNIFICANT CHANGE UP (ref 0.9–1.8)
TRIGL SERPL-MCNC: 160 MG/DL — HIGH
TSH SERPL-MCNC: 4.99 UIU/ML — HIGH (ref 0.27–4.2)
WBC # BLD: 4.18 K/UL — SIGNIFICANT CHANGE UP (ref 3.8–10.5)
WBC # FLD AUTO: 4.18 K/UL — SIGNIFICANT CHANGE UP (ref 3.8–10.5)

## 2021-10-25 PROCEDURE — 71045 X-RAY EXAM CHEST 1 VIEW: CPT | Mod: 26

## 2021-10-25 PROCEDURE — 93880 EXTRACRANIAL BILAT STUDY: CPT | Mod: 26

## 2021-10-25 PROCEDURE — 99223 1ST HOSP IP/OBS HIGH 75: CPT

## 2021-10-25 PROCEDURE — 71250 CT THORAX DX C-: CPT | Mod: 26

## 2021-10-25 PROCEDURE — 76705 ECHO EXAM OF ABDOMEN: CPT | Mod: 26,RT

## 2021-10-25 PROCEDURE — 94010 BREATHING CAPACITY TEST: CPT | Mod: 26

## 2021-10-25 PROCEDURE — 99239 HOSP IP/OBS DSCHRG MGMT >30: CPT

## 2021-10-25 RX ORDER — LABETALOL HCL 100 MG
300 TABLET ORAL THREE TIMES A DAY
Refills: 0 | Status: DISCONTINUED | OUTPATIENT
Start: 2021-10-25 | End: 2021-10-25

## 2021-10-25 RX ORDER — ERYTHROPOIETIN 10000 [IU]/ML
10000 INJECTION, SOLUTION INTRAVENOUS; SUBCUTANEOUS
Refills: 0 | Status: DISCONTINUED | OUTPATIENT
Start: 2021-10-25 | End: 2021-10-28

## 2021-10-25 RX ORDER — CALCIUM ACETATE 667 MG
667 TABLET ORAL
Refills: 0 | Status: DISCONTINUED | OUTPATIENT
Start: 2021-10-25 | End: 2021-10-28

## 2021-10-25 RX ORDER — ATORVASTATIN CALCIUM 80 MG/1
80 TABLET, FILM COATED ORAL AT BEDTIME
Refills: 0 | Status: DISCONTINUED | OUTPATIENT
Start: 2021-10-25 | End: 2021-10-28

## 2021-10-25 RX ORDER — SODIUM CHLORIDE 9 MG/ML
3 INJECTION INTRAMUSCULAR; INTRAVENOUS; SUBCUTANEOUS EVERY 8 HOURS
Refills: 0 | Status: DISCONTINUED | OUTPATIENT
Start: 2021-10-25 | End: 2021-10-28

## 2021-10-25 RX ORDER — HEPARIN SODIUM 5000 [USP'U]/ML
5000 INJECTION INTRAVENOUS; SUBCUTANEOUS EVERY 8 HOURS
Refills: 0 | Status: DISCONTINUED | OUTPATIENT
Start: 2021-10-25 | End: 2021-10-28

## 2021-10-25 RX ORDER — ASPIRIN/CALCIUM CARB/MAGNESIUM 324 MG
81 TABLET ORAL DAILY
Refills: 0 | Status: DISCONTINUED | OUTPATIENT
Start: 2021-10-25 | End: 2021-10-28

## 2021-10-25 RX ORDER — NIFEDIPINE 30 MG
90 TABLET, EXTENDED RELEASE 24 HR ORAL
Refills: 0 | Status: DISCONTINUED | OUTPATIENT
Start: 2021-10-25 | End: 2021-10-28

## 2021-10-25 RX ADMIN — SODIUM CHLORIDE 3 MILLILITER(S): 9 INJECTION INTRAMUSCULAR; INTRAVENOUS; SUBCUTANEOUS at 21:05

## 2021-10-25 RX ADMIN — Medication 667 MILLIGRAM(S): at 15:06

## 2021-10-25 RX ADMIN — Medication 667 MILLIGRAM(S): at 08:52

## 2021-10-25 RX ADMIN — SODIUM CHLORIDE 3 MILLILITER(S): 9 INJECTION INTRAMUSCULAR; INTRAVENOUS; SUBCUTANEOUS at 13:53

## 2021-10-25 RX ADMIN — Medication 90 MILLIGRAM(S): at 21:14

## 2021-10-25 RX ADMIN — Medication 300 MILLIGRAM(S): at 15:05

## 2021-10-25 RX ADMIN — SODIUM CHLORIDE 3 MILLILITER(S): 9 INJECTION INTRAMUSCULAR; INTRAVENOUS; SUBCUTANEOUS at 05:05

## 2021-10-25 RX ADMIN — Medication 667 MILLIGRAM(S): at 23:27

## 2021-10-25 RX ADMIN — ATORVASTATIN CALCIUM 80 MILLIGRAM(S): 80 TABLET, FILM COATED ORAL at 21:15

## 2021-10-25 RX ADMIN — Medication 81 MILLIGRAM(S): at 15:05

## 2021-10-25 RX ADMIN — ERYTHROPOIETIN 10000 UNIT(S): 10000 INJECTION, SOLUTION INTRAVENOUS; SUBCUTANEOUS at 17:48

## 2021-10-25 RX ADMIN — Medication 90 MILLIGRAM(S): at 08:52

## 2021-10-25 NOTE — PROGRESS NOTE ADULT - ASSESSMENT
This is a  62 Y/o Thin cachetic   male PMHx of ESRD on HD (t/th/sat) X 10 yrs  via left forearm AVF, HTN, Hep C s/p treatment, latent TB, CAD c LCx 95% stenosis presented with chest pain and sob admitted with NSTEMI to [facility] on 10 /19/21. + Cardiac Biomarkers-Plavix loaded and heparinized for NSTEMI  . Underwent SAVANA on 10/21 revealed  severe aortic stenosis    Cardiac Cath 10/22-- Severe stenosis ostial Ramus and ostial LCX Last dose Plavix on 10/22  Stabilized transferred to [facility] for CABG/AVR with Dr Joaquin. Denies CP /PALPS/ Cough/fever. Received Pfizer Vaccine x 2  Of note patient endorses diarrhea with vomiting x 3days.   P2Y 12 pending  in  no acute distress CP free amenable to cardiac surgery evaluation and cardiac surgery.    10/25 VSS; SB 50's - AM dose of Labetalol held, Renal consulted- Plan for HD today. Pending Liver US, Hepatitis panel, TTE and CT chest. Plan for CABG/AVR Thursday.

## 2021-10-25 NOTE — H&P ADULT - NSHPPHYSICALEXAM_GEN_ALL_CORE
Constitutional:  Thin frail cachetic  Eyes: EOMI,PERRL  ENMT: WDL  Neck: no bruits ,no thyromegaly  Breasts:not examined  Back: no deformities no kyphosis  Respiratory: Breath  sounds equal & clear throughout  Cardiovascular:  S1 S2 RRR regular rate and rhythm 3/6 murmur - rubs  Gastrointestinal: Soft nontender positive bowels sounds   Genitourinary: wdl   Rectal :wdl   Extremities: + pedal pulse no edema left AVF   Vascular :Equal and normal throughout  Neurological: Alert and oriented no focal deficits  Skin: normal no rashes   Lymph Nodes: non tender   Musculoskeletal: equal  strength throughout Constitutional:  Thin frail cachetic  Eyes: EOMI,PERRL  ENMT: WDL  Neck: no bruits ,no thyromegaly  Breasts:not examined  Back: no deformities no kyphosis  Respiratory: Breath  sounds equal & clear throughout  Cardiovascular:  S1 S2 RRR regular rate and rhythm 3/6 murmur - rubs  Gastrointestinal: Soft nontender positive bowels sounds   Genitourinary: wdl   Rectal :wdl   Extremities: + pedal pulse no edema left AVF   Vascular :Equal and normal throughout  Neurological: Alert and oriented no focal deficits  Skin: normal no rashes  - intact   Lymph Nodes: non tender   Musculoskeletal: equal  strength throughout

## 2021-10-25 NOTE — CONSULT NOTE ADULT - PROBLEM SELECTOR RECOMMENDATION 4
Pt. on oral phoshate binders with meals. Check serum phosphorus.    If any questions, please feel free to contact me     Karla Barboza  Nephrology Fellow  [facility] Pager: [phone]  [facility] Pager: [phone]

## 2021-10-25 NOTE — H&P ADULT - PROBLEM SELECTOR PLAN 1
Admit to Dr Joaquin  Tele  Heparin 5000 sq q8   ASA Labetalol  Atorvastatin   P2y12  Cardiac surgery labs  Carotids   chest CT  OR Likely Tuesday with Dr Joaquin

## 2021-10-25 NOTE — H&P ADULT - HISTORY OF PRESENT ILLNESS
This is a  60 Y/o Thin cachetic   male PMHx of ESRD on HD (t/th/sat) X 10 yrs  via left forearm AVF, HTN, Hep C s/p treatment, latent TB, CAD c LCx 95% stenosis presented with chest pain and sob admitted with NSTEMI to [facility] on 10 /19/21. + Cardiac Biomarkers-Plavix loaded and heparinized for NSTEMI  + cardiac biomarkers. Underwent SAVANA on 10/21 revealed  severe aortic stenosis    Cardiac Cath 10/22-- Severe stenosis ostial Ramus and ostial LCX Last dose Plavix on 10/22  Stabilized transferred to [facility] for CABG/AVR with Dr Joaquin.          This is a  60 Y/o Thin cachetic   male PMHx of ESRD on HD (t/th/sat) X 10 yrs  via left forearm AVF, HTN, Hep C s/p treatment, latent TB, CAD c LCx 95% stenosis presented with chest pain and sob admitted with NSTEMI to [facility] on 10 /19/21. + Cardiac Biomarkers-Plavix loaded and heparinized for NSTEMI  + cardiac biomarkers. Underwent SAVANA on 10/21 revealed  severe aortic stenosis    Cardiac Cath 10/22-- Severe stenosis ostial Ramus and ostial LCX Last dose Plavix on 10/22  Stabilized transferred to [facility] for CABG/AVR with Dr Joaquin.  Received Pfizer Vaccine x 2          This is a  60 Y/o Thin cachetic   male PMHx of ESRD on HD (t/th/sat) X 10 yrs  via left forearm AVF, HTN, Hep C s/p treatment, latent TB, CAD c LCx 95% stenosis presented with chest pain and sob admitted with NSTEMI to [facility] on 10 /19/21. + Cardiac Biomarkers-Plavix loaded and heparinized for NSTEMI . Underwent SAVANA on 10/21 revealed  severe aortic stenosis    Cardiac Cath 10/22-- Severe stenosis ostial Ramus and ostial LCX Last dose Plavix on 10/22  Stabilized transferred to [facility] for CABG/AVR with Dr Joaquin.  Received Pfizer Vaccine x 2

## 2021-10-25 NOTE — PROGRESS NOTE ADULT - SUBJECTIVE AND OBJECTIVE BOX
VITAL SIGNS    Telemetry:  SB 55  Vital Signs Last 24 Hrs  T(C): 36.6 (10-25-21 @ 07:06), Max: 36.6 (10-24-21 @ 12:03)  T(F): 97.9 (10-25-21 @ 07:06), Max: 97.9 (10-24-21 @ 12:03)  HR: 59 (10-25-21 @ 07:06) (52 - 90)  BP: 131/67 (10-25-21 @ 07:06) (117/72 - 147/78)  RR: 18 (10-25-21 @ 07:06) (16 - 18)  SpO2: 100% (10-25-21 @ 07:06) (92% - 100%)            10-24 @ 07:01  -  10-25 @ 07:00  --------------------------------------------------------  IN: 300 mL / OUT: 900 mL / NET: -600 mL       Daily     Daily Weight in k.1 (25 Oct 2021 00:09)  Admit Wt: Drug Dosing Weight  Height (cm): 185.4 (19 Oct 2021 11:35)  Weight (kg): 62.1 (25 Oct 2021 00:09)  BMI (kg/m2): 18.1 (25 Oct 2021 00:09)  BSA (m2): 1.83 (25 Oct 2021 00:09)    Bilirubin Total, Serum: 0.2 mg/dL (10-25 @ 06:23)    CAPILLARY BLOOD GLUCOSE              MEDICATIONS  aspirin enteric coated 81 milliGRAM(s) Oral daily  atorvastatin 80 milliGRAM(s) Oral at bedtime  calcium acetate 667 milliGRAM(s) Oral four times a day with meals  epoetin hillary-epbx (RETACRIT) Injectable 74877 Unit(s) IV Push <User Schedule>  heparin   Injectable 5000 Unit(s) SubCutaneous every 8 hours  labetalol 300 milliGRAM(s) Oral three times a day  NIFEdipine XL 90 milliGRAM(s) Oral two times a day  sodium chloride 0.9% lock flush 3 milliLiter(s) IV Push every 8 hours      >>> <<<  PHYSICAL EXAM  Subjective: " hi"  Neurology: alert and oriented x 3, nonfocal, no gross deficits  CV : RRR S1S2, no murmurs, rubs or gallops   Lungs: CTA B/L, No wheezing, rales, rhonchi. Non labored respirations   Abdomen: soft, NT,ND, (- )BM  :  voiding  Extremities: No LE edema bilaterally, +DP, No calf tenderness     LABS  10-25    134<L>  |  93<L>  |  36<H>  ----------------------------<  92  4.4   |  24  |  8.14<H>    Ca    9.5      25 Oct 2021 06:23    TPro  7.7  /  Alb  4.3  /  TBili  0.2  /  DBili  x   /  AST  16  /  ALT  8<L>  /  AlkPhos  128<H>  10-25                                 8.6    4.18  )-----------( 266      ( 25 Oct 2021 06:23 )             29.3          PT/INR - ( 25 Oct 2021 06:23 )   PT: 13.0 sec;   INR: 1.09 ratio         PTT - ( 25 Oct 2021 06:23 )  PTT:30.0 sec       PAST MEDICAL & SURGICAL HISTORY:  End Stage Renal Disease on Dialysis  since  - Tues, Thurs, Sat    HTN (hypertension)    Chronic renal failure    Hepatitis C    TB (tuberculosis)  Latent    CHF (congestive heart failure)  Mild    AVF (arteriovenous fistula)  placed  - Left side    S/P appendectomy  at age 7

## 2021-10-25 NOTE — H&P ADULT - NSHPLABSRESULTS_GEN_ALL_CORE
< from: Transesophageal Echocardiogram w/o TTE (10.21.21 @ 21:44) >    Limited study to evaluate the aortic valve.  Calcified trileaflet aortic valve with decreased opening.  The right coronary cusp is particularly thickened,  calcified, and immobile. Peak transaortic valve gradient  equals 42 mm Hg, mean transaortic valve gradient equals 21  mm Hg, estimated aortic valve area equals 0.9 sqcm (by  continuity equation and by planimetry), consistent with  severe aortic stenosis. Mild aortic regurgitation.    < end of copied text >    < from: Cardiac Catheterization (10.22.21 @ 17:35) >    CORONARY VESSELS: The coronary circulation is right dominant.  LM:   --  LM: Angiography showed mild atherosclerosis with no flow limiting  lesions.  LAD:   --  LAD: Angiography showed mild atherosclerosis with no flow  limiting lesions.  --  Mid LAD: There was a tubular 40 % stenosis at a site with no prior  intervention. There was BERNADINE grade 3 flow through the vessel (brisk flow).  CX:   --  Ostial circumflex: There was a discrete 99 % stenosis at a site  with no prior intervention. There was a large vascular territory distal to  the lesion and good collateral blood supply to the distal myocardium.  RI:   --  Ostial ramus intermedius: There was a tubular 99 % stenosis at a  site with no prior intervention. There was BERNADINE grade 2 flow through the  vessel (partial perfusion).  RCA:   --  RCA: Angiography showed mild atherosclerosis with no flow  limiting lesions.  COMPLICATIONS: No complications occurred during the cath lab visit.  DIAGNOSTIC IMPRESSIONS: Severe stenosis ostial Ramus and ostial LCX  Patient has severe symptomatic AS  Recommend CTS evaluation for CABG/AVR  INTERVENTIONAL IMPRESSIONS: Severe stenosis ostial Ramus and ostial LCX  Patient has severe symptomatic AS  Recommend CTS evaluation for CABG/AVR    < end of copied text >

## 2021-10-25 NOTE — CONSULT NOTE ADULT - PROBLEM SELECTOR RECOMMENDATION 9
Pt. with ESRD on HD three times a week (TTS) presented to [facility] for CP and SOB. Last HD was on Tuesday 10/19 via LUE AVF. Pt. clinically stable. HD consent obtained from pt, placed in chart. Labs reviewed. Will arrange for maintenance HD tomorrow. Dose meds as per HD

## 2021-10-25 NOTE — CONSULT NOTE ADULT - PROBLEM SELECTOR RECOMMENDATION 3
Patient with anemia in the setting of ESRD. Hemoglobin below target range. Will resume Retacrit 10,000 unit TIW with HD. Monitor hemoglobin

## 2021-10-25 NOTE — H&P ADULT - ASSESSMENT
This is a  62 Y/o Thin cachetic   male PMHx of ESRD on HD (t/th/sat) X 10 yrs  via left forearm AVF, HTN, Hep C s/p treatment, latent TB, CAD c LCx 95% stenosis presented with chest pain and sob admitted with NSTEMI to [facility] on 10 /19/21. + Cardiac Biomarkers-Plavix loaded and heparinized for NSTEMI  + cardiac biomarkers. Underwent SAVANA on 10/21 revealed  severe aortic stenosis    Cardiac Cath 10/22-- Severe stenosis ostial Ramus and ostial LCX Last dose Plavix on 10/22  Stabilized transferred to [facility] for CABG/AVR with Dr Joaquin. Denies CP /PALPS/ Cough/fever.   Of note patient endorses diarrhea with vomiting x 3days.   P2Y 12 pending  in  no acute distress CP free amenable to cardiac surgery evaluation and cardiac surgery.     This is a  60 Y/o Thin cachetic   male PMHx of ESRD on HD (t/th/sat) X 10 yrs  via left forearm AVF, HTN, Hep C s/p treatment, latent TB, CAD c LCx 95% stenosis presented with chest pain and sob admitted with NSTEMI to [facility] on 10 /19/21. + Cardiac Biomarkers-Plavix loaded and heparinized for NSTEMI  + cardiac biomarkers. Underwent SAVANA on 10/21 revealed  severe aortic stenosis    Cardiac Cath 10/22-- Severe stenosis ostial Ramus and ostial LCX Last dose Plavix on 10/22  Stabilized transferred to [facility] for CABG/AVR with Dr Joaquin. Denies CP /PALPS/ Cough/fever. Received Pfizer Vaccine x 2  Of note patient endorses diarrhea with vomiting x 3days.   P2Y 12 pending  in  no acute distress CP free amenable to cardiac surgery evaluation and cardiac surgery.     This is a  62 Y/o Thin cachetic   male PMHx of ESRD on HD (t/th/sat) X 10 yrs  via left forearm AVF, HTN, Hep C s/p treatment, latent TB, CAD c LCx 95% stenosis presented with chest pain and sob admitted with NSTEMI to [facility] on 10 /19/21. + Cardiac Biomarkers-Plavix loaded and heparinized for NSTEMI  . Underwent SAVANA on 10/21 revealed  severe aortic stenosis    Cardiac Cath 10/22-- Severe stenosis ostial Ramus and ostial LCX Last dose Plavix on 10/22  Stabilized transferred to [facility] for CABG/AVR with Dr Joaquin. Denies CP /PALPS/ Cough/fever. Received Pfizer Vaccine x 2  Of note patient endorses diarrhea with vomiting x 3days.   P2Y 12 pending  in  no acute distress CP free amenable to cardiac surgery evaluation and cardiac surgery.

## 2021-10-25 NOTE — H&P ADULT - PROBLEM SELECTOR PLAN 2
Admit to Dr Joaquin  Tele  ASA Labetalol  Atorvastatin   Nefidipine 90 bId  P2y12  Cardiac surgery labs  Carotids   chest CT  OR Likely Tuesday with Dr Joaquin

## 2021-10-25 NOTE — H&P ADULT - NSHPREVIEWOFSYSTEMS_GEN_ALL_CORE
GENERAL SYMPTOMS: SOB GALLAGHER denies  weakness, fevers or chills  ENT: No visual changes;  No vertigo or throat pain   SKIN/BREAST: Negative  NECK: No pain or stiffness  RESPIRATORY/THORAX: No cough, wheezing, hemoptysis;  endorses shortness of breath  CARDIOVASCULAR: No chest pain or palpitations  GASTROINTESTINAL:   endorses diarrhea and  vomiting x3    GENITOURINARY :HD TIW   NEUROLOGICAL: No numbness or weakness  MUSCULOSKELETAL: equal strength throughout   SKIN: No itching, burning, rashes, or lesions   All other review of systems is negative unless indicated above.  HEMATOLOGY/LYMPHATICS: Negative  ENDOCRINE: Negative  Left arm AVF + thrill + Bruit GENERAL SYMPTOMS: SOB GALLAGHER denies  weakness, fevers or chills  ENT: No visual changes;  No vertigo or throat pain   SKIN/BREAST: Negative  NECK: No pain or stiffness  RESPIRATORY/THORAX: No cough, wheezing, hemoptysis;  endorses shortness of breath  CARDIOVASCULAR: No chest pain or palpitations  GASTROINTESTINAL:   endorses diarrhea and  vomiting x3  days  GENITOURINARY :HD TIW minimal urine   NEUROLOGICAL: No numbness or weakness  MUSCULOSKELETAL: equal strength throughout   SKIN: No itching, burning, rashes, or lesions   All other review of systems is negative unless indicated above.  HEMATOLOGY/LYMPHATICS: Negative  ENDOCRINE: Negative  Left arm AVF + thrill + Bruit

## 2021-10-25 NOTE — CONSULT NOTE ADULT - SUBJECTIVE AND OBJECTIVE BOX
[facility] DIVISION OF KIDNEY DISEASES AND HYPERTENSION -- INITIAL CONSULT NOTE  --------------------------------------------------------------------------------  HPI: Pt. is a 62 Y/o Thin cachetic   male PMHx of ESRD on HD (t/th/sat) X 10 yrs  HTN, Hep C s/p treatment, latent TB, CAD c LCx 95% stenosis who originally presented with chest pain and sob, and was found to have NSTEMI. Underwent SAVANA on 10/21 revealed  severe aortic stenosis, s/p Cardiac Cath 10/22 that showed triple vessel disease, now transferred to [facility] for CABG/AVR.  Pt. states that he feels okay and does not offer any complaints. He denies any intradialytic complications as well.         PAST HISTORY  --------------------------------------------------------------------------------  PAST MEDICAL & SURGICAL HISTORY:  End Stage Renal Disease on Dialysis  since 2009 - Tues, Thurs, Sat    HTN (hypertension)    Chronic renal failure    Hepatitis C    TB (tuberculosis)  Latent    CHF (congestive heart failure)  Mild    AVF (arteriovenous fistula)  placed 2009 - Left side    S/P appendectomy  at age 7      FAMILY HISTORY:    PAST SOCIAL HISTORY:    ALLERGIES & MEDICATIONS  --------------------------------------------------------------------------------  Allergies    adhesives (Other)  No Known Drug Allergies    Intolerances      Standing Inpatient Medications  aspirin enteric coated 81 milliGRAM(s) Oral daily  atorvastatin 80 milliGRAM(s) Oral at bedtime  calcium acetate 667 milliGRAM(s) Oral four times a day with meals  epoetin hillary-epbx (RETACRIT) Injectable 00932 Unit(s) IV Push <User Schedule>  heparin   Injectable 5000 Unit(s) SubCutaneous every 8 hours  labetalol 300 milliGRAM(s) Oral three times a day  NIFEdipine XL 90 milliGRAM(s) Oral two times a day  sodium chloride 0.9% lock flush 3 milliLiter(s) IV Push every 8 hours    PRN Inpatient Medications      REVIEW OF SYSTEMS  --------------------------------------------------------------------------------  Constitutional: [ ] Fever [ ] Chills [ ] Fatigue [ ] Weight change   HEENT: [ ] Blurred vision [ ] Eye Pain [ ] Headache [ ] Runny nose [ ] Sore Throat   Respiratory: [ ] Cough [ ] Wheezing [ ] Shortness of breath  Cardiovascular: [ ] Chest Pain [ ] Palpitations [ ] GALLAGHER [ ] PND [ ] Orthopnea  Gastrointestinal: [ ] Abdominal Pain [ ] Diarrhea [ ] Constipation [ ] Hemorrhoids [ ] Nausea [ ] Vomiting  Genitourinary: [ ] Nocturia [ ] Dysuria [ ] Incontinence  Extremities: [ ] Swelling [ ] Joint Pain  Neurologic: [ ] Focal deficit [ ] Paresthesias [ ] Syncope  Lymphatic: [ ] Swelling [ ] Lymphadenopathy   Skin: [ ] Rash [ ] Ecchymoses [ ] Wounds [ ] Lesions  Psychiatry: [ ] Depression [ ] Suicidal/Homicidal Ideation [ ] Anxiety [ ] Sleep Disturbances  [x ] 10 point review of systems is otherwise negative except as mentioned above              [ ]Unable to obtain due to   All other systems were reviewed and are negative, except as noted.      VITALS/PHYSICAL EXAM  --------------------------------------------------------------------------------  T(C): 36.6 (10-25-21 @ 07:06), Max: 36.6 (10-25-21 @ 07:06)  HR: 59 (10-25-21 @ 07:06) (52 - 59)  BP: 131/67 (10-25-21 @ 07:06) (124/69 - 147/78)  RR: 18 (10-25-21 @ 07:06) (16 - 18)  SpO2: 100% (10-25-21 @ 07:06) (92% - 100%)  Wt(kg): --    Weight (kg): 62.1 (10-25-21 @ 00:09)      10-24-21 @ 07:01  -  10-25-21 @ 07:00  --------------------------------------------------------  IN: 300 mL / OUT: 900 mL / NET: -600 mL      Physical Exam:  	Gen: NAD, well-appearing  	HEENT: on room air  	Pulm: CTA B/L  	CV: normal S1S2; no rub  	Abd: soft                      Back : No sacral edema  	: No zaragoza  	LE: no edema  	Skin: Warm, without rashes  	Vascular access: LUE AVF in situ, with good thrill and bruit    LABS/STUDIES  --------------------------------------------------------------------------------              8.6    4.18  >-----------<  266      [10-25-21 @ 06:23]              29.3     134  |  93  |  36  ----------------------------<  92      [10-25-21 @ 06:23]  4.4   |  24  |  8.14        Ca     9.5     [10-25-21 @ 06:23]    TPro  7.7  /  Alb  4.3  /  TBili  0.2  /  DBili  x   /  AST  16  /  ALT  8   /  AlkPhos  128  [10-25-21 @ 06:23]    PT/INR: PT 13.0 , INR 1.09       [10-25-21 @ 06:23]  PTT: 30.0       [10-25-21 @ 06:23]      Creatinine Trend:  SCr 8.14 [10-25 @ 06:23]  SCr 7.17 [10-23 @ 07:57]  SCr 7.12 [10-22 @ 09:01]  SCr 3.45 [10-21 @ 13:19]  SCr 8.90 [10-21 @ 07:27]        TSH 4.99      [10-25-21 @ 08:32]  Lipid: chol 143, , HDL 34, LDL --      [10-25-21 @ 08:32]

## 2021-10-25 NOTE — H&P ADULT - PROBLEM SELECTOR PLAN 3
Tues/Thurs/Saturday   HD Via Left AVF since 2009  Epogen  with HD    House Nephrology Consult in am  HD 10/25  Reanl diet   Nephro chantell   nutrition consult

## 2021-10-25 NOTE — H&P ADULT - NSHPSOCIALHISTORY_GEN_ALL_CORE
single   retired   lives with 4 other gentlemen  denies tobacco   denies alcohol  denies illicit sex

## 2021-10-25 NOTE — H&P ADULT - ATTENDING COMMENTS
Pt seen and examined.  Severe AS, CAD, OM, ramus.  For CABG AVR  STS risk 2-4%.  Risks/benefits d/w pt.  Agree to proceed

## 2021-10-26 LAB
ALBUMIN SERPL ELPH-MCNC: 4.2 G/DL — SIGNIFICANT CHANGE UP (ref 3.3–5)
ALP SERPL-CCNC: 145 U/L — HIGH (ref 40–120)
ALT FLD-CCNC: 11 U/L — SIGNIFICANT CHANGE UP (ref 10–45)
ANION GAP SERPL CALC-SCNC: 15 MMOL/L — SIGNIFICANT CHANGE UP (ref 5–17)
AST SERPL-CCNC: 14 U/L — SIGNIFICANT CHANGE UP (ref 10–40)
BASOPHILS # BLD AUTO: 0.04 K/UL — SIGNIFICANT CHANGE UP (ref 0–0.2)
BASOPHILS NFR BLD AUTO: 0.8 % — SIGNIFICANT CHANGE UP (ref 0–2)
BILIRUB SERPL-MCNC: 0.2 MG/DL — SIGNIFICANT CHANGE UP (ref 0.2–1.2)
BUN SERPL-MCNC: 19 MG/DL — SIGNIFICANT CHANGE UP (ref 7–23)
CALCIUM SERPL-MCNC: 9.5 MG/DL — SIGNIFICANT CHANGE UP (ref 8.4–10.5)
CHLORIDE SERPL-SCNC: 94 MMOL/L — LOW (ref 96–108)
CO2 SERPL-SCNC: 26 MMOL/L — SIGNIFICANT CHANGE UP (ref 22–31)
COVID-19 SPIKE DOMAIN AB INTERP: POSITIVE
COVID-19 SPIKE DOMAIN ANTIBODY RESULT: >250 U/ML — HIGH
CREAT SERPL-MCNC: 5.26 MG/DL — HIGH (ref 0.5–1.3)
EOSINOPHIL # BLD AUTO: 0.22 K/UL — SIGNIFICANT CHANGE UP (ref 0–0.5)
EOSINOPHIL NFR BLD AUTO: 4.4 % — SIGNIFICANT CHANGE UP (ref 0–6)
GLUCOSE SERPL-MCNC: 87 MG/DL — SIGNIFICANT CHANGE UP (ref 70–99)
HAV IGM SER-ACNC: SIGNIFICANT CHANGE UP
HBV CORE IGM SER-ACNC: SIGNIFICANT CHANGE UP
HBV SURFACE AG SER-ACNC: SIGNIFICANT CHANGE UP
HCT VFR BLD CALC: 30.4 % — LOW (ref 39–50)
HCV AB S/CO SERPL IA: 13.47 S/CO — HIGH (ref 0–0.99)
HCV AB SERPL-IMP: REACTIVE
HGB BLD-MCNC: 9 G/DL — LOW (ref 13–17)
IMM GRANULOCYTES NFR BLD AUTO: 0.2 % — SIGNIFICANT CHANGE UP (ref 0–1.5)
LIDOCAIN SERPL-MCNC: 255.6 NMOL/L — HIGH
LYMPHOCYTES # BLD AUTO: 1.2 K/UL — SIGNIFICANT CHANGE UP (ref 1–3.3)
LYMPHOCYTES # BLD AUTO: 24.2 % — SIGNIFICANT CHANGE UP (ref 13–44)
MCHC RBC-ENTMCNC: 22.8 PG — LOW (ref 27–34)
MCHC RBC-ENTMCNC: 29.6 GM/DL — LOW (ref 32–36)
MCV RBC AUTO: 77 FL — LOW (ref 80–100)
MONOCYTES # BLD AUTO: 0.77 K/UL — SIGNIFICANT CHANGE UP (ref 0–0.9)
MONOCYTES NFR BLD AUTO: 15.6 % — HIGH (ref 2–14)
NEUTROPHILS # BLD AUTO: 2.71 K/UL — SIGNIFICANT CHANGE UP (ref 1.8–7.4)
NEUTROPHILS NFR BLD AUTO: 54.8 % — SIGNIFICANT CHANGE UP (ref 43–77)
NRBC # BLD: 0 /100 WBCS — SIGNIFICANT CHANGE UP (ref 0–0)
PLATELET # BLD AUTO: 279 K/UL — SIGNIFICANT CHANGE UP (ref 150–400)
POTASSIUM SERPL-MCNC: 3.8 MMOL/L — SIGNIFICANT CHANGE UP (ref 3.5–5.3)
POTASSIUM SERPL-SCNC: 3.8 MMOL/L — SIGNIFICANT CHANGE UP (ref 3.5–5.3)
PROT SERPL-MCNC: 7.3 G/DL — SIGNIFICANT CHANGE UP (ref 6–8.3)
RBC # BLD: 3.95 M/UL — LOW (ref 4.2–5.8)
RBC # FLD: 19.1 % — HIGH (ref 10.3–14.5)
SARS-COV-2 IGG+IGM SERPL QL IA: >250 U/ML — HIGH
SARS-COV-2 IGG+IGM SERPL QL IA: POSITIVE
SODIUM SERPL-SCNC: 135 MMOL/L — SIGNIFICANT CHANGE UP (ref 135–145)
WBC # BLD: 4.95 K/UL — SIGNIFICANT CHANGE UP (ref 3.8–10.5)
WBC # FLD AUTO: 4.95 K/UL — SIGNIFICANT CHANGE UP (ref 3.8–10.5)

## 2021-10-26 PROCEDURE — 93306 TTE W/DOPPLER COMPLETE: CPT | Mod: 26

## 2021-10-26 PROCEDURE — 99231 SBSQ HOSP IP/OBS SF/LOW 25: CPT

## 2021-10-26 RX ORDER — LABETALOL HCL 100 MG
100 TABLET ORAL EVERY 12 HOURS
Refills: 0 | Status: DISCONTINUED | OUTPATIENT
Start: 2021-10-26 | End: 2021-10-28

## 2021-10-26 RX ORDER — LABETALOL HCL 100 MG
200 TABLET ORAL EVERY 8 HOURS
Refills: 0 | Status: DISCONTINUED | OUTPATIENT
Start: 2021-10-26 | End: 2021-10-26

## 2021-10-26 RX ADMIN — Medication 100 MILLIGRAM(S): at 17:56

## 2021-10-26 RX ADMIN — Medication 667 MILLIGRAM(S): at 09:38

## 2021-10-26 RX ADMIN — HEPARIN SODIUM 5000 UNIT(S): 5000 INJECTION INTRAVENOUS; SUBCUTANEOUS at 14:24

## 2021-10-26 RX ADMIN — Medication 81 MILLIGRAM(S): at 12:43

## 2021-10-26 RX ADMIN — Medication 667 MILLIGRAM(S): at 17:17

## 2021-10-26 RX ADMIN — Medication 90 MILLIGRAM(S): at 22:03

## 2021-10-26 RX ADMIN — Medication 667 MILLIGRAM(S): at 21:56

## 2021-10-26 RX ADMIN — SODIUM CHLORIDE 3 MILLILITER(S): 9 INJECTION INTRAMUSCULAR; INTRAVENOUS; SUBCUTANEOUS at 22:00

## 2021-10-26 RX ADMIN — SODIUM CHLORIDE 3 MILLILITER(S): 9 INJECTION INTRAMUSCULAR; INTRAVENOUS; SUBCUTANEOUS at 05:17

## 2021-10-26 RX ADMIN — HEPARIN SODIUM 5000 UNIT(S): 5000 INJECTION INTRAVENOUS; SUBCUTANEOUS at 21:56

## 2021-10-26 RX ADMIN — Medication 90 MILLIGRAM(S): at 09:38

## 2021-10-26 RX ADMIN — SODIUM CHLORIDE 3 MILLILITER(S): 9 INJECTION INTRAMUSCULAR; INTRAVENOUS; SUBCUTANEOUS at 13:32

## 2021-10-26 RX ADMIN — Medication 667 MILLIGRAM(S): at 12:43

## 2021-10-26 RX ADMIN — ATORVASTATIN CALCIUM 80 MILLIGRAM(S): 80 TABLET, FILM COATED ORAL at 21:56

## 2021-10-26 NOTE — PROGRESS NOTE ADULT - ASSESSMENT
This is a  62 Y/o Thin cachetic   male PMHx of ESRD on HD (t/th/sat) X 10 yrs  via left forearm AVF, HTN, Hep C s/p treatment, latent TB, CAD c LCx 95% stenosis presented with chest pain and sob admitted with NSTEMI to [facility] on 10 /19/21. + Cardiac Biomarkers-Plavix loaded and heparinized for NSTEMI  . Underwent SAVANA on 10/21 revealed  severe aortic stenosis    Cardiac Cath 10/22-- Severe stenosis ostial Ramus and ostial LCX Last dose Plavix on 10/22  Stabilized transferred to [facility] for CABG/AVR with Dr Joaquin. Denies CP /PALPS/ Cough/fever. Received Pfizer Vaccine x 2  Of note patient endorses diarrhea with vomiting x 3days.   P2Y 12 pending  in  no acute distress CP free amenable to cardiac surgery evaluation and cardiac surgery.    10/25 VSS; SB 50's - AM dose of Labetalol held, Renal consulted- Plan for HD today. Pending Liver US, Hepatitis panel, TTE and CT chest. Plan for CABG/AVR Thursday.   10/26 Labetolol held, decreased d/t SB.  Echo today  OR thursday

## 2021-10-26 NOTE — PROGRESS NOTE ADULT - SUBJECTIVE AND OBJECTIVE BOX
VITAL SIGNS    Telemetry:  nsr  50-70    Vital Signs Last 24 Hrs  T(C): 36.6 (10-26-21 @ 08:28), Max: 36.7 (10-26-21 @ 03:06)  T(F): 97.9 (10-26-21 @ 08:28), Max: 98 (10-26-21 @ 03:06)  HR: 60 (10-26-21 @ 08:28) (53 - 66)  BP: 121/61 (10-26-21 @ 08:28) (115/68 - 155/70)  RR: 18 (10-26-21 @ 08:28) (18 - 18)  SpO2: 100% (10-26-21 @ 08:28) (100% - 100%)                   10-25 @ 07:01  -  10-26 @ 07:00  --------------------------------------------------------  IN: 0 mL / OUT: 2000 mL / NET: -2000 mL    10-26 @ 07:01  -  10-26 @ 10:49  --------------------------------------------------------  IN: 360 mL / OUT: 0 mL / NET: 360 mL          Daily     Daily Weight in k.1 (26 Oct 2021 10:28)            CAPILLARY BLOOD GLUCOSE                        Coumadin    [ ] YES          [x ]      NO                                   PHYSICAL EXAM        Neurology: alert and oriented x 3, nonfocal, no gross deficits  CV : s1 s2 RRR    Lungs: cta  Abdomen: soft, nontender, nondistended, positive bowel sounds                :    voiding      Extremities:     - edema   /  -   calve tenderness ,            aspirin enteric coated 81 milliGRAM(s) Oral daily  atorvastatin 80 milliGRAM(s) Oral at bedtime  calcium acetate 667 milliGRAM(s) Oral four times a day with meals  epoetin hillary-epbx (RETACRIT) Injectable 70894 Unit(s) IV Push <User Schedule>  heparin   Injectable 5000 Unit(s) SubCutaneous every 8 hours  labetalol 200 milliGRAM(s) Oral every 8 hours  NIFEdipine XL 90 milliGRAM(s) Oral two times a day  sodium chloride 0.9% lock flush 3 milliLiter(s) IV Push every 8 hours                    Physical Therapy Rec:   Home  [  ]   Home w/ PT  [  ]  Rehab  [  ]  Discussed with Cardiothoracic Team at AM rounds.

## 2021-10-27 ENCOUNTER — TRANSCRIPTION ENCOUNTER (OUTPATIENT)
Age: 61
End: 2021-10-27

## 2021-10-27 ENCOUNTER — RX RENEWAL (OUTPATIENT)
Age: 61
End: 2021-10-27

## 2021-10-27 LAB
ALBUMIN SERPL ELPH-MCNC: 4.3 G/DL — SIGNIFICANT CHANGE UP (ref 3.3–5)
ALP SERPL-CCNC: 148 U/L — HIGH (ref 40–120)
ALT FLD-CCNC: 6 U/L — LOW (ref 10–45)
ANION GAP SERPL CALC-SCNC: 16 MMOL/L — SIGNIFICANT CHANGE UP (ref 5–17)
AST SERPL-CCNC: 16 U/L — SIGNIFICANT CHANGE UP (ref 10–40)
BASOPHILS # BLD AUTO: 0.05 K/UL — SIGNIFICANT CHANGE UP (ref 0–0.2)
BASOPHILS NFR BLD AUTO: 1.1 % — SIGNIFICANT CHANGE UP (ref 0–2)
BILIRUB SERPL-MCNC: 0.3 MG/DL — SIGNIFICANT CHANGE UP (ref 0.2–1.2)
BUN SERPL-MCNC: 36 MG/DL — HIGH (ref 7–23)
CALCIUM SERPL-MCNC: 9.9 MG/DL — SIGNIFICANT CHANGE UP (ref 8.4–10.5)
CHLORIDE SERPL-SCNC: 93 MMOL/L — LOW (ref 96–108)
CO2 SERPL-SCNC: 26 MMOL/L — SIGNIFICANT CHANGE UP (ref 22–31)
CREAT SERPL-MCNC: 7.54 MG/DL — HIGH (ref 0.5–1.3)
EOSINOPHIL # BLD AUTO: 0.26 K/UL — SIGNIFICANT CHANGE UP (ref 0–0.5)
EOSINOPHIL NFR BLD AUTO: 5.5 % — SIGNIFICANT CHANGE UP (ref 0–6)
GLUCOSE SERPL-MCNC: 97 MG/DL — SIGNIFICANT CHANGE UP (ref 70–99)
HCT VFR BLD CALC: 29.8 % — LOW (ref 39–50)
HGB BLD-MCNC: 8.9 G/DL — LOW (ref 13–17)
IMM GRANULOCYTES NFR BLD AUTO: 0.4 % — SIGNIFICANT CHANGE UP (ref 0–1.5)
LYMPHOCYTES # BLD AUTO: 1.53 K/UL — SIGNIFICANT CHANGE UP (ref 1–3.3)
LYMPHOCYTES # BLD AUTO: 32.3 % — SIGNIFICANT CHANGE UP (ref 13–44)
MCHC RBC-ENTMCNC: 23.2 PG — LOW (ref 27–34)
MCHC RBC-ENTMCNC: 29.9 GM/DL — LOW (ref 32–36)
MCV RBC AUTO: 77.6 FL — LOW (ref 80–100)
MONOCYTES # BLD AUTO: 0.67 K/UL — SIGNIFICANT CHANGE UP (ref 0–0.9)
MONOCYTES NFR BLD AUTO: 14.2 % — HIGH (ref 2–14)
NEUTROPHILS # BLD AUTO: 2.2 K/UL — SIGNIFICANT CHANGE UP (ref 1.8–7.4)
NEUTROPHILS NFR BLD AUTO: 46.5 % — SIGNIFICANT CHANGE UP (ref 43–77)
NRBC # BLD: 0 /100 WBCS — SIGNIFICANT CHANGE UP (ref 0–0)
PLATELET # BLD AUTO: 264 K/UL — SIGNIFICANT CHANGE UP (ref 150–400)
POTASSIUM SERPL-MCNC: 4.3 MMOL/L — SIGNIFICANT CHANGE UP (ref 3.5–5.3)
POTASSIUM SERPL-SCNC: 4.3 MMOL/L — SIGNIFICANT CHANGE UP (ref 3.5–5.3)
PROT SERPL-MCNC: 7.5 G/DL — SIGNIFICANT CHANGE UP (ref 6–8.3)
RBC # BLD: 3.84 M/UL — LOW (ref 4.2–5.8)
RBC # FLD: 18.9 % — HIGH (ref 10.3–14.5)
SODIUM SERPL-SCNC: 135 MMOL/L — SIGNIFICANT CHANGE UP (ref 135–145)
WBC # BLD: 4.73 K/UL — SIGNIFICANT CHANGE UP (ref 3.8–10.5)
WBC # FLD AUTO: 4.73 K/UL — SIGNIFICANT CHANGE UP (ref 3.8–10.5)

## 2021-10-27 PROCEDURE — 93010 ELECTROCARDIOGRAM REPORT: CPT

## 2021-10-27 PROCEDURE — 93010 ELECTROCARDIOGRAM REPORT: CPT | Mod: 77

## 2021-10-27 PROCEDURE — 99233 SBSQ HOSP IP/OBS HIGH 50: CPT | Mod: GC

## 2021-10-27 RX ORDER — CEFUROXIME AXETIL 250 MG
1500 TABLET ORAL ONCE
Refills: 0 | Status: DISCONTINUED | OUTPATIENT
Start: 2021-10-27 | End: 2021-10-28

## 2021-10-27 RX ORDER — CHLORHEXIDINE GLUCONATE 213 G/1000ML
1 SOLUTION TOPICAL ONCE
Refills: 0 | Status: COMPLETED | OUTPATIENT
Start: 2021-10-27 | End: 2021-10-27

## 2021-10-27 RX ORDER — LIDOCAINE HCL 20 MG/ML
2 VIAL (ML) INJECTION ONCE
Refills: 0 | Status: DISCONTINUED | OUTPATIENT
Start: 2021-10-27 | End: 2021-10-28

## 2021-10-27 RX ORDER — ACETAMINOPHEN 500 MG
1000 TABLET ORAL ONCE
Refills: 0 | Status: COMPLETED | OUTPATIENT
Start: 2021-10-27 | End: 2021-10-28

## 2021-10-27 RX ORDER — GABAPENTIN 400 MG/1
100 CAPSULE ORAL ONCE
Refills: 0 | Status: COMPLETED | OUTPATIENT
Start: 2021-10-27 | End: 2021-10-28

## 2021-10-27 RX ORDER — ASCORBIC ACID 60 MG
2000 TABLET,CHEWABLE ORAL ONCE
Refills: 0 | Status: COMPLETED | OUTPATIENT
Start: 2021-10-27 | End: 2021-10-27

## 2021-10-27 RX ORDER — CHLORHEXIDINE GLUCONATE 213 G/1000ML
30 SOLUTION TOPICAL ONCE
Refills: 0 | Status: COMPLETED | OUTPATIENT
Start: 2021-10-27 | End: 2021-10-28

## 2021-10-27 RX ADMIN — CHLORHEXIDINE GLUCONATE 1 APPLICATION(S): 213 SOLUTION TOPICAL at 21:57

## 2021-10-27 RX ADMIN — Medication 667 MILLIGRAM(S): at 08:41

## 2021-10-27 RX ADMIN — Medication 667 MILLIGRAM(S): at 17:56

## 2021-10-27 RX ADMIN — ERYTHROPOIETIN 10000 UNIT(S): 10000 INJECTION, SOLUTION INTRAVENOUS; SUBCUTANEOUS at 11:00

## 2021-10-27 RX ADMIN — Medication 81 MILLIGRAM(S): at 14:05

## 2021-10-27 RX ADMIN — Medication 100 MILLIGRAM(S): at 14:04

## 2021-10-27 RX ADMIN — SODIUM CHLORIDE 3 MILLILITER(S): 9 INJECTION INTRAMUSCULAR; INTRAVENOUS; SUBCUTANEOUS at 14:09

## 2021-10-27 RX ADMIN — HEPARIN SODIUM 5000 UNIT(S): 5000 INJECTION INTRAVENOUS; SUBCUTANEOUS at 05:41

## 2021-10-27 RX ADMIN — HEPARIN SODIUM 5000 UNIT(S): 5000 INJECTION INTRAVENOUS; SUBCUTANEOUS at 14:07

## 2021-10-27 RX ADMIN — Medication 667 MILLIGRAM(S): at 14:04

## 2021-10-27 RX ADMIN — SODIUM CHLORIDE 3 MILLILITER(S): 9 INJECTION INTRAMUSCULAR; INTRAVENOUS; SUBCUTANEOUS at 22:49

## 2021-10-27 RX ADMIN — Medication 90 MILLIGRAM(S): at 23:11

## 2021-10-27 RX ADMIN — Medication 2000 MILLIGRAM(S): at 23:04

## 2021-10-27 RX ADMIN — Medication 667 MILLIGRAM(S): at 22:31

## 2021-10-27 RX ADMIN — SODIUM CHLORIDE 3 MILLILITER(S): 9 INJECTION INTRAMUSCULAR; INTRAVENOUS; SUBCUTANEOUS at 05:29

## 2021-10-27 RX ADMIN — Medication 90 MILLIGRAM(S): at 14:09

## 2021-10-27 RX ADMIN — Medication 100 MILLIGRAM(S): at 22:31

## 2021-10-27 RX ADMIN — HEPARIN SODIUM 5000 UNIT(S): 5000 INJECTION INTRAVENOUS; SUBCUTANEOUS at 22:32

## 2021-10-27 RX ADMIN — ATORVASTATIN CALCIUM 80 MILLIGRAM(S): 80 TABLET, FILM COATED ORAL at 22:31

## 2021-10-27 NOTE — PROGRESS NOTE ADULT - SUBJECTIVE AND OBJECTIVE BOX
Cardiac Surgery Pre-op Note:    CC: Patient is a 61y old  Male who presents with a chief complaint of CAD (27 Oct 2021 08:11)                                                             Surgeon:  Emani    Procedure: (Date) (Procedure)  10/28/21  CABG    Allergies    adhesives (Other)  No Known Drug Allergies    Intolerances        HPI:   This is a  60 Y/o Thin cachetic   male PMHx of ESRD on HD (t/th/sat) X 10 yrs  via left forearm AVF, HTN, Hep C s/p treatment, latent TB, CAD c LCx 95% stenosis presented with chest pain and sob admitted with NSTEMI to [facility] on 10 /19/21. + Cardiac Biomarkers-Plavix loaded and heparinized for NSTEMI . Underwent SAVANA on 10/21 revealed  severe aortic stenosis    Cardiac Cath 10/22-- Severe stenosis ostial Ramus and ostial LCX Last dose Plavix on 10/22  Stabilized transferred to [facility] for CABG/AVR with Dr Joaquin.  Received Pfizer Vaccine x 2         (25 Oct 2021 00:10)      PAST MEDICAL & SURGICAL HISTORY:  End Stage Renal Disease on Dialysis  since 2009 - Tues, Thurs, Sat    HTN (hypertension)    Chronic renal failure    Hepatitis C    TB (tuberculosis)  Latent    CHF (congestive heart failure)  Mild    AVF (arteriovenous fistula)  placed 2009 - Left side    S/P appendectomy  at age 7        MEDICATIONS  (STANDING):  acetaminophen     Tablet .. 1000 milliGRAM(s) Oral once  aspirin enteric coated 81 milliGRAM(s) Oral daily  atorvastatin 80 milliGRAM(s) Oral at bedtime  calcium acetate 667 milliGRAM(s) Oral four times a day with meals  chlorhexidine 0.12% Liquid 30 milliLiter(s) Swish and Spit once  chlorhexidine 4% Liquid 1 Application(s) Topical once  epoetin ihllary-epbx (RETACRIT) Injectable 14390 Unit(s) IV Push <User Schedule>  gabapentin 100 milliGRAM(s) Oral once  heparin   Injectable 5000 Unit(s) SubCutaneous every 8 hours  labetalol 100 milliGRAM(s) Oral every 12 hours  NIFEdipine XL 90 milliGRAM(s) Oral two times a day  sodium chloride 0.9% lock flush 3 milliLiter(s) IV Push every 8 hours    MEDICATIONS  (PRN):  cefuroxime  IVPB 1500 milliGRAM(s) IV Intermittent once PRN prophylaxis        Labs:                        8.9    4.73  )-----------( 264      ( 27 Oct 2021 05:51 )             29.8     10-27    135  |  93<L>  |  36<H>  ----------------------------<  97  4.3   |  26  |  7.54<H>    Ca    9.9      27 Oct 2021 05:51    TPro  7.5  /  Alb  4.3  /  TBili  0.3  /  DBili  x   /  AST  16  /  ALT  6<L>  /  AlkPhos  148<H>  10-27        Blood Type: ABO Interpretation: O (10-27 @ 06:02)    HGB A1C:  4.7  Pro-BNP: Serum Pro-Brain Natriuretic Peptide: 707802 pg/mL (10-25 @ 06:23)    Thyroid Panel: 10-25 @ 08:32/4.99  1.3/--/--    MRSA: MRSA PCR Result.: NotDetec (10-25 @ 07:18)   / MSSA:       CXR:  The heart is enlarged. The lungs appear to be clear. No pleural effusion. No pneumothorax. No acute bony pathology could be identified. A calcified granuloma is seen in the left midlung field.      EKG: Normal sinus rhythm  Left ventricular hypertrophy with repolarization abnormality  Abnormal ECG    Carotid Duplex:  No significant hemodynamic stenosis of either carotid artery.    PFT's:  completed    Echocardiogram:  < from: Transthoracic Echocardiogram (10.26.21 @ 08:25) >  Estimated ejection raction 45%. Diffuse hypokinesis, with  no significant regional variation.  Severe aortic stenosis.  Mild-moderate pulmoanry hypertension.      Cardiac catheterization:   Severe stenosis ostial Ramus and ostial LCX  Patient has severe symptomatic AS  Recommend CTS evaluation for CABG/AVR  INTERVENTIONAL IMPRESSIONS: Severe stenosis ostial Ramus and ostial LCX  Patient has severe symptomatic AS  Recommend CTS evaluation for CABG/AVR    Gen: WN/WD NAD  Neuro: AAOx3, nonfocal  Pulm: CTA B/L  CV: RRR, S1S2  Abd: Soft, NT, ND +BS        Pt has AICD/PPM [ ] Yes  [x] No             Brand Name:  Pre-op Beta Blocker ordered within 24 hrs of surgery (CABG ONLY)?  [x ] Yes  [ ] No  If not, Why?  Type & Cross  [x] Yes  [ ] No  NPO after Midnight [x] Yes  [ ] No  Pre-op ABX ordered, to be taped on chart:  [ ] Yes  [ ] No     Hibiclens/Peridex ordered [x] Yes  [ ] No  Intraop   SAVANA [ ]

## 2021-10-27 NOTE — PROGRESS NOTE ADULT - SUBJECTIVE AND OBJECTIVE BOX
[facility] DIVISION OF KIDNEY DISEASES AND HYPERTENSION -- FOLLOW UP NOTE  --------------------------------------------------------------------------------  Chief Complaint: ESRD    24 hour events/subjective:    seen and examined this morning   reports feeling well without any acute complaints   no SOB or chest pain   no acute events noted overnight    PAST HISTORY  --------------------------------------------------------------------------------  No significant changes to PMH, PSH, FHx, SHx, unless otherwise noted    ALLERGIES & MEDICATIONS  --------------------------------------------------------------------------------  Allergies    adhesives (Other)  No Known Drug Allergies    Intolerances      Standing Inpatient Medications  aspirin enteric coated 81 milliGRAM(s) Oral daily  atorvastatin 80 milliGRAM(s) Oral at bedtime  calcium acetate 667 milliGRAM(s) Oral four times a day with meals  epoetin hillary-epbx (RETACRIT) Injectable 97426 Unit(s) IV Push <User Schedule>  heparin   Injectable 5000 Unit(s) SubCutaneous every 8 hours  labetalol 100 milliGRAM(s) Oral every 12 hours  NIFEdipine XL 90 milliGRAM(s) Oral two times a day  sodium chloride 0.9% lock flush 3 milliLiter(s) IV Push every 8 hours    PRN Inpatient Medications      REVIEW OF SYSTEMS      All other systems were reviewed and are negative, except as noted.    VITALS/PHYSICAL EXAM  --------------------------------------------------------------------------------  T(C): 36.8 (10-27-21 @ 05:30), Max: 36.8 (10-27-21 @ 05:30)  HR: 64 (10-27-21 @ 05:30) (57 - 70)  BP: 141/72 (10-27-21 @ 05:30) (121/61 - 142/70)  RR: 18 (10-27-21 @ 05:30) (18 - 18)  SpO2: 97% (10-27-21 @ 05:30) (97% - 100%)  Wt(kg): --        10-26-21 @ 07:01  -  10-27-21 @ 07:00  --------------------------------------------------------  IN: 840 mL / OUT: 0 mL / NET: 840 mL        Physical Exam:  	Gen: NAD  	HEENT: MMM  	Pulm: CTA B/L  	CV: S1S2  	Abd: Soft, +BS   	Ext: No LE edema B/L  	Neuro: Awake  	Skin: Warm and dry  	Vascular access: KALA MILLS      LABS/STUDIES  --------------------------------------------------------------------------------              8.9    4.73  >-----------<  264      [10-27-21 @ 05:51]              29.8     135  |  93  |  36  ----------------------------<  97      [10-27-21 @ 05:51]  4.3   |  26  |  7.54        Ca     9.9     [10-27-21 @ 05:51]    TPro  7.5  /  Alb  4.3  /  TBili  0.3  /  DBili  x   /  AST  16  /  ALT  6   /  AlkPhos  148  [10-27-21 @ 05:51]          Creatinine Trend:  SCr 7.54 [10-27 @ 05:51]  SCr 5.26 [10-26 @ 05:13]  SCr 8.14 [10-25 @ 06:23]  SCr 7.17 [10-23 @ 07:57]  SCr 7.12 [10-22 @ 09:01]        TSH 4.99      [10-25-21 @ 08:32]  Lipid: chol 143, , HDL 34, LDL --      [10-25-21 @ 08:32]    HBsAg Nonreact      [10-26-21 @ 07:48]  HCV 13.47, Reactive      [10-26-21 @ 07:48]

## 2021-10-27 NOTE — PROGRESS NOTE ADULT - ASSESSMENT
Pt. with ESRD on HD TIW admitted for chest pain.      ESRD (end stage renal disease).   Pt. with ESRD on HD three times a week (TTS).   patient follows with [facility] clinic for outpatient HD   last HD on 10/25 for OR on Tuesday however surgery postponed to Thursday  Pt. clinically stable.   Plan for HD today to optimize prior to OR tomorrow.   Dose meds as per HD.      Anemia secondary to renal failure.  Patient with anemia in the setting of ESRD.   Hemoglobin below target range.   Continue Retacrit 10,000 unit TIW with HD.   Monitor CBC      Hyperphosphatemia.  Pt. on oral phosphate binders with meals.   monitor serum phosphorus.   Pt. with ESRD on HD TIW admitted for chest pain.      ESRD (end stage renal disease).   Pt. with ESRD on HD three times a week (TTS).   patient follows with [facility] clinic for outpatient HD   last HD on 10/25 for OR on Tuesday however surgery postponed to Thursday  Pt. clinically stable.   Plan for HD today to optimize prior to OR tomorrow.   Dose meds as per HD.    Anemia secondary to renal failure.  Patient with anemia in the setting of ESRD.   Hemoglobin below target range.   Continue Retacrit 10,000 unit TIW with HD.   Monitor CBC    Hyperphosphatemia.  Pt. on oral phosphate binders with meals.   monitor serum phosphorus.    If any questions, please feel free to contact me     Jai Stern  Nephrology Fellow  [facility] Pager: [phone]

## 2021-10-27 NOTE — PROGRESS NOTE ADULT - ASSESSMENT
This is a  60 Y/o Thin cachetic   male PMHx of ESRD on HD (t/th/sat) X 10 yrs  via left forearm AVF, HTN, Hep C s/p treatment, latent TB, CAD c LCx 95% stenosis presented with chest pain and sob admitted with NSTEMI to [facility] on 10 /19/21. + Cardiac Biomarkers-Plavix loaded and heparinized for NSTEMI . Underwent SAVANA on 10/21 revealed  severe aortic stenosis    Cardiac Cath 10/22-- Severe stenosis ostial Ramus and ostial LCX Last dose Plavix on 10/22  Stabilized transferred to [facility] for CABG/AVR with Dr Joaquin.  10/27  HD stable.  Dialysis today 1 U PRBC today.  Npo after MN

## 2021-10-28 ENCOUNTER — APPOINTMENT (OUTPATIENT)
Dept: CARDIOTHORACIC SURGERY | Facility: HOSPITAL | Age: 61
End: 2021-10-28

## 2021-10-28 ENCOUNTER — RESULT REVIEW (OUTPATIENT)
Age: 61
End: 2021-10-28

## 2021-10-28 LAB
ALBUMIN SERPL ELPH-MCNC: 3.1 G/DL — LOW (ref 3.3–5)
ALP SERPL-CCNC: 73 U/L — SIGNIFICANT CHANGE UP (ref 40–120)
ALT FLD-CCNC: 10 U/L — SIGNIFICANT CHANGE UP (ref 10–45)
ANION GAP SERPL CALC-SCNC: 14 MMOL/L — SIGNIFICANT CHANGE UP (ref 5–17)
APTT BLD: 32.4 SEC — SIGNIFICANT CHANGE UP (ref 27.5–35.5)
AST SERPL-CCNC: 20 U/L — SIGNIFICANT CHANGE UP (ref 10–40)
BASOPHILS # BLD AUTO: 0.03 K/UL — SIGNIFICANT CHANGE UP (ref 0–0.2)
BASOPHILS NFR BLD AUTO: 0.3 % — SIGNIFICANT CHANGE UP (ref 0–2)
BILIRUB SERPL-MCNC: 0.5 MG/DL — SIGNIFICANT CHANGE UP (ref 0.2–1.2)
BUN SERPL-MCNC: 31 MG/DL — HIGH (ref 7–23)
CALCIUM SERPL-MCNC: 9.3 MG/DL — SIGNIFICANT CHANGE UP (ref 8.4–10.5)
CHLORIDE SERPL-SCNC: 101 MMOL/L — SIGNIFICANT CHANGE UP (ref 96–108)
CK MB BLD-MCNC: 4.7 % — HIGH (ref 0–3.5)
CK MB CFR SERPL CALC: 10.7 NG/ML — HIGH (ref 0–6.7)
CK SERPL-CCNC: 226 U/L — HIGH (ref 30–200)
CO2 SERPL-SCNC: 23 MMOL/L — SIGNIFICANT CHANGE UP (ref 22–31)
CREAT SERPL-MCNC: 5.02 MG/DL — HIGH (ref 0.5–1.3)
EOSINOPHIL # BLD AUTO: 0.05 K/UL — SIGNIFICANT CHANGE UP (ref 0–0.5)
EOSINOPHIL NFR BLD AUTO: 0.6 % — SIGNIFICANT CHANGE UP (ref 0–6)
FIBRINOGEN PPP-MCNC: 340 MG/DL — SIGNIFICANT CHANGE UP (ref 290–520)
GAS PNL BLDA: SIGNIFICANT CHANGE UP
GLUCOSE BLDC GLUCOMTR-MCNC: 121 MG/DL — HIGH (ref 70–99)
GLUCOSE BLDC GLUCOMTR-MCNC: 123 MG/DL — HIGH (ref 70–99)
GLUCOSE BLDC GLUCOMTR-MCNC: 148 MG/DL — HIGH (ref 70–99)
GLUCOSE BLDC GLUCOMTR-MCNC: 159 MG/DL — HIGH (ref 70–99)
GLUCOSE BLDC GLUCOMTR-MCNC: 164 MG/DL — HIGH (ref 70–99)
GLUCOSE SERPL-MCNC: 135 MG/DL — HIGH (ref 70–99)
HCT VFR BLD CALC: 25.8 % — LOW (ref 39–50)
HCV RNA FLD QL NAA+PROBE: SIGNIFICANT CHANGE UP
HGB BLD-MCNC: 8.3 G/DL — LOW (ref 13–17)
IMM GRANULOCYTES NFR BLD AUTO: 1 % — SIGNIFICANT CHANGE UP (ref 0–1.5)
INR BLD: 1.04 RATIO — SIGNIFICANT CHANGE UP (ref 0.88–1.16)
LYMPHOCYTES # BLD AUTO: 0.72 K/UL — LOW (ref 1–3.3)
LYMPHOCYTES # BLD AUTO: 8.2 % — LOW (ref 13–44)
MAGNESIUM SERPL-MCNC: 2.7 MG/DL — HIGH (ref 1.6–2.6)
MCHC RBC-ENTMCNC: 26 PG — LOW (ref 27–34)
MCHC RBC-ENTMCNC: 32.2 GM/DL — SIGNIFICANT CHANGE UP (ref 32–36)
MCV RBC AUTO: 80.9 FL — SIGNIFICANT CHANGE UP (ref 80–100)
MONOCYTES # BLD AUTO: 0.7 K/UL — SIGNIFICANT CHANGE UP (ref 0–0.9)
MONOCYTES NFR BLD AUTO: 8 % — SIGNIFICANT CHANGE UP (ref 2–14)
NEUTROPHILS # BLD AUTO: 7.17 K/UL — SIGNIFICANT CHANGE UP (ref 1.8–7.4)
NEUTROPHILS NFR BLD AUTO: 81.9 % — HIGH (ref 43–77)
NRBC # BLD: 0 /100 WBCS — SIGNIFICANT CHANGE UP (ref 0–0)
PHOSPHATE SERPL-MCNC: 3.4 MG/DL — SIGNIFICANT CHANGE UP (ref 2.5–4.5)
PLATELET # BLD AUTO: 176 K/UL — SIGNIFICANT CHANGE UP (ref 150–400)
POTASSIUM SERPL-MCNC: 4.9 MMOL/L — SIGNIFICANT CHANGE UP (ref 3.5–5.3)
POTASSIUM SERPL-SCNC: 4.9 MMOL/L — SIGNIFICANT CHANGE UP (ref 3.5–5.3)
PROT SERPL-MCNC: 5.1 G/DL — LOW (ref 6–8.3)
PROTHROM AB SERPL-ACNC: 12.4 SEC — SIGNIFICANT CHANGE UP (ref 10.6–13.6)
RBC # BLD: 3.19 M/UL — LOW (ref 4.2–5.8)
RBC # FLD: 18.4 % — HIGH (ref 10.3–14.5)
SARS-COV-2 RNA SPEC QL NAA+PROBE: SIGNIFICANT CHANGE UP
SODIUM SERPL-SCNC: 138 MMOL/L — SIGNIFICANT CHANGE UP (ref 135–145)
TROPONIN T, HIGH SENSITIVITY RESULT: 727 NG/L — HIGH (ref 0–51)
WBC # BLD: 8.76 K/UL — SIGNIFICANT CHANGE UP (ref 3.8–10.5)
WBC # FLD AUTO: 8.76 K/UL — SIGNIFICANT CHANGE UP (ref 3.8–10.5)

## 2021-10-28 PROCEDURE — 33511 CABG VEIN TWO: CPT

## 2021-10-28 PROCEDURE — 88305 TISSUE EXAM BY PATHOLOGIST: CPT | Mod: 26

## 2021-10-28 PROCEDURE — 99291 CRITICAL CARE FIRST HOUR: CPT

## 2021-10-28 PROCEDURE — 71045 X-RAY EXAM CHEST 1 VIEW: CPT | Mod: 26

## 2021-10-28 PROCEDURE — 93010 ELECTROCARDIOGRAM REPORT: CPT

## 2021-10-28 PROCEDURE — 33405 REPLACEMENT AORTIC VALVE OPN: CPT

## 2021-10-28 RX ORDER — ASCORBIC ACID 60 MG
500 TABLET,CHEWABLE ORAL
Refills: 0 | Status: COMPLETED | OUTPATIENT
Start: 2021-10-28 | End: 2021-11-02

## 2021-10-28 RX ORDER — ASPIRIN/CALCIUM CARB/MAGNESIUM 324 MG
300 TABLET ORAL ONCE
Refills: 0 | Status: COMPLETED | OUTPATIENT
Start: 2021-10-28

## 2021-10-28 RX ORDER — CHLORHEXIDINE GLUCONATE 213 G/1000ML
1 SOLUTION TOPICAL DAILY
Refills: 0 | Status: DISCONTINUED | OUTPATIENT
Start: 2021-10-28 | End: 2021-11-01

## 2021-10-28 RX ORDER — POTASSIUM CHLORIDE 20 MEQ
10 PACKET (EA) ORAL
Refills: 0 | Status: DISCONTINUED | OUTPATIENT
Start: 2021-10-28 | End: 2021-10-29

## 2021-10-28 RX ORDER — OXYCODONE HYDROCHLORIDE 5 MG/1
10 TABLET ORAL EVERY 4 HOURS
Refills: 0 | Status: DISCONTINUED | OUTPATIENT
Start: 2021-10-28 | End: 2021-11-02

## 2021-10-28 RX ORDER — DOBUTAMINE HCL 250MG/20ML
2.5 VIAL (ML) INTRAVENOUS
Qty: 500 | Refills: 0 | Status: DISCONTINUED | OUTPATIENT
Start: 2021-10-28 | End: 2021-10-29

## 2021-10-28 RX ORDER — AMIODARONE HYDROCHLORIDE 400 MG/1
400 TABLET ORAL
Refills: 0 | Status: DISCONTINUED | OUTPATIENT
Start: 2021-10-28 | End: 2021-10-30

## 2021-10-28 RX ORDER — SODIUM CHLORIDE 9 MG/ML
1000 INJECTION INTRAMUSCULAR; INTRAVENOUS; SUBCUTANEOUS
Refills: 0 | Status: DISCONTINUED | OUTPATIENT
Start: 2021-10-28 | End: 2021-11-01

## 2021-10-28 RX ORDER — HYDROMORPHONE HYDROCHLORIDE 2 MG/ML
0.5 INJECTION INTRAMUSCULAR; INTRAVENOUS; SUBCUTANEOUS EVERY 6 HOURS
Refills: 0 | Status: DISCONTINUED | OUTPATIENT
Start: 2021-10-28 | End: 2021-10-30

## 2021-10-28 RX ORDER — DEXTROSE 50 % IN WATER 50 %
50 SYRINGE (ML) INTRAVENOUS
Refills: 0 | Status: DISCONTINUED | OUTPATIENT
Start: 2021-10-28 | End: 2021-11-02

## 2021-10-28 RX ORDER — INSULIN HUMAN 100 [IU]/ML
10 INJECTION, SOLUTION SUBCUTANEOUS ONCE
Refills: 0 | Status: COMPLETED | OUTPATIENT
Start: 2021-10-28 | End: 2021-10-28

## 2021-10-28 RX ORDER — DEXTROSE 50 % IN WATER 50 %
25 SYRINGE (ML) INTRAVENOUS
Refills: 0 | Status: DISCONTINUED | OUTPATIENT
Start: 2021-10-28 | End: 2021-10-29

## 2021-10-28 RX ORDER — DEXMEDETOMIDINE HYDROCHLORIDE IN 0.9% SODIUM CHLORIDE 4 UG/ML
0.3 INJECTION INTRAVENOUS
Qty: 200 | Refills: 0 | Status: DISCONTINUED | OUTPATIENT
Start: 2021-10-28 | End: 2021-10-30

## 2021-10-28 RX ORDER — NOREPINEPHRINE BITARTRATE/D5W 8 MG/250ML
0.05 PLASTIC BAG, INJECTION (ML) INTRAVENOUS
Qty: 8 | Refills: 0 | Status: DISCONTINUED | OUTPATIENT
Start: 2021-10-28 | End: 2021-10-29

## 2021-10-28 RX ORDER — FENTANYL CITRATE 50 UG/ML
25 INJECTION INTRAVENOUS ONCE
Refills: 0 | Status: DISCONTINUED | OUTPATIENT
Start: 2021-10-28 | End: 2021-10-28

## 2021-10-28 RX ORDER — ALBUMIN HUMAN 25 %
250 VIAL (ML) INTRAVENOUS
Refills: 0 | Status: DISCONTINUED | OUTPATIENT
Start: 2021-10-28 | End: 2021-10-30

## 2021-10-28 RX ORDER — PANTOPRAZOLE SODIUM 20 MG/1
40 TABLET, DELAYED RELEASE ORAL DAILY
Refills: 0 | Status: DISCONTINUED | OUTPATIENT
Start: 2021-10-28 | End: 2021-10-31

## 2021-10-28 RX ORDER — OXYCODONE HYDROCHLORIDE 5 MG/1
5 TABLET ORAL EVERY 4 HOURS
Refills: 0 | Status: DISCONTINUED | OUTPATIENT
Start: 2021-10-28 | End: 2021-11-02

## 2021-10-28 RX ORDER — ACETAMINOPHEN 500 MG
650 TABLET ORAL EVERY 6 HOURS
Refills: 0 | Status: DISCONTINUED | OUTPATIENT
Start: 2021-10-31 | End: 2021-11-02

## 2021-10-28 RX ORDER — CHLORHEXIDINE GLUCONATE 213 G/1000ML
5 SOLUTION TOPICAL
Refills: 0 | Status: DISCONTINUED | OUTPATIENT
Start: 2021-10-28 | End: 2021-10-29

## 2021-10-28 RX ORDER — CEFUROXIME AXETIL 250 MG
1500 TABLET ORAL ONCE
Refills: 0 | Status: COMPLETED | OUTPATIENT
Start: 2021-10-29 | End: 2021-10-29

## 2021-10-28 RX ORDER — VASOPRESSIN 20 [USP'U]/ML
0.05 INJECTION INTRAVENOUS
Qty: 50 | Refills: 0 | Status: DISCONTINUED | OUTPATIENT
Start: 2021-10-28 | End: 2021-10-29

## 2021-10-28 RX ORDER — MEPERIDINE HYDROCHLORIDE 50 MG/ML
25 INJECTION INTRAMUSCULAR; INTRAVENOUS; SUBCUTANEOUS ONCE
Refills: 0 | Status: DISCONTINUED | OUTPATIENT
Start: 2021-10-28 | End: 2021-10-29

## 2021-10-28 RX ORDER — ACETAMINOPHEN 500 MG
650 TABLET ORAL EVERY 6 HOURS
Refills: 0 | Status: COMPLETED | OUTPATIENT
Start: 2021-10-28 | End: 2021-10-31

## 2021-10-28 RX ORDER — GABAPENTIN 400 MG/1
100 CAPSULE ORAL EVERY 12 HOURS
Refills: 0 | Status: COMPLETED | OUTPATIENT
Start: 2021-10-28 | End: 2021-11-02

## 2021-10-28 RX ORDER — INSULIN HUMAN 100 [IU]/ML
3 INJECTION, SOLUTION SUBCUTANEOUS
Qty: 100 | Refills: 0 | Status: DISCONTINUED | OUTPATIENT
Start: 2021-10-28 | End: 2021-10-29

## 2021-10-28 RX ORDER — CALCIUM GLUCONATE 100 MG/ML
2 VIAL (ML) INTRAVENOUS ONCE
Refills: 0 | Status: COMPLETED | OUTPATIENT
Start: 2021-10-28 | End: 2021-10-28

## 2021-10-28 RX ORDER — SENNA PLUS 8.6 MG/1
2 TABLET ORAL AT BEDTIME
Refills: 0 | Status: DISCONTINUED | OUTPATIENT
Start: 2021-10-29 | End: 2021-11-02

## 2021-10-28 RX ORDER — DEXTROSE 50 % IN WATER 50 %
50 SYRINGE (ML) INTRAVENOUS ONCE
Refills: 0 | Status: COMPLETED | OUTPATIENT
Start: 2021-10-28 | End: 2021-10-28

## 2021-10-28 RX ORDER — SODIUM CHLORIDE 9 MG/ML
1000 INJECTION INTRAMUSCULAR; INTRAVENOUS; SUBCUTANEOUS ONCE
Refills: 0 | Status: COMPLETED | OUTPATIENT
Start: 2021-10-28 | End: 2021-10-28

## 2021-10-28 RX ORDER — SODIUM CHLORIDE 9 MG/ML
500 INJECTION INTRAMUSCULAR; INTRAVENOUS; SUBCUTANEOUS ONCE
Refills: 0 | Status: COMPLETED | OUTPATIENT
Start: 2021-10-28 | End: 2021-10-28

## 2021-10-28 RX ORDER — POLYETHYLENE GLYCOL 3350 17 G/17G
17 POWDER, FOR SOLUTION ORAL DAILY
Refills: 0 | Status: DISCONTINUED | OUTPATIENT
Start: 2021-10-29 | End: 2021-11-02

## 2021-10-28 RX ADMIN — Medication 650 MILLIGRAM(S): at 23:35

## 2021-10-28 RX ADMIN — SODIUM CHLORIDE 500 MILLILITER(S): 9 INJECTION INTRAMUSCULAR; INTRAVENOUS; SUBCUTANEOUS at 19:10

## 2021-10-28 RX ADMIN — Medication 50 MILLILITER(S): at 22:23

## 2021-10-28 RX ADMIN — CHLORHEXIDINE GLUCONATE 5 MILLILITER(S): 213 SOLUTION TOPICAL at 18:16

## 2021-10-28 RX ADMIN — SODIUM CHLORIDE 3 MILLILITER(S): 9 INJECTION INTRAMUSCULAR; INTRAVENOUS; SUBCUTANEOUS at 06:06

## 2021-10-28 RX ADMIN — Medication 90 MILLIGRAM(S): at 06:02

## 2021-10-28 RX ADMIN — Medication 1000 MILLIGRAM(S): at 06:30

## 2021-10-28 RX ADMIN — Medication 650 MILLIGRAM(S): at 18:28

## 2021-10-28 RX ADMIN — SODIUM CHLORIDE 1000 MILLILITER(S): 9 INJECTION INTRAMUSCULAR; INTRAVENOUS; SUBCUTANEOUS at 18:25

## 2021-10-28 RX ADMIN — GABAPENTIN 100 MILLIGRAM(S): 400 CAPSULE ORAL at 18:16

## 2021-10-28 RX ADMIN — Medication 650 MILLIGRAM(S): at 18:16

## 2021-10-28 RX ADMIN — FENTANYL CITRATE 25 MICROGRAM(S): 50 INJECTION INTRAVENOUS at 22:06

## 2021-10-28 RX ADMIN — Medication 100 MILLIGRAM(S): at 06:02

## 2021-10-28 RX ADMIN — PANTOPRAZOLE SODIUM 40 MILLIGRAM(S): 20 TABLET, DELAYED RELEASE ORAL at 18:16

## 2021-10-28 RX ADMIN — CHLORHEXIDINE GLUCONATE 30 MILLILITER(S): 213 SOLUTION TOPICAL at 06:02

## 2021-10-28 RX ADMIN — Medication 500 MILLIGRAM(S): at 18:16

## 2021-10-28 RX ADMIN — INSULIN HUMAN 10 UNIT(S): 100 INJECTION, SOLUTION SUBCUTANEOUS at 22:22

## 2021-10-28 RX ADMIN — Medication 1000 MILLIGRAM(S): at 06:02

## 2021-10-28 RX ADMIN — Medication 200 GRAM(S): at 22:22

## 2021-10-28 RX ADMIN — Medication 125 MILLILITER(S): at 19:16

## 2021-10-28 RX ADMIN — FENTANYL CITRATE 25 MICROGRAM(S): 50 INJECTION INTRAVENOUS at 22:21

## 2021-10-28 RX ADMIN — GABAPENTIN 100 MILLIGRAM(S): 400 CAPSULE ORAL at 06:02

## 2021-10-28 NOTE — PROGRESS NOTE ADULT - SUBJECTIVE AND OBJECTIVE BOX
KLAUS DUNN  MRN-[id number]  Patient is a 61y old  Male who presents with a chief complaint of CAD (27 Oct 2021 10:51)    HPI:   This is a  60 Y/o Thin cachetic   male PMHx of ESRD on HD (t/th/sat) X 10 yrs  via left forearm AVF, HTN, Hep C s/p treatment, latent TB, CAD c LCx 95% stenosis presented with chest pain and sob admitted with NSTEMI to [facility] on 10 /19/21. + Cardiac Biomarkers-Plavix loaded and heparinized for NSTEMI . Underwent SAVANA on 10/21 revealed  severe aortic stenosis    Cardiac Cath 10/22-- Severe stenosis ostial Ramus and ostial LCX Last dose Plavix on 10/22  Stabilized transferred to [facility] for CABG/AVR with Dr Joaquin.  Received Pfizer Vaccine x 2         (25 Oct 2021 00:10)      Surgery/Hospital course:  10/28 C2V, AVR-t    Vital Signs Last 24 Hrs  T(C): 35.2 (28 Oct 2021 17:00), Max: 37.5 (27 Oct 2021 19:40)  T(F): 95.4 (28 Oct 2021 17:00), Max: 99.5 (27 Oct 2021 19:40)  HR: 54 (28 Oct 2021 17:30) (52 - 73)  BP: 144/74 (28 Oct 2021 08:36) (143/88 - 144/74)  BP(mean): --  RR: 12 (28 Oct 2021 17:30) (10 - 24)  SpO2: 100% (28 Oct 2021 17:30) (100% - 100%)  ============================I/O===========================  I&O's Summary    27 Oct 2021 07:01  -  28 Oct 2021 07:00  --------------------------------------------------------  IN: 610 mL / OUT: 1000 mL / NET: -390 mL    28 Oct 2021 07:01  -  28 Oct 2021 17:52  --------------------------------------------------------  IN: 33.8 mL / OUT: 130 mL / NET: -96.2 mL      ============================ LABS =========================                        8.3    8.76  )-----------( 176      ( 28 Oct 2021 16:06 )             25.8     10-28    138  |  101  |  31<H>  ----------------------------<  135<H>  4.9   |  23  |  5.02<H>    Ca    9.3      28 Oct 2021 16:06  Phos  3.4     10-28  Mg     2.7     10-28    TPro  5.1<L>  /  Alb  3.1<L>  /  TBili  0.5  /  DBili  x   /  AST  20  /  ALT  10  /  AlkPhos  73  10-28    LIVER FUNCTIONS - ( 28 Oct 2021 16:06 )  Alb: 3.1 g/dL / Pro: 5.1 g/dL / ALK PHOS: 73 U/L / ALT: 10 U/L / AST: 20 U/L / GGT: x           PT/INR - ( 28 Oct 2021 16:06 )   PT: 12.4 sec;   INR: 1.04 ratio         PTT - ( 28 Oct 2021 16:06 )  PTT:32.4 sec  ABG - ( 28 Oct 2021 16:01 )  pH, Arterial: 7.37  pH, Blood: x     /  pCO2: 44    /  pO2: 305   / HCO3: 25    / Base Excess: 0.0   /  SaO2: 99.9                ======================Micro/Rad/Cardio=================  CXR: Reviewed   Echo: Reviewed  ======================================================  PAST MEDICAL & SURGICAL HISTORY:  End Stage Renal Disease on Dialysis  since 2009 - Tues, Thurs, Sat    HTN (hypertension)    Chronic renal failure    Hepatitis C    TB (tuberculosis)  Latent    CHF (congestive heart failure)  Mild    AVF (arteriovenous fistula)  placed 2009 - Left side    S/P appendectomy  at age 7      ========================ASSESSMENT ================  Severe aortic stenosis and CAD status post coronary artery bypass graft x2 and aortic valve replacement on 10/28/21  Aneurysm status post arteriovenous fistula on 4/09/14   Hypertension  Congestive heart failure  Hepatitis C  End stage renal disease   Acute blood loss anemia status post 4u PRBC, 5 cryo, 2 plt, and 500 FEIBA, 2 FFP, and DDAVP   Elevated BUN/Cr  Stress Hyperglycemia     Plan:  ====================== NEUROLOGY=====================  Addressed analgesic regimen to optimize function and sedated with IV Precedex for ventilatory synchrony. Continue to monitor neuro status.      ==================== RESPIRATORY======================  Respiratory status required full ventilatory support, close monitoring of respiratory rate and breathing pattern, the following of ABG’s with A-line monitoring, continuous pulse oximetry monitoring.      Mechanical Ventilation:  Mode: AC/ CMV (Assist Control/ Continuous Mandatory Ventilation)  RR (machine): 12  TV (machine): 600  FiO2: 50  PEEP: 8  ITime: 1  MAP: 8  PIP: 19      ====================CARDIOVASCULAR==================  Patient with history of aneurysm status post arteriovenous fistula on 4/09/14, congestive heart failure, and hypertension underwent a coronary artery bypass graft x2 and aortic valve replacement for severe aortic stenosis and coronary artery disease on 10/28/21. In addition requiring IV Levophed for vasogenic shock and Amiodarone for rate control. Invasive hemodynamic monitoring with a central venous catheter & an A-line were required for the continuous central venous and MAP/BP monitoring to ensure adequate cardiovascular support.    ===================HEMATOLOGIC/ONC ===================  Anemia due to acute blood loss status post 4 units of packed red blood cells, 2 platelets, 5 cryoprecipitate, 500 FEIBA, 2 fresh frozen plasma, and desmopressin. Continue to monitor hemoglobin/hematocrit levels and platelets.     ===================== RENAL =========================  Patient with chronic kidney disease/end stage renal disease has been receiving hemodialysis on Tuesday, Thursday, and Saturday for 10 years. Continue to maintain fluid balance as tolerated. Elevated BUN/creatinine, optimize renal perfusion with adequate volume resuscitation and continued monitoring of urine output, fluid balance, BUN/Creatinine.     ==================== GASTROINTESTINAL===================  NPO after recent procedure, advance diet as tolerated. Continue Protonix for stress ulcer prophylaxis.     =======================    ENDOCRINE  =====================  Metabolic stability, stress hyperglycemia required an IV regular Insulin drip while following serial glucose levels to help achieve and maintain euglycemia. History of Hepatitis C status post past treatment.     ========================INFECTIOUS DISEASE================  Afebrile, white blood cells within normal limits. Monitor temperature and trend white blood cells. Monitor off antibiotics.     Patient requires continuous monitoring with bedside rhythm monitoring, pulse oximetry monitoring, and continuous central venous and arterial pressure monitoring; and intermittent blood gas analysis.  Care plan discussed with ICU care team.    Patient remained critical, at risk for life threatening decompensation.   I have spent 35 minutes providing acute care with multiple re-evaluations throughout the evening.     By signing my name below, I, Jimena Sarmiento , attest that this documentation has been prepared under the direction and in the presence of Devorah Bronson MD   Electronically signed: Dax Abebe, 10-28-21 @ 17:52    I, Devorah Bronson, personally performed the services described in this documentation. All medical record entries made by the fainaibe were at my direction and in my presence. I have reviewed the chart and agree that the record reflects my personal performance and is accurate and complete  Electronically signed: Devorah Bronson MD 10-28-21 @ 17:52       KLAUS DUNN  MRN-[id number]  Patient is a 61y old  Male who presents with a chief complaint of CAD (27 Oct 2021 10:51)    HPI:  This is a  60 Y/o Thin cachetic   male PMHx of ESRD on HD (t/th/sat) X 10 yrs via left forearm AVF, HTN, Hep C s/p treatment, latent TB, CAD c LCx 95% stenosis presented with chest pain and sob admitted with NSTEMI to [facility] on 10 /19/21. + Cardiac Biomarkers-Plavix loaded and heparinized for NSTEMI . Underwent SAVANA on 10/21 revealed  severe aortic stenosis    Cardiac Cath 10/22-- Severe stenosis ostial Ramus and ostial LCX Last dose Plavix on 10/22  Stabilized transferred to [facility] for CABG/AVR with Dr Joaquin.  Received [employer] Vaccine x 2    Surgery/Hospital course:  10/28 C2V, AVR-t    Vital Signs Last 24 Hrs  T(C): 35.2 (28 Oct 2021 17:00), Max: 37.5 (27 Oct 2021 19:40)  T(F): 95.4 (28 Oct 2021 17:00), Max: 99.5 (27 Oct 2021 19:40)  HR: 54 (28 Oct 2021 17:30) (52 - 73)  BP: 144/74 (28 Oct 2021 08:36) (143/88 - 144/74)  BP(mean): --  RR: 12 (28 Oct 2021 17:30) (10 - 24)  SpO2: 100% (28 Oct 2021 17:30) (100% - 100%)  ============================I/O===========================  I&O's Summary    27 Oct 2021 07:01  -  28 Oct 2021 07:00  --------------------------------------------------------  IN: 610 mL / OUT: 1000 mL / NET: -390 mL    28 Oct 2021 07:01  -  28 Oct 2021 17:52  --------------------------------------------------------  IN: 33.8 mL / OUT: 130 mL / NET: -96.2 mL      ============================ LABS =========================                        8.3    8.76  )-----------( 176      ( 28 Oct 2021 16:06 )             25.8     10-28    138  |  101  |  31<H>  ----------------------------<  135<H>  4.9   |  23  |  5.02<H>    Ca    9.3      28 Oct 2021 16:06  Phos  3.4     10-28  Mg     2.7     10-28    TPro  5.1<L>  /  Alb  3.1<L>  /  TBili  0.5  /  DBili  x   /  AST  20  /  ALT  10  /  AlkPhos  73  10-28    LIVER FUNCTIONS - ( 28 Oct 2021 16:06 )  Alb: 3.1 g/dL / Pro: 5.1 g/dL / ALK PHOS: 73 U/L / ALT: 10 U/L / AST: 20 U/L / GGT: x           PT/INR - ( 28 Oct 2021 16:06 )   PT: 12.4 sec;   INR: 1.04 ratio         PTT - ( 28 Oct 2021 16:06 )  PTT:32.4 sec  ABG - ( 28 Oct 2021 16:01 )  pH, Arterial: 7.37  pH, Blood: x     /  pCO2: 44    /  pO2: 305   / HCO3: 25    / Base Excess: 0.0   /  SaO2: 99.9        ======================Micro/Rad/Cardio=================  CXR: Reviewed   Echo: Reviewed  ======================================================  PAST MEDICAL & SURGICAL HISTORY:  End Stage Renal Disease on Dialysis  since 2009 - Tues, Thurs, Sat    HTN (hypertension)    Chronic renal failure    Hepatitis C    TB (tuberculosis)  Latent    CHF (congestive heart failure)  Mild    AVF (arteriovenous fistula)  placed 2009 - Left side    S/P appendectomy  at age 7    ========================ASSESSMENT ================  Severe aortic stenosis and CAD status post coronary artery bypass graft x2 and aortic valve replacement on 10/28/21  Aneurysm status post arteriovenous fistula on 4/09/14   Hypertension  Congestive heart failure  Hepatitis C  End stage renal disease   Acute blood loss anemia status post 4u PRBC, 5 cryo, 2 plt, and 500 FEIBA, 2 FFP, and DDAVP   Elevated BUN/Cr  Stress Hyperglycemia     Plan:  ====================== NEUROLOGY=====================  Addressed analgesic regimen to optimize function and sedated with IV Precedex for ventilatory synchrony. Patient has not yet awoken from anesthesia.    ==================== RESPIRATORY======================  Respiratory status required full ventilatory support, close monitoring of respiratory rate and breathing pattern, the following of ABG’s with A-line monitoring, continuous pulse oximetry monitoring.      Mechanical Ventilation:  Mode: AC/ CMV (Assist Control/ Continuous Mandatory Ventilation)  RR (machine): 12  TV (machine): 600  FiO2: 50  PEEP: 8  ITime: 1  MAP: 8  PIP: 19      ====================CARDIOVASCULAR==================  Patient with moderate LV systolic failure and hypertension underwent a coronary artery bypass graft x2 and aortic valve replacement for severe aortic stenosis and coronary artery disease on 10/28/21. Patient required an IV Levophed and an IV Vasopressin infusion as well as volume resuscitation for hypovolemic and  vasogenic shock . Due to persistent vasopressor requirement, an IV Dobutamine infusion ordered for moderate systolic dysfunction and bradycardia. Patient continues on amiodarone for atrial fibrillation prophylaxis. Invasive hemodynamic monitoring with a central venous catheter & an A-line were required for the continuous central venous and MAP/BP monitoring to ensure adequate cardiovascular support.    ===================HEMATOLOGIC/ONC ===================  Anemia due to acute blood loss status post 4 units of packed red blood cells, 2 platelets, 5 cryoprecipitate, 500 FEIBA, 2 fresh frozen plasma, and desmopressin intraoperatively and one unit of packed red blood cells ordered post op. Continue to monitor hemoglobin/hematocrit levels and platelets.     ===================== RENAL =========================  Patient with chronic kidney disease/end stage renal disease has been receiving hemodialysis on Tuesday, Thursday, and Saturday for 10 years. Cautious volume resuscitation and close monitoring of electrolytes and acid base balance.  ==================== GASTROINTESTINAL===================  NPO after recent procedure, advance diet as tolerated. Continue Protonix for stress ulcer prophylaxis.     =======================    ENDOCRINE  =====================  Metabolic stability, stress hyperglycemia required an IV regular Insulin drip while following serial glucose levels to help achieve and maintain euglycemia. History of Hepatitis C status post past treatment.     ========================INFECTIOUS DISEASE================  Afebrile, white blood cells within normal limits. Monitor temperature and trend white blood cells. Monitor off antibiotics.     Patient requires continuous monitoring with bedside rhythm monitoring, pulse oximetry monitoring, and continuous central venous and arterial pressure monitoring; and intermittent blood gas analysis.  Care plan discussed with ICU care team.    Patient remained critical, at risk for life threatening decompensation.   I have spent 40 minutes providing acute care with multiple re-evaluations throughout the evening.     By signing my name below, I, Jimena Sarmiento , attest that this documentation has been prepared under the direction and in the presence of Devorah Bronson MD   Electronically signed: Dax Abebe, 10-28-21 @ 17:52    I, Devorah Bronson, personally performed the services described in this documentation. All medical record entries made by the scribe were at my direction and in my presence. I have reviewed the chart and agree that the record reflects my personal performance and is accurate and complete  Electronically signed: Devorah Bronson MD 10-28-21 @ 17:52

## 2021-10-28 NOTE — BRIEF OPERATIVE NOTE - NSICDXBRIEFPROCEDURE_GEN_ALL_CORE_FT
PROCEDURES:  Replacement of aortic valve with tissue graft 28-Oct-2021 17:53:31  Zhou Thomas  Bypass graft, coronary artery, 2 venous grafts 28-Oct-2021 17:54:34  Zhou Thomas

## 2021-10-28 NOTE — BRIEF OPERATIVE NOTE - OPERATION/FINDINGS
severe aortic stenosis and CAD now s/p AVR [25mm Patel [address]] and CABG x2 [RSVG-Ramus, RSVG-OM1]      ***EF ~65%  ***Normal right ventricular function  ***Aortic cross-clamp: 102" severe aortic stenosis and CAD now s/p AVR [25mm Patel [address]] and CABG x2 [RSVG-Ramus, RSVG-OM1]      ***EF ~40-45%  ***Normal right ventricular function  ***Aortic cross-clamp: 102"

## 2021-10-29 LAB
ALBUMIN SERPL ELPH-MCNC: 3.3 G/DL — SIGNIFICANT CHANGE UP (ref 3.3–5)
ALP SERPL-CCNC: 66 U/L — SIGNIFICANT CHANGE UP (ref 40–120)
ALT FLD-CCNC: 10 U/L — SIGNIFICANT CHANGE UP (ref 10–45)
ANION GAP SERPL CALC-SCNC: 18 MMOL/L — HIGH (ref 5–17)
APTT BLD: 28.5 SEC — SIGNIFICANT CHANGE UP (ref 27.5–35.5)
AST SERPL-CCNC: 26 U/L — SIGNIFICANT CHANGE UP (ref 10–40)
BILIRUB SERPL-MCNC: 0.4 MG/DL — SIGNIFICANT CHANGE UP (ref 0.2–1.2)
BUN SERPL-MCNC: 35 MG/DL — HIGH (ref 7–23)
CALCIUM SERPL-MCNC: 9.5 MG/DL — SIGNIFICANT CHANGE UP (ref 8.4–10.5)
CHLORIDE SERPL-SCNC: 102 MMOL/L — SIGNIFICANT CHANGE UP (ref 96–108)
CO2 SERPL-SCNC: 19 MMOL/L — LOW (ref 22–31)
CREAT SERPL-MCNC: 5.48 MG/DL — HIGH (ref 0.5–1.3)
GAS PNL BLDA: SIGNIFICANT CHANGE UP
GLUCOSE BLDC GLUCOMTR-MCNC: 101 MG/DL — HIGH (ref 70–99)
GLUCOSE BLDC GLUCOMTR-MCNC: 103 MG/DL — HIGH (ref 70–99)
GLUCOSE BLDC GLUCOMTR-MCNC: 113 MG/DL — HIGH (ref 70–99)
GLUCOSE BLDC GLUCOMTR-MCNC: 71 MG/DL — SIGNIFICANT CHANGE UP (ref 70–99)
GLUCOSE BLDC GLUCOMTR-MCNC: 73 MG/DL — SIGNIFICANT CHANGE UP (ref 70–99)
GLUCOSE BLDC GLUCOMTR-MCNC: 82 MG/DL — SIGNIFICANT CHANGE UP (ref 70–99)
GLUCOSE BLDC GLUCOMTR-MCNC: 83 MG/DL — SIGNIFICANT CHANGE UP (ref 70–99)
GLUCOSE BLDC GLUCOMTR-MCNC: 83 MG/DL — SIGNIFICANT CHANGE UP (ref 70–99)
GLUCOSE BLDC GLUCOMTR-MCNC: 86 MG/DL — SIGNIFICANT CHANGE UP (ref 70–99)
GLUCOSE BLDC GLUCOMTR-MCNC: 87 MG/DL — SIGNIFICANT CHANGE UP (ref 70–99)
GLUCOSE BLDC GLUCOMTR-MCNC: 87 MG/DL — SIGNIFICANT CHANGE UP (ref 70–99)
GLUCOSE BLDC GLUCOMTR-MCNC: 89 MG/DL — SIGNIFICANT CHANGE UP (ref 70–99)
GLUCOSE BLDC GLUCOMTR-MCNC: 90 MG/DL — SIGNIFICANT CHANGE UP (ref 70–99)
GLUCOSE BLDC GLUCOMTR-MCNC: 90 MG/DL — SIGNIFICANT CHANGE UP (ref 70–99)
GLUCOSE BLDC GLUCOMTR-MCNC: 92 MG/DL — SIGNIFICANT CHANGE UP (ref 70–99)
GLUCOSE BLDC GLUCOMTR-MCNC: 93 MG/DL — SIGNIFICANT CHANGE UP (ref 70–99)
GLUCOSE BLDC GLUCOMTR-MCNC: 94 MG/DL — SIGNIFICANT CHANGE UP (ref 70–99)
GLUCOSE BLDC GLUCOMTR-MCNC: 99 MG/DL — SIGNIFICANT CHANGE UP (ref 70–99)
GLUCOSE SERPL-MCNC: 76 MG/DL — SIGNIFICANT CHANGE UP (ref 70–99)
HCT VFR BLD CALC: 24.5 % — LOW (ref 39–50)
HGB BLD-MCNC: 7.9 G/DL — LOW (ref 13–17)
INR BLD: 1.16 RATIO — SIGNIFICANT CHANGE UP (ref 0.88–1.16)
MAGNESIUM SERPL-MCNC: 2.6 MG/DL — SIGNIFICANT CHANGE UP (ref 1.6–2.6)
MCHC RBC-ENTMCNC: 26.2 PG — LOW (ref 27–34)
MCHC RBC-ENTMCNC: 32.2 GM/DL — SIGNIFICANT CHANGE UP (ref 32–36)
MCV RBC AUTO: 81.1 FL — SIGNIFICANT CHANGE UP (ref 80–100)
NRBC # BLD: 0 /100 WBCS — SIGNIFICANT CHANGE UP (ref 0–0)
PHOSPHATE SERPL-MCNC: 4.5 MG/DL — SIGNIFICANT CHANGE UP (ref 2.5–4.5)
PLATELET # BLD AUTO: 187 K/UL — SIGNIFICANT CHANGE UP (ref 150–400)
POTASSIUM SERPL-MCNC: 5 MMOL/L — SIGNIFICANT CHANGE UP (ref 3.5–5.3)
POTASSIUM SERPL-SCNC: 5 MMOL/L — SIGNIFICANT CHANGE UP (ref 3.5–5.3)
PROT SERPL-MCNC: 5 G/DL — LOW (ref 6–8.3)
PROTHROM AB SERPL-ACNC: 13.8 SEC — HIGH (ref 10.6–13.6)
RBC # BLD: 3.02 M/UL — LOW (ref 4.2–5.8)
RBC # FLD: 17.5 % — HIGH (ref 10.3–14.5)
SODIUM SERPL-SCNC: 139 MMOL/L — SIGNIFICANT CHANGE UP (ref 135–145)
WBC # BLD: 10.27 K/UL — SIGNIFICANT CHANGE UP (ref 3.8–10.5)
WBC # FLD AUTO: 10.27 K/UL — SIGNIFICANT CHANGE UP (ref 3.8–10.5)

## 2021-10-29 PROCEDURE — 99291 CRITICAL CARE FIRST HOUR: CPT

## 2021-10-29 PROCEDURE — 93010 ELECTROCARDIOGRAM REPORT: CPT

## 2021-10-29 PROCEDURE — 71045 X-RAY EXAM CHEST 1 VIEW: CPT | Mod: 26

## 2021-10-29 PROCEDURE — 99233 SBSQ HOSP IP/OBS HIGH 50: CPT | Mod: GC

## 2021-10-29 RX ORDER — ERYTHROPOIETIN 10000 [IU]/ML
10000 INJECTION, SOLUTION INTRAVENOUS; SUBCUTANEOUS
Refills: 0 | Status: DISCONTINUED | OUTPATIENT
Start: 2021-10-29 | End: 2021-11-02

## 2021-10-29 RX ORDER — INSULIN HUMAN 100 [IU]/ML
5 INJECTION, SOLUTION SUBCUTANEOUS ONCE
Refills: 0 | Status: DISCONTINUED | OUTPATIENT
Start: 2021-10-29 | End: 2021-10-29

## 2021-10-29 RX ORDER — INSULIN LISPRO 100/ML
VIAL (ML) SUBCUTANEOUS
Refills: 0 | Status: DISCONTINUED | OUTPATIENT
Start: 2021-10-29 | End: 2021-11-02

## 2021-10-29 RX ORDER — DEXTROSE 50 % IN WATER 50 %
50 SYRINGE (ML) INTRAVENOUS ONCE
Refills: 0 | Status: COMPLETED | OUTPATIENT
Start: 2021-10-29 | End: 2021-10-29

## 2021-10-29 RX ORDER — INSULIN HUMAN 100 [IU]/ML
5 INJECTION, SOLUTION SUBCUTANEOUS ONCE
Refills: 0 | Status: COMPLETED | OUTPATIENT
Start: 2021-10-29 | End: 2021-10-29

## 2021-10-29 RX ORDER — FENTANYL CITRATE 50 UG/ML
50 INJECTION INTRAVENOUS ONCE
Refills: 0 | Status: DISCONTINUED | OUTPATIENT
Start: 2021-10-29 | End: 2021-10-29

## 2021-10-29 RX ORDER — CALCIUM GLUCONATE 100 MG/ML
1 VIAL (ML) INTRAVENOUS ONCE
Refills: 0 | Status: COMPLETED | OUTPATIENT
Start: 2021-10-29 | End: 2021-10-29

## 2021-10-29 RX ORDER — NICARDIPINE HYDROCHLORIDE 30 MG/1
3 CAPSULE, EXTENDED RELEASE ORAL
Qty: 40 | Refills: 0 | Status: DISCONTINUED | OUTPATIENT
Start: 2021-10-29 | End: 2021-10-30

## 2021-10-29 RX ORDER — ASPIRIN/CALCIUM CARB/MAGNESIUM 324 MG
300 TABLET ORAL ONCE
Refills: 0 | Status: COMPLETED | OUTPATIENT
Start: 2021-10-29 | End: 2021-10-29

## 2021-10-29 RX ORDER — INSULIN HUMAN 100 [IU]/ML
10 INJECTION, SOLUTION SUBCUTANEOUS ONCE
Refills: 0 | Status: DISCONTINUED | OUTPATIENT
Start: 2021-10-29 | End: 2021-10-29

## 2021-10-29 RX ADMIN — Medication 500 MILLIGRAM(S): at 06:05

## 2021-10-29 RX ADMIN — FENTANYL CITRATE 50 MICROGRAM(S): 50 INJECTION INTRAVENOUS at 17:30

## 2021-10-29 RX ADMIN — Medication 650 MILLIGRAM(S): at 06:05

## 2021-10-29 RX ADMIN — Medication 650 MILLIGRAM(S): at 11:45

## 2021-10-29 RX ADMIN — Medication 650 MILLIGRAM(S): at 00:05

## 2021-10-29 RX ADMIN — HYDROMORPHONE HYDROCHLORIDE 0.5 MILLIGRAM(S): 2 INJECTION INTRAMUSCULAR; INTRAVENOUS; SUBCUTANEOUS at 15:10

## 2021-10-29 RX ADMIN — HYDROMORPHONE HYDROCHLORIDE 0.5 MILLIGRAM(S): 2 INJECTION INTRAMUSCULAR; INTRAVENOUS; SUBCUTANEOUS at 22:27

## 2021-10-29 RX ADMIN — Medication 500 MILLIGRAM(S): at 17:59

## 2021-10-29 RX ADMIN — PANTOPRAZOLE SODIUM 40 MILLIGRAM(S): 20 TABLET, DELAYED RELEASE ORAL at 13:42

## 2021-10-29 RX ADMIN — HYDROMORPHONE HYDROCHLORIDE 0.5 MILLIGRAM(S): 2 INJECTION INTRAMUSCULAR; INTRAVENOUS; SUBCUTANEOUS at 15:25

## 2021-10-29 RX ADMIN — AMIODARONE HYDROCHLORIDE 400 MILLIGRAM(S): 400 TABLET ORAL at 06:05

## 2021-10-29 RX ADMIN — Medication 300 MILLIGRAM(S): at 04:21

## 2021-10-29 RX ADMIN — CHLORHEXIDINE GLUCONATE 5 MILLILITER(S): 213 SOLUTION TOPICAL at 06:06

## 2021-10-29 RX ADMIN — ERYTHROPOIETIN 10000 UNIT(S): 10000 INJECTION, SOLUTION INTRAVENOUS; SUBCUTANEOUS at 11:03

## 2021-10-29 RX ADMIN — SENNA PLUS 2 TABLET(S): 8.6 TABLET ORAL at 22:17

## 2021-10-29 RX ADMIN — HYDROMORPHONE HYDROCHLORIDE 0.5 MILLIGRAM(S): 2 INJECTION INTRAMUSCULAR; INTRAVENOUS; SUBCUTANEOUS at 22:18

## 2021-10-29 RX ADMIN — Medication 100 GRAM(S): at 06:44

## 2021-10-29 RX ADMIN — GABAPENTIN 100 MILLIGRAM(S): 400 CAPSULE ORAL at 06:05

## 2021-10-29 RX ADMIN — INSULIN HUMAN 5 UNIT(S): 100 INJECTION, SOLUTION SUBCUTANEOUS at 06:43

## 2021-10-29 RX ADMIN — Medication 650 MILLIGRAM(S): at 06:35

## 2021-10-29 RX ADMIN — Medication 650 MILLIGRAM(S): at 11:15

## 2021-10-29 RX ADMIN — POLYETHYLENE GLYCOL 3350 17 GRAM(S): 17 POWDER, FOR SOLUTION ORAL at 11:12

## 2021-10-29 RX ADMIN — Medication 100 MILLIGRAM(S): at 13:42

## 2021-10-29 RX ADMIN — Medication 50 MILLILITER(S): at 06:44

## 2021-10-29 RX ADMIN — GABAPENTIN 100 MILLIGRAM(S): 400 CAPSULE ORAL at 17:58

## 2021-10-29 RX ADMIN — FENTANYL CITRATE 50 MICROGRAM(S): 50 INJECTION INTRAVENOUS at 17:45

## 2021-10-29 NOTE — AIRWAY REMOVAL NOTE  ADULT & PEDS - ARTIFICAL AIRWAY REMOVAL COMMENTS
Written order for extubation verified. Pt was identified by full name and birthday compared to ID band.  Present during the procedure was cole DE PAZ

## 2021-10-29 NOTE — PROGRESS NOTE ADULT - SUBJECTIVE AND OBJECTIVE BOX
CRITICAL CARE ATTENDING - CTICU    MEDICATIONS  (STANDING):  acetaminophen     Tablet .. 650 milliGRAM(s) Oral every 6 hours  albumin human  5% IVPB 250 milliLiter(s) IV Intermittent every 30 minutes  aMIOdarone    Tablet 400 milliGRAM(s) Oral two times a day  ascorbic acid 500 milliGRAM(s) Oral two times a day  calcium gluconate IVPB 1 Gram(s) IV Intermittent once  cefuroxime  IVPB 1500 milliGRAM(s) IV Intermittent once  chlorhexidine 0.12% Liquid 5 milliLiter(s) Oral Mucosa two times a day  chlorhexidine 2% Cloths 1 Application(s) Topical daily  dextrose 50% Injectable 50 milliLiter(s) IV Push every 15 minutes  dextrose 50% Injectable 25 milliLiter(s) IV Push every 15 minutes  dextrose 50% Injectable 50 milliLiter(s) IV Push once  DOBUTamine Infusion 2.5 MICROgram(s)/kG/Min (4.62 mL/Hr) IV Continuous <Continuous>  gabapentin 100 milliGRAM(s) Oral every 12 hours  insulin regular  human recombinant 5 Unit(s) SubCutaneous once  insulin regular Infusion 3 Unit(s)/Hr (3 mL/Hr) IV Continuous <Continuous>  meperidine     Injectable 25 milliGRAM(s) IV Push once  norepinephrine Infusion 0.05 MICROgram(s)/kG/Min (5.78 mL/Hr) IV Continuous <Continuous>  pantoprazole  Injectable 40 milliGRAM(s) IV Push daily  polyethylene glycol 3350 17 Gram(s) Oral daily  potassium chloride  10 mEq/50 mL IVPB 10 milliEquivalent(s) IV Intermittent every 1 hour  potassium chloride  10 mEq/50 mL IVPB 10 milliEquivalent(s) IV Intermittent every 1 hour  potassium chloride  10 mEq/50 mL IVPB 10 milliEquivalent(s) IV Intermittent every 1 hour  senna 2 Tablet(s) Oral at bedtime  sodium chloride 0.9%. 1000 milliLiter(s) (10 mL/Hr) IV Continuous <Continuous>  vasopressin Infusion 0.05 Unit(s)/Min (3 mL/Hr) IV Continuous <Continuous>                                    7.9    10.27 )-----------( 187      ( 29 Oct 2021 00:20 )             24.5       10    139  |  102  |  35<H>  ----------------------------<  76  5.0   |  19<L>  |  5.48<H>    Ca    9.5      29 Oct 2021 00:20  Phos  4.5     10-29  Mg     2.6     10-29    TPro  5.0<L>  /  Alb  3.3  /  TBili  0.4  /  DBili  x   /  AST  26  /  ALT  10  /  AlkPhos  66  10-29      PT/INR - ( 29 Oct 2021 00:20 )   PT: 13.8 sec;   INR: 1.16 ratio         PTT - ( 29 Oct 2021 00:20 )  PTT:28.5 sec    Mode: AC/ CMV (Assist Control/ Continuous Mandatory Ventilation)  RR (machine): 12  TV (machine): 600  FiO2: 50  PEEP: 8  ITime: 1  MAP: 10  PIP: 27      Daily     Daily Weight in k.2 (29 Oct 2021 00:00)      10-27 @ 07:  -  10-28 @ 07:00  --------------------------------------------------------  IN: 610 mL / OUT: 1000 mL / NET: -390 mL    10-28 @ :  -  10-29 @ 06:36  --------------------------------------------------------  IN: 2300.9 mL / OUT: 580 mL / NET: 1720.9 mL        Critically Ill patient  : [ ] preoperative ,   [ x] post operative    Requires :  [ x] Arterial Line   [x ] Central Line  [ ] PA catheter  [ ] IABP  [ ] ECMO  [ ] LVAD  [ x] Ventilator  [ x] pacemaker [ ] Impella.                      [x ] ABG's     [ x] Pulse Oxymetry Monitoring  Bedside evaluation , monitoring , treatment of hemodynamics , fluids , IVP/ IVCD meds.        Diagnosis:     POD 1 - C2V, AVR    Chest tube drainage     Requires chest PT, pulmonary toilet, ambu bagging, suctioning to maintain SaO2,  patent airway and treat atelectasis.     Ventilator Management:  [ x]AC-rest    [ ]CPAP-PS Wean    [ ]Trach Collar     [ ]Extubate    [ ] T-Piece  [ ]peep>5     Hypotension     CHF- acute [ x]   chronic [ x]    systolic [ x]   diatolic [ ]          - Echo- EF -       40-45%      [ ] RV dysfunction          - Cxr-cardiomegally, edema          - Clinical-  [ x]inotropes   [ x]pressors   [ ]diuresis   [ ]IABP   [ ]ECMO   [ ]LVAD   [ ]Respiratory Failure.     Hemodynamic lability,  instability. Requires IVCD [x ] vasopressors [ x] inotropes  [ ] vasodilator  [ ]IVSS fluid  to maintain MAP, perfusion, C.I.     Temporary pacemaker (TPM) interrogation and setting.     Chronic Renal Failure - End Stage Renal Disease     Hypovolemia     IVCD insulin           By signing my name below, I, Lanny Martin, attest that this documentation has been prepared under the direction and in the presence of Ugo Wynn MD.   Electronically Signed: Dax Krueger 10-29- @ 06:36      Discussed with CT surgeon, Physician Assistant - Nurse Practitioner- Critical care medicine team.   Discussed at  AM / PM rounds.   Chart, labs , films reviewed.    Cumulative Critical Care Time Given Today:  CRITICAL CARE ATTENDING - CTICU    MEDICATIONS  (STANDING):  acetaminophen     Tablet .. 650 milliGRAM(s) Oral every 6 hours  albumin human  5% IVPB 250 milliLiter(s) IV Intermittent every 30 minutes  aMIOdarone    Tablet 400 milliGRAM(s) Oral two times a day  ascorbic acid 500 milliGRAM(s) Oral two times a day  calcium gluconate IVPB 1 Gram(s) IV Intermittent once  cefuroxime  IVPB 1500 milliGRAM(s) IV Intermittent once  chlorhexidine 0.12% Liquid 5 milliLiter(s) Oral Mucosa two times a day  chlorhexidine 2% Cloths 1 Application(s) Topical daily  dextrose 50% Injectable 50 milliLiter(s) IV Push every 15 minutes  dextrose 50% Injectable 25 milliLiter(s) IV Push every 15 minutes  dextrose 50% Injectable 50 milliLiter(s) IV Push once  DOBUTamine Infusion 2.5 MICROgram(s)/kG/Min (4.62 mL/Hr) IV Continuous <Continuous>  gabapentin 100 milliGRAM(s) Oral every 12 hours  insulin regular  human recombinant 5 Unit(s) SubCutaneous once  insulin regular Infusion 3 Unit(s)/Hr (3 mL/Hr) IV Continuous <Continuous>  meperidine     Injectable 25 milliGRAM(s) IV Push once  norepinephrine Infusion 0.05 MICROgram(s)/kG/Min (5.78 mL/Hr) IV Continuous <Continuous>  pantoprazole  Injectable 40 milliGRAM(s) IV Push daily  polyethylene glycol 3350 17 Gram(s) Oral daily  potassium chloride  10 mEq/50 mL IVPB 10 milliEquivalent(s) IV Intermittent every 1 hour  potassium chloride  10 mEq/50 mL IVPB 10 milliEquivalent(s) IV Intermittent every 1 hour  potassium chloride  10 mEq/50 mL IVPB 10 milliEquivalent(s) IV Intermittent every 1 hour  senna 2 Tablet(s) Oral at bedtime  sodium chloride 0.9%. 1000 milliLiter(s) (10 mL/Hr) IV Continuous <Continuous>  vasopressin Infusion 0.05 Unit(s)/Min (3 mL/Hr) IV Continuous <Continuous>                                    7.9    10.27 )-----------( 187      ( 29 Oct 2021 00:20 )             24.5       10    139  |  102  |  35<H>  ----------------------------<  76  5.0   |  19<L>  |  5.48<H>    Ca    9.5      29 Oct 2021 00:20  Phos  4.5     10-29  Mg     2.6     10-29    TPro  5.0<L>  /  Alb  3.3  /  TBili  0.4  /  DBili  x   /  AST  26  /  ALT  10  /  AlkPhos  66  10-29      PT/INR - ( 29 Oct 2021 00:20 )   PT: 13.8 sec;   INR: 1.16 ratio         PTT - ( 29 Oct 2021 00:20 )  PTT:28.5 sec    Mode: AC/ CMV (Assist Control/ Continuous Mandatory Ventilation)  RR (machine): 12  TV (machine): 600  FiO2: 50  PEEP: 8  ITime: 1  MAP: 10  PIP: 27      Daily     Daily Weight in k.2 (29 Oct 2021 00:00)      10-27 @ 07:  -  10-28 @ 07:00  --------------------------------------------------------  IN: 610 mL / OUT: 1000 mL / NET: -390 mL    10-28 @ :  -  10-29 @ 06:36  --------------------------------------------------------  IN: 2300.9 mL / OUT: 580 mL / NET: 1720.9 mL        Critically Ill patient  : [ ] preoperative ,   [ x] post operative    Requires :  [ x] Arterial Line   [x ] Central Line  [ ] PA catheter  [ ] IABP  [ ] ECMO  [ ] LVAD  [ x] Ventilator  [ x] pacemaker [ ] Impella.                      [x ] ABG's     [ x] Pulse Oxymetry Monitoring  Bedside evaluation , monitoring , treatment of hemodynamics , fluids , IVP/ IVCD meds.        Diagnosis:     POD 1 - C2V, AVR    Chest tube drainage     Requires chest PT, pulmonary toilet, ambu bagging, suctioning to maintain SaO2,  patent airway and treat atelectasis.     Ventilator Management:  [ x]AC-rest    [x ]CPAP-PS Wean    [ ]Trach Collar     [ ]Extubate    [ ] T-Piece  [ ]peep>5     Hypotension     CHF- acute [ x]   chronic [ x]    systolic [ x]   diatolic [ ]          - Echo- EF -       40-45%      [ ] RV dysfunction          - Cxr-cardiomegally, edema          - Clinical-  [ x]inotropes   [ x]pressors   [ ]diuresis   [ ]IABP   [ ]ECMO   [ ]LVAD   [ ]Respiratory Failure.     Hemodynamic lability,  instability. Requires IVCD [x ] vasopressors [ x] inotropes  [ ] vasodilator  [ ]IVSS fluid  to maintain MAP, perfusion, C.I.     Temporary pacemaker (TPM) interrogation and setting.     Chronic Renal Failure - End Stage Renal Disease     [ x] Hemodialysis today [ ] Hemofiltration:    [ ] negative fluid balance [ ] even fluid balance [ ] positive fluid balance, in a hemodynamically unstable patient.     Hypovolemia     IVCD insulin           By signing my name below, I, Lanny Martin, attest that this documentation has been prepared under the direction and in the presence of Ugo Wynn MD.   Electronically Signed: Dax Krueger 10-29-21 @ 06:36      Discussed with CT surgeon, Physician Assistant - Nurse Practitioner- Critical care medicine team.   Discussed at  AM / PM rounds.   Chart, labs , films reviewed.    Cumulative Critical Care Time Given Today:  CRITICAL CARE ATTENDING - CTICU    MEDICATIONS  (STANDING):  acetaminophen     Tablet .. 650 milliGRAM(s) Oral every 6 hours  albumin human  5% IVPB 250 milliLiter(s) IV Intermittent every 30 minutes  aMIOdarone    Tablet 400 milliGRAM(s) Oral two times a day  ascorbic acid 500 milliGRAM(s) Oral two times a day  calcium gluconate IVPB 1 Gram(s) IV Intermittent once  cefuroxime  IVPB 1500 milliGRAM(s) IV Intermittent once  chlorhexidine 0.12% Liquid 5 milliLiter(s) Oral Mucosa two times a day  chlorhexidine 2% Cloths 1 Application(s) Topical daily  dextrose 50% Injectable 50 milliLiter(s) IV Push every 15 minutes  dextrose 50% Injectable 25 milliLiter(s) IV Push every 15 minutes  dextrose 50% Injectable 50 milliLiter(s) IV Push once  DOBUTamine Infusion 2.5 MICROgram(s)/kG/Min (4.62 mL/Hr) IV Continuous <Continuous>  gabapentin 100 milliGRAM(s) Oral every 12 hours  insulin regular  human recombinant 5 Unit(s) SubCutaneous once  insulin regular Infusion 3 Unit(s)/Hr (3 mL/Hr) IV Continuous <Continuous>  meperidine     Injectable 25 milliGRAM(s) IV Push once  norepinephrine Infusion 0.05 MICROgram(s)/kG/Min (5.78 mL/Hr) IV Continuous <Continuous>  pantoprazole  Injectable 40 milliGRAM(s) IV Push daily  polyethylene glycol 3350 17 Gram(s) Oral daily  potassium chloride  10 mEq/50 mL IVPB 10 milliEquivalent(s) IV Intermittent every 1 hour  potassium chloride  10 mEq/50 mL IVPB 10 milliEquivalent(s) IV Intermittent every 1 hour  potassium chloride  10 mEq/50 mL IVPB 10 milliEquivalent(s) IV Intermittent every 1 hour  senna 2 Tablet(s) Oral at bedtime  sodium chloride 0.9%. 1000 milliLiter(s) (10 mL/Hr) IV Continuous <Continuous>  vasopressin Infusion 0.05 Unit(s)/Min (3 mL/Hr) IV Continuous <Continuous>                                    7.9    10.27 )-----------( 187      ( 29 Oct 2021 00:20 )             24.5       10    139  |  102  |  35<H>  ----------------------------<  76  5.0   |  19<L>  |  5.48<H>    Ca    9.5      29 Oct 2021 00:20  Phos  4.5     10-29  Mg     2.6     10-29    TPro  5.0<L>  /  Alb  3.3  /  TBili  0.4  /  DBili  x   /  AST  26  /  ALT  10  /  AlkPhos  66  10-29      PT/INR - ( 29 Oct 2021 00:20 )   PT: 13.8 sec;   INR: 1.16 ratio         PTT - ( 29 Oct 2021 00:20 )  PTT:28.5 sec    Mode: AC/ CMV (Assist Control/ Continuous Mandatory Ventilation)  RR (machine): 12  TV (machine): 600  FiO2: 50  PEEP: 8  ITime: 1  MAP: 10  PIP: 27      Daily     Daily Weight in k.2 (29 Oct 2021 00:00)      10-27 @ 07:  -  10-28 @ 07:00  --------------------------------------------------------  IN: 610 mL / OUT: 1000 mL / NET: -390 mL    10-28 @ :  -  10-29 @ 06:36  --------------------------------------------------------  IN: 2300.9 mL / OUT: 580 mL / NET: 1720.9 mL        Critically Ill patient  : [ ] preoperative ,   [ x] post operative    Requires :  [ x] Arterial Line   [x ] Central Line  [ ] PA catheter  [ ] IABP  [ ] ECMO  [ ] LVAD  [ x] Ventilator  [ x] pacemaker - TPM  [ ] Impella.                      [x ] ABG's     [ x] Pulse Oxymetry Monitoring  Bedside evaluation , monitoring , treatment of hemodynamics , fluids , IVP/ IVCD meds.        Diagnosis:     POD 1 - C2V, AVR    Chest tube drainage     Requires chest PT, pulmonary toilet, ambu bagging, suctioning to maintain SaO2,  patent airway and treat atelectasis.     Ventilator Management:  [ x]AC-rest    [x ]CPAP-PS Wean    [ ]Trach Collar     [ ]Extubate    [ ] T-Piece  [ ]peep>5     Hypotension     Hypovolemia    CHF- acute [ x]   chronic [ x]    systolic [ x]   diatolic [ ]          - Echo- EF -       40-45%   /  AS     [ ] RV dysfunction          - Cxr-cardiomegally, edema          - Clinical-  [ x]inotropes   [ x]pressors   [ ]diuresis   [ ]IABP   [ ]ECMO   [ ]LVAD   [ ]Respiratory Failure.     Hemodynamic lability,  instability. Requires IVCD [x ] vasopressors [ x] inotropes  [ ] vasodilator  [ ]IVSS fluid  to maintain MAP, perfusion, C.I.     Ventilator Management:  [ x]AC-rest    [x ]CPAP-PS Wean today    [ ]Trach Collar     [ ]Extubate    [ ] T-Piece  [ ]peep>5     Requires chest PT, pulmonary toilet, ambu bagging, suctioning to maintain SaO2,  patent airway and treat atelectasis.     Failed  extubation trial this AM - Hypercarbia - Hypoxemia     Temporary pacemaker (TPM) interrogation and setting.     Requires  [ x]DDD  [ ]VVI    [ ]AII  temporary pacing at  80 min    to maintain HR, MAP, CI, and perfusion.     Chronic Renal Failure - End Stage Renal Disease    Metabolic acidosis     [ x] Hemodialysis today [ ] Hemofiltration:    [ x] negative fluid balance  in a hemodynamically unstable patient.     IVCD insulin     Requires bedside physical therapy, mobilization and total FDC care.               By signing my name below, ILanny, attest that this documentation has been prepared under the direction and in the presence of Ugo Wynn MD.   Electronically Signed: Dax Krueger 10-29-21 @ 06:36    IUgo, personally performed the services described in this documentation. All medical record entries made by the scribe were at my direction and in my presence. I have reviewed the chart and agree that the record reflects my personal performance and is accurate and complete.   Ugo Wynn MD.           Discussed with CT surgeon, Physician Assistant - Nurse Practitioner- Critical care medicine team.   Discussed at  AM / PM rounds.   Chart, labs , films reviewed.    Cumulative Critical Care Time Given Today:  45 min

## 2021-10-29 NOTE — PROGRESS NOTE ADULT - SUBJECTIVE AND OBJECTIVE BOX
[facility] DIVISION OF KIDNEY DISEASES AND HYPERTENSION -- FOLLOW UP NOTE  --------------------------------------------------------------------------------  Chief Complaint: ESRD s/p CABG     24 hour events/subjective:    seen and examined this morning   unable to speak due to being intubated but able to answer yes/no questions   denied any SOB or chest pain  overnight noted to be hyperkalemic    PAST HISTORY  --------------------------------------------------------------------------------  No significant changes to PMH, PSH, FHx, SHx, unless otherwise noted    ALLERGIES & MEDICATIONS  --------------------------------------------------------------------------------  Allergies    adhesives (Other)  No Known Drug Allergies    Intolerances      Standing Inpatient Medications  acetaminophen     Tablet .. 650 milliGRAM(s) Oral every 6 hours  albumin human  5% IVPB 250 milliLiter(s) IV Intermittent every 30 minutes  aMIOdarone    Tablet 400 milliGRAM(s) Oral two times a day  ascorbic acid 500 milliGRAM(s) Oral two times a day  cefuroxime  IVPB 1500 milliGRAM(s) IV Intermittent once  chlorhexidine 0.12% Liquid 5 milliLiter(s) Oral Mucosa two times a day  chlorhexidine 2% Cloths 1 Application(s) Topical daily  dexMEDEtomidine Infusion 0.3 MICROgram(s)/kG/Hr IV Continuous <Continuous>  dextrose 50% Injectable 50 milliLiter(s) IV Push every 15 minutes  dextrose 50% Injectable 25 milliLiter(s) IV Push every 15 minutes  DOBUTamine Infusion 2.5 MICROgram(s)/kG/Min IV Continuous <Continuous>  gabapentin 100 milliGRAM(s) Oral every 12 hours  insulin regular Infusion 3 Unit(s)/Hr IV Continuous <Continuous>  meperidine     Injectable 25 milliGRAM(s) IV Push once  norepinephrine Infusion 0.05 MICROgram(s)/kG/Min IV Continuous <Continuous>  pantoprazole  Injectable 40 milliGRAM(s) IV Push daily  polyethylene glycol 3350 17 Gram(s) Oral daily  potassium chloride  10 mEq/50 mL IVPB 10 milliEquivalent(s) IV Intermittent every 1 hour  potassium chloride  10 mEq/50 mL IVPB 10 milliEquivalent(s) IV Intermittent every 1 hour  potassium chloride  10 mEq/50 mL IVPB 10 milliEquivalent(s) IV Intermittent every 1 hour  senna 2 Tablet(s) Oral at bedtime  sodium chloride 0.9%. 1000 milliLiter(s) IV Continuous <Continuous>  vasopressin Infusion 0.05 Unit(s)/Min IV Continuous <Continuous>    PRN Inpatient Medications  HYDROmorphone  Injectable 0.5 milliGRAM(s) IV Push every 6 hours PRN  oxyCODONE    IR 5 milliGRAM(s) Oral every 4 hours PRN  oxyCODONE    IR 10 milliGRAM(s) Oral every 4 hours PRN      REVIEW OF SYSTEMS      All other systems were reviewed and are negative, except as noted.    VITALS/PHYSICAL EXAM  --------------------------------------------------------------------------------  T(C): 35.9 (10-29-21 @ 04:00), Max: 36.4 (10-28-21 @ 08:36)  HR: 80 (10-29-21 @ 06:45) (52 - 84)  BP: 161/97 (10-29-21 @ 01:15) (144/74 - 161/97)  RR: 20 (10-29-21 @ 06:45) (10 - 31)  SpO2: 100% (10-29-21 @ 06:45) (92% - 100%)  Wt(kg): --    Weight (kg): 61.6 (10-28-21 @ 08:36)      10-28-21 @ 07:01  -  10-29-21 @ 07:00  --------------------------------------------------------  IN: 2503.3 mL / OUT: 750 mL / NET: 1753.3 mL        Physical Exam:  	Gen: NAD  	HEENT: MMM, intubated  	Pulm: CTA B/L  	CV: S1S2  	Abd: Soft, +BS   	Ext: No LE edema B/L  	Neuro: Awake  	Skin: Warm and dry  	Vascular access: KALA MILLS       LABS/STUDIES  --------------------------------------------------------------------------------              7.9    10.27 >-----------<  187      [10-29-21 @ 00:20]              24.5     139  |  102  |  35  ----------------------------<  76      [10-29-21 @ 00:20]  5.0   |  19  |  5.48        Ca     9.5     [10-29-21 @ 00:20]      Mg     2.6     [10-29-21 @ 00:20]      Phos  4.5     [10-29-21 @ 00:20]    TPro  5.0  /  Alb  3.3  /  TBili  0.4  /  DBili  x   /  AST  26  /  ALT  10  /  AlkPhos  66  [10-29-21 @ 00:20]    PT/INR: PT 13.8 , INR 1.16       [10-29-21 @ 00:20]  PTT: 28.5       [10-29-21 @ 00:20]          [10-28-21 @ 16:06]    Creatinine Trend:  SCr 5.48 [10-29 @ 00:20]  SCr 5.02 [10-28 @ 16:06]  SCr 7.54 [10-27 @ 05:51]  SCr 5.26 [10-26 @ 05:13]  SCr 8.14 [10-25 @ 06:23]        TSH 4.99      [10-25-21 @ 08:32]  Lipid: chol 143, , HDL 34, LDL --      [10-25-21 @ 08:32]    HBsAg Nonreact      [10-26-21 @ 07:48]  HCV 13.47, Reactive      [10-26-21 @ 07:48]

## 2021-10-29 NOTE — PROGRESS NOTE ADULT - ASSESSMENT
Pt. with ESRD on HD TIW admitted for chest pain. now s/p CABG on 10/28      ESRD (end stage renal disease).   Hyperkalemia  Pt. with ESRD on HD three times a week (TTS).   patient follows with [facility] [place] [facility] for outpatient HD   last HD on 10/27 for OR Thursday  patient is now s/p CABG on 10/28   noted to be hyperkalemic overnight medically managed  Plan for HD today   Dose meds as per HD.    Anemia secondary to renal failure.  Patient with anemia in the setting of ESRD.   Hemoglobin below target range.   Continue Retacrit 10,000 unit TIW with HD.   Monitor CBC    Hyperphosphatemia.  Pt. on oral phosphate binders with meals.   monitor serum phosphorus.   Pt. with ESRD on HD TIW admitted for chest pain. now s/p CABG on 10/28      ESRD (end stage renal disease).   Hyperkalemia  Pt. with ESRD on HD three times a week (TTS).   patient follows with [facility] clinic for outpatient HD   last HD on 10/27 for OR Thursday  patient is now s/p CABG on 10/28   noted to be hyperkalemic overnight medically managed  Plan for HD today   Dose meds as per HD.    Anemia secondary to renal failure.  Patient with anemia in the setting of ESRD.   Hemoglobin below target range.   Continue Retacrit 10,000 unit TIW with HD.   Monitor CBC    Hyperphosphatemia.  Pt. on oral phosphate binders with meals.   monitor serum phosphorus.    If any questions, please feel free to contact me     Jai Stern  Nephrology Fellow  [facility] Pager: [phone]

## 2021-10-30 LAB
ALBUMIN SERPL ELPH-MCNC: 3.4 G/DL — SIGNIFICANT CHANGE UP (ref 3.3–5)
ALP SERPL-CCNC: 77 U/L — SIGNIFICANT CHANGE UP (ref 40–120)
ALT FLD-CCNC: 8 U/L — LOW (ref 10–45)
ANION GAP SERPL CALC-SCNC: 16 MMOL/L — SIGNIFICANT CHANGE UP (ref 5–17)
ANISOCYTOSIS BLD QL: SLIGHT — SIGNIFICANT CHANGE UP
AST SERPL-CCNC: 25 U/L — SIGNIFICANT CHANGE UP (ref 10–40)
BILIRUB SERPL-MCNC: 0.3 MG/DL — SIGNIFICANT CHANGE UP (ref 0.2–1.2)
BUN SERPL-MCNC: 30 MG/DL — HIGH (ref 7–23)
CALCIUM SERPL-MCNC: 8.8 MG/DL — SIGNIFICANT CHANGE UP (ref 8.4–10.5)
CHLORIDE SERPL-SCNC: 98 MMOL/L — SIGNIFICANT CHANGE UP (ref 96–108)
CO2 SERPL-SCNC: 24 MMOL/L — SIGNIFICANT CHANGE UP (ref 22–31)
CREAT SERPL-MCNC: 4.6 MG/DL — HIGH (ref 0.5–1.3)
DACRYOCYTES BLD QL SMEAR: SLIGHT — SIGNIFICANT CHANGE UP
ELLIPTOCYTES BLD QL SMEAR: SLIGHT — SIGNIFICANT CHANGE UP
GAS PNL BLDA: SIGNIFICANT CHANGE UP
GLUCOSE BLDC GLUCOMTR-MCNC: 107 MG/DL — HIGH (ref 70–99)
GLUCOSE BLDC GLUCOMTR-MCNC: 111 MG/DL — HIGH (ref 70–99)
GLUCOSE BLDC GLUCOMTR-MCNC: 117 MG/DL — HIGH (ref 70–99)
GLUCOSE BLDC GLUCOMTR-MCNC: 99 MG/DL — SIGNIFICANT CHANGE UP (ref 70–99)
GLUCOSE SERPL-MCNC: 96 MG/DL — SIGNIFICANT CHANGE UP (ref 70–99)
HCT VFR BLD CALC: 21.6 % — LOW (ref 39–50)
HCT VFR BLD CALC: 27.2 % — LOW (ref 39–50)
HGB BLD-MCNC: 7.1 G/DL — LOW (ref 13–17)
HGB BLD-MCNC: 8.8 G/DL — LOW (ref 13–17)
HYPOCHROMIA BLD QL: SLIGHT — SIGNIFICANT CHANGE UP
MACROCYTES BLD QL: SLIGHT — SIGNIFICANT CHANGE UP
MAGNESIUM SERPL-MCNC: 2.3 MG/DL — SIGNIFICANT CHANGE UP (ref 1.6–2.6)
MANUAL SMEAR VERIFICATION: SIGNIFICANT CHANGE UP
MCHC RBC-ENTMCNC: 26.7 PG — LOW (ref 27–34)
MCHC RBC-ENTMCNC: 26.8 PG — LOW (ref 27–34)
MCHC RBC-ENTMCNC: 32.4 GM/DL — SIGNIFICANT CHANGE UP (ref 32–36)
MCHC RBC-ENTMCNC: 32.9 GM/DL — SIGNIFICANT CHANGE UP (ref 32–36)
MCV RBC AUTO: 81.2 FL — SIGNIFICANT CHANGE UP (ref 80–100)
MCV RBC AUTO: 82.9 FL — SIGNIFICANT CHANGE UP (ref 80–100)
MICROCYTES BLD QL: SLIGHT — SIGNIFICANT CHANGE UP
NRBC # BLD: 0 /100 WBCS — SIGNIFICANT CHANGE UP (ref 0–0)
OVALOCYTES BLD QL SMEAR: SLIGHT — SIGNIFICANT CHANGE UP
PHOSPHATE SERPL-MCNC: 4.4 MG/DL — SIGNIFICANT CHANGE UP (ref 2.5–4.5)
PLAT MORPH BLD: NORMAL — SIGNIFICANT CHANGE UP
PLATELET # BLD AUTO: 135 K/UL — LOW (ref 150–400)
PLATELET # BLD AUTO: 140 K/UL — LOW (ref 150–400)
POLYCHROMASIA BLD QL SMEAR: SLIGHT — SIGNIFICANT CHANGE UP
POTASSIUM SERPL-MCNC: 4.5 MMOL/L — SIGNIFICANT CHANGE UP (ref 3.5–5.3)
POTASSIUM SERPL-SCNC: 4.5 MMOL/L — SIGNIFICANT CHANGE UP (ref 3.5–5.3)
PROT SERPL-MCNC: 5.7 G/DL — LOW (ref 6–8.3)
RBC # BLD: 2.66 M/UL — LOW (ref 4.2–5.8)
RBC # BLD: 3.28 M/UL — LOW (ref 4.2–5.8)
RBC # FLD: 18.1 % — HIGH (ref 10.3–14.5)
RBC # FLD: 18.6 % — HIGH (ref 10.3–14.5)
RBC BLD AUTO: ABNORMAL
SODIUM SERPL-SCNC: 138 MMOL/L — SIGNIFICANT CHANGE UP (ref 135–145)
TARGETS BLD QL SMEAR: SLIGHT — SIGNIFICANT CHANGE UP
WBC # BLD: 10.84 K/UL — HIGH (ref 3.8–10.5)
WBC # BLD: 16.27 K/UL — HIGH (ref 3.8–10.5)
WBC # FLD AUTO: 10.84 K/UL — HIGH (ref 3.8–10.5)
WBC # FLD AUTO: 16.27 K/UL — HIGH (ref 3.8–10.5)

## 2021-10-30 PROCEDURE — 71045 X-RAY EXAM CHEST 1 VIEW: CPT | Mod: 26

## 2021-10-30 PROCEDURE — 93010 ELECTROCARDIOGRAM REPORT: CPT

## 2021-10-30 PROCEDURE — 99233 SBSQ HOSP IP/OBS HIGH 50: CPT | Mod: GC

## 2021-10-30 PROCEDURE — 99233 SBSQ HOSP IP/OBS HIGH 50: CPT

## 2021-10-30 RX ORDER — HEPARIN SODIUM 5000 [USP'U]/ML
5000 INJECTION INTRAVENOUS; SUBCUTANEOUS EVERY 8 HOURS
Refills: 0 | Status: DISCONTINUED | OUTPATIENT
Start: 2021-10-30 | End: 2021-11-02

## 2021-10-30 RX ORDER — HYDRALAZINE HCL 50 MG
5 TABLET ORAL ONCE
Refills: 0 | Status: COMPLETED | OUTPATIENT
Start: 2021-10-30 | End: 2021-10-30

## 2021-10-30 RX ORDER — AMLODIPINE BESYLATE 2.5 MG/1
10 TABLET ORAL DAILY
Refills: 0 | Status: DISCONTINUED | OUTPATIENT
Start: 2021-10-30 | End: 2021-11-01

## 2021-10-30 RX ORDER — ASPIRIN/CALCIUM CARB/MAGNESIUM 324 MG
81 TABLET ORAL DAILY
Refills: 0 | Status: DISCONTINUED | OUTPATIENT
Start: 2021-10-30 | End: 2021-11-02

## 2021-10-30 RX ADMIN — OXYCODONE HYDROCHLORIDE 10 MILLIGRAM(S): 5 TABLET ORAL at 09:16

## 2021-10-30 RX ADMIN — Medication 650 MILLIGRAM(S): at 07:10

## 2021-10-30 RX ADMIN — SENNA PLUS 2 TABLET(S): 8.6 TABLET ORAL at 21:00

## 2021-10-30 RX ADMIN — Medication 650 MILLIGRAM(S): at 11:18

## 2021-10-30 RX ADMIN — Medication 500 MILLIGRAM(S): at 06:53

## 2021-10-30 RX ADMIN — PANTOPRAZOLE SODIUM 40 MILLIGRAM(S): 20 TABLET, DELAYED RELEASE ORAL at 11:18

## 2021-10-30 RX ADMIN — Medication 500 MILLIGRAM(S): at 17:15

## 2021-10-30 RX ADMIN — OXYCODONE HYDROCHLORIDE 10 MILLIGRAM(S): 5 TABLET ORAL at 17:15

## 2021-10-30 RX ADMIN — HEPARIN SODIUM 5000 UNIT(S): 5000 INJECTION INTRAVENOUS; SUBCUTANEOUS at 21:00

## 2021-10-30 RX ADMIN — Medication 650 MILLIGRAM(S): at 06:53

## 2021-10-30 RX ADMIN — OXYCODONE HYDROCHLORIDE 10 MILLIGRAM(S): 5 TABLET ORAL at 10:16

## 2021-10-30 RX ADMIN — GABAPENTIN 100 MILLIGRAM(S): 400 CAPSULE ORAL at 17:15

## 2021-10-30 RX ADMIN — AMLODIPINE BESYLATE 10 MILLIGRAM(S): 2.5 TABLET ORAL at 18:54

## 2021-10-30 RX ADMIN — HYDROMORPHONE HYDROCHLORIDE 0.5 MILLIGRAM(S): 2 INJECTION INTRAMUSCULAR; INTRAVENOUS; SUBCUTANEOUS at 04:20

## 2021-10-30 RX ADMIN — Medication 650 MILLIGRAM(S): at 17:15

## 2021-10-30 RX ADMIN — Medication 5 MILLIGRAM(S): at 20:40

## 2021-10-30 RX ADMIN — CHLORHEXIDINE GLUCONATE 1 APPLICATION(S): 213 SOLUTION TOPICAL at 11:17

## 2021-10-30 RX ADMIN — Medication 2.5 MILLIGRAM(S): at 21:40

## 2021-10-30 RX ADMIN — Medication 5 MILLIGRAM(S): at 19:51

## 2021-10-30 RX ADMIN — Medication 81 MILLIGRAM(S): at 11:18

## 2021-10-30 RX ADMIN — HYDROMORPHONE HYDROCHLORIDE 0.5 MILLIGRAM(S): 2 INJECTION INTRAMUSCULAR; INTRAVENOUS; SUBCUTANEOUS at 04:33

## 2021-10-30 RX ADMIN — POLYETHYLENE GLYCOL 3350 17 GRAM(S): 17 POWDER, FOR SOLUTION ORAL at 11:17

## 2021-10-30 RX ADMIN — GABAPENTIN 100 MILLIGRAM(S): 400 CAPSULE ORAL at 06:53

## 2021-10-30 NOTE — PHYSICAL THERAPY INITIAL EVALUATION ADULT - PHYSICAL ASSIST/NONPHYSICAL ASSIST: GAIT, REHAB EVAL
Pt , repeat 423, dx COPD exacerbation, hyperglycemia 2/16 pt requesting to get her 4 fiber supplements/capsules after taking evening meds supervision

## 2021-10-30 NOTE — PHYSICAL THERAPY INITIAL EVALUATION ADULT - PLANNED THERAPY INTERVENTIONS, PT EVAL
GOAL: Pt will be able to independently asc/desc 10 steps with 1HR within 2 weeks./bed mobility training/gait training/transfer training

## 2021-10-30 NOTE — PHYSICAL THERAPY INITIAL EVALUATION ADULT - PERTINENT HX OF CURRENT PROBLEM, REHAB EVAL
62 Y/o Thin cachetic   male PMHx of ESRD on HD (t/th/sat) X 10 yrs  via left forearm AVF, HTN, Hep C s/p treatment, latent TB, CAD c LCx 95% stenosis presented with chest pain and sob admitted with NSTEMI to [facility] on 10 /19/21. POD#2 AVR, CABGx2

## 2021-10-30 NOTE — PHYSICAL THERAPY INITIAL EVALUATION ADULT - GENERAL OBSERVATIONS, REHAB EVAL
Pt received seated in chair in NAD, +A-line, +R IJ, +CTs, +external PM, +O2 2L NC,+Sol, +ICU monitoring. Pt AOx4 pleasant and cooperative.

## 2021-10-30 NOTE — PROGRESS NOTE ADULT - ASSESSMENT
Pt. with ESRD on HD TIW admitted for chest pain. now s/p CABG on 10/28      ESRD (end stage renal disease) with Hyperkalemia-  Pt. with ESRD on HD three times a week (TTS).   patient follows with [facility]   last HD on 10/27 for OR Thursday  patient is now s/p CABG on 10/28   noted to be hyperkalemic overnight medically managed  Last HD on 10/29. Pt appears volume up. Pt getting 1PRBC today. Disccussed with CTS. Will plan for 2L UF today.   Dose meds as per HD.      Anemia secondary to renal failure.  Likely in the setting of ESRD. r/o other causes  Hemoglobin below target range.   Continue Retacrit 10,000 unit TIW with HD.   Monitor CBC  Transfuse prn      Hyperphosphatemia.  Pt. on oral phosphate binders with meals.   monitor serum phosphorus.                If you have any questions, please feel free to contact nata Winchester  Nephrology Fellow  Pager [place]: [phone]/ STEVE: [phone]    (After 5 pm or on weekends please page the on-call fellow, can check [email or web address] for schedule. Login is eduardo queen, schedule under [facility] medicine, psych, derm)

## 2021-10-30 NOTE — PROGRESS NOTE ADULT - SUBJECTIVE AND OBJECTIVE BOX
[facility] Division of Kidney Diseases & Hypertension  FOLLOW UP NOTE  [phone]--------------------------------------------------------------------------------  Chief Complaint:Atherosclerosis of native coronary artery without angina pectoris        24 hour events/subjective: Patient seen & examined. Labs & vitals reviewed. s/p intubation yesterday. 3 hrs HD completed yesterday without event and 1 liter net fluid removed.       PAST HISTORY  --------------------------------------------------------------------------------  No significant changes to PMH, PSH, FHx, SHx, unless otherwise noted    ALLERGIES & MEDICATIONS  --------------------------------------------------------------------------------  Allergies    adhesives (Other)  No Known Drug Allergies    Intolerances      Standing Inpatient Medications  acetaminophen     Tablet .. 650 milliGRAM(s) Oral every 6 hours  ascorbic acid 500 milliGRAM(s) Oral two times a day  aspirin enteric coated 81 milliGRAM(s) Oral daily  bisacodyl Suppository 10 milliGRAM(s) Rectal once  chlorhexidine 2% Cloths 1 Application(s) Topical daily  dextrose 50% Injectable 50 milliLiter(s) IV Push every 15 minutes  epoetin hillary-epbx (RETACRIT) Injectable 95027 Unit(s) IV Push <User Schedule>  gabapentin 100 milliGRAM(s) Oral every 12 hours  insulin lispro (ADMELOG) corrective regimen sliding scale   SubCutaneous Before meals and at bedtime  pantoprazole  Injectable 40 milliGRAM(s) IV Push daily  polyethylene glycol 3350 17 Gram(s) Oral daily  senna 2 Tablet(s) Oral at bedtime  sodium chloride 0.9%. 1000 milliLiter(s) IV Continuous <Continuous>    PRN Inpatient Medications  HYDROmorphone  Injectable 0.5 milliGRAM(s) IV Push every 6 hours PRN  oxyCODONE    IR 5 milliGRAM(s) Oral every 4 hours PRN  oxyCODONE    IR 10 milliGRAM(s) Oral every 4 hours PRN      REVIEW OF SYSTEMS  --------------------------------------------------------------------------------  unable to obtain    VITALS/PHYSICAL EXAM  --------------------------------------------------------------------------------  T(C): 36.3 (10-30-21 @ 08:00), Max: 36.9 (10-29-21 @ 16:00)  HR: 79 (10-30-21 @ 11:00) (79 - 82)  BP: 125/77 (10-30-21 @ 11:00) (113/65 - 143/73)  RR: 18 (10-30-21 @ 11:00) (8 - 29)  SpO2: 99% (10-30-21 @ 11:00) (95% - 100%)  Wt(kg): --        10-29-21 @ 07:01  -  10-30-21 @ 07:00  --------------------------------------------------------  IN: 1332.1 mL / OUT: 2630 mL / NET: -1297.9 mL    10-30-21 @ 07:01  -  10-30-21 @ 11:10  --------------------------------------------------------  IN: 330 mL / OUT: 170 mL / NET: 160 mL      Physical Exam:  Gen: NAD  	HEENT: MMM, intubated  	Pulm: CTA B/L  	CV: S1S2  	Abd: Soft, +BS   	Ext: No LE edema B/L  	Neuro: intubated  	Skin: Warm and dry  	Vascular access: [facility]     LABS/STUDIES  --------------------------------------------------------------------------------              7.1    10.84 >-----------<  140      [10-30-21 @ 00:29]              21.6     138  |  98  |  30  ----------------------------<  96      [10-30-21 @ 00:29]  4.5   |  24  |  4.60        Ca     8.8     [10-30-21 @ 00:29]      Mg     2.3     [10-30-21 @ 00:29]      Phos  4.4     [10-30-21 @ 00:29]    TPro  5.7  /  Alb  3.4  /  TBili  0.3  /  DBili  x   /  AST  25  /  ALT  8   /  AlkPhos  77  [10-30-21 @ 00:29]    PT/INR: PT 13.8 , INR 1.16       [10-29-21 @ 00:20]  PTT: 28.5       [10-29-21 @ 00:20]          [10-28-21 @ 16:06]    Creatinine Trend:  SCr 4.60 [10-30 @ 00:29]  SCr 5.48 [10-29 @ 00:20]  SCr 5.02 [10-28 @ 16:06]  SCr 7.54 [10-27 @ 05:51]  SCr 5.26 [10-26 @ 05:13]        TSH 4.99      [10-25-21 @ 08:32]  Lipid: chol 143, , HDL 34, LDL --      [10-25-21 @ 08:32]    HBsAg Nonreact      [10-26-21 @ 07:48]  HCV 13.47, Reactive      [10-26-21 @ 07:48]

## 2021-10-30 NOTE — PHYSICAL THERAPY INITIAL EVALUATION ADULT - ADDITIONAL COMMENTS
Pt lives on second floor of house with roomates. Pt states he was 100% independent w/o PTA.  Pt has 6 steps into house, and 10 steps to 2nd floor.

## 2021-10-30 NOTE — PROGRESS NOTE ADULT - SUBJECTIVE AND OBJECTIVE BOX
KLAUS DUNN  MRN-[id number]  Patient is a 61y old  Male who presents with a chief complaint of CAD (29 Oct 2021 07:02)    HPI:   This is a  60 Y/o Thin cachetic   male PMHx of ESRD on HD (t/th/sat) X 10 yrs  via left forearm AVF, HTN, Hep C s/p treatment, latent TB, CAD c LCx 95% stenosis presented with chest pain and sob admitted with NSTEMI to [facility] on 10 /19/21. + Cardiac Biomarkers-Plavix loaded and heparinized for NSTEMI . Underwent SAVANA on 10/21 revealed  severe aortic stenosis    Cardiac Cath 10/22-- Severe stenosis ostial Ramus and ostial LCX Last dose Plavix on 10/22  Stabilized transferred to [facility] for CABG/AVR with Dr Joaquin.  Received Pfizer Vaccine x 2         (25 Oct 2021 00:10)      Surgery/Hospital course:  10/28 C2V, AVR-t    Today:    REVIEW OF SYSTEMS:  Gen: No fever  EYES/ENT: No visual changes;  No vertigo or throat pain   NECK: No pain   RES:  No shortness of breath or Cough  Chest: + incisional pain  CARD: No chest pain   GI: No abdominal pain  : No dysuria  NEURO: No weakness  SKIN: No itching, rashes     Physical Exam:  Vital Signs Last 24 Hrs  T(C): 36.3 (30 Oct 2021 08:00), Max: 36.9 (29 Oct 2021 16:00)  T(F): 97.3 (30 Oct 2021 08:00), Max: 98.5 (29 Oct 2021 16:00)  HR: 79 (30 Oct 2021 09:00) (79 - 82)  BP: 143/73 (30 Oct 2021 09:00) (113/65 - 143/73)  BP(mean): 103 (30 Oct 2021 09:00) (84 - 103)  RR: 25 (30 Oct 2021 09:00) (8 - 29)  SpO2: 100% (30 Oct 2021 09:00) (95% - 100%)  Gen:  Awake, alert   CNS: non focal 	  Neck: no JVD  RES : clear , no wheezing    Chest:   + chest tubes                     CVS: Regular  rhythm. Normal S1/S2  Abd: Soft, non-distended. Bowel sounds present.  Skin: No rash.  Ext:  no edema, A Line  ============================I/O===========================   I&O's Detail    29 Oct 2021 07:01  -  30 Oct 2021 07:00  --------------------------------------------------------  IN:    Dexmedetomidine: 77.1 mL    Enteral Tube Flush: 90 mL    IV PiggyBack: 50 mL    NiCARdipine: 15 mL    Oral Fluid: 60 mL    Other (mL): 800 mL    sodium chloride 0.9%: 240 mL  Total IN: 1332.1 mL    OUT:    Chest Tube (mL): 580 mL    Chest Tube (mL): 250 mL    DOBUTamine: 0 mL    Insulin: 0 mL    Norepinephrine: 0 mL    Other (mL): 1800 mL    Vasopressin: 0 mL  Total OUT: 2630 mL    Total NET: -1297.9 mL      30 Oct 2021 07:01  -  30 Oct 2021 09:31  --------------------------------------------------------  IN:    Oral Fluid: 280 mL    sodium chloride 0.9%: 20 mL  Total IN: 300 mL    OUT:    Chest Tube (mL): 40 mL    Chest Tube (mL): 110 mL  Total OUT: 150 mL    Total NET: 150 mL        ============================ LABS =========================                        7.1    10.84 )-----------( 140      ( 30 Oct 2021 00:29 )             21.6     10-30    138  |  98  |  30<H>  ----------------------------<  96  4.5   |  24  |  4.60<H>    Ca    8.8      30 Oct 2021 00:29  Phos  4.4     10-30  Mg     2.3     10-30    TPro  5.7<L>  /  Alb  3.4  /  TBili  0.3  /  DBili  x   /  AST  25  /  ALT  8<L>  /  AlkPhos  77  10-30    LIVER FUNCTIONS - ( 30 Oct 2021 00:29 )  Alb: 3.4 g/dL / Pro: 5.7 g/dL / ALK PHOS: 77 U/L / ALT: 8 U/L / AST: 25 U/L / GGT: x           PT/INR - ( 29 Oct 2021 00:20 )   PT: 13.8 sec;   INR: 1.16 ratio         PTT - ( 29 Oct 2021 00:20 )  PTT:28.5 sec  ABG - ( 30 Oct 2021 00:11 )  pH, Arterial: 7.40  pH, Blood: x     /  pCO2: 45    /  pO2: 93    / HCO3: 28    / Base Excess: 2.8   /  SaO2: 98.7                ======================Micro/Rad/Cardio=================  CXR: Reviewed  Echo:Reviewed  ======================================================  PAST MEDICAL & SURGICAL HISTORY:  End Stage Renal Disease on Dialysis  since 2009 - Tues, Thurs, Sat    HTN (hypertension)    Chronic renal failure    Hepatitis C    TB (tuberculosis)  Latent    CHF (congestive heart failure)  Mild    AVF (arteriovenous fistula)  placed 2009 - Left side    S/P appendectomy  at age 7      ====================ASSESSMENT ==============  Severe aortic stenosis and CAD status post coronary artery bypass graft x2 and aortic valve replacement on 10/28/21  Aneurysm status post arteriovenous fistula on 4/09/14   Hypertension  Congestive heart failure  Hepatitis C  End stage renal disease   Acute blood loss anemia status post 4u PRBC, 5 cryo, 2 plt, and 500 FEIBA, 2 FFP, and DDAVP   Elevated BUN/Cr  Stress Hyperglycemia     ====================== NEUROLOGY=====================  Continue close monitoring of neuro status   Tylenol, Gabapentin, Hydromorphone, and Oxycodone for pain management     acetaminophen     Tablet .. 650 milliGRAM(s) Oral every 6 hours  gabapentin 100 milliGRAM(s) Oral every 12 hours  HYDROmorphone  Injectable 0.5 milliGRAM(s) IV Push every 6 hours PRN Severe Pain (7 - 10)  oxyCODONE    IR 5 milliGRAM(s) Oral every 4 hours PRN Moderate Pain (4 - 6)  oxyCODONE    IR 10 milliGRAM(s) Oral every 4 hours PRN Severe Pain (7 - 10)    ==================== RESPIRATORY======================  Supplemental O2 via CPAP  Encourage incentive spirometry, continue pulse ox monitoring, follow ABGs     Mechanical Ventilation:  Mode: CPAP with PS  FiO2: 40  PEEP: 5  PS: 5      ====================CARDIOVASCULAR==================  Severe aortic stenosis and CAD status post coronary artery bypass graft x2 and aortic valve replacement on 10/28/21  Hypertension   Continue invasive hemodynamic monitoring   Lactate peaked on 10/28 to 2.2, now 0.8 continue trending  ASA for graft patency     aspirin enteric coated 81 milliGRAM(s) Oral daily    ===================HEMATOLOGIC/ONC ===================  Continue to monitor H&H/Plts     ===================== RENAL =========================  Continue to monitor I/Os, BUN/Creatinine, and urine output  Replete lytes PRN. Keep K> 4 and Mg >2     ==================== GASTROINTESTINAL===================  Tolerating a consistent carb full liquid diet   Bowel regimen with Miralax and senna     ascorbic acid 500 milliGRAM(s) Oral two times a day  bisacodyl Suppository 10 milliGRAM(s) Rectal once  GI prophylaxis, pantoprazole  Injectable 40 milliGRAM(s) IV Push daily  polyethylene glycol 3350 17 Gram(s) Oral daily  senna 2 Tablet(s) Oral at bedtime  sodium chloride 0.9%. 1000 milliLiter(s) (10 mL/Hr) IV Continuous <Continuous>    =======================    ENDOCRINE  =====================  Stress hyperglycemia, continue glucose control with admelog sliding scale     dextrose 50% Injectable 50 milliLiter(s) IV Push every 15 minutes  insulin lispro (ADMELOG) corrective regimen sliding scale   SubCutaneous Before meals and at bedtime    ========================INFECTIOUS DISEASE================  Afebrile, WBC within normal limits  Continue trending WBC and monitoring fever curve         By signing my name below, I, Kati Arreaga, attest that this documentation has been prepared under the direction and in the presence of Andrea Key MD.  Electronically signed: Mando Ware, 10-30-21 @ 09:31    I, Andrea Key, personally performed the services described in this documentation. all medical record entries made by the mando were at my direction and in my presence. I have reviewed the chart and agree that the record reflects my personal performance and is accurate and complete  Electronically signed: Andrea Key 10-30-21 @ 09:31       KLAUS DUNN  MRN-[id number]  Patient is a 61y old  Male who presents with a chief complaint of CAD (29 Oct 2021 07:02)    HPI:   This is a  60 Y/o Thin cachetic   male PMHx of ESRD on HD (t/th/sat) X 10 yrs  via left forearm AVF, HTN, Hep C s/p treatment, latent TB, CAD c LCx 95% stenosis presented with chest pain and sob admitted with NSTEMI to [facility] on 10 /19/21. + Cardiac Biomarkers-Plavix loaded and heparinized for NSTEMI . Underwent SAVANA on 10/21 revealed  severe aortic stenosis    Cardiac Cath 10/22-- Severe stenosis ostial Ramus and ostial LCX Last dose Plavix on 10/22  Stabilized transferred to [facility] for CABG/AVR with Dr Joaquin.  Received Pfizer Vaccine x 2         (25 Oct 2021 00:10)      Surgery/Hospital course:  10/28 C2V, AVR-t    Today:  - Continue dialysis as tolerated.   - Cardene weaned to off this AM.     REVIEW OF SYSTEMS:  Gen: No fever  EYES/ENT: No visual changes;  No vertigo or throat pain   NECK: No pain   RES:  No shortness of breath or Cough  Chest: + incisional pain  CARD: No chest pain   GI: No abdominal pain  : No dysuria  NEURO: No weakness  SKIN: No itching, rashes     Physical Exam:  Vital Signs Last 24 Hrs  T(C): 36.3 (30 Oct 2021 08:00), Max: 36.9 (29 Oct 2021 16:00)  T(F): 97.3 (30 Oct 2021 08:00), Max: 98.5 (29 Oct 2021 16:00)  HR: 79 (30 Oct 2021 09:00) (79 - 82)  BP: 143/73 (30 Oct 2021 09:00) (113/65 - 143/73)  BP(mean): 103 (30 Oct 2021 09:00) (84 - 103)  RR: 25 (30 Oct 2021 09:00) (8 - 29)  SpO2: 100% (30 Oct 2021 09:00) (95% - 100%)  Gen:  Awake, alert   CNS: non focal 	  Neck: no JVD  RES : clear , no wheezing    Chest:   + chest tubes                     CVS: Regular  rhythm. Normal S1/S2  Abd: Soft, non-distended. Bowel sounds present.  Skin: No rash.  Ext:  no edema, A Line  ============================I/O===========================   I&O's Detail    29 Oct 2021 07:01  -  30 Oct 2021 07:00  --------------------------------------------------------  IN:    Dexmedetomidine: 77.1 mL    Enteral Tube Flush: 90 mL    IV PiggyBack: 50 mL    NiCARdipine: 15 mL    Oral Fluid: 60 mL    Other (mL): 800 mL    sodium chloride 0.9%: 240 mL  Total IN: 1332.1 mL    OUT:    Chest Tube (mL): 580 mL    Chest Tube (mL): 250 mL    DOBUTamine: 0 mL    Insulin: 0 mL    Norepinephrine: 0 mL    Other (mL): 1800 mL    Vasopressin: 0 mL  Total OUT: 2630 mL    Total NET: -1297.9 mL      30 Oct 2021 07:01  -  30 Oct 2021 09:31  --------------------------------------------------------  IN:    Oral Fluid: 280 mL    sodium chloride 0.9%: 20 mL  Total IN: 300 mL    OUT:    Chest Tube (mL): 40 mL    Chest Tube (mL): 110 mL  Total OUT: 150 mL    Total NET: 150 mL        ============================ LABS =========================                        7.1    10.84 )-----------( 140      ( 30 Oct 2021 00:29 )             21.6     10-30    138  |  98  |  30<H>  ----------------------------<  96  4.5   |  24  |  4.60<H>    Ca    8.8      30 Oct 2021 00:29  Phos  4.4     10-30  Mg     2.3     10-30    TPro  5.7<L>  /  Alb  3.4  /  TBili  0.3  /  DBili  x   /  AST  25  /  ALT  8<L>  /  AlkPhos  77  10-30    LIVER FUNCTIONS - ( 30 Oct 2021 00:29 )  Alb: 3.4 g/dL / Pro: 5.7 g/dL / ALK PHOS: 77 U/L / ALT: 8 U/L / AST: 25 U/L / GGT: x           PT/INR - ( 29 Oct 2021 00:20 )   PT: 13.8 sec;   INR: 1.16 ratio         PTT - ( 29 Oct 2021 00:20 )  PTT:28.5 sec  ABG - ( 30 Oct 2021 00:11 )  pH, Arterial: 7.40  pH, Blood: x     /  pCO2: 45    /  pO2: 93    / HCO3: 28    / Base Excess: 2.8   /  SaO2: 98.7                ======================Micro/Rad/Cardio=================  CXR: Reviewed  Echo:Reviewed  ======================================================  PAST MEDICAL & SURGICAL HISTORY:  End Stage Renal Disease on Dialysis  since 2009 - Jacinta Mora, Sat    HTN (hypertension)    Chronic renal failure    Hepatitis C    TB (tuberculosis)  Latent    CHF (congestive heart failure)  Mild    AVF (arteriovenous fistula)  placed 2009 - Left side    S/P appendectomy  at age 7      ====================ASSESSMENT ==============  Severe aortic stenosis and CAD status post coronary artery bypass graft x2 and aortic valve replacement on 10/28/21  Aneurysm status post arteriovenous fistula on 4/09/14   Hypertension  Congestive heart failure  Hepatitis C  End stage renal disease   Acute blood loss anemia status post 4u PRBC, 5 cryo, 2 plt, and 500 FEIBA, 2 FFP, and DDAVP   Elevated BUN/Cr  Stress Hyperglycemia   Thrombocytopenia     ====================== NEUROLOGY=====================  Continue close monitoring of neuro status   Tylenol, Gabapentin, Hydromorphone, and Oxycodone for pain management     acetaminophen     Tablet .. 650 milliGRAM(s) Oral every 6 hours  gabapentin 100 milliGRAM(s) Oral every 12 hours  HYDROmorphone  Injectable 0.5 milliGRAM(s) IV Push every 6 hours PRN Severe Pain (7 - 10)  oxyCODONE    IR 5 milliGRAM(s) Oral every 4 hours PRN Moderate Pain (4 - 6)  oxyCODONE    IR 10 milliGRAM(s) Oral every 4 hours PRN Severe Pain (7 - 10)    ==================== RESPIRATORY======================  Supplemental O2 via CPAP  Encourage incentive spirometry, continue pulse ox monitoring, follow ABGs     Mechanical Ventilation:  Mode: CPAP with PS  FiO2: 40  PEEP: 5  PS: 5      ====================CARDIOVASCULAR==================  Severe aortic stenosis and CAD status post coronary artery bypass graft x2 and aortic valve replacement on 10/28/21  Hypertension   Continue invasive hemodynamic monitoring   Lactate peaked on 10/28 to 2.2, now 0.8 continue trending  ASA for graft patency     aspirin enteric coated 81 milliGRAM(s) Oral daily    ===================HEMATOLOGIC/ONC ===================  Thrombocytopenia, Continue to monitor H&H/Plts     ===================== RENAL =========================  Continue to monitor I/Os, BUN/Creatinine, and urine output  Replete lytes PRN. Keep K> 4 and Mg >2     ==================== GASTROINTESTINAL===================  Tolerating a consistent carb full liquid diet   Bowel regimen with Miralax and senna     ascorbic acid 500 milliGRAM(s) Oral two times a day  bisacodyl Suppository 10 milliGRAM(s) Rectal once  GI prophylaxis, pantoprazole  Injectable 40 milliGRAM(s) IV Push daily  polyethylene glycol 3350 17 Gram(s) Oral daily  senna 2 Tablet(s) Oral at bedtime  sodium chloride 0.9%. 1000 milliLiter(s) (10 mL/Hr) IV Continuous <Continuous>    =======================    ENDOCRINE  =====================  Stress hyperglycemia, continue glucose control with admelog sliding scale     dextrose 50% Injectable 50 milliLiter(s) IV Push every 15 minutes  insulin lispro (ADMELOG) corrective regimen sliding scale   SubCutaneous Before meals and at bedtime    ========================INFECTIOUS DISEASE================  Afebrile, WBC within normal limits  Continue trending WBC and monitoring fever curve         By signing my name below, I, Kati Arreaga, attest that this documentation has been prepared under the direction and in the presence of Andrea Key MD.  Electronically signed: Mando Ware, 10-30-21 @ 09:31    I, Andrea Key, personally performed the services described in this documentation. all medical record entries made by the mando were at my direction and in my presence. I have reviewed the chart and agree that the record reflects my personal performance and is accurate and complete  Electronically signed: Andrea Key 10-30-21 @ 09:31

## 2021-10-31 LAB
ALBUMIN SERPL ELPH-MCNC: 3.8 G/DL — SIGNIFICANT CHANGE UP (ref 3.3–5)
ALP SERPL-CCNC: 85 U/L — SIGNIFICANT CHANGE UP (ref 40–120)
ALT FLD-CCNC: 11 U/L — SIGNIFICANT CHANGE UP (ref 10–45)
ANION GAP SERPL CALC-SCNC: 17 MMOL/L — SIGNIFICANT CHANGE UP (ref 5–17)
AST SERPL-CCNC: 26 U/L — SIGNIFICANT CHANGE UP (ref 10–40)
BILIRUB SERPL-MCNC: 0.3 MG/DL — SIGNIFICANT CHANGE UP (ref 0.2–1.2)
BUN SERPL-MCNC: 48 MG/DL — HIGH (ref 7–23)
CALCIUM SERPL-MCNC: 9 MG/DL — SIGNIFICANT CHANGE UP (ref 8.4–10.5)
CHLORIDE SERPL-SCNC: 96 MMOL/L — SIGNIFICANT CHANGE UP (ref 96–108)
CLOSURE TME COLL+EPINEP BLD: 119 K/UL — LOW (ref 150–400)
CO2 SERPL-SCNC: 22 MMOL/L — SIGNIFICANT CHANGE UP (ref 22–31)
CREAT SERPL-MCNC: 6.43 MG/DL — HIGH (ref 0.5–1.3)
GAS PNL BLDA: SIGNIFICANT CHANGE UP
GLUCOSE BLDC GLUCOMTR-MCNC: 103 MG/DL — HIGH (ref 70–99)
GLUCOSE BLDC GLUCOMTR-MCNC: 110 MG/DL — HIGH (ref 70–99)
GLUCOSE BLDC GLUCOMTR-MCNC: 111 MG/DL — HIGH (ref 70–99)
GLUCOSE SERPL-MCNC: 102 MG/DL — HIGH (ref 70–99)
HCT VFR BLD CALC: 25.1 % — LOW (ref 39–50)
HGB BLD-MCNC: 8.2 G/DL — LOW (ref 13–17)
MAGNESIUM SERPL-MCNC: 2.6 MG/DL — SIGNIFICANT CHANGE UP (ref 1.6–2.6)
MCHC RBC-ENTMCNC: 27.1 PG — SIGNIFICANT CHANGE UP (ref 27–34)
MCHC RBC-ENTMCNC: 32.7 GM/DL — SIGNIFICANT CHANGE UP (ref 32–36)
MCV RBC AUTO: 82.8 FL — SIGNIFICANT CHANGE UP (ref 80–100)
NRBC # BLD: 0 /100 WBCS — SIGNIFICANT CHANGE UP (ref 0–0)
PHOSPHATE SERPL-MCNC: 5.7 MG/DL — HIGH (ref 2.5–4.5)
PLATELET # BLD AUTO: SIGNIFICANT CHANGE UP K/UL (ref 150–400)
POTASSIUM SERPL-MCNC: 4.8 MMOL/L — SIGNIFICANT CHANGE UP (ref 3.5–5.3)
POTASSIUM SERPL-SCNC: 4.8 MMOL/L — SIGNIFICANT CHANGE UP (ref 3.5–5.3)
PROT SERPL-MCNC: 6.3 G/DL — SIGNIFICANT CHANGE UP (ref 6–8.3)
RBC # BLD: 3.03 M/UL — LOW (ref 4.2–5.8)
RBC # FLD: 17.9 % — HIGH (ref 10.3–14.5)
SODIUM SERPL-SCNC: 135 MMOL/L — SIGNIFICANT CHANGE UP (ref 135–145)
WBC # BLD: 12.06 K/UL — HIGH (ref 3.8–10.5)
WBC # FLD AUTO: 12.06 K/UL — HIGH (ref 3.8–10.5)

## 2021-10-31 PROCEDURE — 99233 SBSQ HOSP IP/OBS HIGH 50: CPT | Mod: GC

## 2021-10-31 PROCEDURE — 99233 SBSQ HOSP IP/OBS HIGH 50: CPT

## 2021-10-31 PROCEDURE — 71045 X-RAY EXAM CHEST 1 VIEW: CPT | Mod: 26

## 2021-10-31 RX ORDER — CALCIUM ACETATE 667 MG
667 TABLET ORAL
Refills: 0 | Status: DISCONTINUED | OUTPATIENT
Start: 2021-10-31 | End: 2021-11-02

## 2021-10-31 RX ORDER — NICARDIPINE HYDROCHLORIDE 30 MG/1
3 CAPSULE, EXTENDED RELEASE ORAL
Qty: 40 | Refills: 0 | Status: DISCONTINUED | OUTPATIENT
Start: 2021-10-31 | End: 2021-11-01

## 2021-10-31 RX ORDER — HYDRALAZINE HCL 50 MG
10 TABLET ORAL ONCE
Refills: 0 | Status: COMPLETED | OUTPATIENT
Start: 2021-10-31 | End: 2021-10-31

## 2021-10-31 RX ORDER — LISINOPRIL 2.5 MG/1
10 TABLET ORAL DAILY
Refills: 0 | Status: DISCONTINUED | OUTPATIENT
Start: 2021-10-31 | End: 2021-11-01

## 2021-10-31 RX ORDER — PANTOPRAZOLE SODIUM 20 MG/1
40 TABLET, DELAYED RELEASE ORAL
Refills: 0 | Status: DISCONTINUED | OUTPATIENT
Start: 2021-10-31 | End: 2021-11-02

## 2021-10-31 RX ADMIN — Medication 650 MILLIGRAM(S): at 05:40

## 2021-10-31 RX ADMIN — POLYETHYLENE GLYCOL 3350 17 GRAM(S): 17 POWDER, FOR SOLUTION ORAL at 12:00

## 2021-10-31 RX ADMIN — HEPARIN SODIUM 5000 UNIT(S): 5000 INJECTION INTRAVENOUS; SUBCUTANEOUS at 16:20

## 2021-10-31 RX ADMIN — LISINOPRIL 10 MILLIGRAM(S): 2.5 TABLET ORAL at 05:11

## 2021-10-31 RX ADMIN — Medication 667 MILLIGRAM(S): at 09:27

## 2021-10-31 RX ADMIN — HEPARIN SODIUM 5000 UNIT(S): 5000 INJECTION INTRAVENOUS; SUBCUTANEOUS at 21:18

## 2021-10-31 RX ADMIN — Medication 650 MILLIGRAM(S): at 12:00

## 2021-10-31 RX ADMIN — HEPARIN SODIUM 5000 UNIT(S): 5000 INJECTION INTRAVENOUS; SUBCUTANEOUS at 05:10

## 2021-10-31 RX ADMIN — OXYCODONE HYDROCHLORIDE 10 MILLIGRAM(S): 5 TABLET ORAL at 02:30

## 2021-10-31 RX ADMIN — SENNA PLUS 2 TABLET(S): 8.6 TABLET ORAL at 21:15

## 2021-10-31 RX ADMIN — OXYCODONE HYDROCHLORIDE 10 MILLIGRAM(S): 5 TABLET ORAL at 02:48

## 2021-10-31 RX ADMIN — Medication 650 MILLIGRAM(S): at 11:57

## 2021-10-31 RX ADMIN — Medication 500 MILLIGRAM(S): at 17:56

## 2021-10-31 RX ADMIN — Medication 10 MILLIGRAM(S): at 09:00

## 2021-10-31 RX ADMIN — CHLORHEXIDINE GLUCONATE 1 APPLICATION(S): 213 SOLUTION TOPICAL at 12:17

## 2021-10-31 RX ADMIN — Medication 650 MILLIGRAM(S): at 05:10

## 2021-10-31 RX ADMIN — Medication 500 MILLIGRAM(S): at 05:10

## 2021-10-31 RX ADMIN — Medication 667 MILLIGRAM(S): at 17:56

## 2021-10-31 RX ADMIN — PANTOPRAZOLE SODIUM 40 MILLIGRAM(S): 20 TABLET, DELAYED RELEASE ORAL at 12:01

## 2021-10-31 RX ADMIN — Medication 81 MILLIGRAM(S): at 12:00

## 2021-10-31 RX ADMIN — GABAPENTIN 100 MILLIGRAM(S): 400 CAPSULE ORAL at 05:10

## 2021-10-31 RX ADMIN — Medication 1.25 MILLIGRAM(S): at 00:57

## 2021-10-31 RX ADMIN — Medication 667 MILLIGRAM(S): at 12:00

## 2021-10-31 RX ADMIN — GABAPENTIN 100 MILLIGRAM(S): 400 CAPSULE ORAL at 17:56

## 2021-10-31 NOTE — PROGRESS NOTE ADULT - SUBJECTIVE AND OBJECTIVE BOX
[facility] Division of Kidney Diseases & Hypertension  FOLLOW UP NOTE  [phone]--------------------------------------------------------------------------------  Chief Complaint:Atherosclerosis of native coronary artery without angina pectoris        24 hour events/subjective: Patient seen & examined. Labs & vitals reviewed. Remains intubated this am with no change in FiO2. Last full HD with 1L UF on 10/29 & UF of 2L only on 10/30.         PAST HISTORY  --------------------------------------------------------------------------------  No significant changes to PMH, PSH, FHx, SHx, unless otherwise noted    ALLERGIES & MEDICATIONS  --------------------------------------------------------------------------------  Allergies    adhesives (Other)  No Known Drug Allergies    Intolerances      Standing Inpatient Medications  acetaminophen     Tablet .. 650 milliGRAM(s) Oral every 6 hours  amLODIPine   Tablet 10 milliGRAM(s) Oral daily  ascorbic acid 500 milliGRAM(s) Oral two times a day  aspirin enteric coated 81 milliGRAM(s) Oral daily  bisacodyl Suppository 10 milliGRAM(s) Rectal once  calcium acetate 667 milliGRAM(s) Oral three times a day with meals  chlorhexidine 2% Cloths 1 Application(s) Topical daily  dextrose 50% Injectable 50 milliLiter(s) IV Push every 15 minutes  epoetin hillary-epbx (RETACRIT) Injectable 81741 Unit(s) IV Push <User Schedule>  gabapentin 100 milliGRAM(s) Oral every 12 hours  heparin   Injectable 5000 Unit(s) SubCutaneous every 8 hours  insulin lispro (ADMELOG) corrective regimen sliding scale   SubCutaneous Before meals and at bedtime  lisinopril 10 milliGRAM(s) Oral daily  pantoprazole  Injectable 40 milliGRAM(s) IV Push daily  polyethylene glycol 3350 17 Gram(s) Oral daily  senna 2 Tablet(s) Oral at bedtime  sodium chloride 0.9%. 1000 milliLiter(s) IV Continuous <Continuous>    PRN Inpatient Medications  acetaminophen     Tablet .. 650 milliGRAM(s) Oral every 6 hours PRN  oxyCODONE    IR 5 milliGRAM(s) Oral every 4 hours PRN  oxyCODONE    IR 10 milliGRAM(s) Oral every 4 hours PRN      REVIEW OF SYSTEMS  --------------------------------------------------------------------------------  unable to obtain      VITALS/PHYSICAL EXAM  --------------------------------------------------------------------------------  T(C): 36.9 (10-31-21 @ 00:00), Max: 36.9 (10-31-21 @ 00:00)  HR: 63 (10-31-21 @ 09:30) (57 - 79)  BP: 142/68 (10-31-21 @ 08:45) (122/62 - 170/79)  RR: 25 (10-31-21 @ 09:30) (12 - 55)  SpO2: 98% (10-31-21 @ 09:30) (91% - 100%)  Wt(kg): --        10-30-21 @ 07:01  -  10-31-21 @ 07:00  --------------------------------------------------------  IN: 1460 mL / OUT: 2390 mL / NET: -930 mL    10-31-21 @ 07:01  -  10-31-21 @ 10:31  --------------------------------------------------------  IN: 20 mL / OUT: 0 mL / NET: 20 mL      Physical Exam:  	Gen: NAD  	HEENT: MMM, intubated  	Pulm: CTA B/L  	CV: S1S2  	Abd: Soft, +BS   	Ext: No LE edema B/L  	Neuro: intubated  	Skin: Warm and dry  	Vascular access: [facility]S       LABS/STUDIES  --------------------------------------------------------------------------------              8.2    12.06 >-----------<  CLMP     [10-31-21 @ 00:46]              25.1     135  |  96  |  48  ----------------------------<  102      [10-31-21 @ 00:46]  4.8   |  22  |  6.43        Ca     9.0     [10-31-21 @ 00:46]      Mg     2.6     [10-31-21 @ 00:46]      Phos  5.7     [10-31-21 @ 00:46]    TPro  6.3  /  Alb  3.8  /  TBili  0.3  /  DBili  x   /  AST  26  /  ALT  11  /  AlkPhos  85  [10-31-21 @ 00:46]          Creatinine Trend:  SCr 6.43 [10-31 @ 00:46]  SCr 4.60 [10-30 @ 00:29]  SCr 5.48 [10-29 @ 00:20]  SCr 5.02 [10-28 @ 16:06]  SCr 7.54 [10-27 @ 05:51]        TSH 4.99      [10-25-21 @ 08:32]  Lipid: chol 143, , HDL 34, LDL --      [10-25-21 @ 08:32]    HBsAg Nonreact      [10-26-21 @ 07:48]  HCV 13.47, Reactive      [10-26-21 @ 07:48]

## 2021-10-31 NOTE — PROGRESS NOTE ADULT - SUBJECTIVE AND OBJECTIVE BOX
KLAUS DUNN  MRN-[id number]  Patient is a 61y old  Male who presents with a chief complaint of CAD (31 Oct 2021 10:31)    HPI:   This is a  60 Y/o Thin cachetic   male PMHx of ESRD on HD (t/th/sat) X 10 yrs  via left forearm AVF, HTN, Hep C s/p treatment, latent TB, CAD c LCx 95% stenosis presented with chest pain and sob admitted with NSTEMI to [facility] on 10 /19/21. + Cardiac Biomarkers-Plavix loaded and heparinized for NSTEMI . Underwent SAVANA on 10/21 revealed  severe aortic stenosis    Cardiac Cath 10/22-- Severe stenosis ostial Ramus and ostial LCX Last dose Plavix on 10/22  Stabilized transferred to [facility] for CABG/AVR with Dr Joaquin.  Received Pfizer Vaccine x 2         (25 Oct 2021 00:10)      Surgery/Hospital course:    Today:    REVIEW OF SYSTEMS:  Gen: No fever  EYES/ENT: No visual changes;  No vertigo or throat pain   NECK: No pain   RES:  No shortness of breath or Cough  Chest: + incisional pain  CARD: No chest pain   GI: No abdominal pain  : No dysuria  NEURO: No weakness  SKIN: No itching, rashes     Physical Exam:  Vital Signs Last 24 Hrs  T(C): 36.4 (31 Oct 2021 13:35), Max: 36.9 (31 Oct 2021 00:00)  T(F): 97.5 (31 Oct 2021 13:35), Max: 98.4 (31 Oct 2021 00:00)  HR: 62 (31 Oct 2021 14:00) (57 - 72)  BP: 142/68 (31 Oct 2021 08:45) (122/62 - 170/79)  BP(mean): 98 (31 Oct 2021 08:45) (86 - 113)  RR: 23 (31 Oct 2021 14:00) (12 - 55)  SpO2: 100% (31 Oct 2021 14:00) (91% - 100%)  Gen:  Awake, alert   CNS: non focal 	  Neck: no JVD  RES : clear , no wheezing    Chest:   + chest tubes                     CVS: Regular  rhythm. Normal S1/S2  Abd: Soft, non-distended. Bowel sounds present.  Skin: No rash.  Ext:  no edema, A Line  PSY:  ============================I/O===========================   I&O's Detail    30 Oct 2021 07:01  -  31 Oct 2021 07:00  --------------------------------------------------------  IN:    Oral Fluid: 920 mL    PRBCs (Packed Red Blood Cells): 300 mL    sodium chloride 0.9%: 240 mL  Total IN: 1460 mL    OUT:    Chest Tube (mL): 80 mL    Chest Tube (mL): 310 mL    Other (mL): 2000 mL  Total OUT: 2390 mL    Total NET: -930 mL      31 Oct 2021 07:01  -  31 Oct 2021 15:22  --------------------------------------------------------  IN:    NiCARdipine: 60 mL    sodium chloride 0.9%: 70 mL  Total IN: 130 mL    OUT:    Chest Tube (mL): 40 mL  Total OUT: 40 mL    Total NET: 90 mL        ============================ LABS =========================                        8.2    12.06 )-----------( CLMP     ( 31 Oct 2021 00:46 )             25.1     10-31    135  |  96  |  48<H>  ----------------------------<  102<H>  4.8   |  22  |  6.43<H>    Ca    9.0      31 Oct 2021 00:46  Phos  5.7     10-31  Mg     2.6     10-31    TPro  6.3  /  Alb  3.8  /  TBili  0.3  /  DBili  x   /  AST  26  /  ALT  11  /  AlkPhos  85  10-31    LIVER FUNCTIONS - ( 31 Oct 2021 00:46 )  Alb: 3.8 g/dL / Pro: 6.3 g/dL / ALK PHOS: 85 U/L / ALT: 11 U/L / AST: 26 U/L / GGT: x             ABG - ( 31 Oct 2021 00:16 )  pH, Arterial: 7.42  pH, Blood: x     /  pCO2: 38    /  pO2: 131   / HCO3: 25    / Base Excess: 0.2   /  SaO2: 99.0                ======================Micro/Rad/Cardio=================  Culture: Reviewed   CXR: Reviewed  Echo:Reviewed  ======================================================  PAST MEDICAL & SURGICAL HISTORY:  End Stage Renal Disease on Dialysis  since 2009 - Jacinta Mora, Sat    HTN (hypertension)    Chronic renal failure    Hepatitis C    TB (tuberculosis)  Latent    CHF (congestive heart failure)  Mild    AVF (arteriovenous fistula)  placed 2009 - Left side    S/P appendectomy  at age 7      ====================ASSESMENT ==============  CNS:  RES:  CVS:  Hem:  Lit:  GI:  Endo:  ID:  Skin  Plan:  ====================== NEUROLOGY=====================  acetaminophen     Tablet .. 650 milliGRAM(s) Oral every 6 hours PRN Mild Pain (1 - 3)  gabapentin 100 milliGRAM(s) Oral every 12 hours  oxyCODONE    IR 5 milliGRAM(s) Oral every 4 hours PRN Moderate Pain (4 - 6)  oxyCODONE    IR 10 milliGRAM(s) Oral every 4 hours PRN Severe Pain (7 - 10)    ==================== RESPIRATORY======================  Mechanical Ventilation:      ====================CARDIOVASCULAR==================  amLODIPine   Tablet 10 milliGRAM(s) Oral daily  lisinopril 10 milliGRAM(s) Oral daily  niCARdipine Infusion 3 mG/Hr (15 mL/Hr) IV Continuous <Continuous>    ===================HEMATOLOGIC/ONC ===================  aspirin enteric coated 81 milliGRAM(s) Oral daily  heparin   Injectable 5000 Unit(s) SubCutaneous every 8 hours    ===================== RENAL =========================  Sol for monitoring urine output    ==================== GASTROINTESTINAL===================  ascorbic acid 500 milliGRAM(s) Oral two times a day  bisacodyl Suppository 10 milliGRAM(s) Rectal once  calcium acetate 667 milliGRAM(s) Oral three times a day with meals  pantoprazole    Tablet 40 milliGRAM(s) Oral before breakfast  polyethylene glycol 3350 17 Gram(s) Oral daily  senna 2 Tablet(s) Oral at bedtime  sodium chloride 0.9%. 1000 milliLiter(s) (10 mL/Hr) IV Continuous <Continuous>    =======================    ENDOCRINE  =====================  dextrose 50% Injectable 50 milliLiter(s) IV Push every 15 minutes  insulin lispro (ADMELOG) corrective regimen sliding scale   SubCutaneous Before meals and at bedtime    ========================INFECTIOUS DISEASE================    .crit

## 2021-10-31 NOTE — PROGRESS NOTE ADULT - ASSESSMENT
Pt. with ESRD on HD TIW admitted for chest pain. now s/p CABG on 10/28      ESRD (end stage renal disease) with Hyperkalemia-  Pt. with ESRD on HD three times a week (TTS).   patient follows with [facility]   last HD on 10/27 for OR Thursday  patient is now s/p CABG on 10/28   noted to be hyperkalemic overnight medically managed   Remains intubated this am with no change in FiO2. Last full HD with 1L UF on 10/29 & UF of 2L only on 10/30. s/p 1PRBC on 10/30. Disccussed with CTS. CXR with worsening pulm vasc congestion. Will plan for HD with 2L UF today.    Dose meds as per HD.      Anemia secondary to renal failure.  Likely in the setting of ESRD. r/o other causes  Hemoglobin below target range.   Continue Retacrit 10,000 unit TIW with HD.   Monitor CBC  Transfuse prn      Hyperphosphatemia.  Pt. on oral phosphate binders with meals.   monitor serum phosphorus.                If you have any questions, please feel free to contact me  Adriana Winchester  Nephrology Fellow  Pager NS: [phone]/ STEVE: [phone]    (After 5 pm or on weekends please page the on-call fellow, can check [email or web address] for schedule. Login is eduardo queen, schedule under [facility] medicine, psych, derm)

## 2021-11-01 DIAGNOSIS — Z95.2 PRESENCE OF PROSTHETIC HEART VALVE: ICD-10-CM

## 2021-11-01 DIAGNOSIS — Z95.1 PRESENCE OF AORTOCORONARY BYPASS GRAFT: ICD-10-CM

## 2021-11-01 LAB
ALBUMIN SERPL ELPH-MCNC: 4 G/DL — SIGNIFICANT CHANGE UP (ref 3.3–5)
ALP SERPL-CCNC: 98 U/L — SIGNIFICANT CHANGE UP (ref 40–120)
ALT FLD-CCNC: 12 U/L — SIGNIFICANT CHANGE UP (ref 10–45)
ANION GAP SERPL CALC-SCNC: 20 MMOL/L — HIGH (ref 5–17)
AST SERPL-CCNC: 23 U/L — SIGNIFICANT CHANGE UP (ref 10–40)
BILIRUB SERPL-MCNC: 0.4 MG/DL — SIGNIFICANT CHANGE UP (ref 0.2–1.2)
BLD GP AB SCN SERPL QL: NEGATIVE — SIGNIFICANT CHANGE UP
BUN SERPL-MCNC: 43 MG/DL — HIGH (ref 7–23)
CALCIUM SERPL-MCNC: 9.1 MG/DL — SIGNIFICANT CHANGE UP (ref 8.4–10.5)
CHLORIDE SERPL-SCNC: 95 MMOL/L — LOW (ref 96–108)
CO2 SERPL-SCNC: 22 MMOL/L — SIGNIFICANT CHANGE UP (ref 22–31)
CREAT SERPL-MCNC: 5.82 MG/DL — HIGH (ref 0.5–1.3)
GAS PNL BLDA: SIGNIFICANT CHANGE UP
GLUCOSE BLDC GLUCOMTR-MCNC: 106 MG/DL — HIGH (ref 70–99)
GLUCOSE BLDC GLUCOMTR-MCNC: 138 MG/DL — HIGH (ref 70–99)
GLUCOSE BLDC GLUCOMTR-MCNC: 142 MG/DL — HIGH (ref 70–99)
GLUCOSE BLDC GLUCOMTR-MCNC: 91 MG/DL — SIGNIFICANT CHANGE UP (ref 70–99)
GLUCOSE SERPL-MCNC: 99 MG/DL — SIGNIFICANT CHANGE UP (ref 70–99)
HCT VFR BLD CALC: 25.9 % — LOW (ref 39–50)
HGB BLD-MCNC: 8.7 G/DL — LOW (ref 13–17)
MAGNESIUM SERPL-MCNC: 2.5 MG/DL — SIGNIFICANT CHANGE UP (ref 1.6–2.6)
MCHC RBC-ENTMCNC: 26.9 PG — LOW (ref 27–34)
MCHC RBC-ENTMCNC: 33.6 GM/DL — SIGNIFICANT CHANGE UP (ref 32–36)
MCV RBC AUTO: 80.2 FL — SIGNIFICANT CHANGE UP (ref 80–100)
NRBC # BLD: 0 /100 WBCS — SIGNIFICANT CHANGE UP (ref 0–0)
PHOSPHATE SERPL-MCNC: 3.5 MG/DL — SIGNIFICANT CHANGE UP (ref 2.5–4.5)
PLATELET # BLD AUTO: 127 K/UL — LOW (ref 150–400)
POTASSIUM SERPL-MCNC: 3.9 MMOL/L — SIGNIFICANT CHANGE UP (ref 3.5–5.3)
POTASSIUM SERPL-SCNC: 3.9 MMOL/L — SIGNIFICANT CHANGE UP (ref 3.5–5.3)
PROT SERPL-MCNC: 6.8 G/DL — SIGNIFICANT CHANGE UP (ref 6–8.3)
RBC # BLD: 3.23 M/UL — LOW (ref 4.2–5.8)
RBC # FLD: 18.4 % — HIGH (ref 10.3–14.5)
RH IG SCN BLD-IMP: POSITIVE — SIGNIFICANT CHANGE UP
SODIUM SERPL-SCNC: 137 MMOL/L — SIGNIFICANT CHANGE UP (ref 135–145)
WBC # BLD: 10.4 K/UL — SIGNIFICANT CHANGE UP (ref 3.8–10.5)
WBC # FLD AUTO: 10.4 K/UL — SIGNIFICANT CHANGE UP (ref 3.8–10.5)

## 2021-11-01 PROCEDURE — 71045 X-RAY EXAM CHEST 1 VIEW: CPT | Mod: 26,77

## 2021-11-01 PROCEDURE — 99233 SBSQ HOSP IP/OBS HIGH 50: CPT | Mod: GC

## 2021-11-01 PROCEDURE — 71045 X-RAY EXAM CHEST 1 VIEW: CPT | Mod: 26

## 2021-11-01 PROCEDURE — 99291 CRITICAL CARE FIRST HOUR: CPT

## 2021-11-01 RX ORDER — SODIUM CHLORIDE 9 MG/ML
3 INJECTION INTRAMUSCULAR; INTRAVENOUS; SUBCUTANEOUS EVERY 8 HOURS
Refills: 0 | Status: DISCONTINUED | OUTPATIENT
Start: 2021-11-01 | End: 2021-11-02

## 2021-11-01 RX ORDER — ATORVASTATIN CALCIUM 80 MG/1
80 TABLET, FILM COATED ORAL AT BEDTIME
Refills: 0 | Status: DISCONTINUED | OUTPATIENT
Start: 2021-11-01 | End: 2021-11-02

## 2021-11-01 RX ORDER — NIFEDIPINE 30 MG
60 TABLET, EXTENDED RELEASE 24 HR ORAL DAILY
Refills: 0 | Status: DISCONTINUED | OUTPATIENT
Start: 2021-11-01 | End: 2021-11-02

## 2021-11-01 RX ADMIN — POLYETHYLENE GLYCOL 3350 17 GRAM(S): 17 POWDER, FOR SOLUTION ORAL at 12:09

## 2021-11-01 RX ADMIN — SODIUM CHLORIDE 3 MILLILITER(S): 9 INJECTION INTRAMUSCULAR; INTRAVENOUS; SUBCUTANEOUS at 22:31

## 2021-11-01 RX ADMIN — GABAPENTIN 100 MILLIGRAM(S): 400 CAPSULE ORAL at 05:03

## 2021-11-01 RX ADMIN — Medication 10 MILLIGRAM(S): at 15:30

## 2021-11-01 RX ADMIN — SENNA PLUS 2 TABLET(S): 8.6 TABLET ORAL at 22:30

## 2021-11-01 RX ADMIN — ERYTHROPOIETIN 10000 UNIT(S): 10000 INJECTION, SOLUTION INTRAVENOUS; SUBCUTANEOUS at 11:10

## 2021-11-01 RX ADMIN — OXYCODONE HYDROCHLORIDE 10 MILLIGRAM(S): 5 TABLET ORAL at 06:34

## 2021-11-01 RX ADMIN — HEPARIN SODIUM 5000 UNIT(S): 5000 INJECTION INTRAVENOUS; SUBCUTANEOUS at 22:31

## 2021-11-01 RX ADMIN — Medication 81 MILLIGRAM(S): at 12:10

## 2021-11-01 RX ADMIN — GABAPENTIN 100 MILLIGRAM(S): 400 CAPSULE ORAL at 18:55

## 2021-11-01 RX ADMIN — HEPARIN SODIUM 5000 UNIT(S): 5000 INJECTION INTRAVENOUS; SUBCUTANEOUS at 05:03

## 2021-11-01 RX ADMIN — Medication 667 MILLIGRAM(S): at 12:10

## 2021-11-01 RX ADMIN — Medication 667 MILLIGRAM(S): at 18:55

## 2021-11-01 RX ADMIN — HEPARIN SODIUM 5000 UNIT(S): 5000 INJECTION INTRAVENOUS; SUBCUTANEOUS at 15:34

## 2021-11-01 RX ADMIN — SODIUM CHLORIDE 3 MILLILITER(S): 9 INJECTION INTRAMUSCULAR; INTRAVENOUS; SUBCUTANEOUS at 14:31

## 2021-11-01 RX ADMIN — OXYCODONE HYDROCHLORIDE 10 MILLIGRAM(S): 5 TABLET ORAL at 07:04

## 2021-11-01 RX ADMIN — Medication 667 MILLIGRAM(S): at 07:39

## 2021-11-01 RX ADMIN — Medication 500 MILLIGRAM(S): at 18:55

## 2021-11-01 RX ADMIN — PANTOPRAZOLE SODIUM 40 MILLIGRAM(S): 20 TABLET, DELAYED RELEASE ORAL at 06:34

## 2021-11-01 RX ADMIN — ATORVASTATIN CALCIUM 80 MILLIGRAM(S): 80 TABLET, FILM COATED ORAL at 22:30

## 2021-11-01 RX ADMIN — Medication 500 MILLIGRAM(S): at 05:03

## 2021-11-01 RX ADMIN — Medication 60 MILLIGRAM(S): at 02:44

## 2021-11-01 NOTE — PROGRESS NOTE ADULT - SUBJECTIVE AND OBJECTIVE BOX
KLAUS DUNN  MRN-[id number]  Patient is a 61y old  Male who presents with a chief complaint of CAD (31 Oct 2021 15:21)    HPI:   This is a  60 Y/o Thin cachetic   male PMHx of ESRD on HD (t/th/sat) X 10 yrs  via left forearm AVF, HTN, Hep C s/p treatment, latent TB, CAD c LCx 95% stenosis presented with chest pain and sob admitted with NSTEMI to [facility] on 10 /19/21. + Cardiac Biomarkers-Plavix loaded and heparinized for NSTEMI . Underwent SAVANA on 10/21 revealed  severe aortic stenosis    Cardiac Cath 10/22-- Severe stenosis ostial Ramus and ostial LCX Last dose Plavix on 10/22  Stabilized transferred to [facility] for CABG/AVR with Dr Joaquin.  Received Pfizer Vaccine x 2         (25 Oct 2021 00:10)      Surgery/Hospital Course:  10/28 C2V, AVR(t)    Today:    ICU Vital Signs Last 24 Hrs  T(C): 37.1 (01 Nov 2021 08:00), Max: 37.1 (01 Nov 2021 08:00)  T(F): 98.7 (01 Nov 2021 08:00), Max: 98.7 (01 Nov 2021 08:00)  HR: 69 (01 Nov 2021 07:00) (57 - 75)  BP: 99/57 (01 Nov 2021 07:00) (99/57 - 162/77)  BP(mean): 74 (01 Nov 2021 07:00) (74 - 111)  ABP: 134/53 (01 Nov 2021 05:45) (108/48 - 246/246)  ABP(mean): 74 (01 Nov 2021 05:45) (63 - 246)  RR: 28 (01 Nov 2021 07:00) (12 - 42)  SpO2: 98% (01 Nov 2021 07:00) (71% - 100%)      Physical Exam:  Gen:  Awake, alert   CNS: non focal 	  Neck: no JVD  RES : clear , no wheezing      Chest: +chest tubes     CVS: Regular  rhythm. Normal S1/S2  Abd: Soft, non-distended. Bowel sounds present.  Skin: No rash.  Ext:  no edema    ============================I/O===========================   I&O's Detail    31 Oct 2021 07:01  -  01 Nov 2021 07:00  --------------------------------------------------------  IN:    NiCARdipine: 247.5 mL    sodium chloride 0.9%: 170 mL  Total IN: 417.5 mL    OUT:    Chest Tube (mL): 80 mL    Oral Fluid: 0 mL    Other (mL): 2000 mL  Total OUT: 2080 mL    Total NET: -1662.5 mL      01 Nov 2021 07:01  -  01 Nov 2021 07:47  --------------------------------------------------------  IN:    Oral Fluid: 30 mL  Total IN: 30 mL    OUT:    Chest Tube (mL): 20 mL  Total OUT: 20 mL    Total NET: 10 mL        ============================ LABS =========================                        8.7    10.40 )-----------( 127      ( 01 Nov 2021 00:27 )             25.9     11-01    137  |  95<L>  |  43<H>  ----------------------------<  99  3.9   |  22  |  5.82<H>    Ca    9.1      01 Nov 2021 00:27  Phos  3.5     11-01  Mg     2.5     11-01    TPro  6.8  /  Alb  4.0  /  TBili  0.4  /  DBili  x   /  AST  23  /  ALT  12  /  AlkPhos  98  11-01    LIVER FUNCTIONS - ( 01 Nov 2021 00:27 )  Alb: 4.0 g/dL / Pro: 6.8 g/dL / ALK PHOS: 98 U/L / ALT: 12 U/L / AST: 23 U/L / GGT: x             ABG - ( 01 Nov 2021 00:13 )  pH, Arterial: 7.47  pH, Blood: x     /  pCO2: 37    /  pO2: 70    / HCO3: 27    / Base Excess: 3.1   /  SaO2: 96.1                ======================Micro/Rad/Cardio=================  CXR: Reviewed  Echo: Reviewed  ======================================================  PAST MEDICAL & SURGICAL HISTORY:  End Stage Renal Disease on Dialysis  since 2009 - Jacinta Mora, Sat    HTN (hypertension)    Chronic renal failure    Hepatitis C    TB (tuberculosis)  Latent    CHF (congestive heart failure)  Mild    AVF (arteriovenous fistula)  placed 2009 - Left side    S/P appendectomy  at age 7      ====================ASSESSMENT ==============  Severe aortic stenosis, and CAD s/p C2V, and AVR (t) on 10/28/2021  Hx of stage CNICM s/p ICD on 8/31/21  Hypovolemic shock  Post op respiratory insufficiency   ESRD  metabolic acidosis   Acute blood loss anemia s/p multiple blood products  Thrombocytopenia   Hyperphosphatemia  HTN    Plan:  ====================== NEUROLOGY=====================  Continue to monitor neuro status per ICU protocol.   Tylenol, gabapentin and oxycodone for analgesia     acetaminophen     Tablet .. 650 milliGRAM(s) Oral every 6 hours PRN Mild Pain (1 - 3)  gabapentin 100 milliGRAM(s) Oral every 12 hours  oxyCODONE    IR 10 milliGRAM(s) Oral every 4 hours PRN Severe Pain (7 - 10)  oxyCODONE    IR 5 milliGRAM(s) Oral every 4 hours PRN Moderate Pain (4 - 6)  ==================== RESPIRATORY======================  Receiving supplemental O2 therapy with aerosol Mask  Continue to monitor RR, breathing pattern, pulse ox, and ABG's.  Encourage incentive spirometry.     ====================CARDIOVASCULAR==================  Severe aortic stenosis, and CAD s/p C2V, and AVR (t) on 10/28/2021  Invasive hemodynamic monitoring, assess perfusion indices.   Hx of stage CNICM s/p ICD   VVI paced at 45 via temporary epicardial wires   Hx of HTN, BP management w/ nifedipine `  ASA for graft patency     NIFEdipine XL 60 milliGRAM(s) Oral daily  aspirin enteric coated 81 milliGRAM(s) Oral daily  ===================HEMATOLOGIC/ONC ===================  Monitor H&H/Plts    Continue Heparin for venous thromboembolism prophylaxis.   Monitor chest tube outputs     heparin   Injectable 5000 Unit(s) SubCutaneous every 8 hours  ===================== RENAL =========================  Hx of ESRD   Continue to monitor I/Os, BUN/Creatinine, and urine output  Replete lytes PRN. Keep K> 4 and Mg >2   ==================== GASTROINTESTINAL===================  Tolerating PO consistent carb diet.   Continue Protonix for stress ulcer prophylaxis.  Bowel regimen with Miralax and Senna     ascorbic acid 500 milliGRAM(s) Oral two times a day  bisacodyl Suppository 10 milliGRAM(s) Rectal once  calcium acetate 667 milliGRAM(s) Oral three times a day with meals  pantoprazole    Tablet 40 milliGRAM(s) Oral before breakfast  polyethylene glycol 3350 17 Gram(s) Oral daily  senna 2 Tablet(s) Oral at bedtime  =======================    ENDOCRINE  =====================  Hyperglycemia, c/w admelog SS   Monitor glucose levels     dextrose 50% Injectable 50 milliLiter(s) IV Push every 15 minutes  insulin lispro (ADMELOG) corrective regimen sliding scale   SubCutaneous Before meals and at bedtime  ========================INFECTIOUS DISEASE================  Afebrile, WBC within normal limits  Continue trending WBC and monitoring fever curve       I have spent 35 minutes providing acute care for this critically ill patient     Patient requires continuous monitoring with bedside rhythm monitoring, pulse ox monitoring, and intermittent blood gas analysis. Care plan discussed with ICU care team. Patient remained critical and at risk for life threatening decompensation.     By signing my name below, I, Melonie Gray, attest that this documentation has been prepared under the direction and in the presence of Chaz Faustin MD   Electronically signed: Dax Hayes, 11-01-21 @ 07:47    I, Chaz Faustin, personally performed the services described in this documentation. all medical record entries made by the scribe were at my direction and in my presence. I have reviewed the chart and agree that the record reflects my personal performance and is accurate and complete  Electronically signed: Chaz Faustin MD        KLAUS DUNN  MRN-[id number]  Patient is a 61y old  Male who presents with a chief complaint of CAD (31 Oct 2021 15:21)    HPI:   This is a  60 Y/o Thin cachetic   male PMHx of ESRD on HD (t/th/sat) X 10 yrs  via left forearm AVF, HTN, Hep C s/p treatment, latent TB, CAD c LCx 95% stenosis presented with chest pain and sob admitted with NSTEMI to [facility] on 10 /19/21. + Cardiac Biomarkers-Plavix loaded and heparinized for NSTEMI . Underwent SAVANA on 10/21 revealed  severe aortic stenosis    Cardiac Cath 10/22-- Severe stenosis ostial Ramus and ostial LCX Last dose Plavix on 10/22  Stabilized transferred to [facility] for CABG/AVR with Dr Joaquin.  Received Pfizer Vaccine x 2         (25 Oct 2021 00:10)      Surgery/Hospital Course:  10/28 C2V, AVR(t)    Today:  No acute events. Pt is a candidate for the floor unit.    ICU Vital Signs Last 24 Hrs  T(C): 37.1 (01 Nov 2021 08:00), Max: 37.1 (01 Nov 2021 08:00)  T(F): 98.7 (01 Nov 2021 08:00), Max: 98.7 (01 Nov 2021 08:00)  HR: 69 (01 Nov 2021 07:00) (57 - 75)  BP: 99/57 (01 Nov 2021 07:00) (99/57 - 162/77)  BP(mean): 74 (01 Nov 2021 07:00) (74 - 111)  ABP: 134/53 (01 Nov 2021 05:45) (108/48 - 246/246)  ABP(mean): 74 (01 Nov 2021 05:45) (63 - 246)  RR: 28 (01 Nov 2021 07:00) (12 - 42)  SpO2: 98% (01 Nov 2021 07:00) (71% - 100%)      Physical Exam:  Gen:  Awake, alert   CNS: non focal 	  Neck: no JVD  RES : clear , no wheezing      Chest: +chest tubes     CVS: Regular  rhythm. Normal S1/S2  Abd: Soft, non-distended. Bowel sounds present.  Skin: No rash.  Ext:  no edema    ============================I/O===========================   I&O's Detail    31 Oct 2021 07:01  -  01 Nov 2021 07:00  --------------------------------------------------------  IN:    NiCARdipine: 247.5 mL    sodium chloride 0.9%: 170 mL  Total IN: 417.5 mL    OUT:    Chest Tube (mL): 80 mL    Oral Fluid: 0 mL    Other (mL): 2000 mL  Total OUT: 2080 mL    Total NET: -1662.5 mL      01 Nov 2021 07:01  -  01 Nov 2021 07:47  --------------------------------------------------------  IN:    Oral Fluid: 30 mL  Total IN: 30 mL    OUT:    Chest Tube (mL): 20 mL  Total OUT: 20 mL    Total NET: 10 mL        ============================ LABS =========================                        8.7    10.40 )-----------( 127      ( 01 Nov 2021 00:27 )             25.9     11-01    137  |  95<L>  |  43<H>  ----------------------------<  99  3.9   |  22  |  5.82<H>    Ca    9.1      01 Nov 2021 00:27  Phos  3.5     11-01  Mg     2.5     11-01    TPro  6.8  /  Alb  4.0  /  TBili  0.4  /  DBili  x   /  AST  23  /  ALT  12  /  AlkPhos  98  11-01    LIVER FUNCTIONS - ( 01 Nov 2021 00:27 )  Alb: 4.0 g/dL / Pro: 6.8 g/dL / ALK PHOS: 98 U/L / ALT: 12 U/L / AST: 23 U/L / GGT: x             ABG - ( 01 Nov 2021 00:13 )  pH, Arterial: 7.47  pH, Blood: x     /  pCO2: 37    /  pO2: 70    / HCO3: 27    / Base Excess: 3.1   /  SaO2: 96.1                ======================Micro/Rad/Cardio=================  CXR: Reviewed  Echo: Reviewed  ======================================================  PAST MEDICAL & SURGICAL HISTORY:  End Stage Renal Disease on Dialysis  since 2009 - Jacinta Mora, Sat    HTN (hypertension)    Chronic renal failure    Hepatitis C    TB (tuberculosis)  Latent    CHF (congestive heart failure)  Mild    AVF (arteriovenous fistula)  placed 2009 - Left side    S/P appendectomy  at age 7      ====================ASSESSMENT ==============  Severe aortic stenosis, and CAD s/p C2V, and AVR (t) on 10/28/2021  Hx of stage CNICM s/p ICD on 8/31/21  Hypovolemic shock  Post op respiratory insufficiency   ESRD  metabolic acidosis   Acute blood loss anemia s/p multiple blood products  Thrombocytopenia   Hyperphosphatemia  HTN    Plan:  ====================== NEUROLOGY=====================  Continue to monitor neuro status per ICU protocol.   Tylenol, gabapentin and oxycodone for analgesia     acetaminophen     Tablet .. 650 milliGRAM(s) Oral every 6 hours PRN Mild Pain (1 - 3)  gabapentin 100 milliGRAM(s) Oral every 12 hours  oxyCODONE    IR 10 milliGRAM(s) Oral every 4 hours PRN Severe Pain (7 - 10)  oxyCODONE    IR 5 milliGRAM(s) Oral every 4 hours PRN Moderate Pain (4 - 6)  ==================== RESPIRATORY======================  Receiving supplemental O2 therapy with aerosol Mask  Continue to monitor RR, breathing pattern, pulse ox, and ABG's.  Encourage incentive spirometry.     ====================CARDIOVASCULAR==================  Severe aortic stenosis, and CAD s/p C2V, and AVR (t) on 10/28/2021  Invasive hemodynamic monitoring, assess perfusion indices.   Hx of stage CNICM s/p ICD   VVI paced at 45 via temporary epicardial wires   Hx of HTN, BP management w/ nifedipine `  ASA for graft patency     NIFEdipine XL 60 milliGRAM(s) Oral daily  aspirin enteric coated 81 milliGRAM(s) Oral daily  ===================HEMATOLOGIC/ONC ===================  Monitor H&H/Plts    Continue Heparin for venous thromboembolism prophylaxis.   Monitor chest tube outputs     heparin   Injectable 5000 Unit(s) SubCutaneous every 8 hours  ===================== RENAL =========================  Hx of ESRD   Continue to monitor I/Os, BUN/Creatinine, and urine output  Replete lytes PRN. Keep K> 4 and Mg >2   ==================== GASTROINTESTINAL===================  Tolerating PO consistent carb diet.   Continue Protonix for stress ulcer prophylaxis.  Bowel regimen with Miralax and Senna     ascorbic acid 500 milliGRAM(s) Oral two times a day  bisacodyl Suppository 10 milliGRAM(s) Rectal once  calcium acetate 667 milliGRAM(s) Oral three times a day with meals  pantoprazole    Tablet 40 milliGRAM(s) Oral before breakfast  polyethylene glycol 3350 17 Gram(s) Oral daily  senna 2 Tablet(s) Oral at bedtime  =======================    ENDOCRINE  =====================  Hyperglycemia, c/w admelog SS   Monitor glucose levels     dextrose 50% Injectable 50 milliLiter(s) IV Push every 15 minutes  insulin lispro (ADMELOG) corrective regimen sliding scale   SubCutaneous Before meals and at bedtime  ========================INFECTIOUS DISEASE================  Afebrile, WBC within normal limits  Continue trending WBC and monitoring fever curve       I have spent 35 minutes providing acute care for this critically ill patient     Patient requires continuous monitoring with bedside rhythm monitoring, pulse ox monitoring, and intermittent blood gas analysis. Care plan discussed with ICU care team. Patient remained critical and at risk for life threatening decompensation.     By signing my name below, I, Melonie Gray, attest that this documentation has been prepared under the direction and in the presence of Chaz Faustin MD   Electronically signed: Dax Hayes, 11-01-21 @ 07:47    I, Chaz Faustin, personally performed the services described in this documentation. all medical record entries made by the fainaiblinda were at my direction and in my presence. I have reviewed the chart and agree that the record reflects my personal performance and is accurate and complete  Electronically signed: Chaz Faustin MD

## 2021-11-01 NOTE — PROGRESS NOTE ADULT - PROBLEM SELECTOR PLAN 4
continue postop care  continue asa and initiate statin  no bb at this time secondary to sinus billy 50-60  +epicardial pw attached and on VVI 45  HD as per renal  pulm toilet  increase activity as tolerated  pain management  bowel regimen  Discharge planning- home when stable

## 2021-11-01 NOTE — PROGRESS NOTE ADULT - SUBJECTIVE AND OBJECTIVE BOX
VITAL SIGNS    Telemetry:  rsr/ sb 50-60   Vital Signs Last 24 Hrs  T(C): 36.8 (21 @ 11:22), Max: 37.1 (21 @ 08:00)  T(F): 98.2 (21 @ :), Max: 98.7 (21 @ 08:00)  HR: 65 (21:) (62 - 75)  BP: 126/69 (21:) (92/50 - 162/77)  RR: 18 (21:) (10 - 35)  SpO2: 95% (21 @ 11:22) (71% - 100%)            10-31 @ 07:  -   @ 07:00  --------------------------------------------------------  IN: 417.5 mL / OUT: 2080 mL / NET: -1662.5 mL     @ :  -   @ 12:28  --------------------------------------------------------  IN: 150 mL / OUT: 20 mL / NET: 130 mL       Daily     Daily Weight in k (2021 00:00)  Admit Wt: Drug Dosing Weight  Height (cm): 185.4 (19 Oct 2021 11:35)  Weight (kg): 61.6 (28 Oct 2021 08:36)  BMI (kg/m2): 17.9 (28 Oct 2021 08:36)  BSA (m2): 1.83 (28 Oct 2021 08:36)    Bilirubin Total, Serum: 0.4 mg/dL ( @ 00:27)    CAPILLARY BLOOD GLUCOSE  138 (2021 07:00)  110 (31 Oct 2021 21:00)      POCT Blood Glucose.: 142 mg/dL (2021 11:40)  POCT Blood Glucose.: 138 mg/dL (2021 06:47)  POCT Blood Glucose.: 110 mg/dL (31 Oct 2021 21:17)          acetaminophen     Tablet .. 650 milliGRAM(s) Oral every 6 hours PRN  ascorbic acid 500 milliGRAM(s) Oral two times a day  aspirin enteric coated 81 milliGRAM(s) Oral daily  bisacodyl Suppository 10 milliGRAM(s) Rectal once  bisacodyl Suppository 10 milliGRAM(s) Rectal once  calcium acetate 667 milliGRAM(s) Oral three times a day with meals  dextrose 50% Injectable 50 milliLiter(s) IV Push every 15 minutes  epoetin hillary-epbx (RETACRIT) Injectable 62185 Unit(s) IV Push <User Schedule>  gabapentin 100 milliGRAM(s) Oral every 12 hours  heparin   Injectable 5000 Unit(s) SubCutaneous every 8 hours  insulin lispro (ADMELOG) corrective regimen sliding scale   SubCutaneous Before meals and at bedtime  NIFEdipine XL 60 milliGRAM(s) Oral daily  oxyCODONE    IR 10 milliGRAM(s) Oral every 4 hours PRN  oxyCODONE    IR 5 milliGRAM(s) Oral every 4 hours PRN  pantoprazole    Tablet 40 milliGRAM(s) Oral before breakfast  polyethylene glycol 3350 17 Gram(s) Oral daily  senna 2 Tablet(s) Oral at bedtime  sodium chloride 0.9% lock flush 3 milliLiter(s) IV Push every 8 hours      PHYSICAL EXAM    Subjective: "I haven't had a bowel movement."   Neurology: alert and oriented x 3, nonfocal, no gross deficits  CV : tele:  RSR/ SB 50-60   Sternal Wound :  CDI with dressing , Stable; +pw VVI 45  Lungs: clear. RR easy, unlabored   Abdomen: soft, nontender, nondistended, positive bowel sounds, neg bowel movement   Neg N/V/D   Extremities:   DOBBINS; trace LE edema, neg calf tenderness.   PPP bilaterally: + left AVF + bruit/ thrill; Left SVG site cdi w/ dressing     PW:pw VVI 45  Chest tubes: none                 VITAL SIGNS    Telemetry:  rsr/ sb 50-60   Vital Signs Last 24 Hrs  T(C): 36.8 (21 @ 11:22), Max: 37.1 (21 @ 08:00)  T(F): 98.2 (21 @ :), Max: 98.7 (21 @ 08:00)  HR: 65 (21:) (62 - 75)  BP: 126/69 (21:) (92/50 - 162/77)  RR: 18 (21:) (10 - 35)  SpO2: 95% (21 @ 11:22) (71% - 100%)            10-31 @ 07:  -   @ 07:00  --------------------------------------------------------  IN: 417.5 mL / OUT: 2080 mL / NET: -1662.5 mL     @ :  -   @ 12:28  --------------------------------------------------------  IN: 150 mL / OUT: 20 mL / NET: 130 mL       Daily     Daily Weight in k (2021 00:00)  Admit Wt: Drug Dosing Weight  Height (cm): 185.4 (19 Oct 2021 11:35)  Weight (kg): 61.6 (28 Oct 2021 08:36)  BMI (kg/m2): 17.9 (28 Oct 2021 08:36)  BSA (m2): 1.83 (28 Oct 2021 08:36)    Bilirubin Total, Serum: 0.4 mg/dL ( @ 00:27)    CAPILLARY BLOOD GLUCOSE  138 (2021 07:00)  110 (31 Oct 2021 21:00)      POCT Blood Glucose.: 142 mg/dL (2021 11:40)  POCT Blood Glucose.: 138 mg/dL (2021 06:47)  POCT Blood Glucose.: 110 mg/dL (31 Oct 2021 21:17)          acetaminophen     Tablet .. 650 milliGRAM(s) Oral every 6 hours PRN  ascorbic acid 500 milliGRAM(s) Oral two times a day  aspirin enteric coated 81 milliGRAM(s) Oral daily  bisacodyl Suppository 10 milliGRAM(s) Rectal once  bisacodyl Suppository 10 milliGRAM(s) Rectal once  calcium acetate 667 milliGRAM(s) Oral three times a day with meals  dextrose 50% Injectable 50 milliLiter(s) IV Push every 15 minutes  epoetin hillary-epbx (RETACRIT) Injectable 43357 Unit(s) IV Push <User Schedule>  gabapentin 100 milliGRAM(s) Oral every 12 hours  heparin   Injectable 5000 Unit(s) SubCutaneous every 8 hours  insulin lispro (ADMELOG) corrective regimen sliding scale   SubCutaneous Before meals and at bedtime  NIFEdipine XL 60 milliGRAM(s) Oral daily  oxyCODONE    IR 10 milliGRAM(s) Oral every 4 hours PRN  oxyCODONE    IR 5 milliGRAM(s) Oral every 4 hours PRN  pantoprazole    Tablet 40 milliGRAM(s) Oral before breakfast  polyethylene glycol 3350 17 Gram(s) Oral daily  senna 2 Tablet(s) Oral at bedtime  sodium chloride 0.9% lock flush 3 milliLiter(s) IV Push every 8 hours      PHYSICAL EXAM    Subjective: "I haven't had a bowel movement."   Neurology: alert and oriented x 3, nonfocal, no gross deficits  CV : tele:  RSR/ SB 50-60   Sternal Wound :  CDI with dressing , Stable;  +pw VVI 45  Lungs: clear. RR easy, unlabored   Abdomen: soft, nontender, nondistended, positive bowel sounds, neg bowel movement   Neg N/V/D   Extremities:   DOBBINS; trace LE edema, neg calf tenderness.   PPP bilaterally: + left AVF + bruit/ thrill; Left SVG site cdi w/ dressing     PW:pw VVI 45  Chest tubes: none

## 2021-11-01 NOTE — PROGRESS NOTE ADULT - ASSESSMENT
Pt. with ESRD on HD TIW admitted for chest pain. now s/p CABG on 10/28.     #ESRD (end stage renal disease) with Hyperkalemia-  Pt. with ESRD on HD three times a week (TTS).   patient follows with [facility] for outpatient HD with Dr. Valdovinos.   patient is now s/p CABG on 10/28   Last HD 10/31, HD for today  Dose meds as per HD.    #Anemia secondary to renal failure.  Likely in the setting of ESRD. r/o other causes  Hemoglobin below target range.   Continue Retacrit 10,000 unit TIW with HD.   Monitor CBC  s/p 1PRBC on 10/30. Transfuse prn    #BMD/Hyperphosphatemia.  Pt. on oral phosphate binders with meals.   monitor serum phosphorus.    If you have any questions, please feel free to contact me  Remberto Georges  Nephrology Fellow  [phone]  (After 5pm or on weekends please page the on-call fellow)

## 2021-11-01 NOTE — PROGRESS NOTE ADULT - ASSESSMENT
This is a  62 Y/o Thin cachetic   male PMHx of ESRD on HD (t/th/sat) X 10 yrs  via left forearm AVF, HTN, Hep C s/p treatment, latent TB, CAD c LCx 95% stenosis presented with chest pain and sob admitted with NSTEMI to [facility] on 10 /19/21. + Cardiac Biomarkers-Plavix loaded and heparinized for NSTEMI . Underwent SAVANA on 10/21 revealed  severe aortic stenosis    Cardiac Cath 10/22-- Severe stenosis ostial Ramus and ostial LCX Last dose Plavix on 10/22  Stabilized transferred to [facility] for CABG/AVR with Dr Joaquin.  Received Pfizer Vaccine x 2  10/28 C2V/ AVR (t) intraop bleeding- pt received multiple blood products including 4 PRBC/5 cryo/ 2 PLT/ 500 feiba/ 2 FFP; DDAVP  extubated   pt initiated paced in CTU - now sb/ 50-60   postop HD as per renal  11/1 tx floor; VSS; RSR/ SB 50-60   This is a  62 Y/o Thin cachetic   male PMHx of ESRD on HD (t/th/sat) X 10 yrs  via left forearm AVF, HTN, Hep C s/p treatment, latent TB, CAD c LCx 95% stenosis presented with chest pain and sob admitted with NSTEMI to [facility] on 10 /19/21. + Cardiac Biomarkers-Plavix loaded and heparinized for NSTEMI . Underwent SAVANA on 10/21 revealed  severe aortic stenosis    Cardiac Cath 10/22-- Severe stenosis ostial Ramus and ostial LCX Last dose Plavix on 10/22  Stabilized transferred to [facility] for CABG/AVR with Dr Joaquin.  Received Pfizer Vaccine x 2  10/28 C2V/ AVR (t) intraop bleeding- pt received multiple blood products including 4 PRBC/5 cryo/ 2 PLT/ 500 feiba/ 2 FFP; DDAVP  10/29 extubated   postop insulin gttp; cardene gttp for htn and inotropic support   pt initiated paced in CTU - now sb/ 50-60; +epicardial pw VVI 45   postop HD as per renal  11/1 tx floor; VSS; RSR/ SB 50-60 -no bb at this time secondary to sinus billy   discharge planning- home when stable

## 2021-11-01 NOTE — PROGRESS NOTE ADULT - SUBJECTIVE AND OBJECTIVE BOX
[facility] DIVISION OF KIDNEY DISEASES AND HYPERTENSION -- FOLLOW UP NOTE  --------------------------------------------------------------------------------  Chief Complaint:    24 hour events/subjective:    Pt. endorses inability to lie flat b/c of orthopnea. Pt. slept in chair and now how back pain. Pt. states legs are swollen " like balloons" Pt. tolerated HS yesterday without difficulty.     PAST HISTORY  --------------------------------------------------------------------------------  No significant changes to PMH, PSH, FHx, SHx, unless otherwise noted    ALLERGIES & MEDICATIONS  --------------------------------------------------------------------------------  Allergies    adhesives (Other)  No Known Drug Allergies    Intolerances      Standing Inpatient Medications  ascorbic acid 500 milliGRAM(s) Oral two times a day  aspirin enteric coated 81 milliGRAM(s) Oral daily  atorvastatin 80 milliGRAM(s) Oral at bedtime  bisacodyl Suppository 10 milliGRAM(s) Rectal once  calcium acetate 667 milliGRAM(s) Oral three times a day with meals  dextrose 50% Injectable 50 milliLiter(s) IV Push every 15 minutes  epoetin hillary-epbx (RETACRIT) Injectable 96107 Unit(s) IV Push <User Schedule>  gabapentin 100 milliGRAM(s) Oral every 12 hours  heparin   Injectable 5000 Unit(s) SubCutaneous every 8 hours  insulin lispro (ADMELOG) corrective regimen sliding scale   SubCutaneous Before meals and at bedtime  NIFEdipine XL 60 milliGRAM(s) Oral daily  pantoprazole    Tablet 40 milliGRAM(s) Oral before breakfast  polyethylene glycol 3350 17 Gram(s) Oral daily  senna 2 Tablet(s) Oral at bedtime  sodium chloride 0.9% lock flush 3 milliLiter(s) IV Push every 8 hours    PRN Inpatient Medications  acetaminophen     Tablet .. 650 milliGRAM(s) Oral every 6 hours PRN  oxyCODONE    IR 10 milliGRAM(s) Oral every 4 hours PRN  oxyCODONE    IR 5 milliGRAM(s) Oral every 4 hours PRN      REVIEW OF SYSTEMS  --------------------------------------------------------------------------------  Gen: No fevers/chills  Skin: No rashes  Head/Eyes/Ears: Normal hearing,   Respiratory: No dyspnea, cough, Orthopnea+  CV: No chest pain  GI: No abdominal pain, diarrhea  : No dysuria, hematuria  MSK: 2+ edema  Heme: No easy bruising or bleeding  Psych: No significant depression      All other systems were reviewed and are negative, except as noted.    VITALS/PHYSICAL EXAM  --------------------------------------------------------------------------------  T(C): 36.7 (11-01-21 @ 14:20), Max: 37.1 (11-01-21 @ 08:00)  HR: 71 (11-01-21 @ 14:20) (62 - 75)  BP: 155/71 (11-01-21 @ 14:20) (92/50 - 162/77)  RR: 18 (11-01-21 @ 14:20) (10 - 35)  SpO2: 95% (11-01-21 @ 14:20) (71% - 100%)  Wt(kg): --        10-31-21 @ 07:01  -  11-01-21 @ 07:00  --------------------------------------------------------  IN: 417.5 mL / OUT: 2080 mL / NET: -1662.5 mL    11-01-21 @ 07:01  -  11-01-21 @ 16:37  --------------------------------------------------------  IN: 150 mL / OUT: 2020 mL / NET: -1870 mL        Physical Exam:  	Gen: NAD  	HEENT: MMM  	Pulm: CTA B/L  	CV: S1S2  	Abd: Soft, +BS   	Ext: No LE edema B/L  	Neuro: Awake  	Skin: Warm and dry  	Vascular access: LUE AVF- thrills present      LABS/STUDIES  --------------------------------------------------------------------------------              8.7    10.40 >-----------<  127      [11-01-21 @ 00:27]              25.9     137  |  95  |  43  ----------------------------<  99      [11-01-21 @ 00:27]  3.9   |  22  |  5.82        Ca     9.1     [11-01-21 @ 00:27]      Mg     2.5     [11-01-21 @ 00:27]      Phos  3.5     [11-01-21 @ 00:27]    TPro  6.8  /  Alb  4.0  /  TBili  0.4  /  DBili  x   /  AST  23  /  ALT  12  /  AlkPhos  98  [11-01-21 @ 00:27]      Creatinine Trend:  SCr 5.82 [11-01 @ 00:27]  SCr 6.43 [10-31 @ 00:46]  SCr 4.60 [10-30 @ 00:29]  SCr 5.48 [10-29 @ 00:20]  SCr 5.02 [10-28 @ 16:06]      TSH 4.99      [10-25-21 @ 08:32]  Lipid: chol 143, , HDL 34, LDL --      [10-25-21 @ 08:32]    HBsAg Nonreact      [10-26-21 @ 07:48]  HCV 13.47, Reactive      [10-26-21 @ 07:48]     [facility] DIVISION OF KIDNEY DISEASES AND HYPERTENSION -- FOLLOW UP NOTE  --------------------------------------------------------------------------------  Chief Complaint:    24 hour events/subjective:    Pt. endorses inability to lie flat b/c of orthopnea. Pt. slept in chair and now how back pain. Pt. states legs are swollen " like balloons" Pt. tolerated HD yesterday without difficulty.     PAST HISTORY  --------------------------------------------------------------------------------  No significant changes to PMH, PSH, FHx, SHx, unless otherwise noted    ALLERGIES & MEDICATIONS  --------------------------------------------------------------------------------  Allergies    adhesives (Other)  No Known Drug Allergies    Intolerances      Standing Inpatient Medications  ascorbic acid 500 milliGRAM(s) Oral two times a day  aspirin enteric coated 81 milliGRAM(s) Oral daily  atorvastatin 80 milliGRAM(s) Oral at bedtime  bisacodyl Suppository 10 milliGRAM(s) Rectal once  calcium acetate 667 milliGRAM(s) Oral three times a day with meals  dextrose 50% Injectable 50 milliLiter(s) IV Push every 15 minutes  epoetin hillary-epbx (RETACRIT) Injectable 22111 Unit(s) IV Push <User Schedule>  gabapentin 100 milliGRAM(s) Oral every 12 hours  heparin   Injectable 5000 Unit(s) SubCutaneous every 8 hours  insulin lispro (ADMELOG) corrective regimen sliding scale   SubCutaneous Before meals and at bedtime  NIFEdipine XL 60 milliGRAM(s) Oral daily  pantoprazole    Tablet 40 milliGRAM(s) Oral before breakfast  polyethylene glycol 3350 17 Gram(s) Oral daily  senna 2 Tablet(s) Oral at bedtime  sodium chloride 0.9% lock flush 3 milliLiter(s) IV Push every 8 hours    PRN Inpatient Medications  acetaminophen     Tablet .. 650 milliGRAM(s) Oral every 6 hours PRN  oxyCODONE    IR 10 milliGRAM(s) Oral every 4 hours PRN  oxyCODONE    IR 5 milliGRAM(s) Oral every 4 hours PRN      REVIEW OF SYSTEMS  --------------------------------------------------------------------------------  Gen: No fevers/chills  Skin: No rashes  Head/Eyes/Ears: Normal hearing,   Respiratory: No dyspnea, cough, Orthopnea+  CV: No chest pain  GI: No abdominal pain, diarrhea  : No dysuria, hematuria  MSK: 2+ edema  Heme: No easy bruising or bleeding  Psych: No significant depression      All other systems were reviewed and are negative, except as noted.    VITALS/PHYSICAL EXAM  --------------------------------------------------------------------------------  T(C): 36.7 (11-01-21 @ 14:20), Max: 37.1 (11-01-21 @ 08:00)  HR: 71 (11-01-21 @ 14:20) (62 - 75)  BP: 155/71 (11-01-21 @ 14:20) (92/50 - 162/77)  RR: 18 (11-01-21 @ 14:20) (10 - 35)  SpO2: 95% (11-01-21 @ 14:20) (71% - 100%)  Wt(kg): --        10-31-21 @ 07:01  -  11-01-21 @ 07:00  --------------------------------------------------------  IN: 417.5 mL / OUT: 2080 mL / NET: -1662.5 mL    11-01-21 @ 07:01  -  11-01-21 @ 16:37  --------------------------------------------------------  IN: 150 mL / OUT: 2020 mL / NET: -1870 mL        Physical Exam:  	Gen: NAD  	HEENT: MMM  	Pulm: CTA B/L  	CV: S1S2  	Abd: Soft, +BS   	Ext: No LE edema B/L  	Neuro: Awake  	Skin: Warm and dry  	Vascular access: LUE AVF- thrills present      LABS/STUDIES  --------------------------------------------------------------------------------              8.7    10.40 >-----------<  127      [11-01-21 @ 00:27]              25.9     137  |  95  |  43  ----------------------------<  99      [11-01-21 @ 00:27]  3.9   |  22  |  5.82        Ca     9.1     [11-01-21 @ 00:27]      Mg     2.5     [11-01-21 @ 00:27]      Phos  3.5     [11-01-21 @ 00:27]    TPro  6.8  /  Alb  4.0  /  TBili  0.4  /  DBili  x   /  AST  23  /  ALT  12  /  AlkPhos  98  [11-01-21 @ 00:27]      Creatinine Trend:  SCr 5.82 [11-01 @ 00:27]  SCr 6.43 [10-31 @ 00:46]  SCr 4.60 [10-30 @ 00:29]  SCr 5.48 [10-29 @ 00:20]  SCr 5.02 [10-28 @ 16:06]      TSH 4.99      [10-25-21 @ 08:32]  Lipid: chol 143, , HDL 34, LDL --      [10-25-21 @ 08:32]    HBsAg Nonreact      [10-26-21 @ 07:48]  HCV 13.47, Reactive      [10-26-21 @ 07:48]

## 2021-11-02 ENCOUNTER — TRANSCRIPTION ENCOUNTER (OUTPATIENT)
Age: 61
End: 2021-11-02

## 2021-11-02 VITALS — OXYGEN SATURATION: 95 % | DIASTOLIC BLOOD PRESSURE: 78 MMHG | SYSTOLIC BLOOD PRESSURE: 131 MMHG | HEART RATE: 69 BPM

## 2021-11-02 LAB
ANION GAP SERPL CALC-SCNC: 16 MMOL/L — SIGNIFICANT CHANGE UP (ref 5–17)
BUN SERPL-MCNC: 36 MG/DL — HIGH (ref 7–23)
CALCIUM SERPL-MCNC: 9 MG/DL — SIGNIFICANT CHANGE UP (ref 8.4–10.5)
CHLORIDE SERPL-SCNC: 96 MMOL/L — SIGNIFICANT CHANGE UP (ref 96–108)
CO2 SERPL-SCNC: 26 MMOL/L — SIGNIFICANT CHANGE UP (ref 22–31)
CREAT SERPL-MCNC: 5.32 MG/DL — HIGH (ref 0.5–1.3)
GLUCOSE BLDC GLUCOMTR-MCNC: 104 MG/DL — HIGH (ref 70–99)
GLUCOSE BLDC GLUCOMTR-MCNC: 111 MG/DL — HIGH (ref 70–99)
GLUCOSE SERPL-MCNC: 84 MG/DL — SIGNIFICANT CHANGE UP (ref 70–99)
HCT VFR BLD CALC: 28.1 % — LOW (ref 39–50)
HGB BLD-MCNC: 8.8 G/DL — LOW (ref 13–17)
MCHC RBC-ENTMCNC: 26.2 PG — LOW (ref 27–34)
MCHC RBC-ENTMCNC: 31.3 GM/DL — LOW (ref 32–36)
MCV RBC AUTO: 83.6 FL — SIGNIFICANT CHANGE UP (ref 80–100)
NRBC # BLD: 0 /100 WBCS — SIGNIFICANT CHANGE UP (ref 0–0)
PLATELET # BLD AUTO: 158 K/UL — SIGNIFICANT CHANGE UP (ref 150–400)
POTASSIUM SERPL-MCNC: 3.9 MMOL/L — SIGNIFICANT CHANGE UP (ref 3.5–5.3)
POTASSIUM SERPL-SCNC: 3.9 MMOL/L — SIGNIFICANT CHANGE UP (ref 3.5–5.3)
RBC # BLD: 3.36 M/UL — LOW (ref 4.2–5.8)
RBC # FLD: 18.4 % — HIGH (ref 10.3–14.5)
SODIUM SERPL-SCNC: 138 MMOL/L — SIGNIFICANT CHANGE UP (ref 135–145)
WBC # BLD: 7.34 K/UL — SIGNIFICANT CHANGE UP (ref 3.8–10.5)
WBC # FLD AUTO: 7.34 K/UL — SIGNIFICANT CHANGE UP (ref 3.8–10.5)

## 2021-11-02 PROCEDURE — 99232 SBSQ HOSP IP/OBS MODERATE 35: CPT | Mod: GC

## 2021-11-02 PROCEDURE — 71045 X-RAY EXAM CHEST 1 VIEW: CPT | Mod: 26

## 2021-11-02 RX ORDER — OXYCODONE HYDROCHLORIDE 5 MG/1
1 TABLET ORAL
Qty: 24 | Refills: 0
Start: 2021-11-02 | End: 2021-11-07

## 2021-11-02 RX ORDER — NIFEDIPINE 30 MG
1 TABLET, EXTENDED RELEASE 24 HR ORAL
Qty: 30 | Refills: 1
Start: 2021-11-02 | End: 2021-12-31

## 2021-11-02 RX ORDER — ASPIRIN/CALCIUM CARB/MAGNESIUM 324 MG
1 TABLET ORAL
Qty: 0 | Refills: 0 | DISCHARGE
Start: 2021-11-02

## 2021-11-02 RX ORDER — SENNA PLUS 8.6 MG/1
2 TABLET ORAL
Qty: 0 | Refills: 0 | DISCHARGE
Start: 2021-11-02

## 2021-11-02 RX ORDER — ATORVASTATIN CALCIUM 80 MG/1
1 TABLET, FILM COATED ORAL
Qty: 30 | Refills: 1
Start: 2021-11-02 | End: 2021-12-31

## 2021-11-02 RX ORDER — METOPROLOL TARTRATE 50 MG
1 TABLET ORAL
Qty: 60 | Refills: 1
Start: 2021-11-02 | End: 2021-12-31

## 2021-11-02 RX ORDER — METOPROLOL TARTRATE 50 MG
25 TABLET ORAL
Refills: 0 | Status: DISCONTINUED | OUTPATIENT
Start: 2021-11-02 | End: 2021-11-02

## 2021-11-02 RX ORDER — ACETAMINOPHEN 500 MG
0 TABLET ORAL
Qty: 0 | Refills: 0 | DISCHARGE
Start: 2021-11-02

## 2021-11-02 RX ADMIN — Medication 667 MILLIGRAM(S): at 08:12

## 2021-11-02 RX ADMIN — Medication 81 MILLIGRAM(S): at 08:15

## 2021-11-02 RX ADMIN — SODIUM CHLORIDE 3 MILLILITER(S): 9 INJECTION INTRAMUSCULAR; INTRAVENOUS; SUBCUTANEOUS at 14:27

## 2021-11-02 RX ADMIN — Medication 667 MILLIGRAM(S): at 12:48

## 2021-11-02 RX ADMIN — GABAPENTIN 100 MILLIGRAM(S): 400 CAPSULE ORAL at 06:44

## 2021-11-02 RX ADMIN — SODIUM CHLORIDE 3 MILLILITER(S): 9 INJECTION INTRAMUSCULAR; INTRAVENOUS; SUBCUTANEOUS at 06:47

## 2021-11-02 RX ADMIN — HEPARIN SODIUM 5000 UNIT(S): 5000 INJECTION INTRAVENOUS; SUBCUTANEOUS at 06:44

## 2021-11-02 RX ADMIN — PANTOPRAZOLE SODIUM 40 MILLIGRAM(S): 20 TABLET, DELAYED RELEASE ORAL at 06:44

## 2021-11-02 RX ADMIN — Medication 25 MILLIGRAM(S): at 08:12

## 2021-11-02 RX ADMIN — Medication 60 MILLIGRAM(S): at 06:44

## 2021-11-02 RX ADMIN — Medication 500 MILLIGRAM(S): at 06:43

## 2021-11-02 NOTE — DISCHARGE NOTE PROVIDER - NSDCMRMEDTOKEN_GEN_ALL_CORE_FT
aspirin 81 mg oral delayed release tablet: 1 tab(s) orally once a day  atorvastatin 80 mg oral tablet: 1 tab(s) orally once a day (at bedtime)  calcium acetate 667 mg oral tablet: 1334 milligram(s) orally 3 times a day (with meals)  epoetin hillary: 22878 unit(s) injectable 3 times a week on dialysis days Tuesdays, Thursday, and Sarturday  heparin: 5000 unit(s) subcutaneous every 8 hours  labetalol 300 mg oral tablet: 2 tab(s) orally 3 times a day  NIFEdipine 90 mg oral tablet, extended release: 1 tab(s) orally 2 times a day   acetaminophen:   aspirin 81 mg oral delayed release tablet: 1 tab(s) orally once a day  atorvastatin 80 mg oral tablet: 1 tab(s) orally once a day (at bedtime)  calcium acetate 667 mg oral tablet: 1334 milligram(s) orally 3 times a day (with meals)  metoprolol tartrate 25 mg oral tablet: 1 tab(s) orally 2 times a day  NIFEdipine 60 mg oral tablet, extended release: 1 tab(s) orally once a day  oxyCODONE 5 mg oral tablet: 1 tab(s) orally every 6 hours, As Needed -Moderate Pain (4 - 6) MDD:4 tabs  senna oral tablet: 2 tab(s) orally once a day (at bedtime)

## 2021-11-02 NOTE — DISCHARGE NOTE PROVIDER - CARE PROVIDER_API CALL
Ugo Joaquin)  Surgery; Thoracic Surgery  [address] [place] [address]  Phone: [phone]  Fax: [phone]  Scheduled Appointment: 11/17/2021 12:00 PM

## 2021-11-02 NOTE — PROGRESS NOTE ADULT - SUBJECTIVE AND OBJECTIVE BOX
Subjective:      Tele:             V/S:                    T(F): 98.2 (21 @ 11:30), Max: 98.5 (21 @ 18:43)  HR: 65 (21 @ 11:30) (65 - 72)  BP: 120/62 (21 @ 11:30) (120/62 - 155/71)  RR: 18 (21 @ 11:30) (18 - 18)  SpO2: 96% (21 @ 11:30) (95% - 100%)  Wt(kg): --      LV EF:      Labs:      138  |  96  |  36<H>  ----------------------------<  84  3.9   |  26  |  5.32<H>    Ca    9.0      2021 06:38  Phos  3.5       Mg     2.5         TPro  6.8  /  Alb  4.0  /  TBili  0.4  /  DBili  x   /  AST  23  /  ALT  12  /  AlkPhos  98                                 8.8    7.34  )-----------( 158      ( 2021 06:38 )             28.1             CAPILLARY BLOOD GLUCOSE      POCT Blood Glucose.: 104 mg/dL (2021 11:45)  POCT Blood Glucose.: 111 mg/dL (2021 07:13)  POCT Blood Glucose.: 106 mg/dL (2021 21:52)  POCT Blood Glucose.: 91 mg/dL (2021 18:52)           CXR:    I&O's Detail    2021 07:01  -  2021 07:00  --------------------------------------------------------  IN:    Oral Fluid: 290 mL  Total IN: 290 mL    OUT:    Chest Tube (mL): 20 mL    Other (mL): 2000 mL    Voided (mL): 0 mL  Total OUT: 2020 mL    Total NET: -1730 mL      2021 07:01  -  2021 12:50  --------------------------------------------------------  IN:    Oral Fluid: 240 mL  Total IN: 240 mL    OUT:  Total OUT: 0 mL    Total NET: 240 mL          CHEST TUBE:  [ ] YES [ ] NO  OUTPUT:     per 24 hours    AIR LEAKS:  [ ] YES [ ] NO      RACHEL DRAIN:   [ ] YES [ ] NO  OUTPUT:     per 24 hours    EPICARDIAL WIRES:  [ ] YES [ ] NO      BOWEL MOVEMENT:  [ ] YES [ ] NO      Daily     Daily Weight in k.1 (2021 11:30)  Medications:  acetaminophen     Tablet .. 650 milliGRAM(s) Oral every 6 hours PRN  aspirin enteric coated 81 milliGRAM(s) Oral daily  atorvastatin 80 milliGRAM(s) Oral at bedtime  bisacodyl Suppository 10 milliGRAM(s) Rectal once  calcium acetate 667 milliGRAM(s) Oral three times a day with meals  dextrose 50% Injectable 50 milliLiter(s) IV Push every 15 minutes  epoetin hillary-epbx (RETACRIT) Injectable 58334 Unit(s) IV Push <User Schedule>  heparin   Injectable 5000 Unit(s) SubCutaneous every 8 hours  insulin lispro (ADMELOG) corrective regimen sliding scale   SubCutaneous Before meals and at bedtime  metoprolol tartrate 25 milliGRAM(s) Oral two times a day  NIFEdipine XL 60 milliGRAM(s) Oral daily  oxyCODONE    IR 5 milliGRAM(s) Oral every 4 hours PRN  oxyCODONE    IR 10 milliGRAM(s) Oral every 4 hours PRN  pantoprazole    Tablet 40 milliGRAM(s) Oral before breakfast  polyethylene glycol 3350 17 Gram(s) Oral daily  senna 2 Tablet(s) Oral at bedtime  sodium chloride 0.9% lock flush 3 milliLiter(s) IV Push every 8 hours        Physical Exam:    Neuro:     Pulm:     CV:    Abd:     Extremities:    Incision(s):                  PAST MEDICAL & SURGICAL HISTORY:  End Stage Renal Disease on Dialysis  since  - Tues, Thurs, Sat    HTN (hypertension)    Chronic renal failure    Hepatitis C    TB (tuberculosis)  Latent    CHF (congestive heart failure)  Mild    AVF (arteriovenous fistula)  placed  - Left side    S/P appendectomy  at age 7               Subjective: "Feel good, want to get home where I can rest, get no rest here"  OOB chair      Tele:  SR  60-70           V/S:                    T(F): 98.2 (21 @ 11:30), Max: 98.5 (21 @ 18:43)  HR: 65 (21 @ 11:30) (65 - 72)  BP: 120/62 (21 @ 11:30) (120/62 - 155/71)  RR: 18 (21 @ 11:30) (18 - 18)  SpO2: 96% (21 @ 11:30) (95% - 100%)  Wt(kg): --      LV EF:      Labs:      138  |  96  |  36<H>  ----------------------------<  84  3.9   |  26  |  5.32<H>    Ca    9.0      2021 06:38  Phos  3.5       Mg     2.5         TPro  6.8  /  Alb  4.0  /  TBili  0.4  /  DBili  x   /  AST  23  /  ALT  12  /  AlkPhos  98                                 8.8    7.34  )-----------( 158      ( 2021 06:38 )             28.1             CAPILLARY BLOOD GLUCOSE      POCT Blood Glucose.: 104 mg/dL (2021 11:45)  POCT Blood Glucose.: 111 mg/dL (2021 07:13)  POCT Blood Glucose.: 106 mg/dL (2021 21:52)  POCT Blood Glucose.: 91 mg/dL (2021 18:52)           CXR:    I&O's Detail    2021 07:01  -  2021 07:00  --------------------------------------------------------  IN:    Oral Fluid: 290 mL  Total IN: 290 mL    OUT:    Chest Tube (mL): 20 mL    Other (mL): 2000 mL    Voided (mL): 0 mL  Total OUT:  mL    Total NET: -1730 mL      2021 07:01  -  2021 12:50  --------------------------------------------------------  IN:    Oral Fluid: 240 mL  Total IN: 240 mL    OUT:  Total OUT: 0 mL    Total NET: 240 mL    EPICARDIAL WIRES:  [ x] YES [ ] NO  removed    BOWEL MOVEMENT:  [ x] YES [ ] NO      Daily     Daily Weight in k.1 (2021 11:30)  Medications:  acetaminophen     Tablet .. 650 milliGRAM(s) Oral every 6 hours PRN  aspirin enteric coated 81 milliGRAM(s) Oral daily  atorvastatin 80 milliGRAM(s) Oral at bedtime  bisacodyl Suppository 10 milliGRAM(s) Rectal once  calcium acetate 667 milliGRAM(s) Oral three times a day with meals  dextrose 50% Injectable 50 milliLiter(s) IV Push every 15 minutes  epoetin hillary-epbx (RETACRIT) Injectable 79323 Unit(s) IV Push <User Schedule>  heparin   Injectable 5000 Unit(s) SubCutaneous every 8 hours  insulin lispro (ADMELOG) corrective regimen sliding scale   SubCutaneous Before meals and at bedtime  metoprolol tartrate 25 milliGRAM(s) Oral two times a day  NIFEdipine XL 60 milliGRAM(s) Oral daily  oxyCODONE    IR 5 milliGRAM(s) Oral every 4 hours PRN  oxyCODONE    IR 10 milliGRAM(s) Oral every 4 hours PRN  pantoprazole    Tablet 40 milliGRAM(s) Oral before breakfast  polyethylene glycol 3350 17 Gram(s) Oral daily  senna 2 Tablet(s) Oral at bedtime  sodium chloride 0.9% lock flush 3 milliLiter(s) IV Push every 8 hours        Physical Exam:    Neurology: alert and oriented x 3,     CV : S1 S2  RRR    Sternal Wound :  CDI with dressing , Stable;  +pw >removed    Lungs: clear. RR easy, unlabored     Abdomen: soft, nontender, nondistended, positive bowel sounds,+  bowel movement   Neg N/V/D     Extremities:   DOBBINS; trace LE edema, neg calf tenderness.   PPP bilaterally:   + left AVF + bruit/ thrill; Left SVG site cdi w/ dressing                    PAST MEDICAL & SURGICAL HISTORY:  End Stage Renal Disease on Dialysis  since  - Tues, Thurs, Sat    HTN (hypertension)    Chronic renal failure    Hepatitis C    TB (tuberculosis)  Latent    CHF (congestive heart failure)  Mild    AVF (arteriovenous fistula)  placed  - Left side    S/P appendectomy  at age 7

## 2021-11-02 NOTE — DISCHARGE NOTE PROVIDER - HOSPITAL COURSE
62 Y/o Thin cachetic   male PMHx of ESRD on HD (t/th/sat) X 10 yrs  via left forearm AVF, HTN, Hep C s/p treatment, latent TB, CAD c LCx 95% stenosis presented with chest pain and sob admitted with NSTEMI to [facility] on 10 /19/21. + Cardiac Biomarkers-Plavix loaded and heparinized for NSTEMI . Underwent SAVANA on 10/21 revealed  severe aortic stenosis    Cardiac Cath 10/22-- Severe stenosis ostial Ramus and ostial LCX Last dose Plavix on 10/22  Stabilized transferred to [facility] for CABG/AVR with Dr Joaquin.  Received Pfizer Vaccine x 2  10/28 C2V/ AVR (t) intraop bleeding- pt received multiple blood products including 4 PRBC/5 cryo/ 2 PLT/ 500 feiba/ 2 FFP; DDAVP  10/29 extubated   postop insulin gttp; cardene gttp for htn and inotropic support   pt initiated paced in CTU - now sb/ 50-60; +epicardial pw VVI 45   postop HD as per renal  11/1 tx floor; VSS; RSR/ SB 50-60 -no bb at this time secondary to sinus billy   11/2 HD  DC home after HD

## 2021-11-02 NOTE — PROGRESS NOTE ADULT - PROBLEM SELECTOR PLAN 3
Tues/Thurs/Saturday - HOME   HD Via Left AVF since 2009  Epogen  with HD   House Nephrology Consulted  HD 10/25  Reanl diet   Nephbear abdul   nutrition consult
Tues/Thurs/Saturday - HOME   HD Via Left AVF since 2009  Epogen  with HD   House Nephrology Consulted  HD 10/25  Reanl diet   Nephbear abdul   nutrition consult
continue postop care  continue asa and initiate statin  Lopressor 25 bid   +epicardial pw removed  HD as per renal  pulm toilet  increase activity as tolerated  pain management  bowel regimen  Discharge planning- home when stable
continue postop care  continue asa and initiate statin  no bb at this time secondary to sinus billy 50-60  +epicardial pw attached and on VVI 45  HD as per renal  pulm toilet  increase activity as tolerated  pain management  bowel regimen  Discharge planning- home when stable

## 2021-11-02 NOTE — DISCHARGE NOTE PROVIDER - NSDCFUADDINST_GEN_ALL_CORE_FT
Please come to ED or Call Cardio thoracic office at [phone] if Chest pain, Shortness of Breath, persistant Nausea & vomiting, oozing from wounds,palpitations or pain not relieved with medication  Weigh yourself daily>notify surgeons office if  2 lb increase in weight in 24 hours.  Wash incisions with soap and water using washcloth in shower daily,do not apply any lotion,powder,oil,sunscreen to any surgical incision  1. Take ALL of your medications as ordered. Fill your prescriptions the day you are discharged and take according to the schedule you were given. Continue to take a stool softener if you are taking narcotic pain medications. AVOID medications such as ibuprofen or naproxen if you have had bypass surgery. If you have any questions or are unable to fill the prescriptions, please call the office right away at [phone].  2. Shower daily. Clean all incisions daily while showering with warm water and mild soap, pat dry with a clean towel and do not cover with any dressings unless instructed to. No bathing, swimming in a pool or the ocean until instructed by MD.  DO NOT use creams or lotions on the wound.  3. We advise that you do not drive until instructed by MD. Use your red pillow between chest and seatbelt to avoid injury in the event of a motor vehicle accident until you see the surgeon again in the office.  4. You may not return to work until instructed by MD.   5. Please eat a low fat, low cholesterol, low salt diet. (No added/extra salt). A nutritional supplement like Ensure or Glucerna (for diabetics) may help you reach your nutritional goals after surgery and aid in your recovery.  6. Weigh yourself every day in the morning and record it in the weight log in your red folder. Notify the office of any weight gain more than 2-3 pounds in 24 hours.  **Please LIMIT YOUR FLUID INTAKE TO ABOUT 4 8 OUNCE GLASSES OF BEVERAGE DAILY.**  7. Continue breathing exercises several times a day. Continue to use your heart pillow when coughing.  8. No heavy lifting nothing greater than 5 pounds until cleared by MD. We recommend that you sleep on your back and not your side or stomach for the next 4-6 weeks.  9. Call / Notify MD any fever greater than 101.0, any drainage from incisions or if they become red, hot or very tender to the touch.  10. Increase activity as tolerated. Walk indoors and/or outdoors at least 3 times a day.

## 2021-11-02 NOTE — PROGRESS NOTE ADULT - ATTENDING COMMENTS
Senait Olivares MD  Off: [phone]  Cell: [phone]
ESRD, s/p CABG  1.  ESRD--HD today  2.  Anemia--transfuse, continue TONEY  3.  Hyperkalemia--med rx, HD  4.  Volume overload--optimize EDW and UF 2L goal today
Remains intubated  1.  ESRD--HD TIW with periodic supplemental UF to mitigate2.  NO evidence of underdialysis  2.  Volume overload--concur with UF goals  3.  Anemia--TONEY titration.  NOT Fe deficient    discussed with CTU team, attending
Nayely Jonas MD  Cell : [phone]  Pager : [phone]  Office : [phone]
Nayely Jonas MD  Cell : [phone]  Pager : [phone]  Office : [phone]
Senait Olivares MD  Off: [phone]  Cell: [phone]

## 2021-11-02 NOTE — PROGRESS NOTE ADULT - PROBLEM SELECTOR PLAN 1
Continue with ASA, Statin, BB  Preop in progress   DVT ppx with Heparin 5000 sq q8   P2y12 184   Carotids pending   chest CT pending (hx of TB)   Plan for Liver US given hx of Hep C   Hepatitis panel with AM labs   RPT Full TTE as per Emani   OR Likely Thursday with Dr Joaquin
Continue with ASA, Statin, BB  Preop in progress   DVT ppx with Heparin 5000 sq q8   P2y12 184   Carotids pending   chest CT pending (hx of TB)   Plan for Liver US given hx of Hep C   Hepatitis panel with AM labs   RPT Full TTE as per Emani   OR Likely Thursday with Dr Joaquin
renal following  + left AVF + bruit/ thrill  HD today as per renal  daily bmp
renal following  + left AVF + bruit/ thrill  HD today as per renal  daily bmp

## 2021-11-02 NOTE — PROGRESS NOTE ADULT - ASSESSMENT
Pt. with ESRD on HD TIW admitted for chest pain. now s/p CABG on 10/28.     #ESRD (end stage renal disease) with Hyperkalemia-  Pt. with ESRD on HD three times a week (TTS).   patient follows with [facility] for outpatient HD with Dr. Valdovinos.   patient is now s/p CABG on 10/28   HD for today and get back on usual schedule  Dose meds as per HD.    #Anemia secondary to renal failure.  Likely in the setting of ESRD. r/o other causes  Hemoglobin below target range.   Continue Retacrit 10,000 unit TIW with HD.   Monitor CBC    #BMD/Hyperphosphatemia.  Pt. on oral phosphate binders with meals.   monitor serum phosphorus.    dc plan per CTS    If you have any questions, please feel free to contact me  Rembreto Georges  Nephrology Fellow  [phone]  (After 5pm or on weekends please page the on-call fellow)

## 2021-11-02 NOTE — PROGRESS NOTE ADULT - ASSESSMENT
This is a  60 Y/o Thin cachetic   male PMHx of ESRD on HD (t/th/sat) X 10 yrs  via left forearm AVF, HTN, Hep C s/p treatment, latent TB, CAD c LCx 95% stenosis presented with chest pain and sob admitted with NSTEMI to [facility] on 10 /19/21. + Cardiac Biomarkers-Plavix loaded and heparinized for NSTEMI . Underwent SAVANA on 10/21 revealed  severe aortic stenosis    Cardiac Cath 10/22-- Severe stenosis ostial Ramus and ostial LCX Last dose Plavix on 10/22  Stabilized transferred to [facility] for CABG/AVR with Dr Joaquin.  Received Pfizer Vaccine x 2  10/28 C2V/ AVR (t) intraop bleeding- pt received multiple blood products including 4 PRBC/5 cryo/ 2 PLT/ 500 feiba/ 2 FFP; DDAVP  10/29 extubated   postop insulin gttp; cardene gttp for htn and inotropic support   pt initiated paced in CTU - now sb/ 50-60; +epicardial pw VVI 45   postop HD as per renal  11/1 tx floor; VSS; RSR/ SB 50-60 -no bb at this time secondary to sinus billy   11/2 HD  DC home after HD  discharge planning- home when stable

## 2021-11-02 NOTE — DISCHARGE NOTE PROVIDER - NSDCACTIVITY_GEN_ALL_CORE
Bathing allowed/Do not drive or operate machinery/Showering allowed/Do not make important decisions/Stairs allowed/Walking - Indoors allowed/No heavy lifting/straining/Walking - Outdoors allowed/Follow Instructions Provided by your Surgical Team

## 2021-11-02 NOTE — DISCHARGE NOTE NURSING/CASE MANAGEMENT/SOCIAL WORK - PATIENT PORTAL LINK FT
You can access the FollowMyHealth Patient Portal offered by [facility] by registering at the following website: [email or web address]. By joining [employer]’s FollowMyHealth portal, you will also be able to view your health information using other applications (apps) compatible with our system.

## 2021-11-02 NOTE — PROGRESS NOTE ADULT - PROVIDER SPECIALTY LIST ADULT
CT Surgery
Critical Care
Critical Care
Nephrology
Critical Care
Nephrology
Critical Care
Critical Care
Nephrology
CT Surgery
Nephrology
CT Surgery

## 2021-11-02 NOTE — PROGRESS NOTE ADULT - PROBLEM SELECTOR PROBLEM 4
HTN (hypertension)
S/P AVR (aortic valve replacement)
HTN (hypertension)
S/P AVR (aortic valve replacement)

## 2021-11-02 NOTE — DISCHARGE NOTE PROVIDER - NSDCFUADDAPPT_GEN_ALL_CORE_FT
Follow up appointment with Emani Graham  Cardiac surgery office at [facility] entrance 3  first floor on left after entering [facility]  Office telephone     Your Care Navigator Nurse Practitioner will be in touch to see you in your home within a few days from discharge. The Follow Your Heart program can help ensure you understand your medications, discharge instructions and answer any questions you may have at that time. They are also a great source to address concerns during the day and may be reached at [phone].

## 2021-11-02 NOTE — DISCHARGE NOTE NURSING/CASE MANAGEMENT/SOCIAL WORK - NSDCFUADDAPPT_GEN_ALL_CORE_FT
Follow up appointment with Emani Graham  Cardiac surgery office at [facility] entrance 3  first floor on left after entering [facility]  Office telephone     Your Care Navigator Nurse Practitioner will be in touch to see you in your home within a few days from discharge. The Follow Your Heart program can help ensure you understand your medications, discharge instructions and answer any questions you may have at that time. They are also a great source to address concerns during the day and may be reached at [phone].

## 2021-11-02 NOTE — PROGRESS NOTE ADULT - PROBLEM SELECTOR PLAN 4
continue postop care  continue asa and initiate statin  no bb at this time secondary to sinus billy 50-60  +epicardial pw attached and on VVI 45  HD as per renal  pulm toilet  increase activity as tolerated  pain management  bowel regimen  Discharge planning- home when stable continue postop care  continue asa and initiate statin  pulm toilet  increase activity as tolerated  pain management  Discharge planning- home when stable

## 2021-11-02 NOTE — PROGRESS NOTE ADULT - SUBJECTIVE AND OBJECTIVE BOX
[facility] Division of Kidney Diseases & Hypertension  HEMODIALYSIS NOTE  --------------------------------------------------------------------------------  Chief Complaint: ESRD/Ongoing hemodialysis requirement    24 hour events/subjective:    feels well and wants to go home    PAST HISTORY  --------------------------------------------------------------------------------  No significant changes to PMH, PSH, FHx, SHx, unless otherwise noted    ALLERGIES & MEDICATIONS  --------------------------------------------------------------------------------  Allergies    adhesives (Other)  No Known Drug Allergies    Intolerances      Standing Inpatient Medications  aspirin enteric coated 81 milliGRAM(s) Oral daily  atorvastatin 80 milliGRAM(s) Oral at bedtime  bisacodyl Suppository 10 milliGRAM(s) Rectal once  calcium acetate 667 milliGRAM(s) Oral three times a day with meals  dextrose 50% Injectable 50 milliLiter(s) IV Push every 15 minutes  epoetin hillary-epbx (RETACRIT) Injectable 92530 Unit(s) IV Push <User Schedule>  heparin   Injectable 5000 Unit(s) SubCutaneous every 8 hours  insulin lispro (ADMELOG) corrective regimen sliding scale   SubCutaneous Before meals and at bedtime  metoprolol tartrate 25 milliGRAM(s) Oral two times a day  NIFEdipine XL 60 milliGRAM(s) Oral daily  pantoprazole    Tablet 40 milliGRAM(s) Oral before breakfast  polyethylene glycol 3350 17 Gram(s) Oral daily  senna 2 Tablet(s) Oral at bedtime  sodium chloride 0.9% lock flush 3 milliLiter(s) IV Push every 8 hours    PRN Inpatient Medications  acetaminophen     Tablet .. 650 milliGRAM(s) Oral every 6 hours PRN  oxyCODONE    IR 5 milliGRAM(s) Oral every 4 hours PRN  oxyCODONE    IR 10 milliGRAM(s) Oral every 4 hours PRN      REVIEW OF SYSTEMS  --------------------------------------------------------------------------------  All other systems were reviewed and are negative, except as noted.    VITALS/PHYSICAL EXAM  --------------------------------------------------------------------------------  T(C): 36.8 (11-02-21 @ 11:30), Max: 36.9 (11-01-21 @ 18:43)  HR: 65 (11-02-21 @ 11:30) (65 - 72)  BP: 120/62 (11-02-21 @ 11:30) (120/62 - 155/71)  RR: 18 (11-02-21 @ 11:30) (18 - 18)  SpO2: 96% (11-02-21 @ 11:30) (95% - 100%)  Wt(kg): --        11-01-21 @ 07:01  -  11-02-21 @ 07:00  --------------------------------------------------------  IN: 290 mL / OUT: 2020 mL / NET: -1730 mL      Physical Exam:  	Gen: NAD  	Pulm: CTA B/L  	CV: RRR, S1S2; no rub  	Abd: +BS, soft, nontender/nondistended  	: No suprapubic tenderness  	UE: Warm, FROM  	LE: Warm no edema  	Neuro: No focal deficits, intact gait  	Psych: Normal affect and mood  	Skin: Warm, without rashes  	Vascular access: AVF    LABS/STUDIES  --------------------------------------------------------------------------------              8.8    7.34  >-----------<  158      [11-02-21 @ 06:38]              28.1     138  |  96  |  36  ----------------------------<  84      [11-02-21 @ 06:38]  3.9   |  26  |  5.32        Ca     9.0     [11-02-21 @ 06:38]      Mg     2.5     [11-01-21 @ 00:27]      Phos  3.5     [11-01-21 @ 00:27]    TPro  6.8  /  Alb  4.0  /  TBili  0.4  /  DBili  x   /  AST  23  /  ALT  12  /  AlkPhos  98  [11-01-21 @ 00:27]

## 2021-11-02 NOTE — DISCHARGE NOTE PROVIDER - NSDCPNSUBOBJ_GEN_ALL_CORE
Progress Note:   · Provider Specialty  CT Surgery    Reason for Admission:   Reason for Admission:  · Reason for Admission  10/28 C2V  AVR      · Subjective and Objective:   Subjective: "Feel good, want to get home where I can rest, get no rest here"  OOB chair      Tele:  SR  60-70           V/S:                    T(F): 98.2 (21 @ 11:30), Max: 98.5 (21 @ 18:43)  HR: 65 (21 @ 11:30) (65 - 72)  BP: 120/62 (21 @ 11:30) (120/62 - 155/71)  RR: 18 (21 @ 11:30) (18 - 18)  SpO2: 96% (21 @ 11:30) (95% - 100%)  Wt(kg): --      LV EF:      Labs:      138  |  96  |  36<H>  ----------------------------<  84  3.9   |  26  |  5.32<H>    Ca    9.0      2021 06:38  Phos  3.5       Mg     2.5         TPro  6.8  /  Alb  4.0  /  TBili  0.4  /  DBili  x   /  AST  23  /  ALT  12  /  AlkPhos  98                                 8.8    7.34  )-----------( 158      ( 2021 06:38 )             28.1             CAPILLARY BLOOD GLUCOSE      POCT Blood Glucose.: 104 mg/dL (2021 11:45)  POCT Blood Glucose.: 111 mg/dL (2021 07:13)  POCT Blood Glucose.: 106 mg/dL (2021 21:52)  POCT Blood Glucose.: 91 mg/dL (2021 18:52)           CXR:    I&O's Detail    2021 07:01  -  2021 07:00  --------------------------------------------------------  IN:    Oral Fluid: 290 mL  Total IN: 290 mL    OUT:    Chest Tube (mL): 20 mL    Other (mL): 2000 mL    Voided (mL): 0 mL  Total OUT: 2020 mL    Total NET: -1730 mL      2021 07:01  -  2021 12:50  --------------------------------------------------------  IN:    Oral Fluid: 240 mL  Total IN: 240 mL    OUT:  Total OUT: 0 mL    Total NET: 240 mL    EPICARDIAL WIRES:  [ x] YES [ ] NO  removed    BOWEL MOVEMENT:  [ x] YES [ ] NO      Daily     Daily Weight in k.1 (2021 11:30)  Medications:  acetaminophen     Tablet .. 650 milliGRAM(s) Oral every 6 hours PRN  aspirin enteric coated 81 milliGRAM(s) Oral daily  atorvastatin 80 milliGRAM(s) Oral at bedtime  bisacodyl Suppository 10 milliGRAM(s) Rectal once  calcium acetate 667 milliGRAM(s) Oral three times a day with meals  dextrose 50% Injectable 50 milliLiter(s) IV Push every 15 minutes  epoetin hillary-epbx (RETACRIT) Injectable 80569 Unit(s) IV Push <User Schedule>  heparin   Injectable 5000 Unit(s) SubCutaneous every 8 hours  insulin lispro (ADMELOG) corrective regimen sliding scale   SubCutaneous Before meals and at bedtime  metoprolol tartrate 25 milliGRAM(s) Oral two times a day  NIFEdipine XL 60 milliGRAM(s) Oral daily  oxyCODONE    IR 5 milliGRAM(s) Oral every 4 hours PRN  oxyCODONE    IR 10 milliGRAM(s) Oral every 4 hours PRN  pantoprazole    Tablet 40 milliGRAM(s) Oral before breakfast  polyethylene glycol 3350 17 Gram(s) Oral daily  senna 2 Tablet(s) Oral at bedtime  sodium chloride 0.9% lock flush 3 milliLiter(s) IV Push every 8 hours        Physical Exam:    Neurology: alert and oriented x 3,     CV : S1 S2  RRR    Sternal Wound :  CDI with dressing , Stable;  +pw >removed    Lungs: clear. RR easy, unlabored     Abdomen: soft, nontender, nondistended, positive bowel sounds,+  bowel movement   Neg N/V/D     Extremities:   DOBBINS; trace LE edema, neg calf tenderness.   PPP bilaterally:   + left AVF + bruit/ thrill; Left SVG site cdi w/ dressing                    PAST MEDICAL & SURGICAL HISTORY:  End Stage Renal Disease on Dialysis  since  - Tues, Thurs, Sat    HTN (hypertension)    Chronic renal failure    Hepatitis C    TB (tuberculosis)  Latent    CHF (congestive heart failure)  Mild    AVF (arteriovenous fistula)  placed  - Left side    S/P appendectomy  at age 7                  Assessment and Plan:   · Assessment     This is a  62 Y/o Thin cachetic   male PMHx of ESRD on HD (//sat) X 10 yrs  via left forearm AVF, HTN, Hep C s/p treatment, latent TB, CAD c LCx 95% stenosis presented with chest pain and sob admitted with NSTEMI to [facility] on 10 /19/21. + Cardiac Biomarkers-Plavix loaded and heparinized for NSTEMI . Underwent SAVANA on 10/21 revealed  severe aortic stenosis    Cardiac Cath 10/22-- Severe stenosis ostial Ramus and ostial LCX Last dose Plavix on 10/22  Stabilized transferred to [facility] for CABG/AVR with Dr Joaquin.  Received Pfizer Vaccine x 2  10/28 C2V/ AVR (t) intraop bleeding- pt received multiple blood products including 4 PRBC/5 cryo/ 2 PLT/ 500 feiba/ 2 FFP; DDAVP  10/29 extubated   postop insulin gttp; cardene gttp for htn and inotropic support   pt initiated paced in CTU - now sb/ 50-60; +epicardial pw VVI 45   postop HD as per renal   tx floor; VSS; RSR/ SB 50-60 -no bb at this time secondary to sinus billy    HD  DC home after HD  discharge planning- home when stable     Problem/Plan - 1:  ·  Problem: ESRD on dialysis.   ·  Plan: renal following  + left AVF + bruit/ thrill  HD today as per renal  daily bmp.    Problem/Plan - 2:  ·  Problem: HTN (hypertension).   ·  Plan: procardia 60 mg po qd initiated  HD as per renal   Low dose betablocker CAD.    Problem/Plan - 3:  ·  Problem: S/P CABG x 2.   ·  Plan: continue postop care  continue asa and initiate statin  Lopressor 25 bid   +epicardial pw removed  HD as per renal  pulm toilet  increase activity as tolerated  pain management  bowel regimen  Discharge planning- home when stable.    Problem/Plan - 4:  ·  Problem: S/P AVR (aortic valve replacement).   ·  Plan: continue postop care  continue asa and initiate statin  pulm toilet  increase activity as tolerated  pain management  Discharge planning- home when stable.        Electronic Signatures:  Orly Gao)  (Signed 2021 13:02)  	Authored: Progress Note, Reason for Admission, Subjective and Objective, Assessment and Plan

## 2021-11-02 NOTE — PROGRESS NOTE ADULT - PROBLEM SELECTOR PLAN 2
Continue with ASA, Statin, BB  Preop in progress   DVT ppx with Heparin 5000 sq q8   P2y12 184   Carotids pending   chest CT pending (hx of TB)   Plan for Liver US given hx of Hep C   Hepatitis panel with AM labs   RPT Full TTE as per Emani   CABG/AVR OR Likely Thursday
Continue with ASA, Statin, BB  Preop in progress   DVT ppx with Heparin 5000 sq q8   P2y12 184   Carotids pending   chest CT pending (hx of TB)   Plan for Liver US given hx of Hep C   Hepatitis panel with AM labs   RPT Full TTE as per Emani   CABG/AVR OR Likely Thursday
procardia 60 mg po qd initiated  HD as per renal   Low dose betablocker CAD
procardia 60 mg po qd initiated  HD as per renal   no bb at this time secondary to sinus billy 50-60

## 2021-11-03 ENCOUNTER — TRANSCRIPTION ENCOUNTER (OUTPATIENT)
Age: 61
End: 2021-11-03

## 2021-11-03 RX ORDER — NIFEDIPINE 90 MG/1
90 TABLET, EXTENDED RELEASE ORAL TWICE DAILY
Qty: 180 | Refills: 3 | Status: DISCONTINUED | COMMUNITY
Start: 2021-03-24 | End: 2021-11-03

## 2021-11-04 LAB — SURGICAL PATHOLOGY STUDY: SIGNIFICANT CHANGE UP

## 2021-11-05 ENCOUNTER — TRANSCRIPTION ENCOUNTER (OUTPATIENT)
Age: 61
End: 2021-11-05

## 2021-11-08 ENCOUNTER — TRANSCRIPTION ENCOUNTER (OUTPATIENT)
Age: 61
End: 2021-11-08

## 2021-11-10 ENCOUNTER — APPOINTMENT (OUTPATIENT)
Dept: CARE COORDINATION | Facility: HOME HEALTH | Age: 61
End: 2021-11-10
Payer: MEDICARE

## 2021-11-10 VITALS
OXYGEN SATURATION: 99 % | HEART RATE: 72 BPM | DIASTOLIC BLOOD PRESSURE: 70 MMHG | SYSTOLIC BLOOD PRESSURE: 160 MMHG | RESPIRATION RATE: 16 BRPM

## 2021-11-10 PROCEDURE — 99024 POSTOP FOLLOW-UP VISIT: CPT

## 2021-11-10 RX ORDER — ASPIRIN 81 MG
81 TABLET, DELAYED RELEASE (ENTERIC COATED) ORAL
Refills: 0 | Status: DISCONTINUED | COMMUNITY
End: 2021-11-10

## 2021-11-11 NOTE — HISTORY OF PRESENT ILLNESS
[FreeTextEntry1] : FOLLOW YOUR HEART HOME VISIT-[facility]\par Home Visit made Jorge Luis DUNN ] . A  patient of Dr Bre Joaquin  S/P C2V/AVR  on 10/28. Discharged from [facility]\par \par \par Visiting patient for post discharge transitional care management and post surgical follow up. \par Arrived to  home, he lives in a 2 family-2nd floor apartment.  He lives with 4 other room mates.\par He is ambulating, appears well- in no distress.\par \par Had dialysis yesterday- 2.8 kilos removed \par

## 2021-11-11 NOTE — PHYSICAL EXAM
[Sclera] : the sclera and conjunctiva were normal [Neck Appearance] : the appearance of the neck was normal [Auscultation Breath Sounds / Voice Sounds] : lungs were clear to auscultation bilaterally [Heart Rate And Rhythm] : heart rate was normal and rhythm regular [Heart Sounds] : normal S1 and S2 [1+] : left 1+ [Bowel Sounds] : normal bowel sounds [Abdomen Soft] : soft [Abnormal Walk] : normal gait [Skin Color & Pigmentation] : normal skin color and pigmentation [No Focal Deficits] : no focal deficits [Oriented To Time, Place, And Person] : oriented to person, place, and time [FreeTextEntry1] : MTI- approximated, Left leg black purse string sutures

## 2021-11-11 NOTE — ASSESSMENT
[FreeTextEntry1] : 62 Y/o Thin cachetic   male PMHx of ESRD on HD (t/th/sat) X 10 yrs  via left forearm AVF, HTN, Hep C s/p treatment, latent TB\par On 10/28 C2V/ AVR (t) with Dr Joaquin

## 2021-11-17 ENCOUNTER — APPOINTMENT (OUTPATIENT)
Dept: CARDIOTHORACIC SURGERY | Facility: CLINIC | Age: 61
End: 2021-11-17
Payer: MEDICARE

## 2021-11-17 VITALS
SYSTOLIC BLOOD PRESSURE: 162 MMHG | WEIGHT: 161 LBS | HEART RATE: 80 BPM | OXYGEN SATURATION: 99 % | TEMPERATURE: 98.2 F | DIASTOLIC BLOOD PRESSURE: 85 MMHG | BODY MASS INDEX: 21.34 KG/M2 | RESPIRATION RATE: 16 BRPM | HEIGHT: 73 IN

## 2021-11-17 PROCEDURE — 99024 POSTOP FOLLOW-UP VISIT: CPT

## 2021-12-01 ENCOUNTER — APPOINTMENT (OUTPATIENT)
Dept: CARDIOTHORACIC SURGERY | Facility: CLINIC | Age: 61
End: 2021-12-01

## 2021-12-01 ENCOUNTER — TRANSCRIPTION ENCOUNTER (OUTPATIENT)
Age: 61
End: 2021-12-01

## 2021-12-01 PROCEDURE — G9005: CPT

## 2021-12-07 ENCOUNTER — EMERGENCY (EMERGENCY)
Facility: HOSPITAL | Age: 61
LOS: 1 days | Discharge: HOME | End: 2021-12-07
Attending: EMERGENCY MEDICINE | Admitting: EMERGENCY MEDICINE
Payer: MEDICARE

## 2021-12-07 VITALS
SYSTOLIC BLOOD PRESSURE: 198 MMHG | TEMPERATURE: 99 F | RESPIRATION RATE: 17 BRPM | OXYGEN SATURATION: 98 % | HEART RATE: 70 BPM | HEIGHT: 73 IN | DIASTOLIC BLOOD PRESSURE: 101 MMHG

## 2021-12-07 VITALS
RESPIRATION RATE: 16 BRPM | OXYGEN SATURATION: 100 % | DIASTOLIC BLOOD PRESSURE: 94 MMHG | SYSTOLIC BLOOD PRESSURE: 186 MMHG | HEART RATE: 76 BPM

## 2021-12-07 LAB
ALBUMIN SERPL ELPH-MCNC: 4.2 G/DL — SIGNIFICANT CHANGE UP (ref 3.3–5)
ALP SERPL-CCNC: 155 U/L — HIGH (ref 40–120)
ALT FLD-CCNC: 9 U/L — SIGNIFICANT CHANGE UP (ref 4–41)
ANION GAP SERPL CALC-SCNC: 11 MMOL/L — SIGNIFICANT CHANGE UP (ref 7–14)
AST SERPL-CCNC: 16 U/L — SIGNIFICANT CHANGE UP (ref 4–40)
BASOPHILS # BLD AUTO: 0.03 K/UL — SIGNIFICANT CHANGE UP (ref 0–0.2)
BASOPHILS NFR BLD AUTO: 0.6 % — SIGNIFICANT CHANGE UP (ref 0–2)
BILIRUB SERPL-MCNC: 0.4 MG/DL — SIGNIFICANT CHANGE UP (ref 0.2–1.2)
BUN SERPL-MCNC: 23 MG/DL — SIGNIFICANT CHANGE UP (ref 7–23)
CALCIUM SERPL-MCNC: 10.5 MG/DL — SIGNIFICANT CHANGE UP (ref 8.4–10.5)
CHLORIDE SERPL-SCNC: 97 MMOL/L — LOW (ref 98–107)
CO2 SERPL-SCNC: 31 MMOL/L — SIGNIFICANT CHANGE UP (ref 22–31)
CREAT SERPL-MCNC: 5.28 MG/DL — HIGH (ref 0.5–1.3)
EOSINOPHIL # BLD AUTO: 0.34 K/UL — SIGNIFICANT CHANGE UP (ref 0–0.5)
EOSINOPHIL NFR BLD AUTO: 6.4 % — HIGH (ref 0–6)
GLUCOSE SERPL-MCNC: 86 MG/DL — SIGNIFICANT CHANGE UP (ref 70–99)
HCT VFR BLD CALC: 34.1 % — LOW (ref 39–50)
HGB BLD-MCNC: 10.2 G/DL — LOW (ref 13–17)
IANC: 2.92 K/UL — SIGNIFICANT CHANGE UP (ref 1.5–8.5)
IMM GRANULOCYTES NFR BLD AUTO: 0.4 % — SIGNIFICANT CHANGE UP (ref 0–1.5)
LYMPHOCYTES # BLD AUTO: 1.25 K/UL — SIGNIFICANT CHANGE UP (ref 1–3.3)
LYMPHOCYTES # BLD AUTO: 23.5 % — SIGNIFICANT CHANGE UP (ref 13–44)
MCHC RBC-ENTMCNC: 24.2 PG — LOW (ref 27–34)
MCHC RBC-ENTMCNC: 29.9 GM/DL — LOW (ref 32–36)
MCV RBC AUTO: 80.8 FL — SIGNIFICANT CHANGE UP (ref 80–100)
MONOCYTES # BLD AUTO: 0.76 K/UL — SIGNIFICANT CHANGE UP (ref 0–0.9)
MONOCYTES NFR BLD AUTO: 14.3 % — HIGH (ref 2–14)
NEUTROPHILS # BLD AUTO: 2.92 K/UL — SIGNIFICANT CHANGE UP (ref 1.8–7.4)
NEUTROPHILS NFR BLD AUTO: 54.8 % — SIGNIFICANT CHANGE UP (ref 43–77)
NRBC # BLD: 0 /100 WBCS — SIGNIFICANT CHANGE UP
NRBC # FLD: 0 K/UL — SIGNIFICANT CHANGE UP
PLATELET # BLD AUTO: 217 K/UL — SIGNIFICANT CHANGE UP (ref 150–400)
POTASSIUM SERPL-MCNC: 4.8 MMOL/L — SIGNIFICANT CHANGE UP (ref 3.5–5.3)
POTASSIUM SERPL-SCNC: 4.8 MMOL/L — SIGNIFICANT CHANGE UP (ref 3.5–5.3)
PROT SERPL-MCNC: 8.3 G/DL — SIGNIFICANT CHANGE UP (ref 6–8.3)
RBC # BLD: 4.22 M/UL — SIGNIFICANT CHANGE UP (ref 4.2–5.8)
RBC # FLD: 19.2 % — HIGH (ref 10.3–14.5)
SODIUM SERPL-SCNC: 139 MMOL/L — SIGNIFICANT CHANGE UP (ref 135–145)
WBC # BLD: 5.32 K/UL — SIGNIFICANT CHANGE UP (ref 3.8–10.5)
WBC # FLD AUTO: 5.32 K/UL — SIGNIFICANT CHANGE UP (ref 3.8–10.5)

## 2021-12-07 PROCEDURE — 71045 X-RAY EXAM CHEST 1 VIEW: CPT | Mod: 26

## 2021-12-07 PROCEDURE — 99284 EMERGENCY DEPT VISIT MOD MDM: CPT

## 2021-12-07 RX ORDER — AMLODIPINE BESYLATE 2.5 MG/1
5 TABLET ORAL ONCE
Refills: 0 | Status: COMPLETED | OUTPATIENT
Start: 2021-12-07 | End: 2021-12-07

## 2021-12-07 RX ADMIN — AMLODIPINE BESYLATE 5 MILLIGRAM(S): 2.5 TABLET ORAL at 16:49

## 2021-12-07 NOTE — ED PROVIDER NOTE - NSFOLLOWUPINSTRUCTIONS_ED_ALL_ED_FT
YOU WERE SEEN IN THE ED FOR: small amount of blood in your sputum    YOUR CHEST XRAY STILL SHOWS A SMALL AMOUNT OF FLUID ON YOUR RIGHT LUNG.    FOR PAIN/FEVER, YOU MAY TAKE TYLENOL (acetaminophen). FOLLOW THE INSTRUCTIONS ON THE LABEL/CONTAINER.    PLEASE FOLLOW UP WITH YOUR PRIVATE PHYSICIAN WITHIN THE NEXT 48 HOURS. BRING COPIES OF YOUR RESULTS.    RETURN TO THE EMERGENCY DEPARTMENT IF YOU EXPERIENCE ANY NEW/CONCERNING/WORSENING SYMPTOMS.

## 2021-12-07 NOTE — ED PROVIDER NOTE - ATTENDING CONTRIBUTION TO CARE
agree with resident note    "61M hx of hx ESRD on HD (t/th/sat) X 10 yrs  via left forearm AVF, HTN, Hep C s/p treatment, latent TB, CAD c LCx 95% stenosis presents to the ED for 1 episode of scant blood after clearing throat and coughing, "few specks". Denies antiplt, AC. Denies recent falls, trauma. Denies hx of VTE. Denies chest pain, shortness of breath. Originally patient stated today morning before dialysis but on clarification stated it happened yesterday morning. He told individuals at the dialysis center who told him to f/u with his PCP (but currently does not have one) so came to ED."    Pt denies any hx of TB, PE or DVT, prior hemoptysis.  States was scant blood "2 small specks".  Denies weight loss, hx of smoking    PE: well appearing; VSS: CTAB/L; s1 s2 systolic murmur; abd soft/NT/ND ext: +thrill on left graft    Imp: recent CABG on ASA, no AC, one isolated episode of small coughing up of secretions.  Has not happened since.  hemodynamically stable; will get labs and CXR: if wnl can follow up with PCP (needs one as hasn't seen his last one for 3 years and she is no longer working.

## 2021-12-07 NOTE — ED PROVIDER NOTE - OBJECTIVE STATEMENT
61M hx of hx ESRD on HD (t/th/sat) X 10 yrs  via left forearm AVF, HTN, Hep C s/p treatment, latent TB, CAD c LCx 95% stenosis presents to the ED for 1 episode of scant blood after clearing throat and coughing, "few specks". Denies antiplt, AC. Denies recent falls, trauma. Denies hx of VTE. Denies chest pain, shortness of breath. Originally patient stated today morning before dialysis but on clarification stated it happened yesterday morning. He told individuals at the dialysis center who told him to f/u with his PCP (but currently does not have one) so came to ED.

## 2021-12-07 NOTE — ED ADULT TRIAGE NOTE - CHIEF COMPLAINT QUOTE
pt c/o 1 episode of vomiting blood. went to dialysis today, had full session. (L a/v fistual t,th,s). ambulatory with cane.

## 2021-12-07 NOTE — ED PROVIDER NOTE - PROGRESS NOTE DETAILS
Adams Back D.O., PGY3 (Resident)  Patient w/o further episodes of blood in sputum. Hgb improved from 11/2021 from 8 -> 10. CXR with right sided small pleural effusion, consistent from prior CXRs. Not hypoxic nor tachycardic. Breathing comfortable. Results endorsed including unexpected incidental findings (copy of reports provided to patient). Return precautions given. Patient expressed verbal understanding. All questions answered.  Will DC with outpatient follow-up.

## 2021-12-07 NOTE — ED PROVIDER NOTE - PHYSICAL EXAMINATION
GENERAL: elderly male, lying in bed, NAD. Hypertensive otherwise Vital signs are within normal limits  EYES: Conjunctiva noninjected or pale, sclera anicteric  HENT: NC/AT, moist mucous membranes, oropharynx clear, no exudate  NECK: Supple, trachea midline  LUNG: Nonlabored respirations, no wheezes, rales  CV: RRR, Pulses- Radial/dorsalis pedis: 2+ bilateral and equal  ABDOMEN: Nondistended, nontender, no guarding  MSK: No visible deformities, nontender extremities, nontender chest wall  SKIN: No rashes, bruises  NEURO: AAOx4 (to person, place, time, event), no tremor, steady gait  PSYCH: Normal mood and affect GENERAL: elderly male, lying in bed, NAD. Hypertensive otherwise Vital signs are within normal limits  EYES: Conjunctiva noninjected or pale, sclera anicteric  HENT: NC/AT, moist mucous membranes, oropharynx clear, no exudate  NECK: Supple, trachea midline  LUNG: Nonlabored respirations, no wheezes, rales  CV: RRR, Pulses- Radial/dorsalis pedis: 2+ bilateral and equal  ABDOMEN: Nondistended, nontender, no guarding  MSK: No visible deformities, nontender extremities, nontender chest wall  SKIN: No rashes, bruises, left arm AV fistula with thrill, nonbleeding  NEURO: AAOx4 (to person, place, time, event), no tremor, steady gait  PSYCH: Normal mood and affect

## 2021-12-07 NOTE — ED ADULT NURSE NOTE - OBJECTIVE STATEMENT
Pt received to Room 2 with c/o 1 episode of hemoptysis yesterday. Pt is dialysis pt T,Th, Sat, last treatment today. Pt was advised today by dialysis RN to follow up with PMD about 1 episode of hemoptysis because pt doesn't have a PMD pt came to ED. Pt denies chest pain, difficulty breathing, anticoagulant use, or other complaints. Pt reports getting into a coughing fit yesterday and then noticed blood tinged sputum yesterday denies other episodes of similar. Pt changed into gown #18g IV placed to R forearm, lab work collected as ordered, placed on cardiac monitor showing NSR. Pt found to be hypertensive, denies chest pain, headache, numbness, tingling, weakness or other neuro deficits. MD at bedside for eval, will continue to monitor.

## 2021-12-07 NOTE — ED PROVIDER NOTE - NSFOLLOWUPCLINICS_GEN_ALL_ED_FT
[facility] General Internal Medicine  General Internal Medicine  [address] [place] [address]  Phone: [phone]  Fax:   Follow Up Time: Urgent

## 2021-12-07 NOTE — ED PROVIDER NOTE - PATIENT PORTAL LINK FT
You can access the FollowMyHealth Patient Portal offered by [facility] by registering at the following website: [email or web address]. By joining [facility]’s FollowMyHealth portal, you will also be able to view your health information using other applications (apps) compatible with our system.

## 2021-12-27 ENCOUNTER — APPOINTMENT (OUTPATIENT)
Dept: CARDIOLOGY | Facility: CLINIC | Age: 61
End: 2021-12-27

## 2021-12-30 PROCEDURE — 84439 ASSAY OF FREE THYROXINE: CPT

## 2021-12-30 PROCEDURE — 85610 PROTHROMBIN TIME: CPT

## 2021-12-30 PROCEDURE — 86891 AUTOLOGOUS BLOOD OP SALVAGE: CPT

## 2021-12-30 PROCEDURE — 85384 FIBRINOGEN ACTIVITY: CPT

## 2021-12-30 PROCEDURE — 86803 HEPATITIS C AB TEST: CPT

## 2021-12-30 PROCEDURE — 82330 ASSAY OF CALCIUM: CPT

## 2021-12-30 PROCEDURE — 88305 TISSUE EXAM BY PATHOLOGIST: CPT

## 2021-12-30 PROCEDURE — 83735 ASSAY OF MAGNESIUM: CPT

## 2021-12-30 PROCEDURE — 85027 COMPLETE CBC AUTOMATED: CPT

## 2021-12-30 PROCEDURE — 99261: CPT

## 2021-12-30 PROCEDURE — 85025 COMPLETE CBC W/AUTO DIFF WBC: CPT

## 2021-12-30 PROCEDURE — 94003 VENT MGMT INPAT SUBQ DAY: CPT

## 2021-12-30 PROCEDURE — 82565 ASSAY OF CREATININE: CPT

## 2021-12-30 PROCEDURE — P9016: CPT

## 2021-12-30 PROCEDURE — 36415 COLL VENOUS BLD VENIPUNCTURE: CPT

## 2021-12-30 PROCEDURE — 80074 ACUTE HEPATITIS PANEL: CPT

## 2021-12-30 PROCEDURE — 83605 ASSAY OF LACTIC ACID: CPT

## 2021-12-30 PROCEDURE — 83036 HEMOGLOBIN GLYCOSYLATED A1C: CPT

## 2021-12-30 PROCEDURE — 82553 CREATINE MB FRACTION: CPT

## 2021-12-30 PROCEDURE — 85576 BLOOD PLATELET AGGREGATION: CPT

## 2021-12-30 PROCEDURE — 86850 RBC ANTIBODY SCREEN: CPT

## 2021-12-30 PROCEDURE — 82803 BLOOD GASES ANY COMBINATION: CPT

## 2021-12-30 PROCEDURE — 87640 STAPH A DNA AMP PROBE: CPT

## 2021-12-30 PROCEDURE — 36430 TRANSFUSION BLD/BLD COMPNT: CPT

## 2021-12-30 PROCEDURE — P9012: CPT

## 2021-12-30 PROCEDURE — 84295 ASSAY OF SERUM SODIUM: CPT

## 2021-12-30 PROCEDURE — 86965 POOLING BLOOD PLATELETS: CPT

## 2021-12-30 PROCEDURE — C1769: CPT

## 2021-12-30 PROCEDURE — P9047: CPT

## 2021-12-30 PROCEDURE — 82550 ASSAY OF CK (CPK): CPT

## 2021-12-30 PROCEDURE — 80061 LIPID PANEL: CPT

## 2021-12-30 PROCEDURE — 85014 HEMATOCRIT: CPT

## 2021-12-30 PROCEDURE — 85730 THROMBOPLASTIN TIME PARTIAL: CPT

## 2021-12-30 PROCEDURE — 84100 ASSAY OF PHOSPHORUS: CPT

## 2021-12-30 PROCEDURE — 93306 TTE W/DOPPLER COMPLETE: CPT

## 2021-12-30 PROCEDURE — 93005 ELECTROCARDIOGRAM TRACING: CPT

## 2021-12-30 PROCEDURE — 85396 CLOTTING ASSAY WHOLE BLOOD: CPT

## 2021-12-30 PROCEDURE — 76705 ECHO EXAM OF ABDOMEN: CPT

## 2021-12-30 PROCEDURE — 87521 HEPATITIS C PROBE&RVRS TRNSC: CPT

## 2021-12-30 PROCEDURE — U0003: CPT

## 2021-12-30 PROCEDURE — 86923 COMPATIBILITY TEST ELECTRIC: CPT

## 2021-12-30 PROCEDURE — P9045: CPT

## 2021-12-30 PROCEDURE — 93880 EXTRACRANIAL BILAT STUDY: CPT

## 2021-12-30 PROCEDURE — U0005: CPT

## 2021-12-30 PROCEDURE — 85018 HEMOGLOBIN: CPT

## 2021-12-30 PROCEDURE — 84484 ASSAY OF TROPONIN QUANT: CPT

## 2021-12-30 PROCEDURE — 86901 BLOOD TYPING SEROLOGIC RH(D): CPT

## 2021-12-30 PROCEDURE — 97530 THERAPEUTIC ACTIVITIES: CPT

## 2021-12-30 PROCEDURE — 94002 VENT MGMT INPAT INIT DAY: CPT

## 2021-12-30 PROCEDURE — 82435 ASSAY OF BLOOD CHLORIDE: CPT

## 2021-12-30 PROCEDURE — 97163 PT EVAL HIGH COMPLEX 45 MIN: CPT

## 2021-12-30 PROCEDURE — 86900 BLOOD TYPING SEROLOGIC ABO: CPT

## 2021-12-30 PROCEDURE — 97116 GAIT TRAINING THERAPY: CPT

## 2021-12-30 PROCEDURE — P9037: CPT

## 2021-12-30 PROCEDURE — 83880 ASSAY OF NATRIURETIC PEPTIDE: CPT

## 2021-12-30 PROCEDURE — 71250 CT THORAX DX C-: CPT

## 2021-12-30 PROCEDURE — 80053 COMPREHEN METABOLIC PANEL: CPT

## 2021-12-30 PROCEDURE — 85049 AUTOMATED PLATELET COUNT: CPT

## 2021-12-30 PROCEDURE — 71045 X-RAY EXAM CHEST 1 VIEW: CPT

## 2021-12-30 PROCEDURE — 87641 MR-STAPH DNA AMP PROBE: CPT

## 2021-12-30 PROCEDURE — 82962 GLUCOSE BLOOD TEST: CPT

## 2021-12-30 PROCEDURE — 84132 ASSAY OF SERUM POTASSIUM: CPT

## 2021-12-30 PROCEDURE — 83695 ASSAY OF LIPOPROTEIN(A): CPT

## 2021-12-30 PROCEDURE — 82947 ASSAY GLUCOSE BLOOD QUANT: CPT

## 2021-12-30 PROCEDURE — 94010 BREATHING CAPACITY TEST: CPT

## 2021-12-30 PROCEDURE — 84443 ASSAY THYROID STIM HORMONE: CPT

## 2021-12-30 PROCEDURE — P9059: CPT

## 2021-12-30 PROCEDURE — 80048 BASIC METABOLIC PNL TOTAL CA: CPT

## 2021-12-30 PROCEDURE — C1889: CPT

## 2021-12-30 PROCEDURE — 86769 SARS-COV-2 COVID-19 ANTIBODY: CPT

## 2022-01-01 NOTE — DISCHARGE NOTE NURSING/CASE MANAGEMENT/SOCIAL WORK - PATIENT PORTAL LINK FT
You can access the FollowMyHealth Patient Portal offered by [facility] by registering at the following website: [email or web address]. By joining [employer]’s FollowMyHealth portal, you will also be able to view your health information using other applications (apps) compatible with our system. No

## 2022-01-10 RX ORDER — METOPROLOL TARTRATE 25 MG/1
25 TABLET, FILM COATED ORAL TWICE DAILY
Qty: 90 | Refills: 3 | Status: DISCONTINUED | COMMUNITY
End: 2022-01-10

## 2022-01-20 LAB
POTASSIUM SERPL-MCNC: 5.4 MMOL/L — HIGH (ref 3.5–5.3)
POTASSIUM SERPL-SCNC: 5.4 MMOL/L — HIGH (ref 3.5–5.3)

## 2022-03-14 ENCOUNTER — RX RENEWAL (OUTPATIENT)
Age: 62
End: 2022-03-14

## 2022-04-25 ENCOUNTER — APPOINTMENT (OUTPATIENT)
Dept: CARDIOLOGY | Facility: CLINIC | Age: 62
End: 2022-04-25
Payer: MEDICARE

## 2022-04-25 ENCOUNTER — NON-APPOINTMENT (OUTPATIENT)
Age: 62
End: 2022-04-25

## 2022-04-25 VITALS
DIASTOLIC BLOOD PRESSURE: 93 MMHG | WEIGHT: 158 LBS | HEIGHT: 73 IN | OXYGEN SATURATION: 100 % | SYSTOLIC BLOOD PRESSURE: 218 MMHG | HEART RATE: 61 BPM | BODY MASS INDEX: 20.94 KG/M2

## 2022-04-25 PROCEDURE — 93000 ELECTROCARDIOGRAM COMPLETE: CPT

## 2022-04-25 PROCEDURE — 99214 OFFICE O/P EST MOD 30 MIN: CPT

## 2022-04-25 NOTE — HISTORY OF PRESENT ILLNESS
[FreeTextEntry1] : SP CABG x 2 and AVR on 10/28/21. He has been slowly recovering but BP is an issue. In the hospital they had decreased his nifedipine from 90 mg BID to 60 mg daily; he was also initially discahrged on Metorpolol 25 mg BID but was changed back to his Labetalol by nephrologist. \par He is complaining of occasional dyspnea\par \par #HTN- On Labetalol 400 mg TID; will increase his Nifedipine back to 90 mg BID\par #CAD- SP CABG/AVR now on ASA and Atorvastatin, continue present med\par #Dyspnea- will check TTE; may be from uncontrolled hTN but will check AV\par \par \par

## 2022-04-25 NOTE — DISCUSSION/SUMMARY
[FreeTextEntry1] : 59 year old man with HTN ESRD on treatment for  hep C, now with dyspnea. With significant stenosis of LCX and Ramus / significant AS. SP CABG/AVR on 10/28/21. Here for followup.\par #HTN- On Metoprolol 25 mg BID and Nifedipine 60 mg daily\par #CAD- SP CABG/AVR now on ASA and Atorvastatin, continue present meds\par #FU in 2 months

## 2022-05-04 ENCOUNTER — APPOINTMENT (OUTPATIENT)
Dept: CV DIAGNOSITCS | Facility: HOSPITAL | Age: 62
End: 2022-05-04
Payer: MEDICARE

## 2022-05-04 ENCOUNTER — OUTPATIENT (OUTPATIENT)
Dept: OUTPATIENT SERVICES | Facility: HOSPITAL | Age: 62
LOS: 1 days | End: 2022-05-04

## 2022-05-04 DIAGNOSIS — I10 ESSENTIAL (PRIMARY) HYPERTENSION: ICD-10-CM

## 2022-05-04 PROCEDURE — 93306 TTE W/DOPPLER COMPLETE: CPT | Mod: 26

## 2022-05-15 NOTE — CHART NOTE - NSCHARTNOTESELECT_GEN_ALL_CORE
Patient Education     Salivary Gland Infection  Salivary glands make saliva. Saliva is mostly water. It also has minerals and proteins that help break down food and keep the mouth and teeth healthy. There are 3 pairs of salivary glands:   · Parotid glands. In front of the ear.  · Submandibular glands. Below the jaw.  · Sublingual glands. Below the tongue.  A salivary gland can become infected by germs (bacteria). Things that make this more likely include dehydration and taking medicines that affect saliva flow. Infection is also more likely when the tube (duct) that carries saliva from the gland to the mouth is blocked. It may be blocked by a salivary gland stone. This is a collection of minerals that forms in the salivary gland.   Symptoms of infection include fever, severe pain in the gland, and redness and swelling over the gland. It may hurt to open your mouth. Symptoms may be worse when saliva flow is stimulated, such as by the smell of food.    Antibiotics are used to treat the infection. Draining the infection with a simple surgery may be needed. If you have a salivary gland stone, a procedure may be done to remove it.   Home care  · Take antibiotics as directed until they are finished. Do this even if you start to feel better after only a few days.  · Unless another medicine was prescribed, take over-the-counter medicines to help ease pain. These include acetaminophen or ibuprofen.  · Moist heat can also help ease swelling and pain. Wet a cloth with warm water and put it over the sore gland for 10 to 15 minutes several times a day.  · Gently massage the gland a few times a day.   · Suck on lemon or other tart hard candies to make saliva flow.  To help prevent stones and infections:   · Drink 6 to 8 glasses of fluid per day (such as water, tea, and clear soup) to keep well-hydrated.  · If you smoke, ask your healthcare provider for help to quit. Smoking makes salivary gland stones more likely.  · Keep good  Event Note dental hygiene. Brush and floss your teeth daily. See your dentist for regular cleanings.  Follow-up care  Follow up with your healthcare provider, or as advised.    When to call your healthcare provider   Call your healthcare provider if any of the following occur:   · Fever over 100.4°F (38ºC) after 2 days of taking antibiotics  · Symptoms that get worse or don't get better in a few days  Call 911  Call 911 if you have trouble breathing or swallowing.   [employer] last reviewed this educational content on 3/1/2020  © 8211-5797 [employer]. All rights reserved. This information is not intended as a substitute for professional medical care. Always follow your healthcare professional's instructions.           Patient Education     Understanding Middle Ear Infections in Children    Middle ear infections are most common in children under age 5. Crankiness, a fever, and tugging at or rubbing the ear may all be signs that your child has a middle ear infection. This is especially true if your child has a cold or other viral illness. It's important to call your healthcare provider if you see these or any of the signs listed below.   It's important to stop smoking in the home or around children to help prevent ear infections. Keep your child away from secondhand smoke too.   Call your child's healthcare provider if you notice any signs of a middle ear infection.   What are middle ear infections?  Middle ear infections occur behind the eardrum. The eardrum is the thin sheet of tissue that passes sound waves between the outer and middle ear. These infections are usually caused by bacteria or viruses. These are often related to a recent cold or allergy problem.   A blocked tube  In young children, these bacteria or viruses likely reach the middle ear by traveling the short length of the eustachian tube from the back of the nose. Once in the middle ear, they multiply and spread. This irritates delicate tissues  lining the middle ear and eustachian tube. If the tube lining swells enough to block off the tube, air pressure drops in the middle ear. This pulls the eardrum inward, making it stiffer and less able to transmit sound.   Fluid buildup causes pain  Once the eustachian tube swells shut, moisture can’t drain from the middle ear. Fluid that should flush out the infection builds up in the chamber. This may raise pressure behind the eardrum and increase pain. But if the infection spreads to this fluid, pressure behind the eardrum goes way up. The eardrum is forced outward. It becomes painful, and may break.   Chronic fluid affects hearing  If the eardrum doesn’t break and the tube remains blocked, the fluid becomes an ongoing (chronic) condition. As the immediate (acute) infection passes, the middle ear fluid thickens. It becomes sticky and takes up less space. Pressure drops in the middle ear once more. Inward suction stiffens the eardrum. This affects hearing. If the fluid is not removed, the eardrum may be stretched and damaged.   Signs of middle ear infection  · A fever over 100.4° F ( 38.0°C) and cold symptoms  · Severe ear pain  · Any kind of discharge from the ear  · Ear pain that gets worse or doesn’t go away after a few days    When to call your child's healthcare provider  Call your child's healthcare provider's office if your otherwise healthy child has any of the signs or symptoms described below:   · Fever (see Fever and children, below)  · Your child has had a seizure caused by the fever  · Rapid breathing or shortness of breath  · A stiff neck or headache  · Trouble swallowing  · Your child acts ill after the fever is gone  · Persistent brown, green, or bloody mucus  · Signs of dehydration. These include severe thirst, dark yellow urine, infrequent urination, dull or sunken eyes, dry skin, and dry or cracked lips.  · Your child still doesn't look or act right to you, even after taking a non-aspirin pain  reliever  Fever and children  Use a digital thermometer to check your child’s temperature. Don’t use a mercury thermometer. There are different kinds and uses of digital thermometers. They include:   · Rectal. For children younger than 3 years, a rectal temperature is the most accurate.  · Forehead (temporal). This works for children age 3 months and older. If a child under 3 months old has signs of illness, this can be used for a first pass. The provider may want to confirm with a rectal temperature.  · Ear (tympanic). Ear temperatures are accurate after 6 months of age, but not before.  · Armpit (axillary). This is the least reliable but may be used for a first pass to check a child of any age with signs of illness. The provider may want to confirm with a rectal temperature.  · Mouth (oral). Don’t use a thermometer in your child’s mouth until he or she is at least 4 years old.  Use the rectal thermometer with care. Follow the product maker’s directions for correct use. Insert it gently. Label it and make sure it’s not used in the mouth. It may pass on germs from the stool. If you don’t feel OK using a rectal thermometer, ask the healthcare provider what type to use instead. When you talk with any healthcare provider about your child’s fever, tell him or her which type you used.   Below are guidelines to know if your young child has a fever. Your child’s healthcare provider may give you different numbers for your child. Follow your provider’s specific instructions.   Fever readings for a baby under 3 months old:   · First, ask your child’s healthcare provider how you should take the temperature.  · Rectal or forehead: 100.4°F (38°C) or higher  · Armpit: 99°F (37.2°C) or higher  Fever readings for a child age 3 months to 36 months (3 years):   · Rectal, forehead, or ear: 102°F (38.9°C) or higher  · Armpit: 101°F (38.3°C) or higher  Call the healthcare provider in these cases:   · Repeated temperature of 104°F (40°C)  or higher in a child of any age  · Fever of 100.4° F (38° C) or higher in baby younger than 3 months  · Fever that lasts more than 24 hours in a child under age 2  · Fever that lasts for 3 days in a child age 2 or older  [employer] last reviewed this educational content on 4/1/2020  © 2834-2250 [employer]. All rights reserved. This information is not intended as a substitute for professional medical care. Always follow your healthcare professional's instructions.

## 2022-06-13 ENCOUNTER — APPOINTMENT (OUTPATIENT)
Dept: CARDIOLOGY | Facility: CLINIC | Age: 62
End: 2022-06-13
Payer: MEDICARE

## 2022-06-13 VITALS — WEIGHT: 157 LBS | BODY MASS INDEX: 20.71 KG/M2

## 2022-06-13 VITALS
DIASTOLIC BLOOD PRESSURE: 63 MMHG | TEMPERATURE: 98.5 F | BODY MASS INDEX: 20.71 KG/M2 | HEART RATE: 62 BPM | HEIGHT: 73 IN | SYSTOLIC BLOOD PRESSURE: 146 MMHG | OXYGEN SATURATION: 100 %

## 2022-06-13 PROCEDURE — 99214 OFFICE O/P EST MOD 30 MIN: CPT

## 2022-06-13 PROCEDURE — 93000 ELECTROCARDIOGRAM COMPLETE: CPT

## 2022-06-13 NOTE — DISCUSSION/SUMMARY
[FreeTextEntry1] : 59 year old man with HTN ESRD on treatment for  hep C, now with dyspnea. With significant stenosis of LCX and Ramus / significant AS. SP CABG/AVR on 10/28/21. Here for followup.\par #HTN- On Labetalol 400 mg TID; Nifedipine 90 mg BID\par #CAD- SP CABG/AVR now on ASA and Atorvastatin, continue present med\par #Dyspnea- TTE normal, may be related to fluid removal at HD, he will speak with nephrologist\par #FU in 3 months

## 2022-06-13 NOTE — HISTORY OF PRESENT ILLNESS
[FreeTextEntry1] : SP CABG x 2 and AVR on 10/28/21. He has been slowly recovering but BP is an issue. In the hospital they had decreased his nifedipine from 90 mg BID to 60 mg daily; he was also initially discharged on Metoprolol 25 mg BID but was changed back to his Labetalol by nephrologist. \par Increased his Nifedipine 90 mg last visit\par TTE 5/2 showed normal prosthetic valve function, mild to moderate MR, normal LV function, moderate diastolic dysfunction\par \par #HTN- On Labetalol 400 mg TID; Nifedipine 90 mg BID\par #CAD- SP CABG/AVR now on ASA and Atorvastatin, continue present med\par #Dyspnea- TTE normal, may be related to fluid removal at HD, he will speak with nephrologist\par \par \par

## 2022-06-27 ENCOUNTER — OUTPATIENT (OUTPATIENT)
Dept: OUTPATIENT SERVICES | Facility: HOSPITAL | Age: 62
LOS: 1 days | End: 2022-06-27
Payer: MEDICARE

## 2022-06-27 ENCOUNTER — APPOINTMENT (OUTPATIENT)
Dept: RADIOLOGY | Facility: IMAGING CENTER | Age: 62
End: 2022-06-27

## 2022-06-27 DIAGNOSIS — N18.6 END STAGE RENAL DISEASE: ICD-10-CM

## 2022-06-27 PROCEDURE — 71046 X-RAY EXAM CHEST 2 VIEWS: CPT | Mod: 26

## 2022-06-27 PROCEDURE — 71046 X-RAY EXAM CHEST 2 VIEWS: CPT

## 2022-06-29 ENCOUNTER — APPOINTMENT (OUTPATIENT)
Dept: INTERNAL MEDICINE | Facility: CLINIC | Age: 62
End: 2022-06-29

## 2022-07-11 ENCOUNTER — APPOINTMENT (OUTPATIENT)
Dept: INTERNAL MEDICINE | Facility: CLINIC | Age: 62
End: 2022-07-11

## 2022-07-11 ENCOUNTER — OUTPATIENT (OUTPATIENT)
Dept: OUTPATIENT SERVICES | Facility: HOSPITAL | Age: 62
LOS: 1 days | End: 2022-07-11

## 2022-07-11 VITALS
WEIGHT: 154 LBS | BODY MASS INDEX: 20.41 KG/M2 | TEMPERATURE: 97.3 F | DIASTOLIC BLOOD PRESSURE: 60 MMHG | SYSTOLIC BLOOD PRESSURE: 180 MMHG | OXYGEN SATURATION: 99 % | HEIGHT: 73 IN | HEART RATE: 57 BPM | RESPIRATION RATE: 16 BRPM

## 2022-07-11 DIAGNOSIS — G47.69 OTHER SLEEP RELATED MOVEMENT DISORDERS: ICD-10-CM

## 2022-07-11 DIAGNOSIS — I35.0 NONRHEUMATIC AORTIC (VALVE) STENOSIS: ICD-10-CM

## 2022-07-11 DIAGNOSIS — R07.9 CHEST PAIN, UNSPECIFIED: ICD-10-CM

## 2022-07-11 DIAGNOSIS — K59.09 OTHER CONSTIPATION: ICD-10-CM

## 2022-07-11 PROCEDURE — 99204 OFFICE O/P NEW MOD 45 MIN: CPT | Mod: GC

## 2022-07-11 RX ORDER — OXYCODONE 5 MG/1
5 TABLET ORAL
Refills: 0 | Status: DISCONTINUED | COMMUNITY
End: 2022-07-11

## 2022-07-12 DIAGNOSIS — Z95.1 PRESENCE OF AORTOCORONARY BYPASS GRAFT: ICD-10-CM

## 2022-07-12 DIAGNOSIS — N18.6 END STAGE RENAL DISEASE: ICD-10-CM

## 2022-07-12 DIAGNOSIS — I10 ESSENTIAL (PRIMARY) HYPERTENSION: ICD-10-CM

## 2022-07-12 DIAGNOSIS — G47.69 OTHER SLEEP RELATED MOVEMENT DISORDERS: ICD-10-CM

## 2022-07-12 NOTE — HEALTH RISK ASSESSMENT
[Never] : Never [No] : No [Patient reported colonoscopy was normal] : Patient reported colonoscopy was normal [HIV test declined] : HIV test declined [# of Members in Household ___] :  household currently consist of [unfilled] member(s) [Unemployed] : unemployed [Feels Safe at Home] : Feels safe at home [Fully functional (bathing, dressing, toileting, transferring, walking, feeding)] : Fully functional (bathing, dressing, toileting, transferring, walking, feeding) [Fully functional (using the telephone, shopping, preparing meals, housekeeping, doing laundry, using] : Fully functional and needs no help or supervision to perform IADLs (using the telephone, shopping, preparing meals, housekeeping, doing laundry, using transportation, managing medications and managing finances) [Change in mental status noted] : No change in mental status noted [Language] : denies difficulty with language [Behavior] : denies difficulty with behavior [Handling Complex Tasks] : denies difficulty handling complex tasks [Reasoning] : denies difficulty with reasoning [Reports changes in hearing] : Reports no changes in hearing [Reports changes in vision] : Reports no changes in vision [ColonoscopyDate] : 01/2021

## 2022-07-12 NOTE — ASSESSMENT
[FreeTextEntry1] : 62yom w PMH CAD s/p CABG x2 and AVR in 10/2021, ESRD on HD (T Th Sa), HTN, Hep C s/p tx presents to establish care for PCP. \par

## 2022-07-12 NOTE — REVIEW OF SYSTEMS
[Dyspnea on Exertion] : dyspnea on exertion [Negative] : Psychiatric [FreeTextEntry6] : on exertion.  [FreeTextEntry7] : + umbilical hernia at surgical scar site. Nontender.  [FreeTextEntry8] : vanessa.

## 2022-07-12 NOTE — HISTORY OF PRESENT ILLNESS
[FreeTextEntry1] : Establish Care [de-identified] : 62yom w PMH CAD s/p CABG x2 and AVR in 10/2021, ESRD on HD (T Th Sa), HTN, Hep C s/p tx presents to establish care for PCP. \par \par CAD s/p CABG x2 w AVR: Patient reports dyspnea after around 6 blocks of walking or 2 flights of stairs. He reports intermittent chest discomfort but is not bothered by. Patient likes to ambulate or shoot around a basketball for fun. Reports he is not taking asa or atorvastatin, so was prescribed again and encouraged to take. \par \par ESRD on HD: T Th Sa, patient does not miss sessions reportedly. Denies dizziness. Dyspnea chronic per above, exertional. ESRD is reportedly due to HTN untreated. Has L arm AVF with 4 significant protrusions of vessel, concerning patient. No issues reported w HD.\par \par HTN: Elevated /70 taken manually. Denies CP, vision changes, HA at time of exam. Reports takes his medications, labetalol 400 TID and nifedipine 90 BID. \par \par Hep C: S/p treatment reportedly: has seen hepatology in 2019.  \par \par Sleep Movements: Patient reports for past 4-6 months he has had times where he woke up on the floor after sleep. Has also reported having vivid dreams, kicking wall thinking he was kicking in his dreams. Reports hasn't slept very well recently as well. \par \par Denies any allergies, fevers, chills, or other complaints at this time.

## 2022-07-12 NOTE — PLAN
[FreeTextEntry1] : RTC in 5 weeks for HTN management, patient to take BP at home and keep log.\par \par D/w Dr. MECRY Palomo\par \par Clarence Swift PGY-2\par [employer]\par

## 2022-07-12 NOTE — END OF VISIT
[] : Resident [FreeTextEntry3] : Agree that RUE fistula may be at risk, vascular referral. Need to control BP, agree with increase in meds.

## 2022-07-12 NOTE — PHYSICAL EXAM
[Normal] : affect was normal and insight and judgment were intact [de-identified] : Prominent heart sounds, no murmurs appreciated. Regular rate and rhythm,  [de-identified] : + reducible hernia near supraumbilical surgical scar.

## 2022-08-06 ENCOUNTER — OUTPATIENT (OUTPATIENT)
Dept: OUTPATIENT SERVICES | Facility: HOSPITAL | Age: 62
LOS: 1 days | Discharge: HOME | End: 2022-08-06

## 2022-08-06 LAB
HEMOLYSIS INDEX: 3 — SIGNIFICANT CHANGE UP
POTASSIUM SERPL-MCNC: 5.9 MMOL/L — HIGH (ref 3.5–5.3)
POTASSIUM SERPL-SCNC: 5.9 MMOL/L — HIGH (ref 3.5–5.3)

## 2022-08-15 NOTE — DISCHARGE NOTE PROVIDER - NSDCHOSPICE_GEN_A_CORE
CENTRAL VENOUS LINE INSERTION:    Indications: hemodynamic monitoring, vascular access, med administration and hemodialysis    Antisepsis: sterile gloves, mask, gown, and drape.  Skin prepped with chlorhexidine.  Catheter: 13 Fr x 3 lumen x 20 cm (HOG) via left internal jugular.  Insertion directed by ultrasound.  Heme positive aspiration all ports.  Secured with sutures at hub at skin.  Dressing: dry sterile gauze and bio occlusive dressing.  Complications: none.  
No

## 2022-08-16 ENCOUNTER — APPOINTMENT (OUTPATIENT)
Dept: VASCULAR SURGERY | Facility: CLINIC | Age: 62
End: 2022-08-16

## 2022-08-16 PROCEDURE — 93990 DOPPLER FLOW TESTING: CPT

## 2022-08-16 PROCEDURE — 99213 OFFICE O/P EST LOW 20 MIN: CPT

## 2022-08-16 NOTE — HISTORY OF PRESENT ILLNESS
[FreeTextEntry1] : 62-year-old gentleman currently on hemodialysis via left radiocephalic AV fistula.  Fistula has been used for approximately 9 years.  Patient is reporting no problems with dialysis however, dialysis is concerned regarding the aneurysms.  He is here for evaluation. [] : left radiocephalic fistula

## 2022-08-16 NOTE — PHYSICAL EXAM
[Normal] : normal rate, regular rhythm, normal S1/S2, no murmur [Thrill] : thrill [Pulsatile Thrill] : no pulsatile thrill [Aneurysm] : aneurysm [Bleeding] : no bleeding [Hand well perfused] : hand well perfused [Foot well perfused] : foot not well perfused [Warm Extremities] : warm extremities

## 2022-08-17 ENCOUNTER — OUTPATIENT (OUTPATIENT)
Dept: OUTPATIENT SERVICES | Facility: HOSPITAL | Age: 62
LOS: 1 days | End: 2022-08-17

## 2022-08-17 ENCOUNTER — APPOINTMENT (OUTPATIENT)
Dept: INTERNAL MEDICINE | Facility: CLINIC | Age: 62
End: 2022-08-17

## 2022-08-17 VITALS — SYSTOLIC BLOOD PRESSURE: 170 MMHG | DIASTOLIC BLOOD PRESSURE: 90 MMHG

## 2022-08-17 VITALS
WEIGHT: 152 LBS | DIASTOLIC BLOOD PRESSURE: 80 MMHG | HEIGHT: 73 IN | TEMPERATURE: 98 F | BODY MASS INDEX: 20.15 KG/M2 | SYSTOLIC BLOOD PRESSURE: 170 MMHG | OXYGEN SATURATION: 99 % | HEART RATE: 57 BPM

## 2022-08-17 VITALS — DIASTOLIC BLOOD PRESSURE: 78 MMHG | SYSTOLIC BLOOD PRESSURE: 164 MMHG

## 2022-08-17 PROCEDURE — 99213 OFFICE O/P EST LOW 20 MIN: CPT | Mod: GE

## 2022-08-17 NOTE — REVIEW OF SYSTEMS
Patient was brought to [facility] [place] and has received 5 / 27 treatments.  Patient will return Monday for further treatment.      [Negative] : Heme/Lymph

## 2022-08-19 NOTE — ASSESSMENT
[FreeTextEntry1] : 62yom w PMH CAD s/p CABG x2 and AVR in 10/2021, ESRD on HD (T Th Sa), HTN, Hep C s/p tx presents for HTN follow up. \par

## 2022-08-19 NOTE — PLAN
[FreeTextEntry1] : RTC in 5 weeks for telephonic visit to review BP meds and BPs. \par \par D/w Dr. Diaz \par \par Clarence Swift PGY-2\par [employer]

## 2022-08-19 NOTE — PHYSICAL EXAM
[Normal] : no joint swelling and grossly normal strength and tone [de-identified] : Systolic murmur appreciated  [de-identified] : L arm with AV fistula with 5 round prominences, appearing like vascular aneurysms.,

## 2022-08-19 NOTE — HISTORY OF PRESENT ILLNESS
[FreeTextEntry1] : HTN [de-identified] : 62yom w PMH CAD s/p CABG x2 and AVR in 10/2021, ESRD on HD (T Th Sa), HTN, Hep C s/p tx presents for HTN follow up. \par \par HTN: Patient has been taking his medications as follows\par AM: Nifedipine 90, labetalol 200\par Noon: Labetalol 600\par Evening: Nifedipine 90, labetalol 200\par Bedtime: Labetalol 600\par \par Totals: Labetalol 1600 qd, Nifedipine 180 qd. \par \par Patient denies HA, CP, SOB. Denies vision changes. \par \par Was unable to obtain BP kit, and some of medications 2/2 insurance issues, says he filled out forms this week\par \par AV Fistula: Patient went to vascular surgeon Dr. Buckner: Fistula is functioning well. No need for intervention.

## 2022-08-22 DIAGNOSIS — I10 ESSENTIAL (PRIMARY) HYPERTENSION: ICD-10-CM

## 2022-08-23 ENCOUNTER — APPOINTMENT (OUTPATIENT)
Dept: VASCULAR SURGERY | Facility: CLINIC | Age: 62
End: 2022-08-23

## 2022-09-12 ENCOUNTER — OUTPATIENT (OUTPATIENT)
Dept: OUTPATIENT SERVICES | Facility: HOSPITAL | Age: 62
LOS: 1 days | Discharge: HOME | End: 2022-09-12

## 2022-09-12 ENCOUNTER — APPOINTMENT (OUTPATIENT)
Dept: CARDIOLOGY | Facility: CLINIC | Age: 62
End: 2022-09-12
Payer: MEDICARE

## 2022-09-12 ENCOUNTER — NON-APPOINTMENT (OUTPATIENT)
Age: 62
End: 2022-09-12

## 2022-09-12 VITALS
DIASTOLIC BLOOD PRESSURE: 94 MMHG | HEIGHT: 73 IN | BODY MASS INDEX: 20.28 KG/M2 | SYSTOLIC BLOOD PRESSURE: 203 MMHG | HEART RATE: 54 BPM | OXYGEN SATURATION: 100 % | WEIGHT: 153 LBS

## 2022-09-12 PROCEDURE — 99214 OFFICE O/P EST MOD 30 MIN: CPT

## 2022-09-12 PROCEDURE — 93000 ELECTROCARDIOGRAM COMPLETE: CPT

## 2022-09-12 NOTE — REASON FOR VISIT
[Follow-Up - Clinic] : a clinic follow-up of [Symptom and Test Evaluation] : symptom and test evaluation [Structural Heart and Valve Disease] : structural heart and valve disease [Hypertension] : hypertension [FreeTextEntry2] : HTN, Dyspnea

## 2022-09-12 NOTE — DISCUSSION/SUMMARY
[EKG obtained to assist in diagnosis and management of assessed problem(s)] : EKG obtained to assist in diagnosis and management of assessed problem(s) [FreeTextEntry1] : 62 year old man with HTN ESRD on treatment for  hep C, now with dyspnea. With significant stenosis of LCX and Ramus / significant AS. SP CABG/AVR on 10/28/21. Here for followup.\par #HTN- On Labetalol 400 mg TID; Nifedipine 90 mg BID\par #CAD- SP CABG/AVR now on ASA and Atorvastatin, continue present med\par #Dyspnea- TTE normal, may be related to fluid removal at HD, he will speak with nephrologist\par #FU in 3 months

## 2022-09-12 NOTE — HISTORY OF PRESENT ILLNESS
[FreeTextEntry1] : SP CABG x 2 and AVR on 10/28/21. \par TTE 5/2 showed normal prosthetic valve function, mild to moderate MR, normal LV function, moderate diastolic dysfunction\par He struggles with his BP but he is taking his meds- unclear dietary compliance. Eats out a lot (fast food)Due for HD tomorrow\par \par #HTN- On Labetalol 400 mg TID; Nifedipine 90 mg BID\par #CAD- SP CABG/AVR now on ASA and Atorvastatin, continue present med\par #Chronic diastolic HF in setting of valve disease and HTN- continue strict BP control\par \par \par

## 2022-09-13 LAB
HEMOLYSIS INDEX: 4 — SIGNIFICANT CHANGE UP
POTASSIUM SERPL-MCNC: 6.2 MMOL/L — CRITICAL HIGH (ref 3.5–5.3)
POTASSIUM SERPL-SCNC: 6.2 MMOL/L — CRITICAL HIGH (ref 3.5–5.3)

## 2022-09-15 LAB
HEMOLYSIS INDEX: 4 — SIGNIFICANT CHANGE UP
POTASSIUM SERPL-MCNC: 5.8 MMOL/L — HIGH (ref 3.5–5.3)
POTASSIUM SERPL-SCNC: 5.8 MMOL/L — HIGH (ref 3.5–5.3)

## 2022-09-19 ENCOUNTER — OUTPATIENT (OUTPATIENT)
Dept: OUTPATIENT SERVICES | Facility: HOSPITAL | Age: 62
LOS: 1 days | End: 2022-09-19

## 2022-09-19 ENCOUNTER — APPOINTMENT (OUTPATIENT)
Dept: INTERNAL MEDICINE | Facility: CLINIC | Age: 62
End: 2022-09-19

## 2022-09-19 VITALS
DIASTOLIC BLOOD PRESSURE: 72 MMHG | BODY MASS INDEX: 20.54 KG/M2 | WEIGHT: 155 LBS | HEIGHT: 73 IN | SYSTOLIC BLOOD PRESSURE: 150 MMHG

## 2022-09-19 DIAGNOSIS — M54.50 LOW BACK PAIN, UNSPECIFIED: ICD-10-CM

## 2022-09-19 DIAGNOSIS — R06.89 OTHER ABNORMALITIES OF BREATHING: ICD-10-CM

## 2022-09-19 PROCEDURE — 99442: CPT | Mod: GC,95

## 2022-09-19 NOTE — ASSESSMENT
[FreeTextEntry1] : - Continue with Nifedipine and Labetalol as prescribed \par - PT referral today \par - Follow up in 5 weeks for HTN, dyspnea and low back pain \par \par \par Case discussed with Dr. Palomo\par Time spent on the visit: 13.54 min

## 2022-09-19 NOTE — HISTORY OF PRESENT ILLNESS
[Home] : at home, [unfilled] , at the time of the visit. [Medical Office: ([facility])___] : at the medical office located in  [FreeTextEntry1] : follow up HTN  [de-identified] : 63 y/o M with PMHx CAD s/p CABG x2 and AVR in 10/2021, ESRD on HD (T Th Sa), HTN, Hep C s/p treatment who presents for a follow up regarding his HTN. \par \par Patient reports feeling better since the last time and reported his BP has improved. Patient has been compliant to medication Labetalol 600mg TID and Nifedipine 90mg BID maintaining -145/70-80's. Denies chest pain but endorses SOB on exertion for a couple of months and has recently presented dyspnea during last half an hour of HD. Patient reports he has been receiving oxygen the last sessions and this has improved. Patient had a recent follow up with cardiology, a SAVANA was normal and thought maybe related to fluid removal at HD. Patient denies anxiety. \par \par Patient endorsing moderate-severe low back pain for a month, particularly in the mornings and has been using "gel" over his back once or twice a day with moderate relief.

## 2022-09-19 NOTE — END OF VISIT
[] : Resident [FreeTextEntry3] : Pt reports he gets SOB at last 30 minutes of HD, feels better with oxygen given, although unclear if hypoxia actually documented. Agree with current BP meds, pt to follow BPs on HD and non-HD days.

## 2022-09-19 NOTE — PHYSICAL EXAM
[de-identified] : Tele health visit. Unable to obtain.  [de-identified] : patient able to speak full sentences.

## 2022-09-19 NOTE — REVIEW OF SYSTEMS
[Shortness Of Breath] : shortness of breath [Dyspnea on Exertion] : dyspnea on exertion [Fever] : no fever [Chills] : no chills [Fatigue] : no fatigue [Night Sweats] : no night sweats [Recent Change In Weight] : ~T no recent weight change [Discharge] : no discharge [Redness] : no redness [Vision Problems] : no vision problems [Itching] : no itching [Chest Pain] : no chest pain [Claudication] : no  leg claudication [Lower Ext Edema] : no lower extremity edema [Orthopena] : no orthopnea [Wheezing] : no wheezing [Cough] : no cough [Abdominal Pain] : no abdominal pain [Nausea] : no nausea [Constipation] : no constipation [Diarrhea] : no diarrhea [Vomiting] : no vomiting [Heartburn] : no heartburn [Melena] : no melena [Dysuria] : no dysuria [Frequency] : no frequency [Itching] : no itching [Skin Rash] : no skin rash [Headache] : no headache [Dizziness] : no dizziness [Fainting] : no fainting [Easy Bleeding] : no easy bleeding [FreeTextEntry6] : dyspnea during HD.  [FreeTextEntry9] : moderate to severe lower back pain.

## 2022-09-26 DIAGNOSIS — M54.50 LOW BACK PAIN, UNSPECIFIED: ICD-10-CM

## 2022-09-26 DIAGNOSIS — R06.89 OTHER ABNORMALITIES OF BREATHING: ICD-10-CM

## 2022-09-26 DIAGNOSIS — I10 ESSENTIAL (PRIMARY) HYPERTENSION: ICD-10-CM

## 2022-12-12 ENCOUNTER — APPOINTMENT (OUTPATIENT)
Dept: CARDIOLOGY | Facility: CLINIC | Age: 62
End: 2022-12-12

## 2022-12-12 ENCOUNTER — NON-APPOINTMENT (OUTPATIENT)
Age: 62
End: 2022-12-12

## 2022-12-12 VITALS
HEART RATE: 58 BPM | DIASTOLIC BLOOD PRESSURE: 63 MMHG | WEIGHT: 155 LBS | SYSTOLIC BLOOD PRESSURE: 130 MMHG | RESPIRATION RATE: 16 BRPM | HEIGHT: 73 IN | OXYGEN SATURATION: 100 % | TEMPERATURE: 97.8 F | BODY MASS INDEX: 20.54 KG/M2

## 2022-12-12 PROCEDURE — 93000 ELECTROCARDIOGRAM COMPLETE: CPT

## 2022-12-12 PROCEDURE — 99214 OFFICE O/P EST MOD 30 MIN: CPT

## 2022-12-12 NOTE — DISCUSSION/SUMMARY
[FreeTextEntry1] : 62 year old man with HTN ESRD on treatment for  hep C, now with dyspnea. With significant stenosis of LCX and Ramus / significant AS. SP CABG/AVR on 10/28/21. Here for followup.\par #HTN- On Labetalol 400 mg TID; Nifedipine 90 mg BID\par #CAD- SP CABG/AVR now on ASA and Atorvastatin, continue present med\par #Dyspnea- TTE normal, may be related to fluid removal at HD, he will speak with nephrologist\par #FU in 3 months [EKG obtained to assist in diagnosis and management of assessed problem(s)] : EKG obtained to assist in diagnosis and management of assessed problem(s)

## 2022-12-12 NOTE — REASON FOR VISIT
[Symptom and Test Evaluation] : symptom and test evaluation [Structural Heart and Valve Disease] : structural heart and valve disease [Hypertension] : hypertension [Follow-Up - Clinic] : a clinic follow-up of [FreeTextEntry2] : HTN, Dyspnea

## 2022-12-14 NOTE — PROGRESS NOTE ADULT - ATTENDING COMMENTS
Subjective:   Patient ID:  Melissa Petty is a 58 y.o. female who presents for evaluation of Congestive Heart Failure        HPI    Melissa Petty is a 58 year old female who presents to Arrhythmia clinic for 6 month follow up. Her current medical conditions include DCM, HFmrEF of 45%, PVCs, COPD,h/o covid,(9/2021) smoker. Shee returns today and states she is doing ok.       She has been using her ibuprofen more regularly due to right knee pain.   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     Has been having to use her lasix regularly due to worsening shortness of breath episodes.       Denies chest pain or anginal equivalents. Reports regular walking without any issues lately. NO leg swelling or claudications. No recent falls, syncope or near syncopal events. Reports compliance with medications and dietary restrictions. NO CNS complaints to suggest TIA or CVA today. No signs of abnormal bleeding.     Still smoking 1 ppd.       12/15/2022 update    Melissa Petty returns for 2 week follow up.     She reports significant improvement in CHF symptoms since med adjustments after last visit. Wearing LifeVest since last visit without any firing.     Still continues to smoke but has cut back since last visit.     Last 2 days, she has had noted improvement in shortness of breath. Denies orthopnea, PND or abdominal bloating. Has lost 6 pounds since last visit.     BP had dropped last week and ACEi has been on hold since that time. BP improved with med adjustments. No leg swelling on exam today.     Long discussion regarding CHF treatment plan. CHF educational booklet provided to patient today.     [place] paperwork completed today, tentative date on return to work will be late March or early April.     Past Medical History:   Diagnosis Date    Cardiomyopathy     CHF (congestive heart failure)     COPD exacerbation 5/26/2020    Hypertension        Past Surgical History:   Procedure Laterality Date    JOINT  REPLACEMENT         Social History     Tobacco Use    Smoking status: Every Day     Packs/day: 1.00     Years: 32.00     Pack years: 32.00     Types: Cigarettes    Smokeless tobacco: Never   Substance Use Topics    Alcohol use: Yes     Comment: Occassionally    Drug use: No       Family History   Problem Relation Age of Onset    Heart failure Mother     Hypertension Brother      Wt Readings from Last 3 Encounters:   12/15/22 53.6 kg (118 lb 2.7 oz)   11/30/22 56.2 kg (124 lb)   11/30/22 56.6 kg (124 lb 12.5 oz)     Temp Readings from Last 3 Encounters:   09/27/21 97.7 °F (36.5 °C)   09/25/21 98.5 °F (36.9 °C) (Oral)   09/25/21 98.5 °F (36.9 °C) (Oral)     BP Readings from Last 3 Encounters:   12/15/22 108/76   11/30/22 120/80   11/30/22 120/80     Pulse Readings from Last 3 Encounters:   12/15/22 80   11/30/22 78   11/30/22 88     Current Outpatient Medications on File Prior to Visit   Medication Sig Dispense Refill    aspirin (ECOTRIN) 81 MG EC tablet Take 1 tablet (81 mg total) by mouth once daily. 30 tablet 6    carvediloL (COREG) 25 MG tablet TAKE 1 TABLET BY MOUTH TWICE DAILY WITH MEALS (Patient taking differently: once daily.) 60 tablet 0    dapagliflozin (FARXIGA) 10 mg tablet Take 1 tablet (10 mg total) by mouth once daily. 30 tablet 3    torsemide (DEMADEX) 10 MG Tab Take 1 tablet (10 mg total) by mouth once daily. 30 tablet 11    albuterol (PROVENTIL/VENTOLIN HFA) 90 mcg/actuation inhaler INHALE 1 TO 2 PUFFS BY MOUTH EVERY 6 HOURS AS NEEDED FOR WHEEZING (RESCUE) 9 g 0    fluticasone propion-salmeterol 115-21 mcg/dose (ADVAIR HFA) 115-21 mcg/actuation HFAA inhaler Inhale 2 puffs into the lungs every 12 (twelve) hours. Controller 12 g 0    folic acid (FOLVITE) 800 MCG Tab Take 400 mcg by mouth once daily.      lisinopriL 10 MG tablet Take 1 tablet by mouth twice daily (Patient not taking: Reported on 12/15/2022) 60 tablet 0    naproxen (NAPROSYN) 500 MG tablet Take 1 tablet by mouth 2 (two) times daily.       thiamine (VITAMIN B-1) 100 MG tablet Take 250 mg by mouth once daily.       Current Facility-Administered Medications on File Prior to Visit   Medication Dose Route Frequency Provider Last Rate Last Admin    acetaminophen tablet 650 mg  650 mg Oral Once PRN Jaspreet Lerma MD        albuterol inhaler 2 puff  2 puff Inhalation Q20 Min PRN Jaspreet Lerma MD        diphenhydrAMINE injection 25 mg  25 mg Intravenous Once PRN Jaspreet Lerma MD        EPINEPHrine (EPIPEN) 0.3 mg/0.3 mL pen injection 0.3 mg  0.3 mg Intramuscular PRN Jaspreet Lerma MD        methylPREDNISolone sodium succinate injection 40 mg  40 mg Intravenous Once PRN Jaspreet Lerma MD        ondansetron disintegrating tablet 4 mg  4 mg Oral Once PRN Jaspreet Lerma MD        sodium chloride 0.9% 500 mL flush bag   Intravenous PRN Jaspreet Lerma MD        sodium chloride 0.9% flush 10 mL  10 mL Intravenous PRN Jaspreet Lerma MD           Review of Systems   Constitutional: Positive for malaise/fatigue.   HENT:  Negative for hearing loss and hoarse voice.    Eyes:  Negative for blurred vision and visual disturbance.   Cardiovascular:  Positive for dyspnea on exertion. Negative for chest pain, claudication, irregular heartbeat, leg swelling, near-syncope, orthopnea, palpitations, paroxysmal nocturnal dyspnea and syncope.   Respiratory:  Negative for cough, hemoptysis, shortness of breath, sleep disturbances due to breathing, snoring and wheezing.    Endocrine: Negative for cold intolerance and heat intolerance.   Hematologic/Lymphatic: Does not bruise/bleed easily.   Skin:  Negative for color change, dry skin and nail changes.   Musculoskeletal:  Positive for arthritis and back pain. Negative for joint pain and myalgias.   Gastrointestinal:  Negative for bloating, abdominal pain, constipation, nausea and vomiting.   Genitourinary:  Negative for dysuria, flank pain, hematuria and hesitancy.   Neurological:  Negative for headaches,  "light-headedness, loss of balance, numbness, paresthesias and weakness.   Psychiatric/Behavioral:  Negative for altered mental status.    Allergic/Immunologic: Negative for environmental allergies.     Objective:/76 (BP Location: Right arm, Patient Position: Sitting)   Pulse 80   Ht 5' 3" (1.6 m)   Wt 53.6 kg (118 lb 2.7 oz)   SpO2 95%   BMI 20.93 kg/m²      Physical Exam  Vitals and nursing note reviewed.   Constitutional:       General: She is not in acute distress.     Appearance: Normal appearance. She is well-developed. She is not ill-appearing.   HENT:      Head: Normocephalic and atraumatic.      Nose: Nose normal.      Mouth/Throat:      Mouth: Mucous membranes are moist.   Eyes:      Pupils: Pupils are equal, round, and reactive to light.   Neck:      Thyroid: No thyromegaly.      Vascular: No JVD.      Trachea: No tracheal deviation.   Cardiovascular:      Rate and Rhythm: Normal rate and regular rhythm.      Chest Wall: PMI is not displaced.      Pulses: Intact distal pulses.           Radial pulses are 2+ on the right side and 2+ on the left side.        Dorsalis pedis pulses are 2+ on the right side and 2+ on the left side.      Heart sounds: S1 normal and S2 normal. Heart sounds not distant. No murmur heard.  Pulmonary:      Effort: Pulmonary effort is normal. No respiratory distress.      Breath sounds: Normal breath sounds. No wheezing.   Abdominal:      General: Bowel sounds are normal. There is no distension.      Palpations: Abdomen is soft.      Tenderness: There is no abdominal tenderness.   Musculoskeletal:         General: No swelling. Normal range of motion.      Cervical back: Full passive range of motion without pain, normal range of motion and neck supple.      Right ankle: No swelling.      Left ankle: No swelling.   Skin:     General: Skin is warm and dry.      Capillary Refill: Capillary refill takes less than 2 seconds.      Nails: There is no clubbing.   Neurological:      " General: No focal deficit present.      Mental Status: She is alert and oriented to person, place, and time.   Psychiatric:         Speech: Speech normal.         Behavior: Behavior normal.         Thought Content: Thought content normal.         Judgment: Judgment normal.       Lab Results   Component Value Date    CHOL 153 05/26/2021     Lab Results   Component Value Date    HDL 52 05/26/2021     Lab Results   Component Value Date    LDLCALC 88.4 05/26/2021     Lab Results   Component Value Date    TRIG 63 05/26/2021     Lab Results   Component Value Date    CHOLHDL 34.0 05/26/2021       Chemistry        Component Value Date/Time     11/30/2022 1004    K 4.5 11/30/2022 1004     11/30/2022 1004    CO2 27 11/30/2022 1004    BUN 11 11/30/2022 1004    CREATININE 0.7 11/30/2022 1004     11/30/2022 1004        Component Value Date/Time    CALCIUM 9.0 11/30/2022 1004    ALKPHOS 91 11/30/2022 1004    AST 26 11/30/2022 1004    ALT 20 11/30/2022 1004    BILITOT 1.1 (H) 11/30/2022 1004    ESTGFRAFRICA >60.0 03/03/2022 1508    EGFRNONAA >60.0 03/03/2022 1508          Lab Results   Component Value Date    TSH 0.332 (L) 05/31/2013     Lab Results   Component Value Date    INR 1.1 05/31/2013     Lab Results   Component Value Date    WBC 3.52 (L) 09/25/2021    HGB 17.6 (H) 09/25/2021    HCT 53.5 (H) 09/25/2021    MCV 99 (H) 09/25/2021     (L) 09/25/2021        Assessment:      1. Chronic systolic heart failure, ACC/AHA stage C    2. CHF (NYHA class III, ACC/AHA stage C)    3. Cardiomyopathy, dilated, nonischemic    4. PVC (premature ventricular contraction)    5. Tobacco abuse          Plan:     CHF SYNOPSIS:    GDMT:  ACE/ARB/ARNI: on hold  Beta Blocker: Coreg 25 mg daily  MRA: on hold  SGLT2: Farxiga 10 mg daily  Diuretic: Torsemide 10 mg daily    Obtain BMP BNP today  Profile BP at home  Daily weights  I have asked her to call if increase in weight by 3 pounds overnight or 5 pounds in 1 week  RTC In 3  domenica.       Nicole May, FNP-C [facility]         Patient examined and ROS reviewed. A case of ESRD admitted with chest pain examined during dialysis. Patient is tolerating dialysis well. Blood flow through access is adequate. Advised to continue UF.

## 2023-01-07 LAB
HEMOLYSIS INDEX: 2 — SIGNIFICANT CHANGE UP
POTASSIUM SERPL-MCNC: 5.9 MMOL/L — HIGH (ref 3.5–5.3)
POTASSIUM SERPL-SCNC: 5.9 MMOL/L — HIGH (ref 3.5–5.3)

## 2023-01-18 NOTE — DISCHARGE NOTE NURSING/CASE MANAGEMENT/SOCIAL WORK - NSTRANSFERBELONGINGSRESP_GEN_A_NUR
yes Sotyktu Pregnancy And Lactation Text: There is insufficient data to evaluate whether or not Sotyktu is safe to use during pregnancy.   It is not known if Sotyktu passes into breast milk and whether or not it is safe to use when breastfeeding.

## 2023-02-22 DIAGNOSIS — E83.39 OTHER DISORDERS OF PHOSPHORUS METABOLISM: ICD-10-CM

## 2023-03-06 ENCOUNTER — APPOINTMENT (OUTPATIENT)
Dept: CARDIOLOGY | Facility: CLINIC | Age: 63
End: 2023-03-06
Payer: MEDICARE

## 2023-03-06 ENCOUNTER — NON-APPOINTMENT (OUTPATIENT)
Age: 63
End: 2023-03-06

## 2023-03-06 VITALS
HEIGHT: 73 IN | DIASTOLIC BLOOD PRESSURE: 77 MMHG | WEIGHT: 156 LBS | HEART RATE: 61 BPM | BODY MASS INDEX: 20.67 KG/M2 | OXYGEN SATURATION: 98 % | SYSTOLIC BLOOD PRESSURE: 156 MMHG

## 2023-03-06 PROCEDURE — 99214 OFFICE O/P EST MOD 30 MIN: CPT

## 2023-03-06 PROCEDURE — 93000 ELECTROCARDIOGRAM COMPLETE: CPT

## 2023-03-17 NOTE — PHYSICAL THERAPY INITIAL EVALUATION ADULT - IMPAIRMENTS FOUND, PT EVAL
gait, locomotion, and balance/muscle strength Sski Pregnancy And Lactation Text: This medication is Pregnancy Category D and isn't considered safe during pregnancy. It is excreted in breast milk.

## 2023-04-08 LAB
HEMOLYSIS INDEX: 2 — SIGNIFICANT CHANGE UP
POTASSIUM SERPL-MCNC: 6.2 MMOL/L — CRITICAL HIGH (ref 3.5–5.3)
POTASSIUM SERPL-SCNC: 6.2 MMOL/L — CRITICAL HIGH (ref 3.5–5.3)

## 2023-04-11 LAB
HEMOLYSIS INDEX: 5 — SIGNIFICANT CHANGE UP
POTASSIUM SERPL-MCNC: 6.4 MMOL/L — CRITICAL HIGH (ref 3.5–5.3)
POTASSIUM SERPL-SCNC: 6.4 MMOL/L — CRITICAL HIGH (ref 3.5–5.3)

## 2023-04-13 ENCOUNTER — RESULT REVIEW (OUTPATIENT)
Age: 63
End: 2023-04-13

## 2023-04-13 LAB
HEMOLYSIS INDEX: 7 — SIGNIFICANT CHANGE UP
POTASSIUM SERPL-MCNC: 6.1 MMOL/L — HIGH (ref 3.5–5.3)
POTASSIUM SERPL-SCNC: 6.1 MMOL/L — HIGH (ref 3.5–5.3)

## 2023-04-15 ENCOUNTER — RESULT REVIEW (OUTPATIENT)
Age: 63
End: 2023-04-15

## 2023-04-15 LAB
HEMOLYSIS INDEX: 4 — SIGNIFICANT CHANGE UP
POTASSIUM SERPL-MCNC: 6 MMOL/L — HIGH (ref 3.5–5.3)
POTASSIUM SERPL-SCNC: 6 MMOL/L — HIGH (ref 3.5–5.3)

## 2023-04-17 PROBLEM — T82.898A INADEQUATE FLOW OF DIALYSIS ARTERIOVENOUS FISTULA: Status: ACTIVE | Noted: 2023-04-17

## 2023-04-17 RX ORDER — SODIUM ZIRCONIUM CYCLOSILICATE 10 G/10G
10 POWDER, FOR SUSPENSION ORAL
Qty: 15 | Refills: 5 | Status: DISCONTINUED | COMMUNITY
Start: 2023-04-17 | End: 2023-04-17

## 2023-04-18 ENCOUNTER — APPOINTMENT (OUTPATIENT)
Dept: ENDOVASCULAR SURGERY | Facility: CLINIC | Age: 63
End: 2023-04-18
Payer: MEDICARE

## 2023-04-18 ENCOUNTER — RESULT REVIEW (OUTPATIENT)
Age: 63
End: 2023-04-18

## 2023-04-18 VITALS
TEMPERATURE: 98 F | BODY MASS INDEX: 20.67 KG/M2 | RESPIRATION RATE: 16 BRPM | HEIGHT: 73 IN | DIASTOLIC BLOOD PRESSURE: 81 MMHG | HEART RATE: 67 BPM | OXYGEN SATURATION: 98 % | SYSTOLIC BLOOD PRESSURE: 174 MMHG | WEIGHT: 156 LBS

## 2023-04-18 DIAGNOSIS — T82.898A OTHER SPECIFIED COMPLICATION OF VASCULAR PROSTHETIC DEVICES, IMPLANTS AND GRAFTS, INITIAL ENCOUNTER: ICD-10-CM

## 2023-04-18 PROCEDURE — 36902Z: CUSTOM

## 2023-04-18 RX ORDER — NIFEDIPINE 90 MG/1
90 TABLET, EXTENDED RELEASE ORAL
Qty: 180 | Refills: 3 | Status: DISCONTINUED | COMMUNITY
End: 2023-04-18

## 2023-04-18 RX ORDER — ASPIRIN ENTERIC COATED TABLETS 81 MG 81 MG/1
81 TABLET, DELAYED RELEASE ORAL DAILY
Qty: 30 | Refills: 3 | Status: DISCONTINUED | COMMUNITY
Start: 2021-11-10 | End: 2023-04-18

## 2023-04-18 RX ORDER — PATIROMER 16.8 G/1
16.8 POWDER, FOR SUSPENSION ORAL EVERY OTHER DAY
Qty: 15 | Refills: 5 | Status: DISCONTINUED | COMMUNITY
Start: 2023-04-17 | End: 2023-04-18

## 2023-04-18 RX ORDER — LABETALOL HYDROCHLORIDE 200 MG/1
200 TABLET, FILM COATED ORAL EVERY 8 HOURS
Qty: 1080 | Refills: 2 | Status: DISCONTINUED | COMMUNITY
Start: 2022-01-10 | End: 2023-04-18

## 2023-04-18 RX ORDER — CINACALCET 60 MG/1
60 TABLET ORAL
Qty: 15 | Refills: 6 | Status: DISCONTINUED | COMMUNITY
Start: 2022-08-12 | End: 2023-04-18

## 2023-04-18 RX ORDER — CALCIUM ACETATE 667 MG
667 TABLET ORAL 3 TIMES DAILY
Qty: 90 | Refills: 3 | Status: DISCONTINUED | COMMUNITY
Start: 2023-02-22 | End: 2023-04-18

## 2023-04-18 NOTE — HISTORY OF PRESENT ILLNESS
[] : left radiocephalic fistula [FreeTextEntry1] : alert and oriented x 3 \par accompanied by\par  no reported falls \par  took blood pressure meds this morning  [FreeTextEntry4] : yesterday [FreeTextEntry6] : Dr Can [FreeTextEntry5] : yesterday at 8pm

## 2023-04-18 NOTE — ASSESSMENT
[FreeTextEntry1] : referred from dialysis for high potassium-plan for fistulogram and possible intervention

## 2023-04-18 NOTE — PAST MEDICAL HISTORY
[Increasing age ( >40 years old)] : Increasing age ( >40 years old) [No therapy indicated for cases scheduled for less than one hour] : No therapy indicated for cases scheduled for less than one hour. [FreeTextEntry1] : Malignant Hyperthermia Screening Tool and Risk of Bleeding Assessment \par \par Mr. KLASU DUNN denies family of unexpected death following Anesthesia or Exercise.\par Denies Family history of Malignant Hyperthermia, Muscle or Neuromuscular disorder and High Temperature  following exercise.\par \par Mr. KLAUS DUNN denies history of Muscle Spasm, Dark or Chocolate- Colored and Unanticipated fever immediately following anaesthesia or serious exercise.\par Mr. KLAUS DUNN also denies bleeding tendencies/Risks of Bleeding.\par

## 2023-05-09 RX ORDER — PATIROMER 8.4 G/1
8.4 POWDER, FOR SUSPENSION ORAL DAILY
Qty: 90 | Refills: 3 | Status: ACTIVE | COMMUNITY
Start: 2023-05-09 | End: 1900-01-01

## 2023-05-18 ENCOUNTER — APPOINTMENT (OUTPATIENT)
Dept: VASCULAR SURGERY | Facility: CLINIC | Age: 63
End: 2023-05-18

## 2023-05-24 ENCOUNTER — OUTPATIENT (OUTPATIENT)
Dept: OUTPATIENT SERVICES | Facility: HOSPITAL | Age: 63
LOS: 1 days | End: 2023-05-24

## 2023-05-24 ENCOUNTER — APPOINTMENT (OUTPATIENT)
Dept: INTERNAL MEDICINE | Facility: CLINIC | Age: 63
End: 2023-05-24
Payer: MEDICARE

## 2023-05-24 VITALS
HEART RATE: 65 BPM | BODY MASS INDEX: 19.75 KG/M2 | HEIGHT: 73 IN | OXYGEN SATURATION: 98 % | SYSTOLIC BLOOD PRESSURE: 140 MMHG | DIASTOLIC BLOOD PRESSURE: 80 MMHG | WEIGHT: 149 LBS

## 2023-05-24 DIAGNOSIS — R06.02 SHORTNESS OF BREATH: ICD-10-CM

## 2023-05-24 DIAGNOSIS — N25.81 SECONDARY HYPERPARATHYROIDISM OF RENAL ORIGIN: ICD-10-CM

## 2023-05-24 DIAGNOSIS — E87.5 HYPERKALEMIA: ICD-10-CM

## 2023-05-24 DIAGNOSIS — N18.6 END STAGE RENAL DISEASE: ICD-10-CM

## 2023-05-24 DIAGNOSIS — K59.09 OTHER CONSTIPATION: ICD-10-CM

## 2023-05-24 PROCEDURE — 99214 OFFICE O/P EST MOD 30 MIN: CPT | Mod: GC

## 2023-05-24 RX ORDER — INSULIN GLARGINE 100 [IU]/ML
INJECTION, SOLUTION SUBCUTANEOUS
Refills: 0 | Status: DISCONTINUED | COMMUNITY
End: 2023-05-24

## 2023-05-24 RX ORDER — LABETALOL HYDROCHLORIDE 200 MG/1
200 TABLET, FILM COATED ORAL 3 TIMES DAILY
Qty: 810 | Refills: 0 | Status: ACTIVE | COMMUNITY
Start: 2023-05-24 | End: 1900-01-01

## 2023-05-24 RX ORDER — CARVEDILOL 6.25 MG/1
6.25 TABLET, FILM COATED ORAL
Refills: 0 | Status: DISCONTINUED | COMMUNITY
End: 2023-05-24

## 2023-05-24 RX ORDER — ATORVASTATIN CALCIUM 80 MG/1
80 TABLET, FILM COATED ORAL
Qty: 90 | Refills: 1 | Status: ACTIVE | COMMUNITY

## 2023-05-24 RX ORDER — INSULIN ASPART 100 [IU]/ML
100 INJECTION, SOLUTION INTRAVENOUS; SUBCUTANEOUS
Refills: 0 | Status: DISCONTINUED | COMMUNITY
End: 2023-05-24

## 2023-05-24 RX ORDER — POLYETHYLENE GLYCOL 3350 17 G/17G
17 POWDER, FOR SOLUTION ORAL
Qty: 1 | Refills: 0 | Status: DISCONTINUED | COMMUNITY
Start: 2021-11-10 | End: 2023-05-24

## 2023-05-26 NOTE — PLAN
[FreeTextEntry1] : RTC in 2-3 months for general f/u after sees cardiologist and vascular. \par \par D/w Dr. Franz\par \par Clarence Swift PGY-2\par [employer]

## 2023-05-26 NOTE — ASSESSMENT
[FreeTextEntry1] : 64 y/o M with PMHx CAD s/p CABG x2 and AVR in 10/2021, ESRD on HD (T Th Sa), HTN, Hep C s/p treatment who presents for a follow up.

## 2023-05-26 NOTE — HISTORY OF PRESENT ILLNESS
[FreeTextEntry1] : Follow Up chronic conditions, HTN, ESRD, CAD [de-identified] : 64 y/o M with PMHx CAD s/p CABG x2 and AVR in 10/2021, ESRD on HD (T Th Sa), HTN, Hep C s/p treatment who presents for a follow up.\par \par Patient reports feeling better since the last time and reported his BP has improved. Patient has been compliant to medication Labetalol 600mg TID and Nifedipine 90mg BID maintaining BP systolic 140s. Denies chest pain but endorses SOB on exertion, can walk 3 blocks or 1 flight of stairs before becomes short of breath. Does not get chest pain. Patient had a recent follow up with cardiology, a SAVANA was normal and thought maybe related to fluid removal at HD. Patient denies anxiety.

## 2023-05-26 NOTE — PHYSICAL EXAM
[Normal] : no joint swelling and grossly normal strength and tone [de-identified] : Systolic murmur appreciated  [de-identified] : L arm with AV fistula with 5 round prominences, thrill and bruit appreciated throughout.

## 2023-05-30 DIAGNOSIS — K59.09 OTHER CONSTIPATION: ICD-10-CM

## 2023-05-30 DIAGNOSIS — Z95.1 PRESENCE OF AORTOCORONARY BYPASS GRAFT: ICD-10-CM

## 2023-05-30 DIAGNOSIS — N25.81 SECONDARY HYPERPARATHYROIDISM OF RENAL ORIGIN: ICD-10-CM

## 2023-05-30 DIAGNOSIS — I10 ESSENTIAL (PRIMARY) HYPERTENSION: ICD-10-CM

## 2023-05-30 DIAGNOSIS — N18.6 END STAGE RENAL DISEASE: ICD-10-CM

## 2023-05-30 DIAGNOSIS — E87.5 HYPERKALEMIA: ICD-10-CM

## 2023-05-30 DIAGNOSIS — R06.02 SHORTNESS OF BREATH: ICD-10-CM

## 2023-06-02 RX ORDER — CINACALCET 60 MG/1
60 TABLET ORAL EVERY OTHER DAY
Qty: 15 | Refills: 2 | Status: DISCONTINUED | COMMUNITY
Start: 2023-05-24 | End: 2023-06-02

## 2023-06-20 ENCOUNTER — RX RENEWAL (OUTPATIENT)
Age: 63
End: 2023-06-20

## 2023-06-20 ENCOUNTER — INPATIENT (INPATIENT)
Facility: HOSPITAL | Age: 63
LOS: 2 days | Discharge: HOME | End: 2023-06-23
Attending: HOSPITALIST | Admitting: HOSPITALIST
Payer: MEDICARE

## 2023-06-20 VITALS
OXYGEN SATURATION: 99 % | HEART RATE: 68 BPM | DIASTOLIC BLOOD PRESSURE: 88 MMHG | SYSTOLIC BLOOD PRESSURE: 164 MMHG | RESPIRATION RATE: 18 BRPM | TEMPERATURE: 98 F

## 2023-06-20 DIAGNOSIS — N18.6 END STAGE RENAL DISEASE: ICD-10-CM

## 2023-06-20 DIAGNOSIS — R07.9 CHEST PAIN, UNSPECIFIED: ICD-10-CM

## 2023-06-20 DIAGNOSIS — I25.10 ATHEROSCLEROTIC HEART DISEASE OF NATIVE CORONARY ARTERY WITHOUT ANGINA PECTORIS: ICD-10-CM

## 2023-06-20 DIAGNOSIS — R63.8 OTHER SYMPTOMS AND SIGNS CONCERNING FOOD AND FLUID INTAKE: ICD-10-CM

## 2023-06-20 DIAGNOSIS — I10 ESSENTIAL (PRIMARY) HYPERTENSION: ICD-10-CM

## 2023-06-20 LAB
ALBUMIN SERPL ELPH-MCNC: 4.4 G/DL — SIGNIFICANT CHANGE UP (ref 3.3–5)
ALP SERPL-CCNC: 146 U/L — HIGH (ref 40–120)
ALT FLD-CCNC: 11 U/L — SIGNIFICANT CHANGE UP (ref 4–41)
ANION GAP SERPL CALC-SCNC: 17 MMOL/L — HIGH (ref 7–14)
APTT BLD: 29 SEC — SIGNIFICANT CHANGE UP (ref 27–36.3)
AST SERPL-CCNC: 28 U/L — SIGNIFICANT CHANGE UP (ref 4–40)
BASE EXCESS BLDV CALC-SCNC: 10.5 MMOL/L — HIGH (ref -2–3)
BASOPHILS # BLD AUTO: 0.06 K/UL — SIGNIFICANT CHANGE UP (ref 0–0.2)
BASOPHILS NFR BLD AUTO: 1.1 % — SIGNIFICANT CHANGE UP (ref 0–2)
BILIRUB SERPL-MCNC: 0.5 MG/DL — SIGNIFICANT CHANGE UP (ref 0.2–1.2)
BUN SERPL-MCNC: 26 MG/DL — HIGH (ref 7–23)
CA-I SERPL-SCNC: 1.12 MMOL/L — LOW (ref 1.15–1.33)
CALCIUM SERPL-MCNC: 9.6 MG/DL — SIGNIFICANT CHANGE UP (ref 8.4–10.5)
CHLORIDE BLDV-SCNC: 100 MMOL/L — SIGNIFICANT CHANGE UP (ref 96–108)
CHLORIDE SERPL-SCNC: 95 MMOL/L — LOW (ref 98–107)
CK MB BLD-MCNC: 1.5 % — SIGNIFICANT CHANGE UP (ref 0–2.5)
CK MB CFR SERPL CALC: 3.6 NG/ML — SIGNIFICANT CHANGE UP
CK MB CFR SERPL CALC: 4.2 NG/ML — SIGNIFICANT CHANGE UP
CK SERPL-CCNC: 272 U/L — HIGH (ref 30–200)
CO2 BLDV-SCNC: 36.5 MMOL/L — HIGH (ref 22–26)
CO2 SERPL-SCNC: 26 MMOL/L — SIGNIFICANT CHANGE UP (ref 22–31)
CREAT SERPL-MCNC: 6.19 MG/DL — HIGH (ref 0.5–1.3)
EGFR: 9 ML/MIN/1.73M2 — LOW
EOSINOPHIL # BLD AUTO: 0.38 K/UL — SIGNIFICANT CHANGE UP (ref 0–0.5)
EOSINOPHIL NFR BLD AUTO: 6.9 % — HIGH (ref 0–6)
GAS PNL BLDV: 136 MMOL/L — SIGNIFICANT CHANGE UP (ref 136–145)
GAS PNL BLDV: SIGNIFICANT CHANGE UP
GLUCOSE BLDV-MCNC: 95 MG/DL — SIGNIFICANT CHANGE UP (ref 70–99)
GLUCOSE SERPL-MCNC: 91 MG/DL — SIGNIFICANT CHANGE UP (ref 70–99)
HCO3 BLDV-SCNC: 35 MMOL/L — HIGH (ref 22–29)
HCT VFR BLD CALC: 32.8 % — LOW (ref 39–50)
HCT VFR BLDA CALC: 32 % — LOW (ref 39–51)
HGB BLD CALC-MCNC: 10.7 G/DL — LOW (ref 12.6–17.4)
HGB BLD-MCNC: 10.3 G/DL — LOW (ref 13–17)
IANC: 3.09 K/UL — SIGNIFICANT CHANGE UP (ref 1.8–7.4)
IMM GRANULOCYTES NFR BLD AUTO: 0.4 % — SIGNIFICANT CHANGE UP (ref 0–0.9)
INR BLD: 1.16 RATIO — SIGNIFICANT CHANGE UP (ref 0.88–1.16)
LACTATE BLDV-MCNC: 1.9 MMOL/L — SIGNIFICANT CHANGE UP (ref 0.5–2)
LYMPHOCYTES # BLD AUTO: 1.14 K/UL — SIGNIFICANT CHANGE UP (ref 1–3.3)
LYMPHOCYTES # BLD AUTO: 20.7 % — SIGNIFICANT CHANGE UP (ref 13–44)
MAGNESIUM SERPL-MCNC: 2.1 MG/DL — SIGNIFICANT CHANGE UP (ref 1.6–2.6)
MCHC RBC-ENTMCNC: 22.6 PG — LOW (ref 27–34)
MCHC RBC-ENTMCNC: 31.4 GM/DL — LOW (ref 32–36)
MCV RBC AUTO: 72.1 FL — LOW (ref 80–100)
MONOCYTES # BLD AUTO: 0.81 K/UL — SIGNIFICANT CHANGE UP (ref 0–0.9)
MONOCYTES NFR BLD AUTO: 14.7 % — HIGH (ref 2–14)
NEUTROPHILS # BLD AUTO: 3.09 K/UL — SIGNIFICANT CHANGE UP (ref 1.8–7.4)
NEUTROPHILS NFR BLD AUTO: 56.2 % — SIGNIFICANT CHANGE UP (ref 43–77)
NRBC # BLD: 0 /100 WBCS — SIGNIFICANT CHANGE UP (ref 0–0)
NRBC # FLD: 0 K/UL — SIGNIFICANT CHANGE UP (ref 0–0)
NT-PROBNP SERPL-SCNC: HIGH PG/ML
PCO2 BLDV: 46 MMHG — SIGNIFICANT CHANGE UP (ref 42–55)
PH BLDV: 7.49 — HIGH (ref 7.32–7.43)
PLATELET # BLD AUTO: 259 K/UL — SIGNIFICANT CHANGE UP (ref 150–400)
PO2 BLDV: 31 MMHG — SIGNIFICANT CHANGE UP (ref 25–45)
POTASSIUM BLDV-SCNC: 5.4 MMOL/L — HIGH (ref 3.5–5.1)
POTASSIUM SERPL-MCNC: 4.3 MMOL/L — SIGNIFICANT CHANGE UP (ref 3.5–5.3)
POTASSIUM SERPL-SCNC: 4.3 MMOL/L — SIGNIFICANT CHANGE UP (ref 3.5–5.3)
PROT SERPL-MCNC: 7.5 G/DL — SIGNIFICANT CHANGE UP (ref 6–8.3)
PROTHROM AB SERPL-ACNC: 13.5 SEC — HIGH (ref 10.5–13.4)
RBC # BLD: 4.55 M/UL — SIGNIFICANT CHANGE UP (ref 4.2–5.8)
RBC # FLD: 18.8 % — HIGH (ref 10.3–14.5)
SAO2 % BLDV: 47.2 % — LOW (ref 67–88)
SODIUM SERPL-SCNC: 138 MMOL/L — SIGNIFICANT CHANGE UP (ref 135–145)
TROPONIN T, HIGH SENSITIVITY RESULT: 398 NG/L — CRITICAL HIGH
TROPONIN T, HIGH SENSITIVITY RESULT: 422 NG/L — CRITICAL HIGH
WBC # BLD: 5.5 K/UL — SIGNIFICANT CHANGE UP (ref 3.8–10.5)
WBC # FLD AUTO: 5.5 K/UL — SIGNIFICANT CHANGE UP (ref 3.8–10.5)

## 2023-06-20 PROCEDURE — 71045 X-RAY EXAM CHEST 1 VIEW: CPT | Mod: 26

## 2023-06-20 PROCEDURE — 99221 1ST HOSP IP/OBS SF/LOW 40: CPT | Mod: GC

## 2023-06-20 PROCEDURE — 99285 EMERGENCY DEPT VISIT HI MDM: CPT | Mod: GC

## 2023-06-20 PROCEDURE — 99223 1ST HOSP IP/OBS HIGH 75: CPT

## 2023-06-20 RX ORDER — CALCIUM ACETATE 667 MG
1334 TABLET ORAL
Refills: 0 | Status: DISCONTINUED | OUTPATIENT
Start: 2023-06-20 | End: 2023-06-23

## 2023-06-20 RX ORDER — NIFEDIPINE 30 MG
90 TABLET, EXTENDED RELEASE 24 HR ORAL EVERY 12 HOURS
Refills: 0 | Status: DISCONTINUED | OUTPATIENT
Start: 2023-06-21 | End: 2023-06-22

## 2023-06-20 RX ORDER — HYDRALAZINE HCL 50 MG
5 TABLET ORAL EVERY 8 HOURS
Refills: 0 | Status: DISCONTINUED | OUTPATIENT
Start: 2023-06-20 | End: 2023-06-23

## 2023-06-20 RX ORDER — ASPIRIN/CALCIUM CARB/MAGNESIUM 324 MG
162 TABLET ORAL ONCE
Refills: 0 | Status: COMPLETED | OUTPATIENT
Start: 2023-06-20 | End: 2023-06-20

## 2023-06-20 RX ORDER — POLYETHYLENE GLYCOL 3350 17 G/17G
17 POWDER, FOR SOLUTION ORAL DAILY
Refills: 0 | Status: DISCONTINUED | OUTPATIENT
Start: 2023-06-20 | End: 2023-06-23

## 2023-06-20 RX ORDER — ASPIRIN/CALCIUM CARB/MAGNESIUM 324 MG
81 TABLET ORAL DAILY
Refills: 0 | Status: DISCONTINUED | OUTPATIENT
Start: 2023-06-20 | End: 2023-06-23

## 2023-06-20 RX ORDER — ACETAMINOPHEN 500 MG
650 TABLET ORAL EVERY 6 HOURS
Refills: 0 | Status: DISCONTINUED | OUTPATIENT
Start: 2023-06-20 | End: 2023-06-23

## 2023-06-20 RX ORDER — LABETALOL HCL 100 MG
400 TABLET ORAL EVERY 8 HOURS
Refills: 0 | Status: DISCONTINUED | OUTPATIENT
Start: 2023-06-20 | End: 2023-06-20

## 2023-06-20 RX ORDER — ATORVASTATIN CALCIUM 80 MG/1
80 TABLET, FILM COATED ORAL AT BEDTIME
Refills: 0 | Status: DISCONTINUED | OUTPATIENT
Start: 2023-06-20 | End: 2023-06-23

## 2023-06-20 RX ORDER — NIFEDIPINE 30 MG
90 TABLET, EXTENDED RELEASE 24 HR ORAL EVERY 12 HOURS
Refills: 0 | Status: DISCONTINUED | OUTPATIENT
Start: 2023-06-20 | End: 2023-06-20

## 2023-06-20 RX ORDER — HEPARIN SODIUM 5000 [USP'U]/ML
5000 INJECTION INTRAVENOUS; SUBCUTANEOUS EVERY 12 HOURS
Refills: 0 | Status: DISCONTINUED | OUTPATIENT
Start: 2023-06-20 | End: 2023-06-23

## 2023-06-20 RX ORDER — NIFEDIPINE 30 MG
90 TABLET, EXTENDED RELEASE 24 HR ORAL ONCE
Refills: 0 | Status: COMPLETED | OUTPATIENT
Start: 2023-06-20 | End: 2023-06-20

## 2023-06-20 RX ORDER — LABETALOL HCL 100 MG
400 TABLET ORAL EVERY 8 HOURS
Refills: 0 | Status: DISCONTINUED | OUTPATIENT
Start: 2023-06-20 | End: 2023-06-22

## 2023-06-20 RX ADMIN — Medication 162 MILLIGRAM(S): at 15:44

## 2023-06-20 RX ADMIN — Medication 90 MILLIGRAM(S): at 21:39

## 2023-06-20 RX ADMIN — Medication 162 MILLIGRAM(S): at 17:57

## 2023-06-20 NOTE — CONSULT NOTE ADULT - ASSESSMENT
63 y.o M with CAD and severe AS s/p AVR and CABG x2 RSVG-Ramus, RSVG-OM1 (2021), ESRD on HD, Hep C s/p treatment, latent TB, who presents with 2 weeks of dyspnea on exertion and post dialysis chest pain.        #Typical angina. Patient has known CAD now s/p CABG presenting with dyspnea on exertion and chest pain following HD sessions concerning for possible obstructive CAD vs microvascular disease exacerbated by volume shifts during HD and exertion. Troponin elevation with minimal CKMB likewise likely a result of myocardial injury from these volume shifts.  Presenting ECG: Normal Sinus rhythm with no ischemic changes  Trop 398 -> 422  CKMB 4.2  BNP > 70K    Recommendations  - Continue aspirin 81mg daily  - Restart home BP meds labetalol 400mg TID and nifedipine 90mg BID  - Would repeat TTE  - Lipitor 80mg daily  - Trend troponin with CK/CKMB to peak    Recommendations are preliminary until attending attestation.    Meredith Stein MD  Cardiology Fellow- PGY 4 63 y.o M with CAD and severe AS s/p AVR and CABG x2 RSVG-Ramus, RSVG-OM1 (2021), ESRD on HD, Hep C s/p treatment, latent TB, who presents with 2 weeks of dyspnea on exertion and post dialysis chest pain.        #Typical angina. Patient has known CAD now s/p CABG presenting with dyspnea on exertion and chest pain following HD sessions concerning for possible obstructive CAD vs microvascular disease exacerbated by volume shifts during HD and exertion. Troponin elevation with minimal CKMB likewise likely a result of myocardial injury from these volume shifts. He appears euvolemic on exam.    Presenting ECG: Normal Sinus rhythm with no ischemic changes  Trop 398 -> 422  CKMB 4.2  BNP > 70,000    Recommendations  - Continue aspirin 81mg daily  - Restart home BP meds labetalol 400mg TID and nifedipine 90mg BID  - Would repeat TTE  - Lipitor 80mg daily  - Trend troponin with CK/CKMB to peak    Recommendations are preliminary until attending attestation.    Meredith Stein MD  Cardiology Fellow- PGY 4

## 2023-06-20 NOTE — H&P ADULT - HISTORY OF PRESENT ILLNESS
63 year old M hx of ESRD on HD TThSa through LUE fistula, HTN, CAD w/ CABG 2021, AV replacement, hep C s/p treatment who presents w/ chest pain starting 2 hours at the end of HD session. He has had this cp happen for about 2 weeks duration after HD session, describes it as dull left sided cp similar to his ACS cp prior. His cp resolved prior, but this time did not prompting him to come to the ED. He also felt palpitations as well. Denies LOC, endorses mild sob, denies fevers, chills, nausea, vomiting, diarrhea, headaches, visual changes, LE edema, melena, or hematochezia.     ED course: chest xray w/ mild pulmonary edema, troponin 398--->422, ck 272, CKMB 4.2.   EKG no ST elevation, Qtc 488, OH wnl.   Cardiology was consulted

## 2023-06-20 NOTE — ED ADULT NURSE NOTE - OBJECTIVE STATEMENT
Luis Manuel RN: Luis Manuel RN: Pt received to [facility] spot 24A A&OX4 c/o chest pain, shortness of breath after receiving dialysis today. Pt arrives on 2L NC. Pt states "It makes me feel better." Pt verbalizing improvement in symptoms since arriving to ED. Breathing even and unlabored. 20G IV placed in L AC. Labs drawn and sent. Medicated as per EMAR. Primary RN Fátima pimentel.

## 2023-06-20 NOTE — ED PROVIDER NOTE - ATTENDING CONTRIBUTION TO CARE
63 y male with PMH CKD on dialysis T/Th/Sat, HTN, CHF, CAD s/p CABG in 2021 for evaluation of chest pain starting 2 hours ago at the end of his dialysis session with palpitations : Well appearing, RRR, CTA BL lungs, abd soft NTND A/P Labs, imaging, medicate, reassess

## 2023-06-20 NOTE — H&P ADULT - PROBLEM SELECTOR PLAN 1
-cp after HD, still present  -given CAD/CABG history concern for possible occlusion  -troponin 398--->422 though ckmb and cpk wnl low concern for acute ACS, EKG nonischemic as well  -low concern for dissection or PE as patient is not hypoxic, and pain is not sharp or tearing and has been going on for 2 weeks.  -c/w aspirin 81 mg, c/w lipitor 80 mg  -f/u TTE  -telemetry monitoring  -cardiology was consulted in ED.

## 2023-06-20 NOTE — PHARMACOTHERAPY INTERVENTION NOTE - COMMENTS
Medication history is incomplete. Unable to verify patient's medication list with two sources. Will follow up with out patient pharmacy when they reopen. Please call spectra [phone] if you have any questions.

## 2023-06-20 NOTE — H&P ADULT - PROBLEM SELECTOR PLAN 3
-HD TThS  -c/w calcium acetate 1337 mg TID  -renal diet  -daily mag and phos  -had full session last, has mild pulm edema not requiring oxygen currently

## 2023-06-20 NOTE — ED PROVIDER NOTE - OBJECTIVE STATEMENT
62 yo M with past medical history of CKD on dialysis T/Th/Sat, HTN, CHF, CAD s/p CABG in 2021 presents to the ED for chest pain starting 2 hours ago at the end of his dialysis session. Patient states that for the last 2 weeks, at the end of every dialysis session, he experiences palpitations and a racing heart, with L sided chest pain that resolve after receiving IV fluids. Today, CP did not resolve prompting ED visit. He states it is constant, non radiating, located on L chest. Also complains of exertional dyspnea for the past 2 weeks, worsening. Patient also reports having a cold 2-3 weeks ago. Denies nausea, vomiting, fever/chills, new cough, abdominal pain, dysuria, hematuria, changes to bowel habits.

## 2023-06-20 NOTE — ED PROVIDER NOTE - RATE
Message   Recorded as Task   Date: 07/18/2017 11:36 AM, Created By: Jennifer Taylor   Task Name: Follow Up   Assigned To: Julissa Whitfield   Regarding Patient: TY YEE, Status: In Progress   Comment:    Jennifer Taylor - 18 Jul 2017 11:36 AM     TASK CREATED  Sean Birmingham will be starting 17OHP on this PRN transfer starting at 16 weeks. She is currently 14 3/7. High risk due to hx of cervical insufficiency and PTD at 21wk.    Thank you!   Julissa Whitfield - 19 Jul 2017 1:42 PM     TASK EDITED  paper referral faxed over waiting for approval to order.   Julissa Whitfield - 27 Jul 2017 9:26 AM     TASK EDITED  called referral specialist still no answer in regards to referral   Julissa Whitfield - 28 Jul 2017 1:50 PM     TASK EDITED  referral approved, if patient going to do at home prescription should be sent to patient's pharmacy.   Julissa Whitfield - 28 Jul 2017 1:51 PM     TASK EDITED  yair Rodriguez. patient will only have one SVT visit rest to be sent to patient's pharmacy.        Signatures   Electronically signed by : MARY ANN Lopez; Jul 28 2017  1:51PM CST    
67

## 2023-06-20 NOTE — ED ADULT NURSE REASSESSMENT NOTE - NS ED NURSE REASSESS COMMENT FT1
Break RN: report received from tavo Foley. Pt a&ox4, denies cp, sob, n/v, headache, dizziness. Breathing even, unlabored. Pt remains on 2L NC for comfort. Safety maintained. pending lab results.
Report given to ESSU 5 RN, pt taken over to ESSU 5. Pt A&Ox4, NAD noted, NSR, safety in place.
Report received from ZANDRA Archer. Pt A&Ox4, resting in stretcher, RR even and unlabored. VS as noted. Pt denies any active CP at this time. Pt requesting his night time BP meds, MD goddard made aware, awaiting further orders. Safety measures in place. Pt in NAD at this time.

## 2023-06-20 NOTE — CONSULT NOTE ADULT - SUBJECTIVE AND OBJECTIVE BOX
CARDIOLOGY FELLOW CONSULT NOTE    HPI:      PMHx:   Benign Essential Hypertension    End Stage Renal Disease on Dialysis    HTN (hypertension)    Chronic renal failure    Hepatitis C    TB (tuberculosis)    CHF (congestive heart failure)        PSHx:   AVF (arteriovenous fistula)    S/P appendectomy        Allergies:  No Known Drug Allergies  adhesives (Other)      Home Meds:    Current Medications:   aspirin  chewable 162 milliGRAM(s) Oral once      FAMILY HISTORY:      ROS:  CV: chest pain (-), palpitation (-), orthopnea (-), PND (-), edema (-)  PULM: SOB (-), cough (-), wheezing (-), hemoptysis (-).   CONST: fever (-), chills (-) or fatigue (-)  GI: abdominal distension (-), abdominal pain (-) , nausea/vomiting (-), hematemesis, (-), melena (-), hematochezia (-)  : dysuria (-), frequency (-), hematuria (-).   NEURO: numbness (-), weakness (-), dizziness (-)  SKIN: itching (-), rash (-)  HEENT:  visual changes (-); vertigo or throat pain (-);  neck stiffness (-)     Physical Exam:  T(F): 98 (06-20), Max: 98 (06-20)  HR: 68 (06-20) (68 - 68)  BP: 164/88 (06-20) (164/88 - 164/88)  RR: 18 (06-20)  SpO2: 99% (06-20)    GENERAL: NAD  HEAD:  Atraumatic, Normocephalic.  EYES: EOMI, PERRLA, conjunctiva and sclera clear.  NECK: Supple, No JVD.  CHEST/LUNG: Clear to auscultation bilaterally; No rales, rhonchi, wheezing, or rubs. Speaking in full sentences  HEART: Regular rate and rhythm; No murmurs, rubs, or gallops.  ABDOMEN: Bowel sounds present; Soft, Nontender, Nondistended.   EXTREMITIES:  2+ Peripheral Pulses, brisk capillary refill. No clubbing, cyanosis, or edema.  PSYCH: Normal affect.  SKIN: No rashes or lesions.    ECG: Personally reviewed    Echo: Personally reviewed    Stress Testing: Personally reviewed    Cath: Personally reviewed    CXR: Personally reviewed    Labs: Personally reviewed                        10.3   5.50  )-----------( 259      ( 20 Jun 2023 15:45 )             32.8     06-20    138  |  95<L>  |  26<H>  ----------------------------<  91  4.3   |  26  |  6.19<H>    Ca    9.6      20 Jun 2023 15:45  Mg     2.10     06-20    TPro  7.5  /  Alb  4.4  /  TBili  0.5  /  DBili  x   /  AST  28  /  ALT  11  /  AlkPhos  146<H>  06-20        CARDIAC MARKERS ( 20 Jun 2023 15:45 )  398 ng/L / x     / x     / x     / x     / x                     CARDIOLOGY FELLOW CONSULT NOTE    HPI:    63 y.o M with CAD and severe AS s/p AVR and CABG x2 RSVG-Ramus, RSVG-OM1 (2021), ESRD on HD, Hep C s/p treatment, latent TB, who presents with 2 weeks of dyspnea on exertion and post dialysis chest pain. Over the past 2 weeks patient reports palpitations, and substernal non-radiating chest pain after his dialysis sessions with pain lasting ~ 1 hour and usually resolved with getting fluid back. Additionally, he reports a decrease in his exercise tolerance to 2-3 blocks due to dyspnea. Patient's last HD session was the day of presentation where again he experienced some chest pain. He denies associated SOB, nausea, vomiting, diaphoresis, orthopnea or PND. At the time of evaluation, he was chest pain free and in no distress.     PMHx:   Benign Essential Hypertension  End Stage Renal Disease on Dialysis  HTN (hypertension)  Chronic renal failure  Hepatitis C  TB (tuberculosis)  CHF (congestive heart failure)      PSHx:   AVF (arteriovenous fistula)    S/P appendectomy    Allergies:  No Known Drug Allergies  adhesives (Other)    Home Meds:    Current Medications:   aspirin  chewable 162 milliGRAM(s) Oral once    ROS:  CV: chest pain (-), palpitation (-), orthopnea (-), PND (-), edema (-)  PULM: SOB (-), cough (-), wheezing (-), hemoptysis (-).   CONST: fever (-), chills (-) or fatigue (-)  GI: abdominal distension (-), abdominal pain (-) , nausea/vomiting (-), hematemesis, (-), melena (-), hematochezia (-)  : dysuria (-), frequency (-), hematuria (-).   NEURO: numbness (-), weakness (-), dizziness (-)  SKIN: itching (-), rash (-)  HEENT:  visual changes (-); vertigo or throat pain (-);  neck stiffness (-)     Physical Exam:  T(F): 98 (06-20), Max: 98 (06-20)  HR: 68 (06-20) (68 - 68)  BP: 164/88 (06-20) (164/88 - 164/88)  RR: 18 (06-20)  SpO2: 99% (06-20)    GENERAL: NAD  HEAD:  Atraumatic, Normocephalic.  EYES: EOMI, PERRLA, conjunctiva and sclera clear.  NECK: Supple, No JVD.  CHEST/LUNG: Clear to auscultation bilaterally; No rales, rhonchi, wheezing, or rubs. Speaking in full sentences  HEART: Regular rate and rhythm; No murmurs, rubs, or gallops.  ABDOMEN: Bowel sounds present; Soft, Nontender, Nondistended.   EXTREMITIES:  2+ Peripheral Pulses, brisk capillary refill. No clubbing, cyanosis, or edema.  PSYCH: Normal affect.  SKIN: No rashes or lesions.    ECG: Personally reviewed    Echo: Personally reviewed  05/2022  EF 58%  CONCLUSIONS:  1. Mitral annular calcification and calcified mitral  leaflets with normal diastolic opening. Mild-moderate  mitral regurgitation.  2. Bioprosthetic aortic valve replacement. Peak transaortic  valve gradient equals 35 mm Hg, mean transaortic valve  gradient equals 20 mm Hg, which is probably normal in the  presence of a prosthetic valve. No aortic valve  regurgitation seen.  3. Severely dilated left atrium.LA volume index = 72  cc/m2.  4. Mild concentric left ventricular hypertrophy.  5. Normal left ventricular systolic function. No segmental  wall motion abnormalities.  6. Moderate diastolic dysfunction (Stage II).  7. Normal right ventricular size and function.  *** Compared with echocardiogram of 10/21/2021, a  bioprosthetic valve is now present in the aortic position.      Cath: Personally reviewed  10/2021  CORONARY VESSELS: The coronary circulation is right dominant.  LM:   --  LM: Angiography showed mild atherosclerosis with no flow limiting  lesions.  LAD:   --  LAD: Angiography showed mild atherosclerosis with no flow  limiting lesions.  --  Mid LAD: There was a tubular 40 % stenosis at a site with no prior  intervention. There was BERNADINE grade 3 flow through the vessel (brisk flow).  CX:   --  Ostial circumflex: There was a discrete 99 % stenosis at a site  with no prior intervention. There was a large vascular territory distal to  the lesion and good collateral blood supply to the distal myocardium.  RI:   --  Ostial ramus intermedius: There was a tubular 99 % stenosis at a  site with no prior intervention. There was BERNADINE grade 2 flow through the  vessel (partial perfusion).  RCA:   --  RCA: Angiography showed mild atherosclerosis with no flow  limiting lesions.      CXR: Personally reviewed    Labs: Personally reviewed                        10.3   5.50  )-----------( 259      ( 20 Jun 2023 15:45 )             32.8     06-20    138  |  95<L>  |  26<H>  ----------------------------<  91  4.3   |  26  |  6.19<H>    Ca    9.6      20 Jun 2023 15:45  Mg     2.10     06-20    TPro  7.5  /  Alb  4.4  /  TBili  0.5  /  DBili  x   /  AST  28  /  ALT  11  /  AlkPhos  146<H>  06-20      CARDIAC MARKERS ( 20 Jun 2023 15:45 )  398 ng/L / x     / x     / x     / x     / x

## 2023-06-20 NOTE — ED ADULT TRIAGE NOTE - CHIEF COMPLAINT QUOTE
pt brought in by EMS from Dialysis center for palpitations during dialysis today. Pt was not able to finish dialysis. Pt started having chest pain while in the ED. Pt denies SOB, N/V/D, fever or chills.,

## 2023-06-20 NOTE — ED PROVIDER NOTE - PHYSICAL EXAMINATION
General: Well appearing male, no acute distress   HEENT: Normocephalic, atraumatic, PERRL, no erythema or lesions in oropharynx   Cardiac: +S1/S2, +systolic murmur, regular rate and rhythm, +tenderness to L anterior chest wall between clavicle and nipple  Lungs: Clear to auscultation bilaterally, non labored breathing   Abdomen: Soft tender non distended   Extremities: +LUE fistula with bruit and thrill, distended old fistula sites noted on L upper extremity, 2+ DP and radial pulses  Psych: Appropriate affect and behavior

## 2023-06-20 NOTE — H&P ADULT - NSHPPHYSICALEXAM_GEN_ALL_CORE
Case discussed with Dr. Theodore  - [phone]. PHYSICAL EXAM:  GENERAL: NAD, well-developed  HEAD:  Atraumatic, Normocephalic  EYES: EOMI, PERRLA, conjunctiva and sclera clear  NECK: Supple, No JVD  CHEST/LUNG: Clear to auscultation bilaterally; No wheeze  HEART: Regular rate and rhythm; No murmurs, rubs, or gallops  ABDOMEN: Soft, Nontender, Nondistended; Bowel sounds present  EXTREMITIES:  2+ Peripheral Pulses, No clubbing, cyanosis, or edema  PSYCH: AAOx3  NEUROLOGY: non-focal  SKIN: LUE fistula without drainage or erythema, no discharge noted.

## 2023-06-20 NOTE — H&P ADULT - NSHPLABSRESULTS_GEN_ALL_CORE
.  LABS:                         10.3   5.50  )-----------( 259      ( 20 Jun 2023 15:45 )             32.8     06-20    138  |  95<L>  |  26<H>  ----------------------------<  91  4.3   |  26  |  6.19<H>    Ca    9.6      20 Jun 2023 15:45  Phos  2.7     06-20  Mg     2.10     06-20    TPro  7.5  /  Alb  4.4  /  TBili  0.5  /  DBili  x   /  AST  28  /  ALT  11  /  AlkPhos  146<H>  06-20    PT/INR - ( 20 Jun 2023 17:50 )   PT: 13.5 sec;   INR: 1.16 ratio         PTT - ( 20 Jun 2023 17:50 )  PTT:29.0 sec    CARDIAC MARKERS ( 20 Jun 2023 17:50 )  x     / x     / 272 U/L / x     / 4.2 ng/mL      < from: Xray Chest 2 Views PA/Lat (06.27.22 @ 14:42) >    IMPRESSION:  No acute radiographic findings and no change, in particular no evidence   of tuberculosis    < end of copied text >

## 2023-06-20 NOTE — ED PROVIDER NOTE - CLINICAL SUMMARY MEDICAL DECISION MAKING FREE TEXT BOX
64 yo M with past medical history of CKD on dialysis T/Th/Sat, HTN, CHF, CAD s/p CABG in 2021 presents to the ED for chest pain starting 2 hours ago at the end of his dialysis session accompanied by palpitations that have since resolved. This has been happening for the last 2 weeks, along with worsening dyspnea on exertion. Physical exam significant for L anterior chest wall tenderness between clavicle and nipple, with lungs clear to auscultation. Differential diagnoses include but are not limited to acute CHF exacerbation vs ACS vs costochondritis. Will obtain labs with troponin and BNP, CXR, blood gas, give ASA.

## 2023-06-20 NOTE — H&P ADULT - ASSESSMENT
63 year old M hx of ESRD on HD TThSa through LUE fistula, HTN, CAD w/ CABG 2021, AV replacement, hep C s/p treatment who presents w/ chest pain starting 2 hours at the end of HD session.

## 2023-06-20 NOTE — ED PROVIDER NOTE - PROGRESS NOTE DETAILS
John Paul Harman MD PGY-2  Received signout on this 63 M with history of ESRD, CAD status post CABG, HFrecEF (EF 45% 2021 --> 62% 02/14/2023) who p/w persisten/recurrent CP during/after HD sessions. Pt currently observed in stable condition in [facility] on monitor. VS WNL. Pt requesting anti-HTN medications, /68. Large pulse pressure, HR 63, pt due reportedly for Labetalol 400mg (q6h?) & Nifed 90.     Trop elevated but possibly ico ESRD. Pending cards eval.

## 2023-06-21 DIAGNOSIS — D64.9 ANEMIA, UNSPECIFIED: ICD-10-CM

## 2023-06-21 DIAGNOSIS — E83.39 OTHER DISORDERS OF PHOSPHORUS METABOLISM: ICD-10-CM

## 2023-06-21 DIAGNOSIS — I10 ESSENTIAL (PRIMARY) HYPERTENSION: ICD-10-CM

## 2023-06-21 LAB
ALBUMIN SERPL ELPH-MCNC: 4.5 G/DL — SIGNIFICANT CHANGE UP (ref 3.3–5)
ALP SERPL-CCNC: 155 U/L — HIGH (ref 40–120)
ALT FLD-CCNC: 12 U/L — SIGNIFICANT CHANGE UP (ref 4–41)
ANION GAP SERPL CALC-SCNC: 15 MMOL/L — HIGH (ref 7–14)
AST SERPL-CCNC: 15 U/L — SIGNIFICANT CHANGE UP (ref 4–40)
BASOPHILS # BLD AUTO: 0.06 K/UL — SIGNIFICANT CHANGE UP (ref 0–0.2)
BASOPHILS NFR BLD AUTO: 1 % — SIGNIFICANT CHANGE UP (ref 0–2)
BILIRUB SERPL-MCNC: 0.5 MG/DL — SIGNIFICANT CHANGE UP (ref 0.2–1.2)
BUN SERPL-MCNC: 32 MG/DL — HIGH (ref 7–23)
CALCIUM SERPL-MCNC: 9.7 MG/DL — SIGNIFICANT CHANGE UP (ref 8.4–10.5)
CHLORIDE SERPL-SCNC: 94 MMOL/L — LOW (ref 98–107)
CO2 SERPL-SCNC: 27 MMOL/L — SIGNIFICANT CHANGE UP (ref 22–31)
CREAT SERPL-MCNC: 7.86 MG/DL — HIGH (ref 0.5–1.3)
EGFR: 7 ML/MIN/1.73M2 — LOW
EOSINOPHIL # BLD AUTO: 0.32 K/UL — SIGNIFICANT CHANGE UP (ref 0–0.5)
EOSINOPHIL NFR BLD AUTO: 5.6 % — SIGNIFICANT CHANGE UP (ref 0–6)
GLUCOSE BLDC GLUCOMTR-MCNC: 107 MG/DL — HIGH (ref 70–99)
GLUCOSE SERPL-MCNC: 103 MG/DL — HIGH (ref 70–99)
HCT VFR BLD CALC: 35.1 % — LOW (ref 39–50)
HGB BLD-MCNC: 11 G/DL — LOW (ref 13–17)
IANC: 3.55 K/UL — SIGNIFICANT CHANGE UP (ref 1.8–7.4)
IMM GRANULOCYTES NFR BLD AUTO: 0.2 % — SIGNIFICANT CHANGE UP (ref 0–0.9)
LYMPHOCYTES # BLD AUTO: 1.01 K/UL — SIGNIFICANT CHANGE UP (ref 1–3.3)
LYMPHOCYTES # BLD AUTO: 17.7 % — SIGNIFICANT CHANGE UP (ref 13–44)
MAGNESIUM SERPL-MCNC: 2.2 MG/DL — SIGNIFICANT CHANGE UP (ref 1.6–2.6)
MCHC RBC-ENTMCNC: 23.1 PG — LOW (ref 27–34)
MCHC RBC-ENTMCNC: 31.3 GM/DL — LOW (ref 32–36)
MCV RBC AUTO: 73.7 FL — LOW (ref 80–100)
MONOCYTES # BLD AUTO: 0.77 K/UL — SIGNIFICANT CHANGE UP (ref 0–0.9)
MONOCYTES NFR BLD AUTO: 13.5 % — SIGNIFICANT CHANGE UP (ref 2–14)
NEUTROPHILS # BLD AUTO: 3.55 K/UL — SIGNIFICANT CHANGE UP (ref 1.8–7.4)
NEUTROPHILS NFR BLD AUTO: 62 % — SIGNIFICANT CHANGE UP (ref 43–77)
NRBC # BLD: 0 /100 WBCS — SIGNIFICANT CHANGE UP (ref 0–0)
NRBC # FLD: 0 K/UL — SIGNIFICANT CHANGE UP (ref 0–0)
PHOSPHATE SERPL-MCNC: 3.9 MG/DL — SIGNIFICANT CHANGE UP (ref 2.5–4.5)
PLATELET # BLD AUTO: 295 K/UL — SIGNIFICANT CHANGE UP (ref 150–400)
POTASSIUM SERPL-MCNC: 4.5 MMOL/L — SIGNIFICANT CHANGE UP (ref 3.5–5.3)
POTASSIUM SERPL-SCNC: 4.5 MMOL/L — SIGNIFICANT CHANGE UP (ref 3.5–5.3)
PROT SERPL-MCNC: 7.9 G/DL — SIGNIFICANT CHANGE UP (ref 6–8.3)
RBC # BLD: 4.76 M/UL — SIGNIFICANT CHANGE UP (ref 4.2–5.8)
RBC # FLD: 18.8 % — HIGH (ref 10.3–14.5)
SODIUM SERPL-SCNC: 136 MMOL/L — SIGNIFICANT CHANGE UP (ref 135–145)
TROPONIN T, HIGH SENSITIVITY RESULT: 416 NG/L — CRITICAL HIGH
WBC # BLD: 5.72 K/UL — SIGNIFICANT CHANGE UP (ref 3.8–10.5)
WBC # FLD AUTO: 5.72 K/UL — SIGNIFICANT CHANGE UP (ref 3.8–10.5)

## 2023-06-21 PROCEDURE — 99232 SBSQ HOSP IP/OBS MODERATE 35: CPT

## 2023-06-21 RX ORDER — HYDRALAZINE HCL 50 MG
5 TABLET ORAL ONCE
Refills: 0 | Status: COMPLETED | OUTPATIENT
Start: 2023-06-21 | End: 2023-06-21

## 2023-06-21 RX ORDER — NIFEDIPINE 30 MG
1 TABLET, EXTENDED RELEASE 24 HR ORAL
Refills: 0 | DISCHARGE

## 2023-06-21 RX ORDER — LABETALOL HCL 100 MG
2 TABLET ORAL
Refills: 0 | DISCHARGE

## 2023-06-21 RX ORDER — SODIUM CHLORIDE 9 MG/ML
100 INJECTION INTRAMUSCULAR; INTRAVENOUS; SUBCUTANEOUS
Refills: 0 | Status: DISCONTINUED | OUTPATIENT
Start: 2023-06-22 | End: 2023-06-23

## 2023-06-21 RX ORDER — ERYTHROPOIETIN 10000 [IU]/ML
10000 INJECTION, SOLUTION INTRAVENOUS; SUBCUTANEOUS
Refills: 0 | Status: DISCONTINUED | OUTPATIENT
Start: 2023-06-22 | End: 2023-06-23

## 2023-06-21 RX ADMIN — Medication 400 MILLIGRAM(S): at 06:04

## 2023-06-21 RX ADMIN — HEPARIN SODIUM 5000 UNIT(S): 5000 INJECTION INTRAVENOUS; SUBCUTANEOUS at 17:40

## 2023-06-21 RX ADMIN — Medication 400 MILLIGRAM(S): at 13:21

## 2023-06-21 RX ADMIN — ATORVASTATIN CALCIUM 80 MILLIGRAM(S): 80 TABLET, FILM COATED ORAL at 22:22

## 2023-06-21 RX ADMIN — Medication 400 MILLIGRAM(S): at 22:22

## 2023-06-21 RX ADMIN — Medication 81 MILLIGRAM(S): at 13:21

## 2023-06-21 RX ADMIN — Medication 1334 MILLIGRAM(S): at 17:39

## 2023-06-21 RX ADMIN — Medication 90 MILLIGRAM(S): at 22:22

## 2023-06-21 RX ADMIN — Medication 5 MILLIGRAM(S): at 01:11

## 2023-06-21 RX ADMIN — HEPARIN SODIUM 5000 UNIT(S): 5000 INJECTION INTRAVENOUS; SUBCUTANEOUS at 06:04

## 2023-06-21 RX ADMIN — Medication 1334 MILLIGRAM(S): at 13:21

## 2023-06-21 RX ADMIN — Medication 1334 MILLIGRAM(S): at 09:52

## 2023-06-21 NOTE — CONSULT NOTE ADULT - PROBLEM SELECTOR RECOMMENDATION 9
Pt. with ESRD on HD three times a week (EVERETTE, LIZ, Dr. Valdovinos, LUE AVF). Had Last maintenance HD on 6/19/21 via LUE AVF. Pt. clinically stable. Plan for maintanence HD on 6/22/23. Discussed with the patient that he will require RRT/HD, risks and benefits associated with RRT/HD explained at length. HD consent obtained and kept in patients chart. Monitor BP and labs. Dose meds as per HD.

## 2023-06-21 NOTE — PATIENT PROFILE ADULT - FUNCTIONAL ASSESSMENT - BASIC MOBILITY 6.
4-calculated by average/Not able to assess (calculate score using [facility] averaging method)

## 2023-06-21 NOTE — PATIENT PROFILE ADULT - FALL HARM RISK - HARM RISK INTERVENTIONS
Communicate Risk of Fall with Harm to all staff/Orthostatic vital signs/Reinforce activity limits and safety measures with patient and family/Tailored Fall Risk Interventions/Visual Cue: Yellow wristband and red socks/Bed in lowest position, wheels locked, appropriate side rails in place/Call bell, personal items and telephone in reach/Instruct patient to call for assistance before getting out of bed or chair/Non-slip footwear when patient is out of bed/[place] to call system/Physically safe environment - no spills, clutter or unnecessary equipment/Purposeful Proactive Rounding/Room/bathroom lighting operational, light cord in reach

## 2023-06-21 NOTE — PROGRESS NOTE ADULT - SUBJECTIVE AND OBJECTIVE BOX
PROGRESS NOTE:     Patient is a 63y old  Male who presents with a chief complaint of chest pain (20 Jun 2023 23:07)      SUBJECTIVE / OVERNIGHT EVENTS: No chest pain.     ADDITIONAL REVIEW OF SYSTEMS:    MEDICATIONS  (STANDING):  aspirin enteric coated 81 milliGRAM(s) Oral daily  atorvastatin 80 milliGRAM(s) Oral at bedtime  calcium acetate 1334 milliGRAM(s) Oral three times a day with meals  heparin   Injectable 5000 Unit(s) SubCutaneous every 12 hours  labetalol 400 milliGRAM(s) Oral every 8 hours  NIFEdipine XL 90 milliGRAM(s) Oral every 12 hours  polyethylene glycol 3350 17 Gram(s) Oral daily    MEDICATIONS  (PRN):  acetaminophen     Tablet .. 650 milliGRAM(s) Oral every 6 hours PRN Temp greater or equal to 38C (100.4F), Mild Pain (1 - 3)  hydrALAZINE Injectable 5 milliGRAM(s) IV Push every 8 hours PRN systolic BP >180      CAPILLARY BLOOD GLUCOSE      POCT Blood Glucose.: 107 mg/dL (21 Jun 2023 12:56)    I&O's Summary      PHYSICAL EXAM:  Vital Signs Last 24 Hrs  T(C): 36.5 (21 Jun 2023 09:13), Max: 36.8 (20 Jun 2023 20:52)  T(F): 97.7 (21 Jun 2023 09:13), Max: 98.3 (21 Jun 2023 07:06)  HR: 60 (21 Jun 2023 09:13) (56 - 66)  BP: 170/73 (21 Jun 2023 09:13) (140/67 - 209/86)  BP(mean): --  RR: 18 (21 Jun 2023 09:13) (16 - 24)  SpO2: 97% (21 Jun 2023 09:13) (97% - 100%)    Parameters below as of 21 Jun 2023 09:13  Patient On (Oxygen Delivery Method): room air      GENERAL: NAD  EYES: EOMI, PERRLA, conjunctiva and sclera clear  NECK: Supple, No JVD  CHEST/LUNG: Clear to auscultation bilaterally; No wheeze  HEART: Regular rate and rhythm; No murmurs, rubs, or gallops  ABDOMEN: Soft, Nontender, Nondistended; Bowel sounds present  EXTREMITIES:  2+ Peripheral Pulses, No clubbing, cyanosis, or edema  PSYCH: AAOx3  NEUROLOGY: non-focal  SKIN: LUE fistula     LABS:                        11.0   5.72  )-----------( 295      ( 21 Jun 2023 06:46 )             35.1     06-21    136  |  94<L>  |  32<H>  ----------------------------<  103<H>  4.5   |  27  |  7.86<H>    Ca    9.7      21 Jun 2023 06:46  Phos  3.9     06-21  Mg     2.20     06-21    TPro  7.9  /  Alb  4.5  /  TBili  0.5  /  DBili  x   /  AST  15  /  ALT  12  /  AlkPhos  155<H>  06-21    PT/INR - ( 20 Jun 2023 17:50 )   PT: 13.5 sec;   INR: 1.16 ratio         PTT - ( 20 Jun 2023 17:50 )  PTT:29.0 sec  CARDIAC MARKERS ( 20 Jun 2023 21:38 )  x     / x     / x     / x     / 3.6 ng/mL  CARDIAC MARKERS ( 20 Jun 2023 17:50 )  x     / x     / 272 U/L / x     / 4.2 ng/mL      Urinalysis Basic - ( 21 Jun 2023 06:46 )    Color: x / Appearance: x / SG: x / pH: x  Gluc: 103 mg/dL / Ketone: x  / Bili: x / Urobili: x   Blood: x / Protein: x / Nitrite: x   Leuk Esterase: x / RBC: x / WBC x   Sq Epi: x / Non Sq Epi: x / Bacteria: x          RADIOLOGY & ADDITIONAL TESTS:  Results Reviewed:   Imaging Personally Reviewed:  Electrocardiogram Personally Reviewed:    COORDINATION OF CARE:  Care Discussed with Consultants/Other Providers [Y/N]:  Prior or Outpatient Records Reviewed [Y/N]:

## 2023-06-21 NOTE — CONSULT NOTE ADULT - SUBJECTIVE AND OBJECTIVE BOX
[facility] DIVISION OF KIDNEY DISEASES AND HYPERTENSION -- [phone]  -- INITIAL CONSULT NOTE  --------------------------------------------------------------------------------  HPI: 62 yo ESRD on HD TIW (TTS, [facility], [facility], Dr. Valdovinos), HTN, Hep C who presents to [facility] due to chest pain that occurred towards end of his last HD treatment on 6/19/23. Nephrology consulted for ESRD/HD management.    Pt seen and examined in the ED. He is "feeling tired". He is undergoing cardiovascular evaluation. Denied fevers, chills, nausea, vomiting, shortness of breath, leg swelling.     PAST HISTORY  --------------------------------------------------------------------------------  PAST MEDICAL & SURGICAL HISTORY:  End Stage Renal Disease on Dialysis  since 2009 - Tues, Thurs, Sat  HTN (hypertension)  Chronic renal failure  Hepatitis C  TB (tuberculosis)  Latent  CHF (congestive heart failure)  Mild  AVF (arteriovenous fistula)  placed 2009 - Left side  S/P appendectomy  at age 7    FAMILY HISTORY:  Father: Lung CA    PAST SOCIAL HISTORY: Non smoker     ALLERGIES & MEDICATIONS  --------------------------------------------------------------------------------  Allergies  No Known Drug Allergies  adhesives (Other)    Intolerances    Standing Inpatient Medications  aspirin enteric coated 81 milliGRAM(s) Oral daily  atorvastatin 80 milliGRAM(s) Oral at bedtime  calcium acetate 1334 milliGRAM(s) Oral three times a day with meals  heparin   Injectable 5000 Unit(s) SubCutaneous every 12 hours  labetalol 400 milliGRAM(s) Oral every 8 hours  NIFEdipine XL 90 milliGRAM(s) Oral every 12 hours  polyethylene glycol 3350 17 Gram(s) Oral daily    PRN Inpatient Medications  acetaminophen     Tablet .. 650 milliGRAM(s) Oral every 6 hours PRN  hydrALAZINE Injectable 5 milliGRAM(s) IV Push every 8 hours PRN    REVIEW OF SYSTEMS  --------------------------------------------------------------------------------  Gen: No fevers/chills  Head/Eyes/Ears: No HA  Respiratory: No dyspnea, cough  CV: per HPI  GI: No abdominal pain, diarrhea  : No dysuria, hematuria  MSK: No edema  Skin: No rashes  Heme: No easy bruising or bleeding    All other systems were reviewed and are negative, except as noted.    VITALS/PHYSICAL EXAM  --------------------------------------------------------------------------------  T(C): 36.5 (06-21-23 @ 09:13), Max: 36.8 (06-20-23 @ 20:52)  HR: 60 (06-21-23 @ 09:13) (56 - 66)  BP: 170/73 (06-21-23 @ 09:13) (140/67 - 209/86)  RR: 18 (06-21-23 @ 09:13) (16 - 24)  SpO2: 97% (06-21-23 @ 09:13) (97% - 100%)  Wt(kg): --    Physical Exam:  	Gen: Laying in bed in NAD  	HEENT: Anicteric  	Pulm: CTA B/L  	CV: S1S2+  	Abd: Soft, +BS    	Ext: No LE edema B/L  	Neuro: Awake  	Skin: Warm and dry  	Dialysis access: LUE AVF with palpable thrill and bruit heard     LABS/STUDIES  --------------------------------------------------------------------------------              11.0   5.72  >-----------<  295      [06-21-23 @ 06:46]              35.1     136  |  94  |  32  ----------------------------<  103      [06-21-23 @ 06:46]  4.5   |  27  |  7.86        Ca     9.7     [06-21-23 @ 06:46]      Mg     2.20     [06-21-23 @ 06:46]      Phos  3.9     [06-21-23 @ 06:46]    TPro  7.9  /  Alb  4.5  /  TBili  0.5  /  DBili  x   /  AST  15  /  ALT  12  /  AlkPhos  155  [06-21-23 @ 06:46]    PT/INR: PT 13.5 , INR 1.16       [06-20-23 @ 17:50]  PTT: 29.0       [06-20-23 @ 17:50]          [06-20-23 @ 17:50]    Creatinine Trend:  SCr 7.86 [06-21 @ 06:46]  SCr 6.19 [06-20 @ 15:45]    Urinalysis - [06-21-23 @ 06:46]      Color  / Appearance  / SG  / pH       Gluc 103 / Ketone   / Bili  / Urobili        Blood  / Protein  / Leuk Est  / Nitrite       RBC  / WBC  / Hyaline  / Gran  / Sq Epi  / Non Sq Epi  / Bacteria

## 2023-06-21 NOTE — CONSULT NOTE ADULT - PROBLEM SELECTOR RECOMMENDATION 2
Patient with anemia in the setting of ESRD. Hemoglobin at range of 11. He is on EPO 10,000u with HD TIW at [facility]. Will order with hold parameters. Monitor hemoglobin.

## 2023-06-21 NOTE — CONSULT NOTE ADULT - ATTENDING COMMENTS
The patient was seen and examined with the Cardiology Consultation Teaching Service.    He is a 63-year-old man with coronary artery disease, prior CABG and AVR who presents with several weeks of progressive dyspnea on exertion, and palpitations that reliably occur at the end of hemodialysis sessions. Chest pain was also reported to the overnight fellow, but the patient reported only palpitations to me. He reports that the dialysis center usually gives him back some fluids, and his palpitations eventually resolve.     He was asymptomatic during our interview.    PMH/PSH:  Coronary artery disease    2-vessel CABG (SVG-[facility], SVG-[facility]), 2021  Severe aortic stenosis with prior AVR  End-stage renal disease on hemodialysis  Hypertension  HCV  Latent TB    Comfortable-appearing man in no acute distress  Alert and oriented  Afebrile  Vital signs stable  JVP is not elevated  Clear lungs  Normal heart sounds  Extremities are warm and perfused  No peripheral edema     Microcytic anemia  Normal coagulation  Hypochloremia  GFR 7    hs-troponin 416, 422, 398  CK-MB normal    ECG demonstrates sinus rhythm with LVH and secondary repolarization abnormalities    Echocardiography in 2022 demonstrated normal systolic LV function, mild concentric LVH, severely dilated LA, moderate diastolic dysfunction and a bioprosthesis in the aortic position with normal gradients.     Impression and Recommendations:  63-year-old man with coronary artery disease, prior CABG and AVR who presents with several weeks of progressive dyspnea on exertion, and palpitations and possible chest pain that recurs at the end of hemodialysis.     The patient's complains are concerning for possible accelerating angina in a patient with known coronary disease. Although his hs-troponin is elevated, this is a non-specific finding in patients with end-stage renal disease. His ECG does not demonstrate ischemic changes.     While I would not treat this patient for acute coronary syndrome, I would consider repeating this echocardiography and planning for coronary and graft angiography to further evaluate this patient's angina.     Sae Ramirez MD  Cardiology  [phone]
agree with above.  patient was seen and evaluated at bedside this morning during dialysis treatment.  tolerating well.  have been increasing TW as an outpatient due to concern for palpitations tachycardia after HD.  goal is about 1 to 1.5 kg today with ECG monitoring to capture any arrythma.  needs cardiology follow up.  next hd after today will likely be saturday.

## 2023-06-21 NOTE — PATIENT PROFILE ADULT - WILL THE PATIENT ACCEPT THE PFIZER COVID-19 VACCINE IF ELIGIBLE AND IT IS AVAILABLE?
Problem: Altered physiologic condition related to postoperative  delivery  Goal: Patient physiologically stable as evidenced by normal lochia, palpable uterine involution and vital signs within normal limits  Outcome: PROGRESSING AS EXPECTED  Note:   Fundus firm, lochia light. VSS.      Problem: Potential for postpartum infection related to surgical incision, compromised uterine condition, urinary tract or respiratory compromise  Goal: Patient will be afebrile and free from signs and symptoms of infection  Outcome: PROGRESSING AS EXPECTED  Note:   No s/s of infection, vss.       No

## 2023-06-21 NOTE — PHARMACOTHERAPY INTERVENTION NOTE - COMMENTS
Medication history is saved as incomplete. Unable to verify entirety of patient's medication list in Outpatient Medication Review (OMR) with pharmacy (Medicine Cabinet).   Pharmacy notes no fill history for atorvastatin on file (?).

## 2023-06-21 NOTE — CONSULT NOTE ADULT - PROBLEM SELECTOR RECOMMENDATION 3
Pt. with hyperphosphatemia in setting of ESRD. Serum phosphorus within target range at 3.9. Continue home phosphate binders. Check intact PTH level. Low phosphorus diet advised. Monitor serum phosphorus

## 2023-06-22 LAB
ANION GAP SERPL CALC-SCNC: 17 MMOL/L — HIGH (ref 7–14)
BUN SERPL-MCNC: 44 MG/DL — HIGH (ref 7–23)
CALCIUM SERPL-MCNC: 9.1 MG/DL — SIGNIFICANT CHANGE UP (ref 8.4–10.5)
CHLORIDE SERPL-SCNC: 94 MMOL/L — LOW (ref 98–107)
CO2 SERPL-SCNC: 25 MMOL/L — SIGNIFICANT CHANGE UP (ref 22–31)
CREAT SERPL-MCNC: 9.6 MG/DL — HIGH (ref 0.5–1.3)
DIALYSIS INSTRUMENT RESULT - HEPATITIS B SURFACE ANTIGEN: NEGATIVE — SIGNIFICANT CHANGE UP
EGFR: 6 ML/MIN/1.73M2 — LOW
GLUCOSE SERPL-MCNC: 98 MG/DL — SIGNIFICANT CHANGE UP (ref 70–99)
HBV CORE AB SER-ACNC: SIGNIFICANT CHANGE UP
HBV SURFACE AB SER-ACNC: <3 MIU/ML — LOW
HBV SURFACE AG SER-ACNC: SIGNIFICANT CHANGE UP
HCT VFR BLD CALC: 30.4 % — LOW (ref 39–50)
HCV AB S/CO SERPL IA: 13.87 S/CO — HIGH (ref 0–0.99)
HCV AB SERPL-IMP: REACTIVE
HGB BLD-MCNC: 9.5 G/DL — LOW (ref 13–17)
MAGNESIUM SERPL-MCNC: 2.1 MG/DL — SIGNIFICANT CHANGE UP (ref 1.6–2.6)
MCHC RBC-ENTMCNC: 22.6 PG — LOW (ref 27–34)
MCHC RBC-ENTMCNC: 31.3 GM/DL — LOW (ref 32–36)
MCV RBC AUTO: 72.4 FL — LOW (ref 80–100)
MRSA PCR RESULT.: SIGNIFICANT CHANGE UP
NRBC # BLD: 0 /100 WBCS — SIGNIFICANT CHANGE UP (ref 0–0)
NRBC # FLD: 0 K/UL — SIGNIFICANT CHANGE UP (ref 0–0)
PHOSPHATE SERPL-MCNC: 4.1 MG/DL — SIGNIFICANT CHANGE UP (ref 2.5–4.5)
PLATELET # BLD AUTO: 266 K/UL — SIGNIFICANT CHANGE UP (ref 150–400)
POTASSIUM SERPL-MCNC: 4.3 MMOL/L — SIGNIFICANT CHANGE UP (ref 3.5–5.3)
POTASSIUM SERPL-SCNC: 4.3 MMOL/L — SIGNIFICANT CHANGE UP (ref 3.5–5.3)
PTH-INTACT FLD-MCNC: 584 PG/ML — HIGH (ref 15–65)
RBC # BLD: 4.2 M/UL — SIGNIFICANT CHANGE UP (ref 4.2–5.8)
RBC # FLD: 18.6 % — HIGH (ref 10.3–14.5)
S AUREUS DNA NOSE QL NAA+PROBE: SIGNIFICANT CHANGE UP
SODIUM SERPL-SCNC: 136 MMOL/L — SIGNIFICANT CHANGE UP (ref 135–145)
WBC # BLD: 5.17 K/UL — SIGNIFICANT CHANGE UP (ref 3.8–10.5)
WBC # FLD AUTO: 5.17 K/UL — SIGNIFICANT CHANGE UP (ref 3.8–10.5)

## 2023-06-22 PROCEDURE — 99232 SBSQ HOSP IP/OBS MODERATE 35: CPT

## 2023-06-22 PROCEDURE — 93306 TTE W/DOPPLER COMPLETE: CPT | Mod: 26

## 2023-06-22 PROCEDURE — 99223 1ST HOSP IP/OBS HIGH 75: CPT

## 2023-06-22 PROCEDURE — 99232 SBSQ HOSP IP/OBS MODERATE 35: CPT | Mod: GC

## 2023-06-22 RX ORDER — LABETALOL HCL 100 MG
400 TABLET ORAL EVERY 8 HOURS
Refills: 0 | Status: DISCONTINUED | OUTPATIENT
Start: 2023-06-22 | End: 2023-06-23

## 2023-06-22 RX ORDER — NIFEDIPINE 30 MG
90 TABLET, EXTENDED RELEASE 24 HR ORAL EVERY 12 HOURS
Refills: 0 | Status: DISCONTINUED | OUTPATIENT
Start: 2023-06-22 | End: 2023-06-23

## 2023-06-22 RX ORDER — HYDRALAZINE HCL 50 MG
25 TABLET ORAL EVERY 12 HOURS
Refills: 0 | Status: DISCONTINUED | OUTPATIENT
Start: 2023-06-22 | End: 2023-06-23

## 2023-06-22 RX ORDER — CHLORHEXIDINE GLUCONATE 213 G/1000ML
1 SOLUTION TOPICAL DAILY
Refills: 0 | Status: DISCONTINUED | OUTPATIENT
Start: 2023-06-22 | End: 2023-06-23

## 2023-06-22 RX ADMIN — CHLORHEXIDINE GLUCONATE 1 APPLICATION(S): 213 SOLUTION TOPICAL at 12:38

## 2023-06-22 RX ADMIN — Medication 5 MILLIGRAM(S): at 01:20

## 2023-06-22 RX ADMIN — Medication 400 MILLIGRAM(S): at 22:08

## 2023-06-22 RX ADMIN — Medication 1334 MILLIGRAM(S): at 11:02

## 2023-06-22 RX ADMIN — Medication 1334 MILLIGRAM(S): at 17:28

## 2023-06-22 RX ADMIN — Medication 400 MILLIGRAM(S): at 04:33

## 2023-06-22 NOTE — PROGRESS NOTE ADULT - PROBLEM SELECTOR PLAN 5
F-1 L restriction  E-cautious repletion in ESRD  N-renal diet  heparin subQ  full code
heparin subQ  full code

## 2023-06-22 NOTE — PROGRESS NOTE ADULT - PROBLEM SELECTOR PLAN 2
-Uncontrolled HTN, still elevated   -c/w labetalol 400 mg TID, would not increase dose d/t HR in 60's   -c/w nifedipine 90 mg ER BID  -consider adding third agent if BP remains elevated   -monitor BP
-Uncontrolled HTN, still elevated but improved   -c/w labetalol 400 mg TID and nifedipine 90 mg ER BID  -monitor BP

## 2023-06-22 NOTE — PROGRESS NOTE ADULT - PROBLEM SELECTOR PLAN 1
-chest pain after HD, likely demand ischemia   -troponin 398--->422 though ckmb and cpk wnl low concern for acute ACS  -EKG w/ no ischemic changes   -c/w aspirin 81 mg, c/w lipitor 80 mg  -TTE w/ normal left ventricular systolic function, no segmental wall motion abnormalities, normal RV, mild MR/MS  -No further ischemic evaluation at this time   -telemetry monitoring  -cardiology f/up
-chest pain after HD, likely demand ischemia   -troponin 398--->422 though ckmb and cpk wnl low concern for acute ACS  -EKG w/ no ischemic changes   -c/w aspirin 81 mg, c/w lipitor 80 mg  -f/u TTE  -telemetry monitoring  -cardiology f/up

## 2023-06-22 NOTE — PROGRESS NOTE ADULT - SUBJECTIVE AND OBJECTIVE BOX
Patient seen and examined at bedside.    Overnight Events: No acute events overnight. Denies chest pain or SOB      REVIEW OF SYSTEMS:  Constitutional:     [ x] negative [ ] fevers [ ] chills [ ] weight loss [ ] weight gain  HEENT:                  [ x] negative [ ] dry eyes [ ] eye irritation [ ] postnasal drip [ ] nasal congestion  CV:                         [x ] negative  [ ] chest pain [ ] orthopnea [ ] palpitations [ ] murmur  Resp:                     [ x] negative [ ] cough [ ] shortness of breath [ ] dyspnea [ ] wheezing [ ] sputum [ ]hemoptysis  GI:                          [ x] negative [ ] nausea [ ] vomiting [ ] diarrhea [ ] constipation [ ] abd pain [ ] dysphagia   :                        [ x] negative [ ] dysuria [ ] nocturia [ ] hematuria [ ] increased urinary frequency  Musculoskeletal: [ x] negative [ ] back pain [ ] myalgias [ ] arthralgias [ ] fracture  Skin:                       [ x] negative [ ] rash [ ] itch  Neurological:        [ x] negative [ ] headache [ ] dizziness [ ] syncope [ ] weakness [ ] numbness  Psychiatric:           [ x] negative [ ] anxiety [ ] depression  Endocrine:            [ x] negative [ ] diabetes [ ] thyroid problem  Heme/Lymph:      [ x] negative [ ] anemia [ ] bleeding problem  Allergic/Immune: [x ] negative [ ] itchy eyes [ ] nasal discharge [ ] hives [ ] angioedema    [x ] All other systems negative  [ ] Unable to assess ROS due to    Current Meds:  acetaminophen     Tablet .. 650 milliGRAM(s) Oral every 6 hours PRN  aspirin enteric coated 81 milliGRAM(s) Oral daily  atorvastatin 80 milliGRAM(s) Oral at bedtime  calcium acetate 1334 milliGRAM(s) Oral three times a day with meals  epoetin hillary-epbx (RETACRIT) Injectable 58657 Unit(s) IV Push <User Schedule>  heparin   Injectable 5000 Unit(s) SubCutaneous every 12 hours  hydrALAZINE Injectable 5 milliGRAM(s) IV Push every 8 hours PRN  labetalol 400 milliGRAM(s) Oral every 8 hours  NIFEdipine XL 90 milliGRAM(s) Oral every 12 hours  polyethylene glycol 3350 17 Gram(s) Oral daily  sodium chloride 0.9% Bolus. 100 milliLiter(s) IV Bolus every 5 minutes PRN      PAST MEDICAL & SURGICAL HISTORY:  End Stage Renal Disease on Dialysis  since 2009 - Tues, Thurs, Sat      HTN (hypertension)      Chronic renal failure      Hepatitis C      TB (tuberculosis)  Latent      CHF (congestive heart failure)  Mild      AVF (arteriovenous fistula)  placed 2009 - Left side      S/P appendectomy  at age 7          Vitals:  T(F): 97.8 (06-22), Max: 98.3 (06-21)  HR: 62 (06-22) (59 - 82)  BP: 180/85 (06-22) (131/56 - 186/68)  RR: 18 (06-22)  SpO2: 100% (06-22)  I&O's Summary      Physical Exam:  Appearance: No acute distress; well appearing  Eyes: PERRL, EOMI, pink conjunctiva  HENT: Normal oral mucosa  Cardiovascular: RRR, S1, S2, no murmurs, rubs, or gallops; no edema; no JVD  Respiratory: Clear to auscultation bilaterally  Gastrointestinal: soft, non-tender, non-distended with normal bowel sounds  Musculoskeletal: No clubbing; no joint deformity   Neurologic: Non-focal  Lymphatic: No lymphadenopathy  Psychiatry: AAOx3, mood & affect appropriate  Skin: No rashes, ecchymoses, or cyanosis                          11.0   5.72  )-----------( 295      ( 21 Jun 2023 06:46 )             35.1     06-21    136  |  94<L>  |  32<H>  ----------------------------<  103<H>  4.5   |  27  |  7.86<H>    Ca    9.7      21 Jun 2023 06:46  Phos  3.9     06-21  Mg     2.20     06-21    TPro  7.9  /  Alb  4.5  /  TBili  0.5  /  DBili  x   /  AST  15  /  ALT  12  /  AlkPhos  155<H>  06-21    PT/INR - ( 20 Jun 2023 17:50 )   PT: 13.5 sec;   INR: 1.16 ratio         PTT - ( 20 Jun 2023 17:50 )  PTT:29.0 sec  CARDIAC MARKERS ( 20 Jun 2023 21:38 )  x     / x     / x     / x     / 3.6 ng/mL  CARDIAC MARKERS ( 20 Jun 2023 17:50 )  x     / x     / 272 U/L / x     / 4.2 ng/mL                 Patient seen and examined at bedside.    Overnight Events: No acute events overnight. Denies chest pain or SOB      REVIEW OF SYSTEMS:  Constitutional:     [ x] negative [ ] fevers [ ] chills [ ] weight loss [ ] weight gain  HEENT:                  [ x] negative [ ] dry eyes [ ] eye irritation [ ] postnasal drip [ ] nasal congestion  CV:                         [x ] negative  [ ] chest pain [ ] orthopnea [ ] palpitations [ ] murmur  Resp:                     [ x] negative [ ] cough [ ] shortness of breath [ ] dyspnea [ ] wheezing [ ] sputum [ ]hemoptysis  GI:                          [ x] negative [ ] nausea [ ] vomiting [ ] diarrhea [ ] constipation [ ] abd pain [ ] dysphagia   :                        [ x] negative [ ] dysuria [ ] nocturia [ ] hematuria [ ] increased urinary frequency  Musculoskeletal: [ x] negative [ ] back pain [ ] myalgias [ ] arthralgias [ ] fracture  Skin:                       [ x] negative [ ] rash [ ] itch  Neurological:        [ x] negative [ ] headache [ ] dizziness [ ] syncope [ ] weakness [ ] numbness  Psychiatric:           [ x] negative [ ] anxiety [ ] depression  Endocrine:            [ x] negative [ ] diabetes [ ] thyroid problem  Heme/Lymph:      [ x] negative [ ] anemia [ ] bleeding problem  Allergic/Immune: [x ] negative [ ] itchy eyes [ ] nasal discharge [ ] hives [ ] angioedema    [x ] All other systems negative  [ ] Unable to assess ROS due to    Current Meds:  acetaminophen     Tablet .. 650 milliGRAM(s) Oral every 6 hours PRN  aspirin enteric coated 81 milliGRAM(s) Oral daily  atorvastatin 80 milliGRAM(s) Oral at bedtime  calcium acetate 1334 milliGRAM(s) Oral three times a day with meals  epoetin hillary-epbx (RETACRIT) Injectable 87039 Unit(s) IV Push <User Schedule>  heparin   Injectable 5000 Unit(s) SubCutaneous every 12 hours  hydrALAZINE Injectable 5 milliGRAM(s) IV Push every 8 hours PRN  labetalol 400 milliGRAM(s) Oral every 8 hours  NIFEdipine XL 90 milliGRAM(s) Oral every 12 hours  polyethylene glycol 3350 17 Gram(s) Oral daily  sodium chloride 0.9% Bolus. 100 milliLiter(s) IV Bolus every 5 minutes PRN      PAST MEDICAL & SURGICAL HISTORY:  End Stage Renal Disease on Dialysis  since 2009 - Tues, Thurs, Sat      HTN (hypertension)      Chronic renal failure      Hepatitis C      TB (tuberculosis)  Latent      CHF (congestive heart failure)  Mild      AVF (arteriovenous fistula)  placed 2009 - Left side      S/P appendectomy  at age 7          Vitals:  T(F): 97.8 (06-22), Max: 98.3 (06-21)  HR: 62 (06-22) (59 - 82)  BP: 180/85 (06-22) (131/56 - 186/68)  RR: 18 (06-22)  SpO2: 100% (06-22)  I&O's Summary      Physical Exam:  Appearance: No acute distress; well appearing  Eyes: PERRL, EOMI, pink conjunctiva  HENT: Normal oral mucosa  Cardiovascular: RRR, S1, S2, no murmurs, rubs, or gallops; no edema; no JVD  Respiratory: Clear to auscultation bilaterally  Gastrointestinal: soft, non-tender, non-distended with normal bowel sounds  Musculoskeletal: No clubbing; no joint deformity   Neurologic: Non-focal  Lymphatic: No lymphadenopathy  Psychiatry: AAOx3, mood & affect appropriate  Skin: No rashes, ecchymoses, or cyanosis                          11.0   5.72  )-----------( 295      ( 21 Jun 2023 06:46 )             35.1     06-21    136  |  94<L>  |  32<H>  ----------------------------<  103<H>  4.5   |  27  |  7.86<H>    Ca    9.7      21 Jun 2023 06:46  Phos  3.9     06-21  Mg     2.20     06-21    TPro  7.9  /  Alb  4.5  /  TBili  0.5  /  DBili  x   /  AST  15  /  ALT  12  /  AlkPhos  155<H>  06-21    PT/INR - ( 20 Jun 2023 17:50 )   PT: 13.5 sec;   INR: 1.16 ratio         PTT - ( 20 Jun 2023 17:50 )  PTT:29.0 sec  CARDIAC MARKERS ( 20 Jun 2023 21:38 )  x     / x     / x     / x     / 3.6 ng/mL  CARDIAC MARKERS ( 20 Jun 2023 17:50 )  x     / x     / 272 U/L / x     / 4.2 ng/mL      TTE 5/2022  DIMENSIONS:  Dimensions:     Normal Values:  LA:     5.1 cm    2.0 - 4.0 cm  Ao:     3.2 cm    2.0 - 3.8 cm  SEPTUM: 1.3 cm    0.6 - 1.2 cm  PWT:    1.2 cm    0.6 - 1.1 cm  LVIDd:  5.5 cm    3.0 - 5.6 cm  LVIDs:  3.4 cm    1.8 - 4.0 cm  Derived Variables:  LVMI: 152 g/m2  RWT: 0.43  Fractional short: 38 %  Ejection Fraction (Modified Barajas Rule): 68 %  ------------------------------------------------------------------------  OBSERVATIONS:  Mitral Valve: Mitral annular calcification and calcified  mitral leaflets with normal diastolic opening.  Mild-moderate mitral regurgitation.  Aortic Root: Normal aortic root.  Aortic Valve: Bioprosthetic aortic valve replacement. Peak  transaortic valve gradient equals 35 mm Hg, mean  transaortic valve gradient equals 20 mm Hg, which is  probably normal in the presence of a prosthetic valve. No  aortic valve regurgitation seen.  Left Atrium: Severely dilated left atrium.  LA volume index  = 72 cc/m2.  Left Ventricle: Normal left ventricular systolic function.  No segmental wall motion abnormalities. Mild concentric  left ventricular hypertrophy. Moderate diastolic  dysfunction (Stage II).  Right Heart: Normal right atrium. Normal right ventricular  size and function. Normal tricuspid valve.  Mild tricuspid  regurgitation. Normal pulmonic valve.  Minimal pulmonic  regurgitation.  Pericardium/PleuraNormal pericardium with no pericardial  effusion.  ------------------------------------------------------------------------  CONCLUSIONS:  1. Mitral annular calcification and calcified mitral  leaflets with normal diastolic opening. Mild-moderate  mitral regurgitation.  2. Bioprosthetic aortic valve replacement. Peak transaortic  valve gradient equals 35 mm Hg, mean transaortic valve  gradient equals 20 mm Hg, which is probably normal in the  presence of a prosthetic valve. No aortic valve  regurgitation seen.  3. Severely dilated left atrium.LA volume index = 72  cc/m2.  4. Mild concentric left ventricular hypertrophy.  5. Normal left ventricular systolic function. No segmental  wall motion abnormalities.  6. Moderate diastolic dysfunction (Stage II).  7. Normal right ventricular size and function.  *** Compared with echocardiogram of 10/21/2021, a  bioprosthetic valve is now present in the aortic position.

## 2023-06-22 NOTE — PROGRESS NOTE ADULT - PROBLEM SELECTOR PLAN 4
-HD TThS  -c/w calcium acetate 1337 mg TID  -renal diet  -has mild pulm edema not requiring oxygen currently  -dialysis as per renal
-HD TThS  -c/w calcium acetate 1337 mg TID  -renal diet  -has mild pulm edema not requiring oxygen currently  -renal consulted for dialysis

## 2023-06-22 NOTE — PROGRESS NOTE ADULT - SUBJECTIVE AND OBJECTIVE BOX
PROGRESS NOTE:     Patient is a 63y old  Male who presents with a chief complaint of chest pain (22 Jun 2023 06:34)      SUBJECTIVE / OVERNIGHT EVENTS: No acute events.     ADDITIONAL REVIEW OF SYSTEMS:    MEDICATIONS  (STANDING):  aspirin enteric coated 81 milliGRAM(s) Oral daily  atorvastatin 80 milliGRAM(s) Oral at bedtime  calcium acetate 1334 milliGRAM(s) Oral three times a day with meals  chlorhexidine 2% Cloths 1 Application(s) Topical daily  epoetin hillary-epbx (RETACRIT) Injectable 74634 Unit(s) IV Push <User Schedule>  heparin   Injectable 5000 Unit(s) SubCutaneous every 12 hours  labetalol 400 milliGRAM(s) Oral every 8 hours  NIFEdipine XL 90 milliGRAM(s) Oral every 12 hours  polyethylene glycol 3350 17 Gram(s) Oral daily    MEDICATIONS  (PRN):  acetaminophen     Tablet .. 650 milliGRAM(s) Oral every 6 hours PRN Temp greater or equal to 38C (100.4F), Mild Pain (1 - 3)  hydrALAZINE Injectable 5 milliGRAM(s) IV Push every 8 hours PRN systolic BP >180  sodium chloride 0.9% Bolus. 100 milliLiter(s) IV Bolus every 5 minutes PRN SBP LESS THAN or EQUAL to 100 mmHg      CAPILLARY BLOOD GLUCOSE        I&O's Summary    22 Jun 2023 07:01  -  22 Jun 2023 14:27  --------------------------------------------------------  IN: 400 mL / OUT: 1900 mL / NET: -1500 mL        PHYSICAL EXAM:  Vital Signs Last 24 Hrs  T(C): 36.6 (22 Jun 2023 12:38), Max: 37.1 (22 Jun 2023 06:25)  T(F): 97.8 (22 Jun 2023 12:38), Max: 98.7 (22 Jun 2023 06:25)  HR: 62 (22 Jun 2023 12:38) (57 - 82)  BP: 155/76 (22 Jun 2023 12:38) (131/56 - 186/68)  BP(mean): --  RR: 18 (22 Jun 2023 12:38) (17 - 18)  SpO2: 98% (22 Jun 2023 12:38) (95% - 100%)    Parameters below as of 22 Jun 2023 12:38  Patient On (Oxygen Delivery Method): room air      GENERAL: NAD  EYES: EOMI, PERRLA, conjunctiva and sclera clear  NECK: Supple, No JVD  CHEST/LUNG: Clear to auscultation bilaterally; No wheeze  HEART: Regular rate and rhythm; No murmurs, rubs, or gallops  ABDOMEN: Soft, Nontender, Nondistended; Bowel sounds present  EXTREMITIES:  2+ Peripheral Pulses, No clubbing, cyanosis, or edema  PSYCH: AAOx3  NEUROLOGY: non-focal  SKIN: LUE fistula       LABS:                        9.5    5.17  )-----------( 266      ( 22 Jun 2023 06:25 )             30.4     06-22    136  |  94<L>  |  44<H>  ----------------------------<  98  4.3   |  25  |  9.60<H>    Ca    9.1      22 Jun 2023 06:25  Phos  4.1     06-22  Mg     2.10     06-22    TPro  7.9  /  Alb  4.5  /  TBili  0.5  /  DBili  x   /  AST  15  /  ALT  12  /  AlkPhos  155<H>  06-21    PT/INR - ( 20 Jun 2023 17:50 )   PT: 13.5 sec;   INR: 1.16 ratio         PTT - ( 20 Jun 2023 17:50 )  PTT:29.0 sec  CARDIAC MARKERS ( 20 Jun 2023 21:38 )  x     / x     / x     / x     / 3.6 ng/mL  CARDIAC MARKERS ( 20 Jun 2023 17:50 )  x     / x     / 272 U/L / x     / 4.2 ng/mL      Urinalysis Basic - ( 22 Jun 2023 06:25 )    Color: x / Appearance: x / SG: x / pH: x  Gluc: 98 mg/dL / Ketone: x  / Bili: x / Urobili: x   Blood: x / Protein: x / Nitrite: x   Leuk Esterase: x / RBC: x / WBC x   Sq Epi: x / Non Sq Epi: x / Bacteria: x          RADIOLOGY & ADDITIONAL TESTS:  Results Reviewed:   Imaging Personally Reviewed:  Electrocardiogram Personally Reviewed:  TTE:  1. Mitral annular calcification and calcified mitral  leaflets with decreased diastolic opening. Mild mitral  regurgitation.  Mean transmitral valve gradient equals 5 mm  Hg, consistent with mild mitral stenosis.  2. Bioprosthetic aortic valve replacement. Peak transaortic  valve gradient equals 43 mm Hg, mean transaortic valve  gradient equals 26 mm Hg, which is probably normal in the  presence of a prosthetic valve.  Dimensionless index about  0.37.  3. Moderately dilated left atrium.  LA volume index = 46  cc/m2.  4. Mild concentric left ventricular hypertrophy.  5. Normal left ventricular systolic function. No segmental  wall motion abnormalities.  6. Normal right ventricular size and systolic function.      COORDINATION OF CARE:  Care Discussed with Consultants/Other Providers [Y/N]:  Prior or Outpatient Records Reviewed [Y/N]:

## 2023-06-22 NOTE — PROGRESS NOTE ADULT - ASSESSMENT
Richie Ramsey MD  Cardiology Fellow PGY-4  [facility]    Notes are not final until signed by attending  For all consults and questions:  [email or web address]   Login: [email or web address] y.o M with CAD and severe AS s/p AVR and CABG x2 RSVG-Ramus, RSVG-OM1 (2021), ESRD on HD, Hep C s/p treatment, latent TB, who presents with subacute progressive GALLAGHER and palpitations +/- chest pain that recurs at the end of hemodialysis.     #Typical angina. Patient has known CAD now s/p CABG presenting with dyspnea on exertion and chest pain following HD sessions concerning for possible obstructive CAD vs microvascular disease exacerbated by volume shifts during HD and exertion. Troponin elevation with minimal CKMB likewise likely a result of myocardial injury from these volume shifts. He appears euvolemic on exam.    Presenting ECG: Normal Sinus rhythm with no ischemic changes  Trop 398 -> 422  CKMB 4.2  BNP > 70,000    Recommendations  - Pending repeat TTE  - Continue aspirin 81mg daily  - Continue BP meds labetalol 400mg TID and nifedipine 90mg BID  - Lipitor 80mg daily     Richie Ramsey MD  Cardiology Fellow PGY-4  [facility]    Notes are not final until signed by attending  For all consults and questions:  [email or web address]   Login: [email or web address]

## 2023-06-22 NOTE — PROGRESS NOTE ADULT - ATTENDING COMMENTS
The patient was seen and examined with the Cardiology Consultation Teaching Service.     No overnight events    No chest pain  No dyspnea, orthopnea or PND  No palpitations or dizziness     He tolerated hemodialysis today without incident    PMH/PSH:  Coronary artery disease    2-vessel CABG (SVG-Ramus, SVG-OM), 2021  Severe aortic stenosis with prior AVR  End-stage renal disease on hemodialysis  Hypertension  HCV  Latent TB    Comfortable-appearing man in no acute distress  Alert and oriented  Afebrile  Vital signs stable  JVP is not elevated  Clear lungs  Normal heart sounds  Extremities are warm and perfused  No peripheral edema     Microcytic anemia  Hypochloremia  GFR 6    hs-troponin 416, 422, 398  CK-MB normal    ECG demonstrates sinus rhythm with LVH and secondary repolarization abnormalities    Echocardiography demonstrates normal LV systolic function, mild concentric LVH, mild MR, mitral annular calcification with mild mitral stenosis, moderate LA dilatation.    Impression and Recommendations:  63-year-old man with coronary artery disease, prior CABG and AVR who presents with several weeks of progressive dyspnea on exertion, and palpitations and possible chest pain that recurs at the end of hemodialysis.     Given the patient's ability to tolerate hemodialysis during this hospitalization without symptoms, and the reassuring echocardiography and ECG findings, we discuss next steps of care with this patient. At this time, he is in agreement to continue medical therapy and defer further ischemic evaluation for the time being. If his symptoms were to recur, I emphasized that the patient could undergo outpatient angiography.     Sae Ramirez MD  Cardiology  [phone]

## 2023-06-23 ENCOUNTER — TRANSCRIPTION ENCOUNTER (OUTPATIENT)
Age: 63
End: 2023-06-23

## 2023-06-23 VITALS — HEART RATE: 61 BPM | DIASTOLIC BLOOD PRESSURE: 70 MMHG | SYSTOLIC BLOOD PRESSURE: 156 MMHG

## 2023-06-23 LAB
ANION GAP SERPL CALC-SCNC: 14 MMOL/L — SIGNIFICANT CHANGE UP (ref 7–14)
BUN SERPL-MCNC: 29 MG/DL — HIGH (ref 7–23)
CALCIUM SERPL-MCNC: 9.4 MG/DL — SIGNIFICANT CHANGE UP (ref 8.4–10.5)
CHLORIDE SERPL-SCNC: 97 MMOL/L — LOW (ref 98–107)
CO2 SERPL-SCNC: 28 MMOL/L — SIGNIFICANT CHANGE UP (ref 22–31)
CREAT SERPL-MCNC: 7.44 MG/DL — HIGH (ref 0.5–1.3)
EGFR: 8 ML/MIN/1.73M2 — LOW
GLUCOSE SERPL-MCNC: 88 MG/DL — SIGNIFICANT CHANGE UP (ref 70–99)
HCT VFR BLD CALC: 33.4 % — LOW (ref 39–50)
HGB BLD-MCNC: 10.2 G/DL — LOW (ref 13–17)
MAGNESIUM SERPL-MCNC: 2.5 MG/DL — SIGNIFICANT CHANGE UP (ref 1.6–2.6)
MCHC RBC-ENTMCNC: 22.9 PG — LOW (ref 27–34)
MCHC RBC-ENTMCNC: 30.5 GM/DL — LOW (ref 32–36)
MCV RBC AUTO: 74.9 FL — LOW (ref 80–100)
NRBC # BLD: 0 /100 WBCS — SIGNIFICANT CHANGE UP (ref 0–0)
NRBC # FLD: 0 K/UL — SIGNIFICANT CHANGE UP (ref 0–0)
PHOSPHATE SERPL-MCNC: 3.9 MG/DL — SIGNIFICANT CHANGE UP (ref 2.5–4.5)
PLATELET # BLD AUTO: 262 K/UL — SIGNIFICANT CHANGE UP (ref 150–400)
POTASSIUM SERPL-MCNC: 4.8 MMOL/L — SIGNIFICANT CHANGE UP (ref 3.5–5.3)
POTASSIUM SERPL-SCNC: 4.8 MMOL/L — SIGNIFICANT CHANGE UP (ref 3.5–5.3)
RBC # BLD: 4.46 M/UL — SIGNIFICANT CHANGE UP (ref 4.2–5.8)
RBC # FLD: 18.8 % — HIGH (ref 10.3–14.5)
SODIUM SERPL-SCNC: 139 MMOL/L — SIGNIFICANT CHANGE UP (ref 135–145)
WBC # BLD: 5 K/UL — SIGNIFICANT CHANGE UP (ref 3.8–10.5)
WBC # FLD AUTO: 5 K/UL — SIGNIFICANT CHANGE UP (ref 3.8–10.5)

## 2023-06-23 PROCEDURE — 99239 HOSP IP/OBS DSCHRG MGMT >30: CPT

## 2023-06-23 RX ORDER — ATORVASTATIN CALCIUM 80 MG/1
1 TABLET, FILM COATED ORAL
Qty: 30 | Refills: 1
Start: 2023-06-23 | End: 2023-08-21

## 2023-06-23 RX ADMIN — Medication 400 MILLIGRAM(S): at 05:37

## 2023-06-23 RX ADMIN — Medication 90 MILLIGRAM(S): at 05:37

## 2023-06-23 NOTE — DISCHARGE NOTE PROVIDER - HOSPITAL COURSE
63 year old M hx of ESRD on HD TThSa through LUE fistula, HTN, CAD w/ CABG 2021, AV replacement, hep C s/p treatment who presents w/ chest pain starting 2 hours at the end of HD session.      Problem/Plan - 1:  ·  Problem: Chest pain.   ·  Plan: -chest pain after HD, likely demand ischemia   -troponin 398--->422 though ckmb and cpk wnl low concern for acute ACS  -EKG w/ no ischemic changes   -c/w aspirin 81 mg, c/w lipitor 80 mg  -TTE w/ normal left ventricular systolic function, no segmental wall motion abnormalities, normal RV, mild MR/MS  -No further ischemic evaluation at this time   -telemetry monitoring  -cardiology f/up.  - pcp f/u 1-2 weeks      Problem/Plan - 2:  ·  Problem: HTN (hypertension).   ·  Plan: -Uncontrolled HTN, still elevated   -c/w labetalol 400 mg TID, would not increase dose d/t HR in 60's   -c/w nifedipine 90 mg ER BID  -consider adding third agent if BP remains elevated, however pt refusing at this time despite uncontrolled bp  -monitor BP.     Problem/Plan - 3:  ·  Problem: CAD (coronary artery disease).   ·  Plan: -c/w ASA and statin.     Problem/Plan - 4:  ·  Problem: ESRD on dialysis.   ·  Plan: -HD TThS  -c/w calcium acetate 1337 mg TID  -renal diet  -has mild pulm edema not requiring oxygen currently  -dialysis as per renal.     Problem/Plan - 5:  ·  Problem: Nutrition, metabolism, and development symptoms.   ·  Plan: heparin subQ  full code.    Dispo: Home no needs, with outpatient HD    Patient seen and evaluated, no acute somatic complaints. Reviewed discharge medications with patient; All new medications requiring new prescriptions were sent to the pharmacy of patient's choice. Reviewed need for prescription for previous home medications and new prescriptions sent if requested. Medically cleared/stable for discharge as per Dr. Gould on 6/23/23 with close outpatient follow up within 1-2 weeks of discharge. Patient understands and agrees with plan of care. 63 year old M hx of ESRD on HD TThSa through LUE fistula, HTN, CAD w/ CABG 2021, AV replacement, hep C s/p treatment who presents w/ chest pain starting 2 hours at the end of HD session.      Problem/Plan - 1:  ·  Problem: Chest pain.   ·  Plan: -chest pain after HD, likely demand ischemia   -troponin 398--->422 though ckmb and cpk wnl, low concern for acute ACS  -EKG w/ no ischemic changes   -c/w aspirin 81 mg, c/w lipitor   -TTE w/ normal left ventricular systolic function, no segmental wall motion abnormalities, normal RV, mild MR/MS  -No further ischemic evaluation at this time   -cardiology f/up.     Problem/Plan - 2:  ·  Problem: HTN (hypertension).   ·  Plan: -Uncontrolled HTN, still elevated but improved   -c/w labetalol 400 mg TID, would not increase dose d/t HR in 60's   -c/w nifedipine 90 mg ER BID  -consider adding third agent if BP remains elevated, however pt refusing at this time   -monitor BP.     Problem/Plan - 3:  ·  Problem: CAD (coronary artery disease).   ·  Plan: -c/w ASA and statin.     Problem/Plan - 4:  ·  Problem: ESRD on dialysis.   ·  Plan: -HD TThS  -c/w calcium acetate 1337 mg TID  -renal diet  -has mild pulm edema not requiring oxygen currently  -dialysis as per renal, tolerated dialysis 6/22 without any events     Dispo: Home no needs, with outpatient HD    Patient seen and evaluated, no acute somatic complaints. Reviewed discharge medications with patient; All new medications requiring new prescriptions were sent to the pharmacy of patient's choice. Reviewed need for prescription for previous home medications and new prescriptions sent if requested. Medically cleared/stable for discharge as per Dr. Gould on 6/23/23 with close outpatient follow up within 1-2 weeks of discharge. Patient understands and agrees with plan of care.

## 2023-06-23 NOTE — PROVIDER CONTACT NOTE (OTHER) - ASSESSMENT
/86;patient is asymptomatic at this time.
No acute distress, no complaints of pain or discomfort noted
Patient /80. Patient is asymptomatic, no acute distress noted. Patient is refusing heparin, hydralazine. Patient is also refusing AM lab work. Vital signs per flowsheet.
Patient stable in bed, vitals per flowsheet. No complains of SOB, dizziness, or chest pain.
No acute distress, pt resting in bed, no chest pain, no pain or discomfort noted, no SOB
180/80 post hydralazine IVP; no acute distress; no complaints of pain/discomfort; no SOB
No acute distress noted. Patient denies chest pain, SOB.

## 2023-06-23 NOTE — DISCHARGE NOTE NURSING/CASE MANAGEMENT/SOCIAL WORK - NSDCCRNAME_GEN_ALL_CORE_FT
[facility] ([id number] [facility] [id number]) Next HD Scheduled Saturday June 24th, 2023 at 9:15a.m.

## 2023-06-23 NOTE — DISCHARGE NOTE PROVIDER - NSDCCPCAREPLAN_GEN_ALL_CORE_FT
PRINCIPAL DISCHARGE DIAGNOSIS  Diagnosis: Chest pain  Assessment and Plan of Treatment: You came in for chest pain likely from fluid overload that was resolved with dialysis. workup was normal. Echocardiogram was normal. Follow up with cardiology and your pcp for further care and blood pressure control as it was elevated.      SECONDARY DISCHARGE DIAGNOSES  Diagnosis: ESRD on dialysis  Assessment and Plan of Treatment: follow up with nephrology for dialysis     PRINCIPAL DISCHARGE DIAGNOSIS  Diagnosis: Chest pain  Assessment and Plan of Treatment: You came in for chest pain likely from fluid overload that was resolved with dialysis. workup was normal. Echocardiogram was normal. Follow up with cardiology and your pcp for further care and blood pressure control as it was elevated.      SECONDARY DISCHARGE DIAGNOSES  Diagnosis: HTN (hypertension)  Assessment and Plan of Treatment: Low salt diet. Continue Labetalol and Nifedipine. Monitor blood pressure.    Diagnosis: ESRD on dialysis  Assessment and Plan of Treatment: follow up with nephrology for dialysis

## 2023-06-23 NOTE — PROVIDER CONTACT NOTE (OTHER) - DATE AND TIME:
20-Jun-2023 23:10
21-Jun-2023 01:00
22-Jun-2023 00:50
21-Jun-2023 02:11
23-Jun-2023 05:30
22-Jun-2023 17:28
21-Jun-2023 02:50

## 2023-06-23 NOTE — DISCHARGE NOTE NURSING/CASE MANAGEMENT/SOCIAL WORK - PATIENT PORTAL LINK FT
You can access the FollowMyHealth Patient Portal offered by [facility] by registering at the following website: [email or web address]. By joining [employer]s FollowMyHealth portal, you will also be able to view your health information using other applications (apps) compatible with our system.

## 2023-06-23 NOTE — DISCHARGE NOTE PROVIDER - NSDCMRMEDTOKEN_GEN_ALL_CORE_FT
aspirin 81 mg oral delayed release tablet: 1 tab(s) orally once a day  atorvastatin 80 mg oral tablet: 1 tab(s) orally once a day (at bedtime)  calcium acetate 667 mg oral tablet: 1334 milligram(s) orally 3 times a day (with meals)  labetalol 200 mg oral tablet: 2 tab(s) orally 3 times a day  NIFEdipine 90 mg oral tablet, extended release: 1 tab(s) orally 2 times a day

## 2023-06-23 NOTE — PROVIDER CONTACT NOTE (OTHER) - SITUATION
/85, order to notify for parameter
/86
PT reports SOB, O2 sat 100% on room air, 24 RR
180/80 post hydralazine IVP
Bp at 21:20 171/84. Patient given Labatelol and Nifdepine PO. BP rechecked at 1:09 with BP of 180/85.
/86, hr 56
Patient /80. Patient is asymptomatic, no acute distress noted. Patient is refusing heparin, hydralazine. Patient is also refusing AM lab work.

## 2023-06-23 NOTE — DISCHARGE NOTE NURSING/CASE MANAGEMENT/SOCIAL WORK - NSDCPEFALRISK_GEN_ALL_CORE
For information on Fall & Injury Prevention, visit: [email or web address] OR  [email or web address] OR  https://www.cdc.gov/steadi/patient.html

## 2023-06-23 NOTE — DISCHARGE NOTE PROVIDER - NSDCFUSCHEDAPPT_GEN_ALL_CORE_FT
Yasmin Mckeon  [facility]  CARDIOLOGY [id number]  Scheduled Appointment: 07/17/2023    [facility]  VASCULAR [address]  Scheduled Appointment: 07/18/2023    Aron Buckner  [facility]  VASCULAR [address]  Scheduled Appointment: 07/18/2023

## 2023-06-23 NOTE — PROVIDER CONTACT NOTE (OTHER) - RECOMMENDATIONS
2 L NC given for comfort as per ACP
Recheck patient vitals 1 hour after giving AM BP meds.
ACP notified
Patient to receive IV Hydralazine PRN.
ACP notified
Provider made aware, administer BP med

## 2023-06-23 NOTE — PROVIDER CONTACT NOTE (OTHER) - ACTION/TREATMENT ORDERED:
Provider made aware, will recheck BP in an hour. All safety maintained.
Provider made aware.
Provider made aware; will give hydralazine IVP and recheck BP in30 minutes.
Patient to receive IV Hydralazine PRN.

## 2023-06-23 NOTE — DISCHARGE NOTE PROVIDER - ATTENDING DISCHARGE PHYSICAL EXAMINATION:
GENERAL: NAD  EYES: EOMI, PERRLA, conjunctiva and sclera clear  NECK: Supple, No JVD  CHEST/LUNG: Clear to auscultation bilaterally; No wheeze  HEART: Regular rate and rhythm; No murmurs, rubs, or gallops  ABDOMEN: Soft, Nontender, Nondistended; Bowel sounds present  EXTREMITIES:  2+ Peripheral Pulses, No clubbing, cyanosis, or edema  PSYCH: AAOx3  NEUROLOGY: non-focal  SKIN: LUE fistula

## 2023-06-23 NOTE — DISCHARGE NOTE PROVIDER - PROVIDER TOKENS
FREE:[LAST:[PCP],FIRST:[Dr. Salvador],PHONE:[phone]],FAX:[(   )    -],FOLLOWUP:[2 weeks]],PROVIDER:[TOKEN:[[id number]],FOLLOWUP:[2 weeks]]

## 2023-06-23 NOTE — DISCHARGE NOTE PROVIDER - CARE PROVIDER_API CALL
PCP, Dr. Salvador  Phone: [phone]  Fax: (   )    -  Follow Up Time: 2 weeks    Matthew Valdovinos  Nephrology  [address] [place] [id number]  Phone: [phone]  Fax: [phone]  Follow Up Time: 2 weeks

## 2023-06-23 NOTE — PROVIDER CONTACT NOTE (OTHER) - BACKGROUND
62 y/o M PMHx CKD, ESRD on HD, Admit for chest pain/palpitations
64 Y/O M PMHx CKD ESRD Admit chest pain and palpitations after HD
Patient admitted for chest pain
64 y/o M PMHx CKD ESRD Admit for palpitations/chest pain
Admitted for chest pain
patient admitted for chest pain with a hx of HD, HTN.
Patient admitted for chest pain at dialysis center. Admitted with elevated BP.

## 2023-06-23 NOTE — PROVIDER CONTACT NOTE (OTHER) - NAME OF MD/NP/PA/DO NOTIFIED:
Iqra Bhatia
Iqra Bhatia
GAIL Kimbrough
[facility] [id number] [address]qra
Hernan Allen, ACP
Iqra Bhatia
[facility] [id number]

## 2023-06-26 ENCOUNTER — NON-APPOINTMENT (OUTPATIENT)
Age: 63
End: 2023-06-26

## 2023-07-13 RX ORDER — CINACALCET 30 MG/1
30 TABLET ORAL DAILY
Qty: 90 | Refills: 3 | Status: ACTIVE | COMMUNITY
Start: 2023-06-02 | End: 1900-01-01

## 2023-07-17 ENCOUNTER — APPOINTMENT (OUTPATIENT)
Dept: CARDIOLOGY | Facility: CLINIC | Age: 63
End: 2023-07-17
Payer: MEDICARE

## 2023-07-17 ENCOUNTER — NON-APPOINTMENT (OUTPATIENT)
Age: 63
End: 2023-07-17

## 2023-07-17 VITALS — SYSTOLIC BLOOD PRESSURE: 170 MMHG | DIASTOLIC BLOOD PRESSURE: 80 MMHG

## 2023-07-17 PROCEDURE — 93000 ELECTROCARDIOGRAM COMPLETE: CPT

## 2023-07-17 PROCEDURE — 99214 OFFICE O/P EST MOD 30 MIN: CPT

## 2023-07-17 NOTE — HISTORY OF PRESENT ILLNESS
[FreeTextEntry1] : SP CABG x 2 and AVR on 10/28/21. \par TTE 5/2 showed normal prosthetic valve function, mild to moderate MR, normal LV function, moderate diastolic dysfunction\par He struggles with his BP but he is taking his meds- unclear dietary compliance. Eats out a lot (fast food)Due for HD tomorrow\par He had episode of chest pain/palpitations at end of HD in Mid June- went to [facility]- admitted and workup negative. \par \par #HTN- On Labetalol 600 mg TID; Nifedipine 90 mg BID\par #CAD- SP CABG/AVR now on ASA and Atorvastatin, continue present med\par #Chronic diastolic HF in setting of valve disease and HTN- continue strict BP control\par \par \par

## 2023-07-17 NOTE — DISCUSSION/SUMMARY
[FreeTextEntry1] : 63 year old man with HTN ESRD on treatment for  hep C, now with dyspnea. With significant stenosis of LCX and Ramus / significant AS. SP CABG/AVR on 10/28/21. Here for followup.\par #HTN- On Labetalol 400 mg TID; Nifedipine 90 mg BID\par #CAD- SP CABG/AVR now on ASA and Atorvastatin, continue present med\par #FU in 3 months [EKG obtained to assist in diagnosis and management of assessed problem(s)] : EKG obtained to assist in diagnosis and management of assessed problem(s)

## 2023-08-04 NOTE — PATIENT PROFILE ADULT - SAFE PLACE TO LIVE
no Scc Sarcomatoid Histology Text: There were aggregates of squamous cells in a sarcomatous pattern.

## 2023-08-08 ENCOUNTER — APPOINTMENT (OUTPATIENT)
Dept: VASCULAR SURGERY | Facility: CLINIC | Age: 63
End: 2023-08-08

## 2023-10-16 ENCOUNTER — NON-APPOINTMENT (OUTPATIENT)
Age: 63
End: 2023-10-16

## 2023-10-16 ENCOUNTER — APPOINTMENT (OUTPATIENT)
Dept: CARDIOLOGY | Facility: CLINIC | Age: 63
End: 2023-10-16
Payer: MEDICARE

## 2023-10-16 VITALS
DIASTOLIC BLOOD PRESSURE: 82 MMHG | HEART RATE: 71 BPM | HEIGHT: 73 IN | SYSTOLIC BLOOD PRESSURE: 163 MMHG | OXYGEN SATURATION: 99 %

## 2023-10-16 PROCEDURE — 99214 OFFICE O/P EST MOD 30 MIN: CPT

## 2023-10-16 PROCEDURE — 93000 ELECTROCARDIOGRAM COMPLETE: CPT

## 2023-11-07 RX ORDER — NIFEDIPINE 90 MG/1
90 TABLET, EXTENDED RELEASE ORAL
Qty: 180 | Refills: 1 | Status: ACTIVE | COMMUNITY
Start: 2023-05-24 | End: 1900-01-01

## 2023-11-07 RX ORDER — ASPIRIN ENTERIC COATED TABLETS 81 MG 81 MG/1
81 TABLET, DELAYED RELEASE ORAL DAILY
Qty: 30 | Refills: 6 | Status: ACTIVE | COMMUNITY
Start: 2023-11-07 | End: 1900-01-01

## 2024-01-04 ENCOUNTER — EMERGENCY (EMERGENCY)
Facility: HOSPITAL | Age: 64
LOS: 1 days | Discharge: HOME | End: 2024-01-04
Attending: EMERGENCY MEDICINE
Payer: MEDICARE

## 2024-01-04 VITALS
SYSTOLIC BLOOD PRESSURE: 183 MMHG | DIASTOLIC BLOOD PRESSURE: 90 MMHG | OXYGEN SATURATION: 97 % | HEART RATE: 69 BPM | HEIGHT: 73 IN | WEIGHT: 148.81 LBS | TEMPERATURE: 98 F | RESPIRATION RATE: 20 BRPM

## 2024-01-04 VITALS
SYSTOLIC BLOOD PRESSURE: 176 MMHG | OXYGEN SATURATION: 97 % | HEART RATE: 66 BPM | TEMPERATURE: 99 F | RESPIRATION RATE: 20 BRPM | DIASTOLIC BLOOD PRESSURE: 87 MMHG

## 2024-01-04 PROCEDURE — 99284 EMERGENCY DEPT VISIT MOD MDM: CPT | Mod: 25

## 2024-01-04 PROCEDURE — 73620 X-RAY EXAM OF FOOT: CPT

## 2024-01-04 PROCEDURE — 73610 X-RAY EXAM OF ANKLE: CPT

## 2024-01-04 PROCEDURE — 99283 EMERGENCY DEPT VISIT LOW MDM: CPT | Mod: GC

## 2024-01-04 PROCEDURE — 73610 X-RAY EXAM OF ANKLE: CPT | Mod: 26,RT

## 2024-01-04 PROCEDURE — 73620 X-RAY EXAM OF FOOT: CPT | Mod: 26,RT

## 2024-01-04 NOTE — ED PROVIDER NOTE - OBJECTIVE STATEMENT
63Y M PMH ESRD on HD TTS, HTN, Hepatitis C, CHF presenting with R ankle pain. Patient reports that he rolled out of bed in his sleep at around 4AM this morning, striking his right ankle. Has had significant R ankle pain and difficulty weight-bearing since that time. He went to HD today and had full run.  Dialysis staff noticed that he was having difficulty ambulating so called EMS to bring him to the ED to be evaluated.  Patient thinks he struck his head when he fell but did not lose consciousness.  Denies headache or neck pain, no other injuries reported.

## 2024-01-04 NOTE — ED PROVIDER NOTE - PROGRESS NOTE DETAILS
Elle Gao MD PGY-2: pain improved with tylenol, motrin; no acute traumatic injury seen on XRs. ACE wrap applied. ambulatory with cane. recommend OTC meds for pain control. Return precautions discussed and patient expressed understanding.  He was discharged home in stable condition.

## 2024-01-04 NOTE — ED PROVIDER NOTE - PHYSICAL EXAMINATION
General: WN/WD NAD  Head: Atraumatic  Eyes: PERRL, EOM grossly intact, no scleral icterus  ENT: moist mucous membranes  Neurology: A&Ox4, nonfocal, DOBBINS x 4  Respiratory: normal respiratory effort on room air  CV: Extremities warm and well perfused  Abdominal: Soft, non-distended  Extremities: trace symmetric lower extremity edema in bilateral feet/ankles, tender to palpation over right lateral ankle and foot  Skin: No rashes

## 2024-01-04 NOTE — ED PROVIDER NOTE - PATIENT PORTAL LINK FT
You can access the FollowMyHealth Patient Portal offered by [facility] by registering at the following website: [email or web address]. By joining [employer]’s FollowMyHealth portal, you will also be able to view your health information using other applications (apps) compatible with our system. You can access the FollowMyHealth Patient Portal offered by [facility] by registering at the following website: [email or web address]. By joining [employer]’s FollowMyHealth portal, you will also be able to view your health information using other applications (apps) compatible with our system.

## 2024-01-04 NOTE — ED PROVIDER NOTE - NSFOLLOWUPINSTRUCTIONS_ED_ALL_ED_FT
You were seen in the ER today for ankle pain.     Your XRay was negative for any broken bones in your foot or ankle.    For pain, you may take:  1) Acetaminophen (Tylenol): 650mg every 6 hours or as needed for pain  2) Ibuprofen (Advil/Motrin): 600mg every 6 hours or as needed for pain     If you develop new or worsening symptoms including chest pain, trouble breathing, worsening pain or swelling in your foot, difficulty walking, or if you are otherwise concerned, come back to the ER right away to be re-evaluated.

## 2024-01-04 NOTE — ED ADULT NURSE NOTE - OBJECTIVE STATEMENT
63y m pt arrived via ems; emt reports pt picked up at dialysis center after pt completed dialysis; emt reports pt c/o pain and swelling to right foot after falling out of bed last night; pt states rolled out of bed while sleeping; took an hour to get back into bed; now right foot is painful and swollen; unable to ambulate on own; aox3; no resp distress; no sob; no chest pain; no abd pain; no n/v/d; denies fever/chills; no cough/congestion; only complaint is pain in foot; + swelling visible 63y m pt arrived via ems; emt reports pt picked up at dialysis center after pt completed dialysis; emt reports pt c/o pain and swelling to right foot after falling out of bed last night; pt states rolled out of bed while sleeping; took an hour to get back into bed; now right foot is painful and swollen; unable to ambulate on own; aox3; no resp distress; no sob; no chest pain; no abd pain; no n/v/d; denies fever/chills; no cough/congestion; only complaint is pain in foot; + swelling visible; pt states was given tylenol about 1-2 hours ago

## 2024-01-04 NOTE — ED PROVIDER NOTE - ATTENDING CONTRIBUTION TO CARE
see mdm    edited by Talia Mcgrath DO - attending physician.   Please see progress notes for status/labs/consult updates and ED course after initial presentation.

## 2024-01-04 NOTE — ED PROVIDER NOTE - CLINICAL SUMMARY MEDICAL DECISION MAKING FREE TEXT BOX
63Y M PMH ESRD on HD TTS, last run today, presenting with R ankle pain after rolling out of bed this AM. Has been able to ambulate but with discomfort. States he struck his head but denies LOC, no evidence of trauma to head/scalp. Low suspicion for acute intracranial process that would necessitate advanced imaging today. The right lateral foot and ankle are tender to palpation without obvious bony deformity. Plan XR R foot and ankle, PO analgesia.

## 2024-01-04 NOTE — ED ADULT NURSE NOTE - NSFALLRISKINTERV_ED_ALL_ED
Assistance OOB with selected safe patient handling equipment if applicable/Assistance with ambulation/Communicate fall risk and risk factors to all staff, patient, and family/Monitor gait and stability/Provide visual cue: yellow wristband, yellow gown, etc/Reinforce activity limits and safety measures with patient and family/Call bell, personal items and telephone in reach/Instruct patient to call for assistance before getting out of bed/chair/stretcher/Non-slip footwear applied when patient is off stretcher/[place] to call system/Physically safe environment - no spills, clutter or unnecessary equipment/Purposeful Proactive Rounding/Room/bathroom lighting operational, light cord in reach Assistance OOB with selected safe patient handling equipment if applicable/Assistance with ambulation/Communicate fall risk and risk factors to all staff, patient, and family/Monitor gait and stability/Provide visual cue: yellow wristband, yellow gown, etc/Reinforce activity limits and safety measures with patient and family/Call bell, personal items and telephone in reach/Instruct patient to call for assistance before getting out of bed/chair/stretcher/Non-slip footwear applied when patient is off stretcher/[place] to call system/Physically safe environment - no spills, clutter or unnecessary equipment/Purposeful Proactive Rounding/Room/bathroom lighting operational, light cord in reach

## 2024-01-04 NOTE — CHART NOTE - NSCHARTNOTEFT_GEN_A_CORE
Emergency Social Work Note:  LMSW received a referral for assistance with discharge planning. Per  medical team  patient is medically cleared for discharge. Chart was reviewed.  [facility] ([phone]) was contacted to arrange transportation.  Trip Number assigned: [id number] . Patient ambulated to the ED waiting room independently; he is appropriately dressed for the weatherNo further concerns voiced. LMSW will followup as needed. Emergency Social Work Note:  LMSW received a referral for assistance with discharge planning. Per  medical team  patient is medically cleared for discharge. Chart was reviewed.  [facility] ([phone]) was contacted to arrange transportation.  Trip Number assigned: [id number] . Patient ambulated to the ED waiting room independently; he is appropriately dressed for the weatherNo further concerns voiced. LMSW will followup as needed.

## 2024-02-22 NOTE — ED PROVIDER NOTE - BIRTH SEX
Subjective      Reports no complaints    Objective       Vitals:    02/22/24 0700   BP: 130/73   Pulse: 90   Resp:    Temp:         SVE  Dilation:  10 (02/22/24 0533)  Effacement:  100 (02/22/24 0533)  Station:  -1 (02/22/24 0533)  Consistency:  Soft (02/21/24 1232)  Presentation:  Vertex (02/21/24 1225)     Fetal Surveillance  Fetal Heart Rate  Mode: External US (02/22/24 0700)  Baseline: 150 bpm (02/22/24 0700)  Classification:    Variability: Minimal < 5 BPM (02/22/24 0700)  Pattern: Variable decelerations (02/22/24 0700)     Uterine Activity  Mode: [place] (02/22/24 0700)  Frequency: 4-5 (02/22/24 0700)  Duration: 80-90 (02/22/24 0700)  Quality: Mild (02/22/24 0700)  Resting Tone: Soft (02/22/24 0700)     Membrane Status: Ruptured (02/21/24 2323)  Sac Identifier: Sac 1 (02/21/24 2323)  Rupture Type: Artificial (02/21/24 2323)  Rupture Date: 02/21/24 (02/21/24 2323)  Rupture Time: 2323 (02/21/24 2323)  Fluid Color: Clear (02/21/24 2323)  Fluid Odor: Normal (02/21/24 2323)  Fluid Amount: Moderate (02/21/24 2323)  Membranes Comment: Dr. Sanchez (02/21/24 2323)     EFW : 8bs    Assessment & Plan     Gestational Age  39w3d IUP in stage 2 labor  Anticipate NEHA Doyle MD       Male

## 2024-02-27 RX ORDER — CALCIUM ACETATE 667 MG/1
667 CAPSULE ORAL 3 TIMES DAILY
Qty: 270 | Refills: 3 | Status: ACTIVE | COMMUNITY
Start: 2018-12-24 | End: 1900-01-01

## 2024-02-27 RX ORDER — SEVELAMER CARBONATE 800 MG/1
800 TABLET, FILM COATED ORAL 3 TIMES DAILY
Qty: 540 | Refills: 2 | Status: DISCONTINUED | COMMUNITY
Start: 2022-03-15 | End: 2024-02-27

## 2024-03-19 NOTE — DISCUSSION/SUMMARY
[FreeTextEntry1] : In the office today patient underwent a duplex study which shows no significant stenosis throughout.  Fistula is functioning well.  No need for intervention.  Patient to follow-up in 6 months.
19-Mar-2024

## 2024-03-22 NOTE — DISCHARGE NOTE PROVIDER - NSFTFHOMEHTHYNRD_GEN_ALL_CORE
Pt called to cx <24hrs, friend passed away, helping with [facility] service    Yes

## 2024-03-25 ENCOUNTER — APPOINTMENT (OUTPATIENT)
Dept: INTERNAL MEDICINE | Facility: CLINIC | Age: 64
End: 2024-03-25
Payer: MEDICARE

## 2024-03-25 ENCOUNTER — OUTPATIENT (OUTPATIENT)
Dept: OUTPATIENT SERVICES | Facility: HOSPITAL | Age: 64
LOS: 1 days | End: 2024-03-25

## 2024-03-25 VITALS
BODY MASS INDEX: 20.28 KG/M2 | OXYGEN SATURATION: 99 % | HEART RATE: 57 BPM | DIASTOLIC BLOOD PRESSURE: 84 MMHG | SYSTOLIC BLOOD PRESSURE: 160 MMHG | HEIGHT: 73 IN | WEIGHT: 153 LBS
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
## 2024-03-25 DIAGNOSIS — I10 ESSENTIAL (PRIMARY) HYPERTENSION: ICD-10-CM

## 2024-03-25 DIAGNOSIS — Z87.440 PERSONAL HISTORY OF URINARY (TRACT) INFECTIONS: ICD-10-CM

## 2024-03-25 DIAGNOSIS — Z87.09 PERSONAL HISTORY OF OTHER DISEASES OF THE RESPIRATORY SYSTEM: ICD-10-CM

## 2024-03-25 PROCEDURE — 99213 OFFICE O/P EST LOW 20 MIN: CPT | Mod: GE

## 2024-03-29 ENCOUNTER — APPOINTMENT (OUTPATIENT)
Dept: CARDIOLOGY | Facility: CLINIC | Age: 64
End: 2024-03-29
Payer: MEDICARE

## 2024-03-29 ENCOUNTER — NON-APPOINTMENT (OUTPATIENT)
Age: 64
End: 2024-03-29

## 2024-03-29 VITALS
BODY MASS INDEX: 20.28 KG/M2 | HEIGHT: 73 IN | HEART RATE: 63 BPM | OXYGEN SATURATION: 99 % | WEIGHT: 153 LBS | DIASTOLIC BLOOD PRESSURE: 92 MMHG | SYSTOLIC BLOOD PRESSURE: 187 MMHG

## 2024-03-29 DIAGNOSIS — Z95.2 PRESENCE OF PROSTHETIC HEART VALVE: ICD-10-CM

## 2024-03-29 DIAGNOSIS — Z95.1 PRESENCE OF AORTOCORONARY BYPASS GRAFT: ICD-10-CM

## 2024-03-29 PROCEDURE — 99214 OFFICE O/P EST MOD 30 MIN: CPT

## 2024-03-29 PROCEDURE — G2211 COMPLEX E/M VISIT ADD ON: CPT

## 2024-03-29 PROCEDURE — 93000 ELECTROCARDIOGRAM COMPLETE: CPT

## 2024-03-29 RX ORDER — CLONIDINE HYDROCHLORIDE 0.1 MG/1
0.1 TABLET ORAL
Qty: 270 | Refills: 3 | Status: ACTIVE | COMMUNITY
Start: 2024-03-29 | End: 1900-01-01

## 2024-03-29 NOTE — PHYSICAL EXAM
[Well Nourished] : well nourished [No Acute Distress] : no acute distress [Well Developed] : well developed [Normal Conjunctiva] : normal conjunctiva [Normal Venous Pressure] : normal venous pressure [No Carotid Bruit] : no carotid bruit [Normal S1, S2] : normal S1, S2 [No Murmur] : no murmur [No Rub] : no rub [No Gallop] : no gallop [Clear Lung Fields] : clear lung fields [Good Air Entry] : good air entry [No Respiratory Distress] : no respiratory distress  [Non Tender] : non-tender [Soft] : abdomen soft [No Masses/organomegaly] : no masses/organomegaly [Normal Bowel Sounds] : normal bowel sounds [No Cyanosis] : no cyanosis [No Edema] : no edema [Normal Gait] : normal gait [No Clubbing] : no clubbing [No Varicosities] : no varicosities [No Rash] : no rash [No Skin Lesions] : no skin lesions [Moves all extremities] : moves all extremities [No Focal Deficits] : no focal deficits [Normal Speech] : normal speech [Alert and Oriented] : alert and oriented [Normal memory] : normal memory

## 2024-03-29 NOTE — HISTORY OF PRESENT ILLNESS
[FreeTextEntry1] : SP CABG x 2 and AVR on 10/28/21.  TTE 5/2/23 showed normal prosthetic valve function, mild to moderate MR, normal LV function, moderate diastolic dysfunction He struggles with his BP but he is taking his meds- unclear dietary compliance.   #HTN- On Labetalol 600 mg TID; Nifedipine 90 mg BID Given he is a HD patient- will start clonidine 0.1 mg BID and increase to TID if tolerated.  #CAD- SP CABG/AVR now on ASA and Atorvastatin, continue present med #Chronic diastolic HF in setting of valve disease and HTN- continue strict BP control

## 2024-03-29 NOTE — DISCUSSION/SUMMARY
[FreeTextEntry1] : 63 year old man with HTN ESRD on treatment for  hep C, now with dyspnea. With significant stenosis of LCX and Ramus / significant AS. SP CABG/AVR on 10/28/21. Here for followup.  #HTN- On Labetalol 600 mg TID; Nifedipine 90 mg BID Given he is a HD patient- will start clonidine 0.1 mg BID and increase to TID if tolerated.  #CAD- SP CABG/AVR now on ASA and Atorvastatin, continue present med #Chronic diastolic HF in setting of valve disease and HTN- continue strict BP control [EKG obtained to assist in diagnosis and management of assessed problem(s)] : EKG obtained to assist in diagnosis and management of assessed problem(s)

## 2024-04-06 LAB
HEMOLYSIS INDEX: 2 — SIGNIFICANT CHANGE UP
POTASSIUM SERPL-MCNC: 4.6 MMOL/L — SIGNIFICANT CHANGE UP (ref 3.5–5.3)
POTASSIUM SERPL-SCNC: 4.6 MMOL/L — SIGNIFICANT CHANGE UP (ref 3.5–5.3)

## 2024-04-18 ENCOUNTER — APPOINTMENT (OUTPATIENT)
Dept: VASCULAR SURGERY | Facility: CLINIC | Age: 64
End: 2024-04-18
Payer: MEDICARE

## 2024-04-18 PROCEDURE — 99214 OFFICE O/P EST MOD 30 MIN: CPT | Mod: 25

## 2024-04-18 PROCEDURE — G0506: CPT

## 2024-04-18 PROCEDURE — 93990 DOPPLER FLOW TESTING: CPT

## 2024-04-18 NOTE — HISTORY OF PRESENT ILLNESS
[FreeTextEntry1] : 63-year-old gentleman currently on hemodialysis via left radiocephalic AV fistula.  Fistula has been used for approximately 9 years.  Patient is reporting no problems with dialysis however, dialysis is concerned regarding the aneurysms.  He is here for evaluation. [] : left radiocephalic fistula

## 2024-04-18 NOTE — DISCUSSION/SUMMARY
[FreeTextEntry1] : In the office today patient underwent a duplex study which shows no significant stenosis throughout.  Fistula is functioning well.  No need for intervention.  continue ASA 81 mg Patient to follow-up in 6 months.
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
## 2024-05-03 ENCOUNTER — APPOINTMENT (OUTPATIENT)
Dept: CARDIOLOGY | Facility: CLINIC | Age: 64
End: 2024-05-03

## 2024-05-03 NOTE — ASSESSMENT
[FreeTextEntry1] : Case discussed with Dr. Zuñiga.  RTC in 5 weeks for follow up with PCP Dr. Swift.   Bairon Chirinos MD Internal Medicine PGY-1

## 2024-05-03 NOTE — PHYSICAL EXAM
[No Acute Distress] : no acute distress [PERRL] : pupils equal round and reactive to light [No Respiratory Distress] : no respiratory distress  [No Accessory Muscle Use] : no accessory muscle use [Normal Rate] : normal rate  [Regular Rhythm] : with a regular rhythm [Soft] : abdomen soft [No Spinal Tenderness] : no spinal tenderness [No Joint Swelling] : no joint swelling [No Rash] : no rash [Coordination Grossly Intact] : coordination grossly intact [No Focal Deficits] : no focal deficits [de-identified] : Healing laceration on nose

## 2024-05-03 NOTE — HISTORY OF PRESENT ILLNESS
[FreeTextEntry8] : Mr Lopez is a 62yo man with a hx of CAD s/p CABGx2 and AVR in 10/2021, ESRD on HD (T, Th, Sat), HTN, Hep C s/p treatment presenting for an acute visit after falling out of bed twice. Pt notes waking up 3 days ago in the middle of the night after a hard impact on the floor. This also occurred three weeks prior. He denies any loss of consciousness, confusion, headaches, nausea, vomiting, visual deficits, dizziness, or lightheadedness. Per further chart review, it appears pt may have a sleep-movement disorder for which he was referred to sleep medicine for in the past. Pt did experience physical abrasion to his nose and forehead. He did not go to the ER after the falls. Pt is inquiring about bed rails.

## 2024-05-08 ENCOUNTER — APPOINTMENT (OUTPATIENT)
Dept: INTERNAL MEDICINE | Facility: CLINIC | Age: 64
End: 2024-05-08

## 2024-06-28 ENCOUNTER — APPOINTMENT (OUTPATIENT)
Dept: CARDIOLOGY | Facility: CLINIC | Age: 64
End: 2024-06-28

## 2024-07-13 ENCOUNTER — INPATIENT (INPATIENT)
Facility: HOSPITAL | Age: 64
LOS: 5 days | Discharge: HOME HEALTH | DRG: 204 | End: 2024-07-19
Attending: STUDENT IN AN ORGANIZED HEALTH CARE EDUCATION/TRAINING PROGRAM | Admitting: STUDENT IN AN ORGANIZED HEALTH CARE EDUCATION/TRAINING PROGRAM
Payer: MEDICARE

## 2024-07-13 VITALS
TEMPERATURE: 98 F | OXYGEN SATURATION: 98 % | HEART RATE: 68 BPM | RESPIRATION RATE: 18 BRPM | DIASTOLIC BLOOD PRESSURE: 73 MMHG | HEIGHT: 73 IN | SYSTOLIC BLOOD PRESSURE: 151 MMHG | WEIGHT: 145.51 LBS

## 2024-07-13 LAB
ADD ON TEST-SPECIMEN IN LAB: SIGNIFICANT CHANGE UP
CK MB BLD-MCNC: 2.2 % — SIGNIFICANT CHANGE UP (ref 0–3.5)
CK MB CFR SERPL CALC: 3.7 NG/ML — SIGNIFICANT CHANGE UP (ref 0–6.7)
CK SERPL-CCNC: 168 U/L — SIGNIFICANT CHANGE UP (ref 30–200)
FLUAV AG NPH QL: SIGNIFICANT CHANGE UP
FLUBV AG NPH QL: SIGNIFICANT CHANGE UP
HCT VFR BLD CALC: 43.6 % — SIGNIFICANT CHANGE UP (ref 39–50)
HGB BLD-MCNC: 13.5 G/DL — SIGNIFICANT CHANGE UP (ref 13–17)
MCHC RBC-ENTMCNC: 24.4 PG — LOW (ref 27–34)
MCHC RBC-ENTMCNC: 31 GM/DL — LOW (ref 32–36)
MCV RBC AUTO: 78.7 FL — LOW (ref 80–100)
NRBC # BLD: 0 /100 WBCS — SIGNIFICANT CHANGE UP (ref 0–0)
PLATELET # BLD AUTO: 221 K/UL — SIGNIFICANT CHANGE UP (ref 150–400)
RBC # BLD: 5.54 M/UL — SIGNIFICANT CHANGE UP (ref 4.2–5.8)
RBC # FLD: 19.9 % — HIGH (ref 10.3–14.5)
RSV RNA NPH QL NAA+NON-PROBE: SIGNIFICANT CHANGE UP
SARS-COV-2 RNA SPEC QL NAA+PROBE: SIGNIFICANT CHANGE UP
TROPONIN T, HIGH SENSITIVITY RESULT: 488 NG/L — HIGH (ref 0–51)
TROPONIN T, HIGH SENSITIVITY RESULT: 518 NG/L — HIGH (ref 0–51)
WBC # BLD: 13.28 K/UL — HIGH (ref 3.8–10.5)
WBC # FLD AUTO: 13.28 K/UL — HIGH (ref 3.8–10.5)

## 2024-07-13 PROCEDURE — 99285 EMERGENCY DEPT VISIT HI MDM: CPT | Mod: GC

## 2024-07-13 PROCEDURE — 71046 X-RAY EXAM CHEST 2 VIEWS: CPT | Mod: 26

## 2024-07-13 RX ORDER — ASPIRIN 325 MG/1
324 TABLET, FILM COATED ORAL ONCE
Refills: 0 | Status: COMPLETED | OUTPATIENT
Start: 2024-07-13 | End: 2024-07-13

## 2024-07-13 NOTE — ED PROVIDER NOTE - CLINICAL SUMMARY MEDICAL DECISION MAKING FREE TEXT BOX
Patient is a 64 year old male with HTN and ESRD on dialysis Tu/Thu/Sat and CAD s/p CABG who presents to the emergency department today for evaluation of increasing shortness of breath with exertion and soreness in his neck, back and legs.    Vital signs unremarkable. Physical exam demonstrated lungs CTAB, no murmurs, regular rate and rhythm.     Differential diagnoses include: ACS vs atypical pneumonia vs anemia    Plan: CBC, CMP, troponin, EKG, CXR, COVID/flu test

## 2024-07-13 NOTE — ED PROVIDER NOTE - PROGRESS NOTE DETAILS
Patient re-assessed. Troponin elevated at 518, prior troponins are chronically elevated, though most recent troponins were in the low 400s. EKG at 16:49 on 7/13/24 appears similar to prior EKGs without acute changes and no Q waves.  Repeat troponin, CK-MB, CPK, and and EKG ordered. Patient states he did take aspirin today and states no chest pain, but possible chest discomfort. Day Shirley MD, PGY-1 Patient re-assessed. Troponin elevated at 518, prior troponins are chronically elevated, though most recent troponins were in the low 400s. EKG at 16:49 on 7/13/24 appears similar to prior EKGs without acute changes and no Q waves.  Repeat troponin, CK-MB, CPK, and and EKG ordered. Patient states he did not take his home aspirin today and states no chest pain, but possible chest discomfort. Day Shirley MD, PGY-1 Care transitioned to me from Dr. Campbell at the time of sign out pending admission   In short this is a 64-year-old male with past medical history of hypertension, ESRD on dialysis, CAD status post CABG who initially presented for shortness of breath with exertion labs significant for initial troponin of 5.18, downtrending to 488 and BNP of 320,912 urine for back etiology in accordance with troponin and BNP, requiring hospital admission for generalized weakness

## 2024-07-13 NOTE — ED ADULT NURSE REASSESSMENT NOTE - NS ED NURSE REASSESS COMMENT FT1
Report received from ZANDRA Noel. Pt A&Ox3, IV intact and patent, VSS, pt denies pain/discomfort, bed locked and in lowest position, call bell within reach and pt instructed on use, safety and comfort measures maintained.

## 2024-07-13 NOTE — ED PROVIDER NOTE - PHYSICAL EXAMINATION
Vital signs reviewed and unremarkable.  CONSTITUTIONAL: NAD   HEAD: Normocephalic; atraumatic  EYES: EOMI, PERRL, no conjunctival injection, no scleral icterus  MOUTH/THROAT:  MMM  NECK: Trachea midline, no JVD  CV: Normal S1, S2; no audible murmurs; sternotomy scar noted  RESP: normal work of breathing; CTAB   ABD: soft, non-distended; non-tender to palpation, reducible umbilical and epigastric hernias noted  : Deferred  MSK/EXT: no LE edema, no limited ROM  SKIN: No rashes on exposed skin surfaces  NEURO: Moves all extremities spontaneously with no focal deficits, speech is appropriate; 5/5 strength in bilateral upper and lower extremities, sensation intact  PSYCH: well kempt, calm, interactive Vital signs reviewed and unremarkable.  CONSTITUTIONAL: NAD   HEAD: Normocephalic; atraumatic  EYES: EOMI, PERRL, no conjunctival injection, no scleral icterus  MOUTH/THROAT:  MMM  NECK: Trachea midline, no JVD  CV: Normal S1, S2; no audible murmurs; sternotomy scar noted  RESP: normal work of breathing; CTAB   ABD: soft, non-distended; non-tender to palpation, reducible umbilical and epigastric hernias noted  : Deferred  MSK/EXT: no LE edema, no limited ROM, no tenderness to palpation of C-spine, T-spine, or L-spine  SKIN: No rashes on exposed skin surfaces  NEURO: Moves all extremities spontaneously with no focal deficits, speech is appropriate; 5/5 strength in bilateral upper and lower extremities, sensation intact  PSYCH: well kempt, calm, interactive

## 2024-07-13 NOTE — ED PROVIDER NOTE - OBJECTIVE STATEMENT
Patient is a 64 year old male with HTN and ESRD on dialysis Tu/Thu/Sat and CAD s/p CABG who presents to the emergency department today for evaluation of increasing shortness of breath with exertion and soreness in his neck, back and legs. He states that 2 days ago he fell and was unable to get up for an hour due to weakness. He denies hitting his head when he fell. No fevers, chills, night sweats, diarrhea, abdominal pain, chest pain or headache.

## 2024-07-13 NOTE — ED ADULT NURSE NOTE - OBJECTIVE STATEMENT
64y male, AAOx4, PMH ESKD on HD, Left AVF, Tu/Thu/Sat, HTN, CAD s/p CABG who presents to the emergency department today for evaluation of increasing shortness of breath with exertion and soreness in his neck, back and legs. He states that 2 days ago he fell and was unable to get up for an hour due to weakness. He denies hitting his head when he fell. No fevers, chills, night sweats, diarrhea, abdominal pain, chest pain or headache. 20g right forearm, placed in gown, side rails up for safety, bed in lowest position, call bell within reach, patient and family educated on plan of care, comfort and safety provided.

## 2024-07-13 NOTE — ED PROVIDER NOTE - ATTENDING CONTRIBUTION TO CARE
This is a 64-year-old gentleman with hepatitis CHF hypertension latent tuberculosis end-stage renal dialysis CABG now with worsening shortness of breath for about a week.  Couple days ago he had a fall in the street and he was not able to get up.  He states that nothing is hurting him from that injury.  He does have muscle aches and pains throughout the body that precedes this.  No fevers or chills cough.  Awake alert no acute distress regular rate and rhythm clear lungs nontender abdomen normal rate and effort of breathing.  There is a positive thrill at the wrist fistula.  Patient with exertional symptoms consistent with heart failure.  Unclear if there is a head strike we will do a brain scan.  CBC CMP cardiac enzymes proBNP.  EKG.  EKG is similar to prior though abnormal there is no STEMI.  This is my independent interpretation of the tracing.  Cardiac enzymes are elevated though downtrending and the BNP is quite elevated.  I will admit to the hospital for cardiac workup and patient will likely require cardiology recommendations with regards to his heart failure.

## 2024-07-14 DIAGNOSIS — R79.89 OTHER SPECIFIED ABNORMAL FINDINGS OF BLOOD CHEMISTRY: ICD-10-CM

## 2024-07-14 DIAGNOSIS — N18.6 END STAGE RENAL DISEASE: ICD-10-CM

## 2024-07-14 DIAGNOSIS — R06.02 SHORTNESS OF BREATH: ICD-10-CM

## 2024-07-14 DIAGNOSIS — R06.09 OTHER FORMS OF DYSPNEA: ICD-10-CM

## 2024-07-14 DIAGNOSIS — Z29.9 ENCOUNTER FOR PROPHYLACTIC MEASURES, UNSPECIFIED: ICD-10-CM

## 2024-07-14 DIAGNOSIS — I10 ESSENTIAL (PRIMARY) HYPERTENSION: ICD-10-CM

## 2024-07-14 DIAGNOSIS — R53.1 WEAKNESS: ICD-10-CM

## 2024-07-14 LAB
ALBUMIN SERPL ELPH-MCNC: 3.3 G/DL — SIGNIFICANT CHANGE UP (ref 3.3–5)
ALP SERPL-CCNC: 119 U/L — SIGNIFICANT CHANGE UP (ref 40–120)
ALT FLD-CCNC: 9 U/L — LOW (ref 10–45)
ANION GAP SERPL CALC-SCNC: 17 MMOL/L — SIGNIFICANT CHANGE UP (ref 5–17)
AST SERPL-CCNC: 19 U/L — SIGNIFICANT CHANGE UP (ref 10–40)
BASOPHILS # BLD AUTO: 0.04 K/UL — SIGNIFICANT CHANGE UP (ref 0–0.2)
BASOPHILS NFR BLD AUTO: 0.3 % — SIGNIFICANT CHANGE UP (ref 0–2)
BILIRUB SERPL-MCNC: 1.1 MG/DL — SIGNIFICANT CHANGE UP (ref 0.2–1.2)
BUN SERPL-MCNC: 22 MG/DL — SIGNIFICANT CHANGE UP (ref 7–23)
CALCIUM SERPL-MCNC: 9.3 MG/DL — SIGNIFICANT CHANGE UP (ref 8.4–10.5)
CHLORIDE SERPL-SCNC: 94 MMOL/L — LOW (ref 96–108)
CO2 SERPL-SCNC: 20 MMOL/L — LOW (ref 22–31)
CREAT SERPL-MCNC: 5.19 MG/DL — HIGH (ref 0.5–1.3)
EGFR: 12 ML/MIN/1.73M2 — LOW
EOSINOPHIL # BLD AUTO: 0.12 K/UL — SIGNIFICANT CHANGE UP (ref 0–0.5)
EOSINOPHIL NFR BLD AUTO: 0.9 % — SIGNIFICANT CHANGE UP (ref 0–6)
FOLATE SERPL-MCNC: 10 NG/ML — SIGNIFICANT CHANGE UP
GLUCOSE SERPL-MCNC: 105 MG/DL — HIGH (ref 70–99)
HCT VFR BLD CALC: 39.4 % — SIGNIFICANT CHANGE UP (ref 39–50)
HGB BLD-MCNC: 12.3 G/DL — LOW (ref 13–17)
HIV 1 & 2 AB SERPL IA.RAPID: SIGNIFICANT CHANGE UP
IMM GRANULOCYTES NFR BLD AUTO: 0.5 % — SIGNIFICANT CHANGE UP (ref 0–0.9)
LYMPHOCYTES # BLD AUTO: 1.24 K/UL — SIGNIFICANT CHANGE UP (ref 1–3.3)
LYMPHOCYTES # BLD AUTO: 9.7 % — LOW (ref 13–44)
MAGNESIUM SERPL-MCNC: 1.9 MG/DL — SIGNIFICANT CHANGE UP (ref 1.6–2.6)
MCHC RBC-ENTMCNC: 24.6 PG — LOW (ref 27–34)
MCHC RBC-ENTMCNC: 31.2 GM/DL — LOW (ref 32–36)
MCV RBC AUTO: 78.8 FL — LOW (ref 80–100)
MONOCYTES # BLD AUTO: 1.31 K/UL — HIGH (ref 0–0.9)
MONOCYTES NFR BLD AUTO: 10.3 % — SIGNIFICANT CHANGE UP (ref 2–14)
NEUTROPHILS # BLD AUTO: 9.96 K/UL — HIGH (ref 1.8–7.4)
NEUTROPHILS NFR BLD AUTO: 78.3 % — HIGH (ref 43–77)
NRBC # BLD: 0 /100 WBCS — SIGNIFICANT CHANGE UP (ref 0–0)
PHOSPHATE SERPL-MCNC: 4.7 MG/DL — HIGH (ref 2.5–4.5)
PLATELET # BLD AUTO: 183 K/UL — SIGNIFICANT CHANGE UP (ref 150–400)
POTASSIUM SERPL-MCNC: 4.2 MMOL/L — SIGNIFICANT CHANGE UP (ref 3.5–5.3)
POTASSIUM SERPL-SCNC: 4.2 MMOL/L — SIGNIFICANT CHANGE UP (ref 3.5–5.3)
PROT SERPL-MCNC: 6.5 G/DL — SIGNIFICANT CHANGE UP (ref 6–8.3)
RBC # BLD: 5 M/UL — SIGNIFICANT CHANGE UP (ref 4.2–5.8)
RBC # FLD: 19.8 % — HIGH (ref 10.3–14.5)
SODIUM SERPL-SCNC: 131 MMOL/L — LOW (ref 135–145)
T4 AB SER-ACNC: 6.6 UG/DL — SIGNIFICANT CHANGE UP (ref 4.6–12)
TSH SERPL-MCNC: 4.12 UIU/ML — SIGNIFICANT CHANGE UP (ref 0.27–4.2)
VIT B12 SERPL-MCNC: 902 PG/ML — SIGNIFICANT CHANGE UP (ref 232–1245)
WBC # BLD: 12.74 K/UL — HIGH (ref 3.8–10.5)
WBC # FLD AUTO: 12.74 K/UL — HIGH (ref 3.8–10.5)

## 2024-07-14 PROCEDURE — 70450 CT HEAD/BRAIN W/O DYE: CPT | Mod: 26,MC

## 2024-07-14 PROCEDURE — 72125 CT NECK SPINE W/O DYE: CPT | Mod: 26,MC

## 2024-07-14 PROCEDURE — 99222 1ST HOSP IP/OBS MODERATE 55: CPT

## 2024-07-14 PROCEDURE — 99223 1ST HOSP IP/OBS HIGH 75: CPT | Mod: GC

## 2024-07-14 RX ORDER — CALCIUM CARBONATE 500(1250)
1 TABLET,CHEWABLE ORAL
Refills: 0 | DISCHARGE

## 2024-07-14 RX ORDER — ATORVASTATIN CALCIUM 20 MG/1
80 TABLET, FILM COATED ORAL AT BEDTIME
Refills: 0 | Status: DISCONTINUED | OUTPATIENT
Start: 2024-07-14 | End: 2024-07-19

## 2024-07-14 RX ORDER — ASPIRIN 325 MG/1
81 TABLET, FILM COATED ORAL DAILY
Refills: 0 | Status: DISCONTINUED | OUTPATIENT
Start: 2024-07-14 | End: 2024-07-19

## 2024-07-14 RX ORDER — SENNOSIDES 8.6 MG
1 TABLET ORAL
Refills: 0 | DISCHARGE

## 2024-07-14 RX ORDER — HEPARIN SODIUM 50 [USP'U]/ML
5000 INJECTION, SOLUTION INTRAVENOUS EVERY 12 HOURS
Refills: 0 | Status: DISCONTINUED | OUTPATIENT
Start: 2024-07-14 | End: 2024-07-19

## 2024-07-14 RX ORDER — CALCIUM ACETATE 667 MG/1
667 CAPSULE ORAL
Refills: 0 | Status: DISCONTINUED | OUTPATIENT
Start: 2024-07-14 | End: 2024-07-19

## 2024-07-14 RX ORDER — LABETALOL HYDROCHLORIDE 300 MG/1
600 TABLET ORAL THREE TIMES A DAY
Refills: 0 | Status: DISCONTINUED | OUTPATIENT
Start: 2024-07-14 | End: 2024-07-16

## 2024-07-14 RX ORDER — CINACALCET HYDROCHLORIDE 60 MG/1
30 TABLET, COATED ORAL DAILY
Refills: 0 | Status: DISCONTINUED | OUTPATIENT
Start: 2024-07-14 | End: 2024-07-17

## 2024-07-14 RX ORDER — CINACALCET HYDROCHLORIDE 60 MG/1
1 TABLET, COATED ORAL
Refills: 0 | DISCHARGE

## 2024-07-14 RX ORDER — ACETAMINOPHEN 325 MG
650 TABLET ORAL EVERY 6 HOURS
Refills: 0 | Status: DISCONTINUED | OUTPATIENT
Start: 2024-07-14 | End: 2024-07-19

## 2024-07-14 RX ADMIN — HEPARIN SODIUM 5000 UNIT(S): 50 INJECTION, SOLUTION INTRAVENOUS at 18:02

## 2024-07-14 RX ADMIN — LABETALOL HYDROCHLORIDE 600 MILLIGRAM(S): 300 TABLET ORAL at 16:40

## 2024-07-14 RX ADMIN — ASPIRIN 81 MILLIGRAM(S): 325 TABLET, FILM COATED ORAL at 11:49

## 2024-07-14 RX ADMIN — CALCIUM ACETATE 667 MILLIGRAM(S): 667 CAPSULE ORAL at 11:49

## 2024-07-14 RX ADMIN — LABETALOL HYDROCHLORIDE 600 MILLIGRAM(S): 300 TABLET ORAL at 06:21

## 2024-07-14 RX ADMIN — ATORVASTATIN CALCIUM 80 MILLIGRAM(S): 20 TABLET, FILM COATED ORAL at 21:32

## 2024-07-14 RX ADMIN — CINACALCET HYDROCHLORIDE 30 MILLIGRAM(S): 60 TABLET, COATED ORAL at 12:41

## 2024-07-14 RX ADMIN — Medication 90 MILLIGRAM(S): at 12:41

## 2024-07-14 RX ADMIN — CALCIUM ACETATE 667 MILLIGRAM(S): 667 CAPSULE ORAL at 16:41

## 2024-07-14 RX ADMIN — Medication 90 MILLIGRAM(S): at 21:32

## 2024-07-14 RX ADMIN — CALCIUM ACETATE 667 MILLIGRAM(S): 667 CAPSULE ORAL at 09:10

## 2024-07-14 NOTE — PROGRESS NOTE ADULT - ASSESSMENT
64-yo M PMHX of CAD and severe AS s/p AVR and CABG x2 RSVG-Ramus, RSVG-OM1 (2021), ESRD on HD, Hep C s/p treatment, latent TB, HTN, who presents to ED w/ SOB and neck/back/leg soreness w/ fall 2 days ago due to weakness, elevated BNP and troponin w/ c/f CHF.

## 2024-07-14 NOTE — CONSULT NOTE ADULT - ASSESSMENT
64-yo M PMHX of CAD and severe AS s/p AVR and CABG x2 RSVG-Ramus, RSVG-OM1 (2021), ESRD on HD , Hep C s/p treatment, latent TB, HTN, who presents to ED w/ SOB and neck/back/leg soreness w/ fall.    #ESRD TTS  - On HD TTS for over 10 year (at [facility] , Access: LUE fistula , Outpatient Nephrologist: Dr. Cagle ). Last outpatient HD Session: 7/13. Patient sessions are usually 3 hours in length. Patient reports SOB on presentation. Patient does not make urine.   - BNP found to be 320, 912. CXR with trace bilateral effusions.   - Obtain repeat ECHO   - No current indication for urgent HD. Will continue to monitor daily.   - Discussed with the patient that he will require RRT/HD, risks and benefits associated with RRT/HD explained at length. Consented for continuation of HD inpatient and placed into chart.     #MBD  - Phos goal: 3.5-5.5. Monitor serum phosphorus daily.  - Continue with phos binder phoslo 667 TID with meals.   - C/w sensipar 30mg daily   - Check PTH and phos.     Feel free to contact me via TEAMS.     Heidi Walters MD, PGY-4  Nephrology Fellow      64-yo M PMHX of CAD and severe AS s/p AVR and CABG x2 RSVG-Ramus, RSVG-OM1 (2021), ESRD on HD , Hep C s/p treatment, latent TB, HTN, who presents to ED w/ SOB and neck/back/leg soreness w/ fall.    #ESRD TTS  - On HD TTS for over 10 year (at [facility] , Access: LUE fistula , Outpatient Nephrologist: Dr. Valdovinos ). Last outpatient HD Session: 7/13. Patient sessions are usually 3 hours in length. - --Patient reports SOB on presentation. Patient does not make urine.   - BNP found to be 320, 912. CXR with trace bilateral effusions.   - Obtain repeat ECHO   - No current indication for urgent HD. Will continue to monitor daily.   - Discussed with the patient that he will require RRT/HD, risks and benefits associated with RRT/HD explained at length. Consented for continuation of HD inpatient and placed into chart.  - May need UF tomorrow given ongoing SOB     #MBD  - Phos goal: 3.5-5.5. Monitor serum phosphorus daily.  - Continue with phos binder phoslo 667 TID with meals.   - C/w sensipar 30mg daily   - Check PTH and phos.     Feel free to contact me via TEAMS.     Heidi Walters MD, PGY-4  Nephrology Fellow

## 2024-07-14 NOTE — ED ADULT NURSE REASSESSMENT NOTE - NS ED NURSE REASSESS COMMENT FT1
pt refuses to go to CT unless food was given. MD gastelum made aware. per MD Gastelum, pt ok to have food before CT.

## 2024-07-14 NOTE — H&P ADULT - PROBLEM/PLAN-3
NURSE ANTICOAGULATION PHONE CONTACT    Loen Borrero 1961 contacted by phone today for 1 week INR results  No outpatient medications have been marked as taking for the 4/14/22 encounter (Anti-Coag) with Luis Manuel PATTERSON MD.     INR obtained today by J LUIS Moreno with [facility].    INR (no units)   Date Value   04/14/2022 2.0       GOAL RANGE: 2.0-3.0    ALLERGIES:   Allergen Reactions   • Aspirin ANAPHYLAXIS     THROAT CLOSE UP    • Losartan Potassium Angioedema   • Fluconazole RASH   • Ace Inhibitors Angioedema     History of angioedema with losartan   • Piperacillin Sod-Tazobactam So RASH       Patient Active Problem List   Diagnosis   • CAD (coronary artery disease)   • HLD (hyperlipidemia)   • EUSEBIO (obstructive sleep apnea)   • Allergic rhinitis   • Chronic sinusitis   • Aspirin-sensitive asthma with nasal polyps   • Unspecified gastritis and gastroduodenitis without mention of hemorrhage   • Chest pain, unspecified   • Hyperglycemia   • Mural thrombus of cardiac apex   • Encounter for therapeutic drug monitoring   • Ventricular thrombus following MI (myocardial infarction) (CMS/[facility])   • Multiple subsegmental pulmonary emboli without acute cor pulmonale (CMS/[facility])   • Essential hypertension   • Type 2 diabetes mellitus, with long-term current use of insulin (CMS/[facility])   • Hypokalemia   • Atrial flutter (CMS/[facility])   • Ischemic dilated cardiomyopathy (CMS/[facility])   • High risk medication use   • Floppy lid syndrome   • dysfunction of tear film of both eyes   • Hx of LASIK   • Eyelid problem, excess lower lid fatty tissue both eyes   • Myopia of right eye   • Myopia of left eye with astigmatism   • Presbyopia   • COPD with chronic bronchitis and emphysema (CMS/[facility])   • Paroxysmal atrial flutter (CMS/[facility])   • Left atrial enlargement   • Lymphocutaneous sporotrichosis   • Chronic systolic congestive heart failure, NYHA class 2 (CMS/[facility])   • Anxiety and depression   • AF (paroxysmal atrial fibrillation)  (CMS/HCC)   • Major depressive disorder, recurrent episode, moderate (CMS/HCC)   • Anxiety disorder   • Ptosis of eyelid, bilateral   • Senile ectropion of both lower eyelids   • Long term (current) use of anticoagulants   • Other chronic pulmonary embolism without acute cor pulmonale (CMS/HCC)   • Laceration of left hand without foreign body   • Surgical wound, non healing, initial encounter   • Right hemiparesis (CMS/HCC)   • Presence of biventricular implantable cardioverter-defibrillator (ICD)   • Lactic acidosis   • Atrial fibrillation with rapid ventricular response (CMS/HCC)   • Atopic conjunctivitis   • Attention deficit hyperactivity disorder (ADHD), predominantly inattentive type   • Chronic pain disorder   • Controlled substance agreement signed   • Knee pain   • Low back pain   • Myocardial infarction (CMS/HCC)   • Polyneuropathy   • Smoker   • Testosterone deficiency   • Orthostatic hypotension       PATIENT REPORTED CHANGES: No changes with medications/diet or concerns currently per patient.      NURSE DOSING ADJUSTMENTS AND EDUCATION: Will continue regular warfarin dosing.      Patient will recheck INR in 1 week with [facility], sooner if changes or concerns develop.  Warfarin management done via phone according to protocol.  Reviewed signs and symptoms of bleeding and clotting with patient.  Reviewed and reinforced with patient the importance of calling the clinic with any medication, diet, and health related changes. Education on importance of adherence to consistent diet in Vitamin K and ETOH also discussed. Dr Haywood available today.      Evette Bragg RN     DISPLAY PLAN FREE TEXT

## 2024-07-14 NOTE — DISCHARGE NOTE PROVIDER - NSDCCPCAREPLAN_GEN_ALL_CORE_FT
PRINCIPAL DISCHARGE DIAGNOSIS  Diagnosis: Exertional dyspnea  Assessment and Plan of Treatment: You were admitted for shortness of breath and fatigue with leag weakness. Workup showed that you have new heart failure with blockage of 2 heart vessels. The treatment for this is through medication so please keep taking your Coreg, hydralazine, isordil, aspirin, and atrovastatin as instructed. Please follow up with your cardiologist and PCP outpatient with 1-2 weeks of discharge. If you start experiencing increasing shortness of breath, chest pain, uncontrollable nasuea/vomiting and lightheadedness please head to the hospital.     PRINCIPAL DISCHARGE DIAGNOSIS  Diagnosis: Exertional dyspnea  Assessment and Plan of Treatment: You were admitted for shortness of breath and fatigue with leg weakness. Workup showed that you have new heart failure with blockage of 2 heart vessels. The treatment for this is through medication so please keep taking your Coreg, hydralazine, isordil, aspirin, and atrovastatin as instructed. Please follow up with your cardiologist and PCP outpatient with 1-2 weeks of discharge. If you start experiencing increasing shortness of breath, chest pain, uncontrollable nasuea/vomiting and lightheadedness please head to the hospital.      SECONDARY DISCHARGE DIAGNOSES  Diagnosis: Syphilis  Assessment and Plan of Treatment: You had a positive screening test for syphilis while in the hospital. The infectious disease doctors were consulted and recommended you get 3 doses of penicillin to treat this. You received one dose while in the hospital and the other two have been scheduled outpatient. Please make sure to go to these appointments as it is important we treat this. If you start experiencing weakness of your body, loss of sensation or touch, changes in vision, paralysis, please return to the hospital.     PRINCIPAL DISCHARGE DIAGNOSIS  Diagnosis: Exertional dyspnea  Assessment and Plan of Treatment: You were admitted for shortness of breath and fatigue with leg weakness. Workup showed that you have new heart failure with blockage of 2 heart vessels. The treatment for this is through medication so please keep taking your Coreg, hydralazine, isordil, aspirin, and atrovastatin as instructed. Please follow up with your cardiologist on Aug 2nd at 1:30pm and PCP on Thursday 07/25 at 1:30pm. If you start experiencing increasing shortness of breath, chest pain, uncontrollable nasuea/vomiting and lightheadedness please head to the hospital.      SECONDARY DISCHARGE DIAGNOSES  Diagnosis: Syphilis  Assessment and Plan of Treatment: You had a positive screening test for syphilis while in the hospital. The infectious disease doctors were consulted and recommended you get 3 doses of penicillin to treat this. You received one dose while in the hospital and the other two have been scheduled outpatient (07/25 at 1:30pm at [facility]). Please make sure to go to these appointments as it is important we treat this. If you start experiencing weakness of your body, loss of sensation or touch, changes in vision, paralysis, please return to the hospital.

## 2024-07-14 NOTE — H&P ADULT - PROBLEM SELECTOR PLAN 6
DVT PPx: Heparin 5000 mg SubQ BID  Diet: Renal Diet  Bowel Regimen: PRN  Code: Full  Dispo: PT/OT due to fall  Pharmacy:  PCP:   Communication:

## 2024-07-14 NOTE — DISCHARGE NOTE PROVIDER - NSDCCPTREATMENT_GEN_ALL_CORE_FT
PRINCIPAL PROCEDURE  Procedure: Echocardiogram, 3D  Findings and Treatment: CONCLUSIONS:      1. Left ventricular systolic function is moderately to severely decreased with an ejection fraction of 35 % by Barajas's method of disks. Regional wall motion abnormalities consistent with ischemic heart disease.   2. Entire septum and entire inferior wall are abnormal.   3. There is moderate (grade 2) left ventricular diastolic dysfunction, with elevated left ventricular filling pressure. Analysis of left ventricular diastolic function and filling pressure is made challenging by the presence of mitral annular calcification.   4. Severely enlarged right ventricular cavity size, with normal wall thickness, and reduced right ventricular systolic function.   5. The left atrium is severely dilated.   6. The right atrium is severely dilated.   7. No pericardial effusion seen.   8. Compared to the transthoracic echocardiogram performed on 10/26/2021,.   9. Estimated pulmonary artery systolic pressure is 29 mmHg.  10. Left pleural effusion noted.  11. Left ventricular global longitudinal strain is -12.4 % is abnormal (> -16%). Images were acquired on a [employer] ultrasound system and processed on the ultrasound machine with a heart rate of 68 bpm and a blood pressure of 125/84 mmHg.  12. There is increased LV mass and eccentric hypertrophy.

## 2024-07-14 NOTE — PHYSICAL THERAPY INITIAL EVALUATION ADULT - NSPTDMEREC_GEN_A_CORE
Pt will require a rolling walker at home due to diagnosis of weakness causing decreased balance and unsteadiness during ambulation to help complete their MRADLs./rolling walker

## 2024-07-14 NOTE — DISCHARGE NOTE PROVIDER - NSDCMRMEDTOKEN_GEN_ALL_CORE_FT
aspirin 81 mg oral delayed release tablet: 1 tab(s) orally once a day  atorvastatin 80 mg oral tablet: 1 tab(s) orally once a day (at bedtime)  calcium (as carbonate) 500 mg oral tablet, chewable: 1 tab(s) chewed once a day as needed for Gas  calcium acetate 667 mg oral tablet: 1334 milligram(s) orally 3 times a day (with meals)  cinacalcet 30 mg oral tablet: 1 tab(s) orally once a day  labetalol 200 mg oral tablet: 3 tab(s) orally 3 times a day  NIFEdipine 90 mg oral tablet, extended release: 1 tab(s) orally 2 times a day  senna (sennosides) 25 mg oral tablet: 1 tab(s) orally once a day as needed for  constipation   aspirin 81 mg oral delayed release tablet: 1 tab(s) orally once a day  atorvastatin 80 mg oral tablet: 1 tab(s) orally once a day (at bedtime)  calcium (as carbonate) 500 mg oral tablet, chewable: 1 tab(s) chewed once a day as needed for Gas  calcium acetate 667 mg oral tablet: 1334 milligram(s) orally 3 times a day (with meals)  cinacalcet 30 mg oral tablet: 1 tab(s) orally once a day  labetalol 200 mg oral tablet: 3 tab(s) orally 3 times a day  NIFEdipine 90 mg oral tablet, extended release: 1 tab(s) orally 2 times a day  Rolling Walker: For: debility (ICD10: R54)  Height: 185.4  Weight: 66kg MDD: 0  senna (sennosides) 25 mg oral tablet: 1 tab(s) orally once a day as needed for  constipation   aspirin 81 mg oral delayed release tablet: 1 tab(s) orally once a day  atorvastatin 80 mg oral tablet: 1 tab(s) orally once a day (at bedtime)  calcium (as carbonate) 500 mg oral tablet, chewable: 1 tab(s) chewed once a day as needed for Gas  calcium acetate 667 mg oral tablet: 1334 milligram(s) orally 3 times a day (with meals)  carvedilol 12.5 mg oral tablet: 1 tab(s) orally every 12 hours  cinacalcet 30 mg oral tablet: 1 tab(s) orally once a day  hydrALAZINE 100 mg oral tablet: 1 tab(s) orally 3 times a day  Isordil Titradose 40 mg oral tablet: 1 tab(s) orally 3 times a day  Rolling Walker: For: debility (ICD10: R54)  Height: 185.4  Weight: 66kg MDD: 0  senna (sennosides) 25 mg oral tablet: 1 tab(s) orally once a day as needed for  constipation   aspirin 81 mg oral delayed release tablet: 1 tab(s) orally once a day  atorvastatin 80 mg oral tablet: 1 tab(s) orally once a day (at bedtime)  calcium (as carbonate) 500 mg oral tablet, chewable: 1 tab(s) chewed once a day as needed for Gas  calcium acetate 667 mg oral tablet: 1334 milligram(s) orally 3 times a day (with meals)  carvedilol 12.5 mg oral tablet: 1 tab(s) orally every 12 hours  cinacalcet 30 mg oral tablet: 1 tab(s) orally once a day  hydrALAZINE 100 mg oral tablet: 1 tab(s) orally 3 times a day  isosorbide dinitrate 20 mg oral tablet: 2 tab(s) orally 3 times a day  Rolling Walker: For: debility (ICD10: R54)  Height: 185.4  Weight: 66kg MDD: 0  senna (sennosides) 25 mg oral tablet: 1 tab(s) orally once a day as needed for  constipation

## 2024-07-14 NOTE — PHYSICAL THERAPY INITIAL EVALUATION ADULT - ADDITIONAL COMMENTS
Pt lives in a house with 4 roommates, with +1 flight to enter, and +1 flight to the bedroom.  Pt endorses difficulty with stairs at baseline.  PTA pt independent with ambulation and ADLs, however pt reports he has not been able to ambulate more than 2 blocks due to fatigue and SOB.  Pt used to own a cane, but reports it was "stolen"

## 2024-07-14 NOTE — H&P ADULT - NSHPLABSRESULTS_GEN_ALL_CORE
13.5   13.28 )-----------( 221      ( 13 Jul 2024 17:17 )             43.6     07-13    135  |  95<L>  |  16  ----------------------------<  97  4.7   |  27  |  4.55<H>    Ca    9.9      13 Jul 2024 17:17    TPro  7.4  /  Alb  3.8  /  TBili  1.4<H>  /  DBili  x   /  AST  18  /  ALT  11  /  AlkPhos  136<H>  07-13    CARDIAC MARKERS ( 13 Jul 2024 19:24 )  x     / x     / 168 U/L / x     / 3.7 ng/mL        LIVER FUNCTIONS - ( 13 Jul 2024 17:17 )  Alb: 3.8 g/dL / Pro: 7.4 g/dL / ALK PHOS: 136 U/L / ALT: 11 U/L / AST: 18 U/L / GGT: x           Urinalysis Basic - ( 13 Jul 2024 17:17 )    Color: x / Appearance: x / SG: x / pH: x  Gluc: 97 mg/dL / Ketone: x  / Bili: x / Urobili: x   Blood: x / Protein: x / Nitrite: x   Leuk Esterase: x / RBC: x / WBC x   Sq Epi: x / Non Sq Epi: x / Bacteria: x

## 2024-07-14 NOTE — H&P ADULT - ASSESSMENT
64-yo M PMHX of ESRD on HD T/Th/Sat, CAD s/p CABG, HTN, who presents to ED w/ SOB and neck/back/leg soreness w/ fall 2 days ago due to weakness, elevated BNP and troponin w/ c/f CHF.  64-yo M PMHX of CAD and severe AS s/p AVR and CABG x2 RSVG-[facility], RSVG-OM1 (2021), ESRD on HD, Hep C s/p treatment, latent TB, HTN, who presents to ED w/ SOB and neck/back/leg soreness w/ fall 2 days ago due to weakness, elevated BNP and troponin w/ c/f CHF.

## 2024-07-14 NOTE — H&P ADULT - PROBLEM SELECTOR PLAN 1
- Pt w/ 2 months of SOB however not hypoxic in ED. No fevers, cough, RVP negative. CXR on admit w/ stable cardiomegaly and b/l pleural effusions. Last Echo in 2023 w/ EF 60%'s, however elevated BNP on admit.    - Pt not appearing volume overloaded, w/o edema or crackles.   - c/f CHF w/ pleural effusions, SOB might be 2/2 effusions.   - TTE - Pt w/ 2 months of SOB however not hypoxic in ED. No fevers, cough, RVP negative. CXR on admit w/ stable cardiomegaly and b/l pleural effusions. Last Echo in 2023 w/ EF 60%'s, however elevated BNP on admit.    - Was evaluated by Cardiology in 2023 for chest pain and SOB after HD, thought to be "concerning for possible obstructive CAD vs microvascular disease exacerbated by volume shifts during HD and exertion"  - Pt not appearing volume overloaded, w/o edema or crackles.   - c/f CHF w/ pleural effusions, SOB might be 2/2 effusions.   - TTE - Pt w/ 2 months of SOB however not hypoxic in ED. No fevers, cough, RVP negative. CXR on admit w/ stable cardiomegaly and b/l pleural effusions. Last Echo in 2023 w/ EF 60%'s, however elevated BNP on admit.    - Was evaluated by Cardiology in 2023 for chest pain and SOB after HD, thought to be "concerning for possible obstructive CAD vs microvascular disease exacerbated by volume shifts during HD and exertion"  - Pt not appearing volume overloaded, w/o edema or crackles. Per pt report not producing urine.   - c/f CHF w/ pleural effusions, SOB might be 2/2 effusions.   - TTE  - Defer diuretics as pt not producing urine

## 2024-07-14 NOTE — PATIENT PROFILE ADULT - FALL HARM RISK - FACTORS
Due to nausea and vomiting  Continue IV fluids  Encourage oral hydration when patient can tolerate   Return to clinic for staple removal in 7 days. Apply mupurocin twice daily. Keep covered when outdoors.     Patient Education       Laceration Repair With Staples Discharge Instructions   About this topic   A laceration is a cut on your skin. It is most often caused by a sharp object like blades, glass, or from things with sharp edges. Sometimes, this kind of cut is shallow. Other times, it goes deep into the skin and muscles. Before the cut can be closed, it must be cleaned. This is called a laceration repair.  Doctors closed the cut on your skin with a special kind of metal staples. In a week or so, a doctor has to take out the staples. The staff may have given you a staple remover to bring to your follow-up doctors visit.       What care is needed at home?   · Ask your doctor what you need to do when you go home. Make sure you ask questions if you do not understand what the doctor says.  · Keep your wound clean and dry for the first 24 hours. After 24 hours, you can gently wash the wound with soap and water or take a shower. Do not soak your wound by bathing or swimming until the staples have been removed.  · You may apply an antibiotic ointment to the wound 1 to 2 times each day. If you want, you can cover your wound with a bandage. You can also leave it open to air if you prefer.  · Wash your hands before and after you touch your wound or bandage.  · Do not try to take out the staples yourself. Your staples need to be removed on _______________________.  · Avoid activities that could hurt the area of your wound for a few weeks. If you hurt the same part of your body again, the wound can open up.  What follow-up care is needed?   · Your doctor may ask you to make visits to the office to check on your progress. Be sure to keep these visits.  · You will need to have your staples taken out in 7 to 14 days. Ask the doctor when you need to come back to have them taken out.  What drugs may be needed?   The doctor may  order drugs to:  · Help with pain  · Prevent or fight an infection  Will physical activity be limited?   · Limit your activity until your wound is fully healed. Talk to your doctor about the right amount of activity for you.  · Your doctor will tell you when it is safe to return to sports. Be sure to ask your doctor before you do any activities.  What problems could happen?   · Bleeding  · Infection  · Poor wound healing  · Scarring  When do I need to call the doctor?   · You have a fever of 100.4°F (38°C) or higher or chills.  · Your wound is swollen, red, or warm.  · Your wound has thick yellow or green drainage.  · The wound opens up again.  Teach Back: Helping You Understand   The Teach Back Method helps you understand the information we are giving you. After you talk with the staff, tell them in your own words what you learned. This helps to make sure the staff has described each thing clearly. It also helps to explain things that may have been confusing. Before going home, make sure you can do these:  · I can tell you about my procedure.  · I can tell you how to care for my wound.  · I can tell you when my staples need to be taken out by the doctor.  · I can tell you what I will do if I have swelling, redness, or warmth around my wound.  Last Reviewed Date   2021-06-18  Consumer Information Use and Disclaimer   This information is not specific medical advice and does not replace information you receive from your health care provider. This is only a brief summary of general information. It does NOT include all information about conditions, illnesses, injuries, tests, procedures, treatments, therapies, discharge instructions or life-style choices that may apply to you. You must talk with your health care provider for complete information about your health and treatment options. This information should not be used to decide whether or not to accept your health care providers advice, instructions or  recommendations. Only your health care provider has the knowledge and training to provide advice that is right for you.  Copyright   Copyright © 2021 [employer] and its affiliates and/or licensors. All rights reserved.     Weakness

## 2024-07-14 NOTE — DISCHARGE NOTE PROVIDER - NSDCFUSCHEDAPPT_GEN_ALL_CORE_FT
[facility]  VASCULAR [address]  Scheduled Appointment: 08/06/2024    Aron Buckner  [facility]  VASCULAR [address]  Scheduled Appointment: 08/06/2024     [facility]  [place] [id number] [address]  Scheduled Appointment: 07/25/2024    Yasmin Mckeon  [facility]  CARDIOLOGY [id number]  Scheduled Appointment: 08/02/2024    [facility]  VASCULAR [address]  Scheduled Appointment: 08/06/2024    Aron Buckner  [facility]  VASCULAR [address]  Scheduled Appointment: 08/06/2024

## 2024-07-14 NOTE — PROGRESS NOTE ADULT - PROBLEM SELECTOR PLAN 4
- Pt w/ hx of ESRD on HD, T/Th/Sat. Last HD 7/13 on Sat  - Continue home Cincalcet, Calcium tabs  - Nephrology consult in AM for HD - Pt w/ hx of ESRD on HD, T/Th/Sat. Last HD 7/13 on Sat  - Continue home Cincalcet, Calcium tabs  - Nephrology consult for HD

## 2024-07-14 NOTE — PATIENT PROFILE ADULT - MONEY FOR FOOD
[facility]  [address]  [facility] [address]  Dept Phone: [phone]    Name: Toribio Saravia                        YOB: 2014      Patient ID: [id number]           Age: 7 year old 9 month old  Date of Service: 01/31/2022     Length of Session:  60 mins  Clinician:   Kristen Dominique MA    Diagnosis:    Autism  (primary encounter diagnosis)     Toribio  is a 7 year old year old who presents for a telehealth visit via live interactive video with a secure connection.   This visit was not recorded or stored.     Consent for a telehealth visit has been verified including risk of electronic data, possibility of equipment failure or need for in person visit if clinical needs cannot be fully met by telehealth.      Provider location: _X_ Outside facility       ___ clinic            ___office            ___hospital  Patient Location:  _X_ Home ___ clinic         ___hospital        ___ other:                  Reason for use of telehealth: COVID-19 Pandemic    Starting time of visit:  1 pm    Ending time of visit: 2 pm    Reason for Visit:  Therapy for treatment of behaviors associated with diagnosis.    Type of Session:  Family Therapy      Subjective   The following persons were present during the session: patient and mother.    Behavioral observations during the session showed the child to be cooperative, distracted and playful.    Objective     Treatment performed to address the functional goals.    Functional Limitations:  attention to activities  compliance  self-regulation    Specific goals addressed:    Patient Goals    1. Promote opportunities to increase compliance by following instructions.    2. Help patient improve frustration tolerance    3. Promote opportunities to work on emotion recognition and regulation.    4. Improve pt's form of communication (e.g., verbal and/or non-verbal, as well as expressive and receptive).    5.  Teach, model and practice age-appropriate coping skills and self-regulation techniques.    6. Teach, model and practice age-appropriate ways to ask for help or a break when needed.    7. Decrease the use of swear words when frustrated.     Treatment Modalities:   cognitive behavioral therapy, increased structure, parent training/ support, play-based intervention, problem solving  and social stories     Treatment Techniques:  modeling     ASSESSMENT:   Further intervention warrented     The following progress was made towards the individuals goals: Toribio presented to session with mother via video. Toribio continued to practice using his coping skills while making and practicing flexibility. Toribio was able to transition during the acitvity while making mistakes and not having an opportunity to correct them. Therapist also role-played being frustrated after maing mistajes and Toribio was able to independently redirect therapist to use her words, ask for help and express frustration. Toribio was observed today to be easily redirected and with an increased tolerance to making mistakes and things not going his way. Mother also reported improvements at home and at school. Mother was provided with feedback.     PLAN:   Continue Therapy 1 times per week to address the current goals as stated in the Individual Treatment Plan.   no

## 2024-07-14 NOTE — H&P ADULT - NSHPREVIEWOFSYSTEMS_GEN_ALL_CORE
REVIEW OF SYSTEMS:    CONSTITUTIONAL: No weakness, fevers or chills  EYES: No vision changes  ENT: No vertigo or throat pain   NECK: No pain or stiffness  RESPIRATORY: No cough, wheezing, hemoptysis; No shortness of breath  CARDIOVASCULAR: No chest pain or palpitations  GASTROINTESTINAL: No abdominal or epigastric pain. No nausea, vomiting, or hematemesis; No diarrhea or constipation. No melena or hematochezia.  GENITOURINARY: No dysuria, frequency or hematuria  NEUROLOGICAL: No numbness or weakness  PSYCH: No anxiety, depression, or behavior changes  All other review of systems is negative unless indicated above. REVIEW OF SYSTEMS:    CONSTITUTIONAL: +Weakness  EYES: No vision changes  ENT: No vertigo or throat pain   NECK: No pain or stiffness  RESPIRATORY: No cough, wheezing, hemoptysis; +Subjective SOB  CARDIOVASCULAR: No chest pain or palpitations  GASTROINTESTINAL: No abdominal or epigastric pain. No nausea, vomiting, or hematemesis; No diarrhea or constipation. No melena or hematochezia.  GENITOURINARY: No dysuria, frequency or hematuria  NEUROLOGICAL: No numbness or weakness  PSYCH: No anxiety, depression, or behavior changes  All other review of systems is negative unless indicated above.

## 2024-07-14 NOTE — CONSULT NOTE ADULT - SUBJECTIVE AND OBJECTIVE BOX
[facility] DIVISION OF KIDNEY DISEASES AND HYPERTENSION -- [phone]  -- INITIAL CONSULT NOTE  --------------------------------------------------------------------------------  HPI:        PAST HISTORY  --------------------------------------------------------------------------------  PAST MEDICAL & SURGICAL HISTORY:  End Stage Renal Disease on Dialysis  since 2009 - Tues, Thurs, Sat      HTN (hypertension)      Chronic renal failure      Hepatitis C      TB (tuberculosis)  Latent      CHF (congestive heart failure)  Mild      AVF (arteriovenous fistula)  placed 2009 - Left side      S/P appendectomy  at age 7        FAMILY HISTORY:    PAST SOCIAL HISTORY:    ALLERGIES & MEDICATIONS  --------------------------------------------------------------------------------  Allergies    No Known Drug Allergies  adhesives (Other)    Intolerances      Standing Inpatient Medications  aspirin enteric coated 81 milliGRAM(s) Oral daily  atorvastatin 80 milliGRAM(s) Oral at bedtime  calcium acetate 667 milliGRAM(s) Oral three times a day with meals  cinacalcet 30 milliGRAM(s) Oral daily  heparin   Injectable 5000 Unit(s) SubCutaneous every 12 hours  labetalol 600 milliGRAM(s) Oral three times a day  NIFEdipine XL 90 milliGRAM(s) Oral two times a day    PRN Inpatient Medications  acetaminophen     Tablet .. 650 milliGRAM(s) Oral every 6 hours PRN  melatonin 3 milliGRAM(s) Oral at bedtime PRN      REVIEW OF SYSTEMS  --------------------------------------------------------------------------------  Gen: No fevers/chills  Skin: No rashes  Head/Eyes/Ears: No HA  Respiratory: No SOB, cough  CV: No CP  GI: No abdominal pain, diarrhea, N/V  : No dysuria, hematuria  MSK: No edema  Heme: No easy bruising or bleeding  Psych: No significant depression    All other systems were reviewed and are negative, except as noted.    VITALS/PHYSICAL EXAM  --------------------------------------------------------------------------------  T(C): 36.6 (07-14-24 @ 05:55), Max: 36.6 (07-13-24 @ 22:51)  HR: 78 (07-14-24 @ 05:55) (68 - 95)  BP: 145/84 (07-14-24 @ 05:55) (128/70 - 156/64)  RR: 18 (07-14-24 @ 05:55) (15 - 18)  SpO2: 96% (07-14-24 @ 05:55) (95% - 99%)  Wt(kg): --  Height (cm): 185.4 (07-13-24 @ 15:24)  Weight (kg): 66 (07-13-24 @ 15:24)  BMI (kg/m2): 19.2 (07-13-24 @ 15:24)  BSA (m2): 1.88 (07-13-24 @ 15:24)        Physical Exam:  	Gen: NAD, well-appearing  	HEENT: PERRL, supple neck, clear oropharynx  	Pulm: CTA B/L  	CV: RRR, S1S2;  	Back: No spinal or CVA tenderness  	Abd: +BS, soft, nontender/nondistended  	: No suprapubic tenderness          Extremities: no bilateral LE edema noted.           Neuro: No focal deficits, intact gait  	Skin: Warm, without rashes  	Vascular access:      LABS/STUDIES  --------------------------------------------------------------------------------              12.3   12.74 >-----------<  183      [07-14-24 @ 06:00]              39.4     135  |  95  |  16  ----------------------------<  97      [07-13-24 @ 17:17]  4.7   |  27  |  4.55        Ca     9.9     [07-13-24 @ 17:17]    TPro  7.4  /  Alb  3.8  /  TBili  1.4  /  DBili  x   /  AST  18  /  ALT  11  /  AlkPhos  136  [07-13-24 @ 17:17]              [07-13-24 @ 19:24]    Creatinine Trend:  SCr 4.55 [07-13 @ 17:17]    Urinalysis - [07-13-24 @ 17:17]      Color  / Appearance  / SG  / pH       Gluc 97 / Ketone   / Bili  / Urobili        Blood  / Protein  / Leuk Est  / Nitrite       RBC  / WBC  / Hyaline  / Gran  / Sq Epi  / Non Sq Epi  / Bacteria         HBsAb <3.0      [06-22-23 @ 06:25]  HBsAg Nonreact      [06-22-23 @ 06:25]  HBcAb Nonreact      [06-22-23 @ 06:25]  HCV 13.87, Reactive      [06-22-23 @ 06:25]          IMAGING/RADIOLOGY: Reviewed. [facility] DIVISION OF KIDNEY DISEASES AND HYPERTENSION -- [phone]  -- INITIAL CONSULT NOTE  --------------------------------------------------------------------------------  HPI: 64-yo M PMHX of CAD and severe AS s/p AVR and CABG x2 RSVG-Ramus, RSVG-OM1 (2021), ESRD on HD , Hep C s/p treatment, latent TB, HTN, who presents to ED w/ SOB and neck/back/leg soreness w/ fall. Patient presented after HD session with weakness, difficulty ambulating and SOB.  Patient is on HD TTS for over 10 years (at [facility] , Access: LUE fistula , Outpatient Nephrologist: Dr. Cagle ). Last outpatient HD Session: 7/13.  Nephrology consulted for continuation of HD.         PAST HISTORY  --------------------------------------------------------------------------------  PAST MEDICAL & SURGICAL HISTORY:  End Stage Renal Disease on Dialysis  since 2009 - Tues, Thurs, Sat      HTN (hypertension)      Chronic renal failure      Hepatitis C      TB (tuberculosis)  Latent      CHF (congestive heart failure)  Mild      AVF (arteriovenous fistula)  placed 2009 - Left side      S/P appendectomy  at age 7        FAMILY HISTORY:    PAST SOCIAL HISTORY:    ALLERGIES & MEDICATIONS  --------------------------------------------------------------------------------  Allergies    No Known Drug Allergies  adhesives (Other)    Intolerances      Standing Inpatient Medications  aspirin enteric coated 81 milliGRAM(s) Oral daily  atorvastatin 80 milliGRAM(s) Oral at bedtime  calcium acetate 667 milliGRAM(s) Oral three times a day with meals  cinacalcet 30 milliGRAM(s) Oral daily  heparin   Injectable 5000 Unit(s) SubCutaneous every 12 hours  labetalol 600 milliGRAM(s) Oral three times a day  NIFEdipine XL 90 milliGRAM(s) Oral two times a day    PRN Inpatient Medications  acetaminophen     Tablet .. 650 milliGRAM(s) Oral every 6 hours PRN  melatonin 3 milliGRAM(s) Oral at bedtime PRN      REVIEW OF SYSTEMS  --------------------------------------------------------------------------------    All other systems were reviewed and are negative, except as noted.    VITALS/PHYSICAL EXAM  --------------------------------------------------------------------------------  T(C): 36.6 (07-14-24 @ 05:55), Max: 36.6 (07-13-24 @ 22:51)  HR: 78 (07-14-24 @ 05:55) (68 - 95)  BP: 145/84 (07-14-24 @ 05:55) (128/70 - 156/64)  RR: 18 (07-14-24 @ 05:55) (15 - 18)  SpO2: 96% (07-14-24 @ 05:55) (95% - 99%)  Wt(kg): --  Height (cm): 185.4 (07-13-24 @ 15:24)  Weight (kg): 66 (07-13-24 @ 15:24)  BMI (kg/m2): 19.2 (07-13-24 @ 15:24)  BSA (m2): 1.88 (07-13-24 @ 15:24)        Physical Exam:  	Gen: NAD, well-appearing  	HEENT: PERRL, supple neck, clear oropharynx  	Pulm: CTA B/L  	CV: RRR, S1S2             Extremities: no bilateral LE edema noted.              Neuro: No focal deficits, intact gait  	Skin: Warm, without rashes  	Vascular access: LUE fistula      LABS/STUDIES  --------------------------------------------------------------------------------              12.3   12.74 >-----------<  183      [07-14-24 @ 06:00]              39.4     135  |  95  |  16  ----------------------------<  97      [07-13-24 @ 17:17]  4.7   |  27  |  4.55        Ca     9.9     [07-13-24 @ 17:17]    TPro  7.4  /  Alb  3.8  /  TBili  1.4  /  DBili  x   /  AST  18  /  ALT  11  /  AlkPhos  136  [07-13-24 @ 17:17]              [07-13-24 @ 19:24]    Creatinine Trend:  SCr 4.55 [07-13 @ 17:17]    Urinalysis - [07-13-24 @ 17:17]      Color  / Appearance  / SG  / pH       Gluc 97 / Ketone   / Bili  / Urobili        Blood  / Protein  / Leuk Est  / Nitrite       RBC  / WBC  / Hyaline  / Gran  / Sq Epi  / Non Sq Epi  / Bacteria         HBsAb <3.0      [06-22-23 @ 06:25]  HBsAg Nonreact      [06-22-23 @ 06:25]  HBcAb Nonreact      [06-22-23 @ 06:25]  HCV 13.87, Reactive      [06-22-23 @ 06:25]          IMAGING/RADIOLOGY: Reviewed. [facility] DIVISION OF KIDNEY DISEASES AND HYPERTENSION -- [phone]  -- INITIAL CONSULT NOTE  --------------------------------------------------------------------------------  HPI: 64-yo M PMHX of CAD and severe AS s/p AVR and CABG x2 RSVG-Ramus, RSVG-OM1 (2021), ESRD on HD , Hep C s/p treatment, latent TB, HTN, who presents to ED w/ SOB and neck/back/leg soreness w/ fall. Patient presented after HD session with weakness, difficulty ambulating and SOB.  Patient is on HD TTS for over 10 years (at [facility] , Access: LUE fistula , Outpatient Nephrologist: Dr. Valdovinos ). Last outpatient HD Session: 7/13.  Nephrology consulted for continuation of HD.         PAST HISTORY  --------------------------------------------------------------------------------  PAST MEDICAL & SURGICAL HISTORY:  End Stage Renal Disease on Dialysis  since 2009 - Tues, Thurs, Sat      HTN (hypertension)      Chronic renal failure      Hepatitis C      TB (tuberculosis)  Latent      CHF (congestive heart failure)  Mild      AVF (arteriovenous fistula)  placed 2009 - Left side      S/P appendectomy  at age 7        FAMILY HISTORY:    PAST SOCIAL HISTORY:    ALLERGIES & MEDICATIONS  --------------------------------------------------------------------------------  Allergies    No Known Drug Allergies  adhesives (Other)    Intolerances      Standing Inpatient Medications  aspirin enteric coated 81 milliGRAM(s) Oral daily  atorvastatin 80 milliGRAM(s) Oral at bedtime  calcium acetate 667 milliGRAM(s) Oral three times a day with meals  cinacalcet 30 milliGRAM(s) Oral daily  heparin   Injectable 5000 Unit(s) SubCutaneous every 12 hours  labetalol 600 milliGRAM(s) Oral three times a day  NIFEdipine XL 90 milliGRAM(s) Oral two times a day    PRN Inpatient Medications  acetaminophen     Tablet .. 650 milliGRAM(s) Oral every 6 hours PRN  melatonin 3 milliGRAM(s) Oral at bedtime PRN      REVIEW OF SYSTEMS  --------------------------------------------------------------------------------    All other systems were reviewed and are negative, except as noted.    VITALS/PHYSICAL EXAM  --------------------------------------------------------------------------------  T(C): 36.6 (07-14-24 @ 05:55), Max: 36.6 (07-13-24 @ 22:51)  HR: 78 (07-14-24 @ 05:55) (68 - 95)  BP: 145/84 (07-14-24 @ 05:55) (128/70 - 156/64)  RR: 18 (07-14-24 @ 05:55) (15 - 18)  SpO2: 96% (07-14-24 @ 05:55) (95% - 99%)  Wt(kg): --  Height (cm): 185.4 (07-13-24 @ 15:24)  Weight (kg): 66 (07-13-24 @ 15:24)  BMI (kg/m2): 19.2 (07-13-24 @ 15:24)  BSA (m2): 1.88 (07-13-24 @ 15:24)        Physical Exam:  	Gen: NAD, well-appearing  	HEENT: PERRL, supple neck, clear oropharynx  	Pulm: CTA B/L  	CV: RRR, S1S2             Extremities: no bilateral LE edema noted.              Neuro: No focal deficits, intact gait  	Skin: Warm, without rashes  	Vascular access: LUE fistula      LABS/STUDIES  --------------------------------------------------------------------------------              12.3   12.74 >-----------<  183      [07-14-24 @ 06:00]              39.4     135  |  95  |  16  ----------------------------<  97      [07-13-24 @ 17:17]  4.7   |  27  |  4.55        Ca     9.9     [07-13-24 @ 17:17]    TPro  7.4  /  Alb  3.8  /  TBili  1.4  /  DBili  x   /  AST  18  /  ALT  11  /  AlkPhos  136  [07-13-24 @ 17:17]              [07-13-24 @ 19:24]    Creatinine Trend:  SCr 4.55 [07-13 @ 17:17]    Urinalysis - [07-13-24 @ 17:17]      Color  / Appearance  / SG  / pH       Gluc 97 / Ketone   / Bili  / Urobili        Blood  / Protein  / Leuk Est  / Nitrite       RBC  / WBC  / Hyaline  / Gran  / Sq Epi  / Non Sq Epi  / Bacteria         HBsAb <3.0      [06-22-23 @ 06:25]  HBsAg Nonreact      [06-22-23 @ 06:25]  HBcAb Nonreact      [06-22-23 @ 06:25]  HCV 13.87, Reactive      [06-22-23 @ 06:25]          IMAGING/RADIOLOGY: Reviewed.

## 2024-07-14 NOTE — H&P ADULT - ATTENDING COMMENTS
64 year old male with hx of  CAD, severe AS s/p AVR and CABG, ESRD on HD, Hep C s/p treatment, latent TB, HTN, who presented to ED w/ generalized weakness and dyspnea on exertion. Patient has had difficulty ambulating and reports a fall recently. Unclear etiology of symptoms though possibly due to deconditioning and chronic illness.     # Dyspnea on exertion without hypoxia  # ESRD on HD  # CAD  # HTN  # Physical deconditioning    Plan  - ESRD on HD, nephrology to follow for HD   - Currently euvolemic on exam. Trace bilateral pleural effusions which may be contributing to dyspnea. Monitor resp status; continue regularly scheduled dialysis sessions  - TTE  - PT evaluation    Rest of plan as outlined above. Discussed case with resident.

## 2024-07-14 NOTE — H&P ADULT - HISTORY OF PRESENT ILLNESS
64-yo M PMHX of ESRD on HD T/Th/Sat, CAD s/p CABG, HTN, who presents to ED w/ SOB and neck/back/leg soreness w/ fall 2 days ago due to weakness.     ED course notable for vitals afebrile, HR 70-90, -150/60-70, Saturating 95-99% on RA. CBC w/ white count 13, troponin 518->488, K 4.7, Cr 4.5, BUN 16, Bicarb 27. ,000. . CTH w/o traumatic abnormality. CXR w/ stable cardiomegaly and trace bilateral pleural effusions. Last Echo in 2023 w/ EF 60%'s.  64-yo M PMHX of ESRD on HD T/Th/Sat, CAD s/p CABG, HTN, who presents to ED w/ SOB and neck/back/leg soreness w/ fall.    Pt reports new onset leg weakness, legs feel heavier, difficulty walking, and SOB over last 2 months. Denies any prior cardiac diagnosis except that he "has a murmur." No current chest pain, no leg swelling. No cough, fever. Fell 2 days ago, no palpitations or LOC during episode, reports was due to weakness, did not hit head. However did fall 1 month ago where he hit his head.     ED course notable for vitals afebrile, HR 70-90, -150/60-70, Saturating 95-99% on RA. CBC w/ white count 13, troponin 518->488, K 4.7, Cr 4.5, BUN 16, Bicarb 27. ,000. . RVP negative. CTH w/o traumatic abnormality. CXR w/ stable cardiomegaly and trace bilateral pleural effusions. Last Echo in 2023 w/ EF 60%'s.  64-yo M PMHX of CAD and severe AS s/p AVR and CABG x2 RSVG-Ramus, RSVG-OM1 (2021), ESRD on HD, Hep C s/p treatment, latent TB, HTN, who presents to ED w/ SOB and neck/back/leg soreness w/ fall.    Pt reports new onset leg weakness, legs feel heavier, difficulty walking, and SOB over last 2 months. Denies any prior cardiac diagnosis except that he "has a murmur." No current chest pain, no leg swelling. No cough, fever. Fell 2 days ago, no palpitations or LOC during episode, reports was due to weakness, did not hit head. However did fall 1 month ago where he hit his head.     ED course notable for vitals afebrile, HR 70-90, -150/60-70, Saturating 95-99% on RA. CBC w/ white count 13, troponin 518->488, K 4.7, Cr 4.5, BUN 16, Bicarb 27. ,000. . RVP negative. CTH w/o traumatic abnormality. CXR w/ stable cardiomegaly and trace bilateral pleural effusions. Last Echo in 2023 w/ EF 60%'s.

## 2024-07-14 NOTE — CONSULT NOTE ADULT - ATTENDING COMMENTS
I have seen this patient with the fellow and agree with their assessment and plan. I was physically present for significant portions of the evaluation and management (E/M) service provided.  I agree with the above history, physical, and plan which I have reviewed and edited where appropriate.    No urgent ind for HD today   Likely needs UF tomorrow    Eduard Dominguez MD  Office   Contact me directly via [employer] Teams     (After 5 pm or on weekends please page the on-call fellow/attending, can check [email or web address] for schedule. Login is eduardo queen, schedule under [facility] medicine, psych, derm)

## 2024-07-14 NOTE — H&P ADULT - PROBLEM SELECTOR PLAN 3
- Pt w/ elevated troponin 400-500's on admit w/o chest pain. ECG w/ LVH w/ ST and T-wave changes c/w LVH.   - Pt w/ prior troponins 400-700's, likely elevated in s/o ESRD.  - CTM for chest pain - Pt w/ elevated troponin 400-500's on admit w/o chest pain. ECG w/ LVH w/ ST and T-wave changes c/w LVH.   - Pt w/ prior troponins 400-700's, likely elevated in s/o ESRD. Downtrended.   - CTM for chest pain

## 2024-07-14 NOTE — PROGRESS NOTE ADULT - SUBJECTIVE AND OBJECTIVE BOX
Patient is a 64y old  Male who presents with a chief complaint of     ======Overnight/Subjective======  Overnight  No acute overnight events.    Subjective  No acute complaints. Patient denies  palpitations, dizziness, fever, chills, or nvd. He notes that he has back pain but attributes it to the hospital bed.     Brief daily plan  -TTE  -PT eval    ======Medications======  MEDICATIONS  (STANDING):  aspirin enteric coated 81 milliGRAM(s) Oral daily  atorvastatin 80 milliGRAM(s) Oral at bedtime  calcium acetate 667 milliGRAM(s) Oral three times a day with meals  cinacalcet 30 milliGRAM(s) Oral daily  heparin   Injectable 5000 Unit(s) SubCutaneous every 12 hours  labetalol 600 milliGRAM(s) Oral three times a day  NIFEdipine XL 90 milliGRAM(s) Oral two times a day    MEDICATIONS  (PRN):  acetaminophen     Tablet .. 650 milliGRAM(s) Oral every 6 hours PRN Temp greater or equal to 38C (100.4F), Mild Pain (1 - 3)  melatonin 3 milliGRAM(s) Oral at bedtime PRN Insomnia      ======Vital Signs======  T(C): 36.3 (24 @ 08:18), Max: 36.6 (24 @ 22:51)  T(F): 97.4 (24 @ 08:18), Max: 97.8 (24 @ 22:51)  HR: 59 (24 @ 08:18) (59 - 95)  BP: 133/74 (24 @ 08:18) (128/70 - 156/64)  BP(mean): --  RR: 18 (24 @ 08:18) (15 - 18)  SpO2: 94% (24 @ 08:18) (94% - 99%)    ======Physical exams======  GENERAL: NAD  Cardiovascular: RRR, S1 S2, no m/r/g, no JVD  LUNGS: Unlabored respiration, CTABL  ABDOMEN: Soft, distended but nontender. Small ventral hernia that is reducible noted, no signs of strangulation or incarceration.   EXTREMITIES: Warm extremities, no edema, peripheral pulses 2+ bilaterally  MSK: 4/5 strength of BLE, patellar and achilles reflex 2+.  NEURO: AAOx3, PERRLA    ======Labs======                12.3<L>  12.74<H> >----------< 183  (MCV: 78.8<L>)                39.4   131<L> | 94<L> | 22  -----------------------< 105<H>  4.2 | 20<L> | 5.19<H>    TPro: 6.5 / Alb: 3.3 / TBili: 1.1 / DBili: -- / AlkPhos: 119 / ALT: 9<L> / AST: 19 (24 @ 06:00)  Ca: 9.3 / Phos: 4.7<H> / M.9 (24 @ 06:00)          ======Microbiology======  Urinalysis Basic - ( 2024 06:00 )    Color: x / Appearance: x / SG: x / pH: x  Gluc: 105 mg/dL / Ketone: x  / Bili: x / Urobili: x   Blood: x / Protein: x / Nitrite: x   Leuk Esterase: x / RBC: x / WBC x   Sq Epi: x / Non Sq Epi: x / Bacteria: x          ======I&O's======  I&O's Summary

## 2024-07-14 NOTE — PROGRESS NOTE ADULT - PROBLEM SELECTOR PLAN 2
- Pt reporting LE weakness w/ mechanical fall. RVP negative, CK wnl. Hgb wnl.   - May be 2/2 edema previously however pt w/o physical exam signs c/f volume OL at this time. CT cervical spine w/ subluxation and foraminal narrowing of C5-C6 however given how high this finding is, would lead to more generalized weakness and not primarily LE weakness. Suspect weakness might be due to debility, chronic process.   - Cardiac work-up as above  - T4/TSH, B12, Folate testing  - PT optimization  - If patient has worsening leg weakness, will consider MRI to r/u spinal cord involvement.

## 2024-07-14 NOTE — DISCHARGE NOTE PROVIDER - NSDCFUADDAPPT_GEN_ALL_CORE_FT
APPTS ARE READY TO BE MADE: [ ] YES    Best Family or Patient Contact (if needed):    Additional Information about above appointments (if needed):    1: Dr. Swift or his colleague at [facility] within 1-2 weeks  2:   3:     Other comments or requests:    APPTS ARE READY TO BE MADE: [ ] YES    Best Family or Patient Contact (if needed):    Additional Information about above appointments (if needed):    1: Dr. Swift or his colleague [place] [facility] [place] 1-2 weeks  2: Nephrology: [phone]  3: Dr. Mckeon (Cardiologist): [phone]    Other comments or requests:    APPTS ARE READY TO BE MADE: [X] YES    Best Family or Patient Contact (if needed):    Additional Information about above appointments (if needed):    1: Dr. Swift or his colleague at [facility] medicine clinic within 1-2 weeks  2: Nephrology: [phone]  3: Dr. Mckeon (Cardiologist): [phone]    Other comments or requests:    APPTS ARE READY TO BE MADE: [X] YES    Best Family or Patient Contact (if needed):    Additional Information about above appointments (if needed):    1: Dr. Swift or his colleague at [facility] internal medicine clinic within 1 weeks  2: Nephrology: [phone]  3: Dr. Mckeon (Cardiologist): [phone]    Other comments or requests:    APPTS ARE READY TO BE MADE: [X] YES    Best Family or Patient Contact (if needed):    Additional Information about above appointments (if needed):    1: [facility] internal medicine clinic (scheduled appointment 7/25/24 at 1:30PM)  2: Nephrology: [phone]  3: Dr. Mckeon (Cardiologist): [phone] (scheduled appointment     Other comments or requests:    APPTS ARE READY TO BE MADE: [X] YES    Best Family or Patient Contact (if needed):    Additional Information about above appointments (if needed):    1: [facility] [place] (scheduled appointment 7/25/24 at 1:30PM)  2: Nephrology: [phone]  3: Dr. Mckeon (Cardiologist): [phone] (scheduled appointment   4. Dr. Hurt (ID):     Other comments or requests:    APPTS ARE READY TO BE MADE: [X] YES    Best Family or Patient Contact (if needed):    Additional Information about above appointments (if needed):    1: [facility] [place] (scheduled appointment 7/25/24 at 1:30PM)  2: Nephrology: [phone]  3: Dr. Mckeon (Cardiologist): [phone] (scheduled appointment in 1 week)  4. Dr. Hurt (ID):     Other comments or requests:    APPTS ARE READY TO BE MADE: [X] YES    Best Family or Patient Contact (if needed):    Additional Information about above appointments (if needed):    1: [facility] [place] (scheduled appointment 7/25/24 at 1:30PM)  2: Nephrology: [phone]  3: Dr. Mckeon (Cardiologist): [phone] (scheduled appointment in 1 week)  4. Dr. Hurt (ID):     Other comments or requests:         7/18/19/22-Patient was outreached but did not answer. A voicemail was left for the patient to return our call.

## 2024-07-14 NOTE — DISCHARGE NOTE PROVIDER - CARE PROVIDERS DIRECT ADDRESSES
,DirectAddress_Unknown ,DirectAddress_Unknown,[email or web address]

## 2024-07-14 NOTE — DISCHARGE NOTE PROVIDER - CARE PROVIDER_API CALL
Clarence Swift  Internal Medicine  Phone: ()-  Fax: ()-  Established Patient  Follow Up Time: 2 weeks   Clarence Swift  Internal Medicine  Phone: ()-  Fax: ()-  Established Patient  Follow Up Time: 2 weeks    Yasmin Mckeon  Cardiology  [address] [place] [address], [address] [place] [id number]  Phone: [phone]  Fax: [phone]  Established Patient  Follow Up Time: 2 weeks   Clarence Swift  Internal Medicine  Phone: ()-  Fax: ()-  Established Patient  Follow Up Time: 1 week    Yasmin Mckeon  Cardiology  [address] [place] [address], [address] [place] [address]  Phone: [phone]  Fax: [phone]  Established Patient  Follow Up Time: 1 week   Yasmin Mckeon  Cardiology  [id number] [place] [address], [address]  [place] [address]  Phone: [phone]  Fax: [phone]  Established Patient  Scheduled Appointment: 08/02/2024 01:30 PM    Kodak Hurt  Infectious Disease  [address] [place] [address]  Phone: [phone]  Fax: ()-  Follow Up Time: 2 weeks

## 2024-07-14 NOTE — PROGRESS NOTE ADULT - PROBLEM SELECTOR PLAN 3
- Pt w/ elevated troponin 400-500's on admit w/o chest pain. ECG w/ LVH w/ ST and T-wave changes c/w LVH.   - Pt w/ prior troponins 400-700's, likely elevated in s/o ESRD. Downtrended.   - CTM for chest pain

## 2024-07-14 NOTE — DISCHARGE NOTE PROVIDER - NSDCADMDATE_GEN_ALL_CORE_FT
[facility] [address], [place] [address]

Test Date:    2021               Test Time:    19:28:26

Pat Name:     MAREN ZHAO        Department:   

Patient ID:   [id number]           Room:          

Gender:       F                        Technician:   

:          1989               Requested By: JAIME TORIBIO

Order Number: [id number]            Reading MD:     

                                 Measurements

Intervals                              Axis          

Rate:         86                       P:            64

OK:           142                      QRS:          65

QRSD:         96                       T:            41

QT:           384                                    

QTc:          463                                    

                           Interpretive Statements

SINUS RHYTHM

INCOMPLETE RIGHT BUNDLE BRANCH BLOCK

OTHERWISE NORMAL ECG

RI6.02

No previous ECG available for comparison
14-Jul-2024 04:24

## 2024-07-14 NOTE — H&P ADULT - NSHPPHYSICALEXAM_GEN_ALL_CORE
VITALS:   T(C): 36.4 (07-14-24 @ 02:45), Max: 36.6 (07-13-24 @ 22:51)  HR: 95 (07-14-24 @ 02:45) (68 - 95)  BP: 128/70 (07-14-24 @ 02:45) (128/70 - 156/64)  RR: 16 (07-14-24 @ 02:45) (15 - 18)  SpO2: 99% (07-14-24 @ 02:45) (95% - 99%)    GENERAL: NAD, lying in bed comfortably  HEAD:  Atraumatic, Normocephalic  EYES: EOMI, PERRLA, conjunctiva and sclera clear  ENT: Moist mucous membranes  NECK: Supple, No JVD  CHEST/LUNG: CTABL; No rales, rhonchi, wheezing, or rubs. Unlabored respirations  HEART: RRR. No M/R/G  ABDOMEN: Soft, nontender, non-distended, normoactive BS. No hepatomegaly  EXTREMITIES:  2+ Peripheral Pulses, brisk capillary refill. No clubbing, cyanosis, or edema  NERVOUS SYSTEM:  Alert & Oriented X3, speech clear. No deficits, CN II-XII intact. Normal sensation   MSK: FROM all 4 extremities, full and equal strength  PSYCH: Normal affect, normal speech, normal behavior  SKIN: No rashes or lesions

## 2024-07-14 NOTE — H&P ADULT - PROBLEM SELECTOR PLAN 2
- Pt reporting LE weakness w/ mechanical fall. RVP negative, CK wnl. Hgb wnl.   - May be 2/2 edema previously however pt w/o physical exam signs c/f volume OL at this time.  - Cardiac work-up as above  - TSH, B12, Folate - Pt reporting LE weakness w/ mechanical fall. RVP negative, CK wnl. Hgb wnl.   - May be 2/2 edema previously however pt w/o physical exam signs c/f volume OL at this time. CT cervical spine w/ subluxation and foraminal narrowing of C5-C6 however given how high this finding is, would lead to more generalized weakness and not primarily LE weakness. Suspect weakness might be due to debility, chronic process.   - Cardiac work-up as above  - TSH, B12, Folate testing  - PT optimization - Pt reporting LE weakness w/ mechanical fall. RVP negative, CK wnl. Hgb wnl.   - May be 2/2 edema previously however pt w/o physical exam signs c/f volume OL at this time. CT cervical spine w/ subluxation and foraminal narrowing of C5-C6 however given how high this finding is, would lead to more generalized weakness and not primarily LE weakness. Suspect weakness might be due to debility, chronic process.   - Cardiac work-up as above  - T4/TSH, B12, Folate testing  - PT optimization

## 2024-07-14 NOTE — PROGRESS NOTE ADULT - ATTENDING COMMENTS
64 year old male with hx of  CAD, severe AS s/p AVR and CABG, ESRD on HD, Hep C s/p treatment, latent TB, HTN, who presented to ED w/ generalized weakness and dyspnea on exertion. Patient has had difficulty ambulating and reports a fall recently. Unclear etiology of symptoms though possibly due to deconditioning and chronic illness.     # Dyspnea on exertion without hypoxia  # ESRD on HD  # CAD  # HTN  # Physical deconditioning    Plan  - ESRD on HD. HD per nephrology.   - Currently euvolemic on exam. Trace bilateral pleural effusions which may be contributing to dyspnea. Monitor resp status; continue regularly scheduled dialysis sessions  - TTE  - PT evaluation    Rest of plan as outlined above. Discussed case with resident.

## 2024-07-14 NOTE — DISCHARGE NOTE PROVIDER - NSFOLLOWUPCLINICS_GEN_ALL_ED_FT
[facility] [place]  Internal Medicine  [address] [place] [address]  Phone: [phone]  Fax: [phone]  Established Patient  Follow Up Time: 2 weeks     [facility] [place]  Internal Medicine  [address] [place] [address]  Phone: [phone]  Fax: [phone]  Established Patient  Follow Up Time: 2 weeks    [facility]  Nephrology  [address] [place] [address], [address] [place] [address]  Phone: [phone]  Fax:   Follow Up Time: 2 weeks     [facility]  Nephrology  [address] [place] [address], [address]nd [place] [address]  Phone: [phone]  Fax:   Follow Up Time: 2 weeks    [facility] [place]  Internal Medicine  [address] [place] [address]  Phone: [phone]  Fax: [phone]  Established Patient  Scheduled Appointment: 7/25/2024 1:30 PM

## 2024-07-14 NOTE — PATIENT PROFILE ADULT - FALL HARM RISK - RISK INTERVENTIONS

## 2024-07-14 NOTE — DISCHARGE NOTE PROVIDER - NSFOLLOWUPCLINICSTOKEN_GEN_ALL_ED_FT
903425:2 weeks|| ||00\01||True; 574396:2 weeks|| ||00\01||True;937939:2 weeks|| ||00\01||False; 404524:2 weeks|| ||00\01||False;[id number]: ||7/25/2024||13\30\00||True;

## 2024-07-14 NOTE — PROGRESS NOTE ADULT - PROBLEM SELECTOR PLAN 1
- Pt w/ 2 months of SOB however not hypoxic in ED. No fevers, cough, RVP negative. CXR on admit w/ stable cardiomegaly and b/l pleural effusions. Last Echo in 2023 w/ EF 60%'s, however elevated BNP on admit.    - Was evaluated by Cardiology in 2023 for chest pain and SOB after HD, thought to be "concerning for possible obstructive CAD vs microvascular disease exacerbated by volume shifts during HD and exertion"  - Pt not appearing volume overloaded, w/o edema or crackles. Per pt report not producing urine.   - c/f CHF w/ pleural effusions, SOB might be 2/2 effusions.   - Pending TTE  - Defer diuretics as pt not producing urine

## 2024-07-14 NOTE — DISCHARGE NOTE PROVIDER - PROVIDER TOKENS
PROVIDER:[TOKEN:[[id number]],FOLLOWUP:[2 weeks],ESTABLISHEDPATIENT:[T]] PROVIDER:[TOKEN:[[id number]],FOLLOWUP:[2 weeks],ESTABLISHEDPATIENT:[T]],PROVIDER:[[id number]],FOLLOWUP:[2 weeks],ESTABLISHEDPATIENT:[T]] PROVIDER:[TOKEN:[[id number]],FOLLOWUP:[1 week],ESTABLISHEDPATIENT:[T]],PROVIDER:[[id number]],FOLLOWUP:[1 week],ESTABLISHEDPATIENT:[T]] PROVIDER:[TOKEN:[[id number]],SCHEDULEDAPPT:[08/02/2024],SCHEDULEDAPPTTIME:[01:30 PM],ESTABLISHEDPATIENT:[T]],PROVIDER:[TOKEN:[[id number]],FOLLOWUP:[2 weeks]]

## 2024-07-14 NOTE — PHYSICAL THERAPY INITIAL EVALUATION ADULT - PERTINENT HX OF CURRENT PROBLEM, REHAB EVAL
Pt is a 64 year old male with PMH significant for CAD and severe AS s/p AVR and CABG x2 RSVG-Ramus, RSVG-OM1 (2021), ESRD on HD, Hep C s/p treatment, latent TB, HTN. Pt presents to ED w/ SOB and neck/back/leg soreness w/ fall.  Pt reports over past 2 months, new onset leg weakness, legs feel heavier, difficulty walking and SOB.  Pt reports a fall 2 days prior to admission, no LOC/HS.  Pt reports another fall 1 month ago endorsing +HS.  Pt admitted for elevated BNP and troponin with c/f CHF.  CXR(7/13): Stable cardiomegaly and trace bilateral pleural effusions.  CTH(7/14): CT HEAD: No CT evidence of acute intracranial pathology. Chronic and nonemergent findings as discussed above.  CT CERVICAL SPINE: Chronic-appearing deformity and focal angulation of the cervical spine at C5-C6.  Approximately 7 mm anterolisthesis at C5-C6 and severe   subluxation of the C5-C6 facet joints bilaterally.  Moderate spinal canal stenosis at C5-C6 and moderate to severe neural foraminal narrowing at C5-C6 on the right.  See further details above. No CT evidence of acute cervical spine fracture. Follow-up MRI may be obtained as clinical warranted.

## 2024-07-15 ENCOUNTER — RESULT REVIEW (OUTPATIENT)
Age: 64
End: 2024-07-15

## 2024-07-15 DIAGNOSIS — I50.23 ACUTE ON CHRONIC SYSTOLIC (CONGESTIVE) HEART FAILURE: ICD-10-CM

## 2024-07-15 LAB
ANION GAP SERPL CALC-SCNC: 14 MMOL/L — SIGNIFICANT CHANGE UP (ref 5–17)
BUN SERPL-MCNC: 30 MG/DL — HIGH (ref 7–23)
CALCIUM SERPL-MCNC: 9.4 MG/DL — SIGNIFICANT CHANGE UP (ref 8.4–10.5)
CHLORIDE SERPL-SCNC: 95 MMOL/L — LOW (ref 96–108)
CO2 SERPL-SCNC: 27 MMOL/L — SIGNIFICANT CHANGE UP (ref 22–31)
CREAT SERPL-MCNC: 6.71 MG/DL — HIGH (ref 0.5–1.3)
EGFR: 9 ML/MIN/1.73M2 — LOW
GLUCOSE SERPL-MCNC: 95 MG/DL — SIGNIFICANT CHANGE UP (ref 70–99)
HCT VFR BLD CALC: 35 % — LOW (ref 39–50)
HGB BLD-MCNC: 11.4 G/DL — LOW (ref 13–17)
MAGNESIUM SERPL-MCNC: 2 MG/DL — SIGNIFICANT CHANGE UP (ref 1.6–2.6)
MCHC RBC-ENTMCNC: 24.6 PG — LOW (ref 27–34)
MCHC RBC-ENTMCNC: 32.6 GM/DL — SIGNIFICANT CHANGE UP (ref 32–36)
MCV RBC AUTO: 75.4 FL — LOW (ref 80–100)
NRBC # BLD: 0 /100 WBCS — SIGNIFICANT CHANGE UP (ref 0–0)
PHOSPHATE SERPL-MCNC: 5 MG/DL — HIGH (ref 2.5–4.5)
PLATELET # BLD AUTO: 171 K/UL — SIGNIFICANT CHANGE UP (ref 150–400)
POTASSIUM SERPL-MCNC: 4.2 MMOL/L — SIGNIFICANT CHANGE UP (ref 3.5–5.3)
POTASSIUM SERPL-SCNC: 4.2 MMOL/L — SIGNIFICANT CHANGE UP (ref 3.5–5.3)
RBC # BLD: 4.64 M/UL — SIGNIFICANT CHANGE UP (ref 4.2–5.8)
RBC # FLD: 19.2 % — HIGH (ref 10.3–14.5)
SODIUM SERPL-SCNC: 136 MMOL/L — SIGNIFICANT CHANGE UP (ref 135–145)
WBC # BLD: 9.49 K/UL — SIGNIFICANT CHANGE UP (ref 3.8–10.5)
WBC # FLD AUTO: 9.49 K/UL — SIGNIFICANT CHANGE UP (ref 3.8–10.5)

## 2024-07-15 PROCEDURE — 93356 MYOCRD STRAIN IMG SPCKL TRCK: CPT

## 2024-07-15 PROCEDURE — 93306 TTE W/DOPPLER COMPLETE: CPT | Mod: 26

## 2024-07-15 PROCEDURE — 99233 SBSQ HOSP IP/OBS HIGH 50: CPT | Mod: GC

## 2024-07-15 PROCEDURE — 99232 SBSQ HOSP IP/OBS MODERATE 35: CPT | Mod: GC

## 2024-07-15 PROCEDURE — 99223 1ST HOSP IP/OBS HIGH 75: CPT

## 2024-07-15 RX ADMIN — Medication 90 MILLIGRAM(S): at 16:43

## 2024-07-15 RX ADMIN — Medication 90 MILLIGRAM(S): at 05:19

## 2024-07-15 RX ADMIN — CALCIUM ACETATE 667 MILLIGRAM(S): 667 CAPSULE ORAL at 11:24

## 2024-07-15 RX ADMIN — ASPIRIN 81 MILLIGRAM(S): 325 TABLET, FILM COATED ORAL at 11:24

## 2024-07-15 RX ADMIN — CALCIUM ACETATE 667 MILLIGRAM(S): 667 CAPSULE ORAL at 16:43

## 2024-07-15 RX ADMIN — CALCIUM ACETATE 667 MILLIGRAM(S): 667 CAPSULE ORAL at 08:17

## 2024-07-15 RX ADMIN — ATORVASTATIN CALCIUM 80 MILLIGRAM(S): 20 TABLET, FILM COATED ORAL at 21:40

## 2024-07-15 RX ADMIN — HEPARIN SODIUM 5000 UNIT(S): 50 INJECTION, SOLUTION INTRAVENOUS at 05:19

## 2024-07-15 RX ADMIN — LABETALOL HYDROCHLORIDE 600 MILLIGRAM(S): 300 TABLET ORAL at 21:40

## 2024-07-15 RX ADMIN — HEPARIN SODIUM 5000 UNIT(S): 50 INJECTION, SOLUTION INTRAVENOUS at 16:43

## 2024-07-15 RX ADMIN — CINACALCET HYDROCHLORIDE 30 MILLIGRAM(S): 60 TABLET, COATED ORAL at 11:24

## 2024-07-15 NOTE — PROGRESS NOTE ADULT - SUBJECTIVE AND OBJECTIVE BOX
[facility] Division of Kidney Diseases & Hypertension  FOLLOW UP NOTE  [phone]--------------------------------------------------------------------------------  Chief Complaint:Other form of dyspnea        24 hour events/subjective: No acute events overnight. Pt seen and examined at bedside this AM. Returned from ECHO, still feeling overtly SOB.         PAST HISTORY  --------------------------------------------------------------------------------  No significant changes to PMH, PSH, FHx, SHx from H&P unless otherwise noted.    ALLERGIES & MEDICATIONS  --------------------------------------------------------------------------------  Allergies    No Known Drug Allergies  adhesives (Other)    Intolerances      Standing Inpatient Medications  aspirin enteric coated 81 milliGRAM(s) Oral daily  atorvastatin 80 milliGRAM(s) Oral at bedtime  calcium acetate 667 milliGRAM(s) Oral three times a day with meals  cinacalcet 30 milliGRAM(s) Oral daily  heparin   Injectable 5000 Unit(s) SubCutaneous every 12 hours  labetalol 600 milliGRAM(s) Oral three times a day  NIFEdipine XL 90 milliGRAM(s) Oral two times a day    PRN Inpatient Medications  acetaminophen     Tablet .. 650 milliGRAM(s) Oral every 6 hours PRN  melatonin 3 milliGRAM(s) Oral at bedtime PRN      REVIEW OF SYSTEMS  --------------------------------------------------------------------------------    All other systems were reviewed and are negative, except as noted above.    VITALS/PHYSICAL EXAM  --------------------------------------------------------------------------------  T(C): 36.7 (07-15-24 @ 11:40), Max: 36.7 (07-14-24 @ 20:39)  HR: 58 (07-15-24 @ 11:40) (57 - 62)  BP: 120/62 (07-15-24 @ 11:40) (120/62 - 165/58)  RR: 18 (07-15-24 @ 11:40) (16 - 18)  SpO2: 97% (07-15-24 @ 11:40) (90% - 98%)  Wt(kg): --  Height (cm): 185.4 (07-13-24 @ 15:24)  Weight (kg): 66 (07-13-24 @ 15:24)  BMI (kg/m2): 19.2 (07-13-24 @ 15:24)  BSA (m2): 1.88 (07-13-24 @ 15:24)      07-14-24 @ 07:01  -  07-15-24 @ 07:00  --------------------------------------------------------  IN: 740 mL / OUT: 0 mL / NET: 740 mL      Physical Exam:  	Gen: NAD, well-appearing  	Pulm: CTA B/L  	CV: RRR, S1S2;  	Back: No spinal or CVA tenderness  	Abd: +BS, soft, nontender/nondistended, +umbilical hernia with tenderness on palpation.   	: No suprapubic tenderness              Extremities: no bilateral LE edema noted.                Neuro: No focal deficits  	Skin: Warm, without rashes  	Vascular access: LUE fistula    LABS/STUDIES  --------------------------------------------------------------------------------              11.4   9.49  >-----------<  171      [07-15-24 @ 06:56]              35.0     136  |  95  |  30  ----------------------------<  95      [07-15-24 @ 06:58]  4.2   |  27  |  6.71        Ca     9.4     [07-15-24 @ 06:58]      Mg     2.0     [07-15-24 @ 06:58]      Phos  5.0     [07-15-24 @ 06:58]    TPro  6.5  /  Alb  3.3  /  TBili  1.1  /  DBili  x   /  AST  19  /  ALT  9   /  AlkPhos  119  [07-14-24 @ 06:00]              [07-13-24 @ 19:24]    Creatinine Trend:  SCr 6.71 [07-15 @ 06:58]  SCr 5.19 [07-14 @ 06:00]  SCr 4.55 [07-13 @ 17:17]    Urinalysis - [07-15-24 @ 06:58]      Color  / Appearance  / SG  / pH       Gluc 95 / Ketone   / Bili  / Urobili        Blood  / Protein  / Leuk Est  / Nitrite       RBC  / WBC  / Hyaline  / Gran  / Sq Epi  / Non Sq Epi  / Bacteria       TSH 4.12      [07-14-24 @ 07:52]    HIV Nonreact      [07-14-24 @ 10:11]        IMAGING/RADIOLOGY:

## 2024-07-15 NOTE — PROGRESS NOTE ADULT - TIME BILLING
- Reviewing, and interpreting labs, testing, and imaging.  - Independently obtaining a review of systems and performing a physical exam  - Reviewing prior records and where necessary, outpatient records.  - Counselling and educating patient regarding interpretation of aforementioned items and plan of care.  - Does not include time spent on resident education.
chart reviewing, history taking, physical exam, assessment and documentation, including speaking to specialists and CM regarding the management.

## 2024-07-15 NOTE — PROGRESS NOTE ADULT - PROBLEM SELECTOR PLAN 5
- Continue home Labetalol 600 mg TID and Nifedipine 90 mg BID with hold parameters - Pt w/ elevated troponin 400-500's on admit w/o chest pain. ECG w/ LVH w/ ST and T-wave changes c/w LVH.   - Pt w/ prior troponins 400-700's, likely elevated in s/o ESRD. Downtrended.   - CTM for chest pain

## 2024-07-15 NOTE — PROGRESS NOTE ADULT - PROBLEM SELECTOR PLAN 3
- Pt w/ elevated troponin 400-500's on admit w/o chest pain. ECG w/ LVH w/ ST and T-wave changes c/w LVH.   - Pt w/ prior troponins 400-700's, likely elevated in s/o ESRD. Downtrended.   - CTM for chest pain - Pt w/ hx of ESRD on HD, T/Th/Sat. Last HD 7/13 on Sat    - Plan for ultrafiltration today 07/15  - check PTH   - Continue home Cincalcet, Calcium tabs  - Nephrology following for HD  - Phoslo 667 TID w/ meals  - daily phos level

## 2024-07-15 NOTE — PROGRESS NOTE ADULT - ASSESSMENT
64-yo M PMHX of CAD and severe AS s/p AVR and CABG x2 RSVG-Ramus, RSVG-OM1 (2021), ESRD on HD, Hep C s/p treatment, latent TB, HTN, who presents to ED w/ SOB and neck/back/leg soreness w/ fall 2 days ago due to weakness, elevated BNP and troponin w/ c/f CHF.  64-yo M PMHX of CAD and severe AS s/p AVR and CABG x2 RSVG-Ramus, RSVG-OM1 (2021), ESRD on HD, Hep C s/p treatment, latent TB, HTN, who presents to ED w/ SOB and neck/back/leg soreness w/ fall 2 days ago due to weakness, elevated BNP and troponin w/ c/f CHF. Awaiting TTE results now.

## 2024-07-15 NOTE — CONSULT NOTE ADULT - SUBJECTIVE AND OBJECTIVE BOX
Patient is a 64y old  Male who presents with a chief complaint of SOB and leg weakness (15 Jul 2024 07:50)      HPI:  64M with CAD, severe AS s/p AVR, CABG x 2 RSVG-Ramus, RSVG-OM! (2021), ESRD on HD, HepC s/p tx, latent TB, HTN, presenting with SOB, leg heaviness, and difficulty with ambulation over the last 2 months. He reports he fell 2 days prior to admission, denies palpitations or LOC.       ED course notable for vitals afebrile, HR 70-90, -150/60-70, Saturating 95-99% on RA. CBC w/ white count 13, troponin 518->488, K 4.7, Cr 4.5, BUN 16, Bicarb 27. ,000. . RVP negative. CTH w/o traumatic abnormality. CXR w/ stable cardiomegaly and trace bilateral pleural effusions. Last Echo in 2023 w/ EF 60%'s. Repeat TTE this admission showing mod-severely reduced LVSF 35% with regional WMA, abnormal septum and inferior wall, moderate grade II diastolic dysfunction, severely enlarged RV with reduced RVSF, severely dilated LA and RA. Cardiology consulted for ischemic evaluation.       PAST MEDICAL & SURGICAL HISTORY:  End Stage Renal Disease on Dialysis  since 2009 - Tues, Thurs, Sat      HTN (hypertension)      Chronic renal failure      Hepatitis C      TB (tuberculosis)  Latent      CHF (congestive heart failure)  Mild      AVF (arteriovenous fistula)  placed 2009 - Left side      S/P appendectomy  at age 7        SOCIAL HISTORY:  Allergies    No Known Drug Allergies  adhesives (Other)    Intolerances        HOME MEDICATIONS:  aspirin 81 mg oral delayed release tablet: 1 tab(s) orally once a day (14 Jul 2024 05:51)  calcium (as carbonate) 500 mg oral tablet, chewable: 1 tab(s) chewed once a day as needed for Gas (14 Jul 2024 05:50)  calcium acetate 667 mg oral tablet: 1334 milligram(s) orally 3 times a day (with meals) (14 Jul 2024 05:51)  cinacalcet 30 mg oral tablet: 1 tab(s) orally once a day (14 Jul 2024 05:50)  labetalol 200 mg oral tablet: 3 tab(s) orally 3 times a day (14 Jul 2024 05:49)  NIFEdipine 90 mg oral tablet, extended release: 1 tab(s) orally 2 times a day (14 Jul 2024 05:51)  senna (sennosides) 25 mg oral tablet: 1 tab(s) orally once a day as needed for  constipation (14 Jul 2024 05:51)      Vital Signs Last 24 Hrs  T(C): 36.5 (15 Jul 2024 13:50), Max: 36.7 (14 Jul 2024 20:39)  T(F): 97.7 (15 Jul 2024 13:50), Max: 98 (14 Jul 2024 20:39)  HR: 57 (15 Jul 2024 13:50) (57 - 62)  BP: 130/68 (15 Jul 2024 13:50) (120/62 - 165/58)  BP(mean): --  RR: 18 (15 Jul 2024 13:50) (16 - 18)  SpO2: 97% (15 Jul 2024 13:50) (90% - 98%)    Parameters below as of 15 Jul 2024 13:50  Patient On (Oxygen Delivery Method): nasal cannula  O2 Flow (L/min): 2    I&O's Summary    14 Jul 2024 07:01  -  15 Jul 2024 07:00  --------------------------------------------------------  IN: 740 mL / OUT: 0 mL / NET: 740 mL    15 Jul 2024 07:01  -  15 Jul 2024 14:50  --------------------------------------------------------  IN: 240 mL / OUT: 0 mL / NET: 240 mL        PHYSICAL EXAM:  GENERAL: NAD, well-developed  HEAD:  Atraumatic, Normocephalic  EYES: EOMI, conjunctiva and sclera clear  NECK: Supple, No JVD  CHEST/LUNG: Clear to auscultation bilaterally; No wheeze  HEART: Regular rate and rhythm; No murmurs, rubs, or gallops  ABDOMEN: Soft, Nontender, Nondistended; Bowel sounds present  EXTREMITIES:  2+ Peripheral Pulses, No clubbing, cyanosis, or edema  PSYCH: AAOx3  NEUROLOGY: non-focal  SKIN: No rashes or lesions    LABS                        11.4   9.49  )-----------( 171      ( 15 Jul 2024 06:56 )             35.0                         12.3   12.74 )-----------( 183      ( 14 Jul 2024 06:00 )             39.4     07-15    136  |  95<L>  |  30<H>  ----------------------------<  95  4.2   |  27  |  6.71<H>  07-14    131<L>  |  94<L>  |  22  ----------------------------<  105<H>  4.2   |  20<L>  |  5.19<H>    Ca    9.4      15 Jul 2024 06:58  Ca    9.3      14 Jul 2024 06:00  Phos  5.0     07-15  Mg     2.0     07-15    TPro  6.5  /  Alb  3.3  /  TBili  1.1  /  DBili  x   /  AST  19  /  ALT  9<L>  /  AlkPhos  119  07-14  TPro  7.4  /  Alb  3.8  /  TBili  1.4<H>  /  DBili  x   /  AST  18  /  ALT  11  /  AlkPhos  136<H>  07-13    CAPILLARY BLOOD GLUCOSE           13 Jul 2024 19:24 Troponin x     /  U/L / CKMB x     / CKMB Units 3.7 ng/mL              Urinalysis Basic - ( 15 Jul 2024 06:58 )    Color: x / Appearance: x / SG: x / pH: x  Gluc: 95 mg/dL / Ketone: x  / Bili: x / Urobili: x   Blood: x / Protein: x / Nitrite: x   Leuk Esterase: x / RBC: x / WBC x   Sq Epi: x / Non Sq Epi: x / Bacteria: x      MEDICATIONS  (STANDING):  aspirin enteric coated 81 milliGRAM(s) Oral daily  atorvastatin 80 milliGRAM(s) Oral at bedtime  calcium acetate 667 milliGRAM(s) Oral three times a day with meals  cinacalcet 30 milliGRAM(s) Oral daily  heparin   Injectable 5000 Unit(s) SubCutaneous every 12 hours  labetalol 600 milliGRAM(s) Oral three times a day  NIFEdipine XL 90 milliGRAM(s) Oral two times a day    MEDICATIONS  (PRN):  acetaminophen     Tablet .. 650 milliGRAM(s) Oral every 6 hours PRN Temp greater or equal to 38C (100.4F), Mild Pain (1 - 3)  melatonin 3 milliGRAM(s) Oral at bedtime PRN Insomnia      ECG:  ECHO FINDINGS:  RADIOLOGY & ADDITIONAL STUDIES:     Patient is a 64y old  Male who presents with a chief complaint of SOB and leg weakness (15 Jul 2024 07:50)      HPI:  64M with CAD, severe AS s/p AVR, CABG x 2 RSVG-Ramus, RSVG-OM! (2021), ESRD on HD, HepC s/p tx, latent TB, HTN, presenting with SOB, leg heaviness, and difficulty with ambulation over the last 2 months. He reports he fell 2 days prior to admission, denies palpitations or LOC. Patient reports he is still having 7/10, substernal, nonradiating chest pain. His pain has not improved since admission. He is unable to pinpoint any factors that improve or worsen the pain. He reports he has not missed any HD sessions and has been compliant with all his home meds.       ED course notable for vitals afebrile, HR 70-90, -150/60-70, Saturating 95-99% on RA. CBC w/ white count 13, troponin 518->488, K 4.7, Cr 4.5, BUN 16, Bicarb 27. ,000. . RVP negative. CTH w/o traumatic abnormality. CXR w/ stable cardiomegaly and trace bilateral pleural effusions. Last Echo in 2023 w/ EF 60%'s. Repeat TTE this admission showing mod-severely reduced LVSF 35% with regional WMA, abnormal septum and inferior wall, moderate grade II diastolic dysfunction, severely enlarged RV with reduced RVSF, severely dilated LA and RA. Cardiology consulted for ischemic evaluation.       PAST MEDICAL & SURGICAL HISTORY:  End Stage Renal Disease on Dialysis  since 2009 - Tues, Thurs, Sat      HTN (hypertension)      Chronic renal failure      Hepatitis C      TB (tuberculosis)  Latent      CHF (congestive heart failure)  Mild      AVF (arteriovenous fistula)  placed 2009 - Left side      S/P appendectomy  at age 7        SOCIAL HISTORY:  Allergies    No Known Drug Allergies  adhesives (Other)    Intolerances        HOME MEDICATIONS:  aspirin 81 mg oral delayed release tablet: 1 tab(s) orally once a day (14 Jul 2024 05:51)  calcium (as carbonate) 500 mg oral tablet, chewable: 1 tab(s) chewed once a day as needed for Gas (14 Jul 2024 05:50)  calcium acetate 667 mg oral tablet: 1334 milligram(s) orally 3 times a day (with meals) (14 Jul 2024 05:51)  cinacalcet 30 mg oral tablet: 1 tab(s) orally once a day (14 Jul 2024 05:50)  labetalol 200 mg oral tablet: 3 tab(s) orally 3 times a day (14 Jul 2024 05:49)  NIFEdipine 90 mg oral tablet, extended release: 1 tab(s) orally 2 times a day (14 Jul 2024 05:51)  senna (sennosides) 25 mg oral tablet: 1 tab(s) orally once a day as needed for  constipation (14 Jul 2024 05:51)      Vital Signs Last 24 Hrs  T(C): 36.5 (15 Jul 2024 13:50), Max: 36.7 (14 Jul 2024 20:39)  T(F): 97.7 (15 Jul 2024 13:50), Max: 98 (14 Jul 2024 20:39)  HR: 57 (15 Jul 2024 13:50) (57 - 62)  BP: 130/68 (15 Jul 2024 13:50) (120/62 - 165/58)  BP(mean): --  RR: 18 (15 Jul 2024 13:50) (16 - 18)  SpO2: 97% (15 Jul 2024 13:50) (90% - 98%)    Parameters below as of 15 Jul 2024 13:50  Patient On (Oxygen Delivery Method): nasal cannula  O2 Flow (L/min): 2    I&O's Summary    14 Jul 2024 07:01  -  15 Jul 2024 07:00  --------------------------------------------------------  IN: 740 mL / OUT: 0 mL / NET: 740 mL    15 Jul 2024 07:01  -  15 Jul 2024 14:50  --------------------------------------------------------  IN: 240 mL / OUT: 0 mL / NET: 240 mL        PHYSICAL EXAM:  GENERAL: NAD, well-developed  HEAD:  Atraumatic, Normocephalic  EYES: EOMI, conjunctiva and sclera clear  NECK: Supple, No JVD  CHEST/LUNG: Clear to auscultation bilaterally; No wheeze  HEART: bradycardic, regular, no MRG  ABDOMEN: Soft, Nontender, Nondistended; Bowel sounds present  EXTREMITIES:  LUE fistula with palpable thrill; no lower extremity swelling   PSYCH: AAOx3  NEUROLOGY: non-focal  SKIN: No rashes or lesions    LABS                        11.4   9.49  )-----------( 171      ( 15 Jul 2024 06:56 )             35.0                         12.3   12.74 )-----------( 183      ( 14 Jul 2024 06:00 )             39.4     07-15    136  |  95<L>  |  30<H>  ----------------------------<  95  4.2   |  27  |  6.71<H>  07-14    131<L>  |  94<L>  |  22  ----------------------------<  105<H>  4.2   |  20<L>  |  5.19<H>    Ca    9.4      15 Jul 2024 06:58  Ca    9.3      14 Jul 2024 06:00  Phos  5.0     07-15  Mg     2.0     07-15    TPro  6.5  /  Alb  3.3  /  TBili  1.1  /  DBili  x   /  AST  19  /  ALT  9<L>  /  AlkPhos  119  07-14  TPro  7.4  /  Alb  3.8  /  TBili  1.4<H>  /  DBili  x   /  AST  18  /  ALT  11  /  AlkPhos  136<H>  07-13    CAPILLARY BLOOD GLUCOSE           13 Jul 2024 19:24 Troponin x     /  U/L / CKMB x     / CKMB Units 3.7 ng/mL              Urinalysis Basic - ( 15 Jul 2024 06:58 )    Color: x / Appearance: x / SG: x / pH: x  Gluc: 95 mg/dL / Ketone: x  / Bili: x / Urobili: x   Blood: x / Protein: x / Nitrite: x   Leuk Esterase: x / RBC: x / WBC x   Sq Epi: x / Non Sq Epi: x / Bacteria: x      MEDICATIONS  (STANDING):  aspirin enteric coated 81 milliGRAM(s) Oral daily  atorvastatin 80 milliGRAM(s) Oral at bedtime  calcium acetate 667 milliGRAM(s) Oral three times a day with meals  cinacalcet 30 milliGRAM(s) Oral daily  heparin   Injectable 5000 Unit(s) SubCutaneous every 12 hours  labetalol 600 milliGRAM(s) Oral three times a day  NIFEdipine XL 90 milliGRAM(s) Oral two times a day    MEDICATIONS  (PRN):  acetaminophen     Tablet .. 650 milliGRAM(s) Oral every 6 hours PRN Temp greater or equal to 38C (100.4F), Mild Pain (1 - 3)  melatonin 3 milliGRAM(s) Oral at bedtime PRN Insomnia      ECG:  ECHO FINDINGS:  RADIOLOGY & ADDITIONAL STUDIES:

## 2024-07-15 NOTE — PROGRESS NOTE ADULT - PROBLEM SELECTOR PLAN 1
- Pt w/ 2 months of SOB however not hypoxic in ED. No fevers, cough, RVP negative. CXR on admit w/ stable cardiomegaly and b/l pleural effusions. Last Echo in 2023 w/ EF 60%'s, however elevated BNP on admit.    - Was evaluated by Cardiology in 2023 for chest pain and SOB after HD, thought to be "concerning for possible obstructive CAD vs microvascular disease exacerbated by volume shifts during HD and exertion"  - Pt not appearing volume overloaded, w/o edema or crackles. Per pt report not producing urine.   - c/f CHF w/ pleural effusions, SOB might be 2/2 effusions.   - Pending TTE  - Defer diuretics as pt not producing urine - Pt w/ 2 months of SOB however not hypoxic in ED. No fevers, cough, RVP negative. CXR on admit w/ stable cardiomegaly and b/l pleural effusions. Last Echo in 2023 w/ EF 60%'s, however elevated BNP on admit (likely 2/2 ESRD).    - Was evaluated by Cardiology in 2023 for chest pain and SOB after HD, thought to be "concerning for possible obstructive CAD vs microvascular disease exacerbated by volume shifts during HD and exertion"  - Pt not appearing volume overloaded, w/o edema or crackles. Per pt report not producing urine.   - c/f CHF w/ pleural effusions, SOB might be 2/2 effusions.     Plan:  - Pending TTE read  - Can consider CT chest if TTE wnl  - Defer diuretics as pt not producing urine  - Ultrafiltration today (07/15) per nephro

## 2024-07-15 NOTE — CONSULT NOTE ADULT - ASSESSMENT
64M with CAD, severe AS s/p AVR, CABG x 2 RSVG-Ramus, RSVG-OM! (2021), ESRD on HD, HepC s/p tx, latent TB, HTN, presenting with SOB, leg heaviness, and difficulty with ambulation over the last 2 months, admitted for ADHF. Cardiology consulted for ischemic evaluation iso newly reduced EF with WMA.    #Newly reduced EF with WMA  #ADHF  TTE showing mod-severely reduced LVSF with EF 35%, regional WMA in septum and inferior wall. Severely enlarged V with reduced RVSF. Severely dilated LA and RA.  Trop 518 --> 488  BNP 320k    - continue asa, statin  - continue labetalol and nifedipine     INCOMPLETE NOTE    Preliminary note. Recommendations not finalized until attending attestation     Jimenez Rick MD  PGY3   64M with CAD, severe AS s/p AVR, CABG x 2 RSVG-Ramus, RSVG-OM! (2021), ESRD on HD, HepC s/p tx, latent TB, HTN, presenting with SOB, leg heaviness, and difficulty with ambulation over the last 2 months, admitted for ADHF. Cardiology consulted for ischemic evaluation iso newly reduced EF with WMA.    #Newly reduced EF with WMA  #ADHF  EKG sinus billy to 57; LVH with QRS widening and repolarization abnormality. EKG in 2021 with LBBB  TTE showing mod-severely reduced LVSF with EF 35%, regional WMA in septum and inferior wall. Severely enlarged V with reduced RVSF. Severely dilated LA and RA.  Trop 518 --> 488  BNP 320k    - continue asa, statin  - continue labetalol and nifedipine     INCOMPLETE NOTE    Preliminary note. Recommendations not finalized until attending attestation     Jimenez Rick MD  PGY3   64M with CAD, severe AS s/p AVR, CABG x 2 RSVG-Ramus, RSVG-OM! (2021), ESRD on HD, HepC s/p tx, latent TB, HTN, presenting with SOB, leg heaviness, and difficulty with ambulation over the last 2 months, admitted for dyspnea on exertion and generalize weakness. Cardiology consulted for ischemic evaluation iso newly reduced EF with WMA.    #Newly reduced EF with WMA  Appears euvolemic on exam.  Trop 518 --> 488  BNP 320k  EKG sinus billy to 57; LVH with QRS widening and repolarization abnormality. EKG in 2021 with LBBB  TTE showing mod-severely reduced LVSF with EF 35%, regional WMA in septum and inferior wall. Severely enlarged V with reduced RVSF. Severely dilated LA and RA.  CXR with stable cardiomegaly and trace bilateral pleural effusions.    - continue asa, statin  - continue labetalol and nifedipine    - continue HD per nephro; UF on 7/15 with removal of 1.5L volume; HD planned for 7/16   - will need [facility] this admission     Preliminary note. Recommendations not finalized until attending attestation     Jimenez Rick MD  PGY3   64M with CAD, severe AS s/p AVR, CABG x 2 RSVG-Ramus, RSVG-OM! (2021), ESRD on HD, HepC s/p tx, latent TB, HTN, presenting with SOB, leg heaviness, and difficulty with ambulation over the last 2 months, admitted for dyspnea on exertion and generalize weakness. Cardiology consulted for ischemic evaluation iso newly reduced EF with WMA.    #Newly reduced EF with WMA  Appears euvolemic on exam.  Trop 518 --> 488  BNP 320k  EKG sinus billy to 57; LVH with QRS widening and repolarization abnormality. EKG in 2021 with LBBB  TTE showing mod-severely reduced LVSF with EF 35%, regional WMA in septum and inferior wall. Severely enlarged V with reduced RVSF. Severely dilated LA and RA.  CXR with stable cardiomegaly and trace bilateral pleural effusions.    - continue asa, statin  - continue labetalol and nifedipine    - continue HD per nephro; UF on 7/15 with removal of 1.5L volume; HD planned for 7/16   - will need C this admission     Preliminary note. Recommendations not finalized until attending attestation     Jimenez Rick MD  PGY3    This patient was seen and examined personally by me and the plan was discussed with the fellow and/or resident above. Amendments were made as necessary to the above. Agree with the excellent note and plan above. 64M w CAD s/p CABG, severe AS s/p AVR, ESRD, HCV, HTN here w SOB, GALLAGHER. + newly reduced EF w WMA. Trop 518 - 488, ECG SB, LVH. TTE EF 35%. LHC pending, GDMT.    Marlon Hopkins MD, MPhil, [facility]  Cardiologist, [facility]  ; [employer] and [facility]  Email: [email or web address] on-service contact/call information, go to [email or web address] and use "[email or web address]" to login.  Outpatient Cardiology appointments, call [phone] to arrange with a colleague; I do not have outpatient Cardiology clinic.

## 2024-07-15 NOTE — PROGRESS NOTE ADULT - ASSESSMENT
64-yo M PMHX of CAD and severe AS s/p AVR and CABG x2 RSVG-Ramus, RSVG-OM1 (2021), ESRD on HD , Hep C s/p treatment, latent TB, HTN, who presents to ED w/ SOB and neck/back/leg soreness w/ fall.    #ESRD  - On HD TTS for over 10 year (at [facility] , Access: LUE fistula , Outpatient Nephrologist: Dr. Valdovinos ). Last outpatient HD Session: 7/13. Patient sessions are usually 3 hours in length. Patient does not make urine.   - BNP found to be 320, 912. CXR with trace bilateral effusions.   - ECHO read pending.   - Discussed with the patient that he will require RRT/HD, risks and benefits associated with RRT/HD explained at length. Consented for continuation of HD inpatient and placed into chart.  - UF session additional today 7/15, plan for 1.5L removal as tolerated.   - Next HD session tomorrow 7/16.     #MBD  - Phos goal: 3.5-5.5. Monitor serum phosphorus daily.  - Continue with phos binder phoslo 667 TID with meals.   - C/w sensipar 30mg daily   - Check PTH and phos.       Feel free to contact me via TEAMS.     Heidi Walters MD, PGY-4  Nephrology Fellow

## 2024-07-15 NOTE — PROGRESS NOTE ADULT - PROBLEM SELECTOR PLAN 4
- Pt w/ hx of ESRD on HD, T/Th/Sat. Last HD 7/13 on Sat  - Continue home Cincalcet, Calcium tabs  - Nephrology consult for HD - Continue home Labetalol 600 mg TID and Nifedipine 90 mg BID with hold parameters

## 2024-07-15 NOTE — PROGRESS NOTE ADULT - ATTENDING COMMENTS
64M PMHX of CAD and severe AS s/p AVR and CABG x2 RSVG-Ramus, RSVG-OM1 (2021), ESRD on HD, Hep C s/p treatment, latent TB, HTN, who presents to ED w/ SOB and neck/back/leg soreness w/ fall 2 days ago due to weakness, elevated BNP and troponin w/ c/f CHF.   Pt with worsening SOB this AM, seen at HD on 2LNC, airways clear, no rales, no LE edema.   TTE now showing: mod-severe decreased LV systolic function Ef 35% with regional WMA c/w ischemic heart disease, and enlarged RV size and decreased systolic function -- all this is new from prior TTE in mid 2023    - renal plannign extra session of UF today, HD tomorrow   - will consult cardiology for ischemic eval given above   - small abdominal hernia on exam, +TTP, but reproducible - non surgical at this time   - monitor symptoms, wean O2 64M PMHX of CAD and severe AS s/p AVR and CABG x2 RSVG-Ramus, RSVG-OM1 (2021), ESRD on HD, Hep C s/p treatment, latent TB, HTN, who presents to ED w/ SOB and neck/back/leg soreness w/ fall 2 days ago due to weakness, elevated BNP and troponin w/ c/f CHF.   Pt with worsening SOB this AM, seen at HD on 2LNC, airways clear, no rales, no LE edema.   TTE now showing: mod-severe decreased LV systolic function Ef 35% with regional WMA c/w ischemic heart disease, and enlarged RV size and decreased systolic function -- all this is new from prior TTE in mid 2023    - renal planning extra session of UF today, HD tomorrow   - will consult cardiology for ischemic eval given above   - small abdominal hernia on exam, +TTP, but reproducible - non surgical at this time   - monitor symptoms, wean O2

## 2024-07-15 NOTE — PROGRESS NOTE ADULT - SUBJECTIVE AND OBJECTIVE BOX
***************************************************************  Asad Cooper  (PGY1) Internal Medicine  On TEAMS  ***************************************************************    PROGRESS NOTE:     Patient is a 64y old  Male who presents with a chief complaint of SOB, Leg weakness (14 Jul 2024 10:12)      INTERVAL EVENTS:   SUBJECTIVE / OVERNIGHT EVENTS: No acute overnight events. Patient was seen and examined by the bedside this AM.   OXYGEN:   TELEMETRY:       MEDICATIONS  (STANDING):  aspirin enteric coated 81 milliGRAM(s) Oral daily  atorvastatin 80 milliGRAM(s) Oral at bedtime  calcium acetate 667 milliGRAM(s) Oral three times a day with meals  cinacalcet 30 milliGRAM(s) Oral daily  heparin   Injectable 5000 Unit(s) SubCutaneous every 12 hours  labetalol 600 milliGRAM(s) Oral three times a day  NIFEdipine XL 90 milliGRAM(s) Oral two times a day    MEDICATIONS  (PRN):  acetaminophen     Tablet .. 650 milliGRAM(s) Oral every 6 hours PRN Temp greater or equal to 38C (100.4F), Mild Pain (1 - 3)  melatonin 3 milliGRAM(s) Oral at bedtime PRN Insomnia      CAPILLARY BLOOD GLUCOSE        I&O's Summary    14 Jul 2024 07:01  -  15 Jul 2024 07:00  --------------------------------------------------------  IN: 740 mL / OUT: 0 mL / NET: 740 mL        PHYSICAL EXAM:  Vital Signs Last 24 Hrs  T(C): 36.4 (15 Jul 2024 04:56), Max: 36.7 (14 Jul 2024 20:39)  T(F): 97.5 (15 Jul 2024 04:56), Max: 98 (14 Jul 2024 20:39)  HR: 57 (15 Jul 2024 04:56) (55 - 62)  BP: 124/72 (15 Jul 2024 04:56) (124/72 - 165/58)  BP(mean): --  RR: 18 (15 Jul 2024 04:56) (16 - 18)  SpO2: 91% (15 Jul 2024 04:56) (91% - 98%)    Parameters below as of 15 Jul 2024 04:56  Patient On (Oxygen Delivery Method): room air        General : NAD  CV: RRR no R/M/G  Lungs: CTAB  Abd : Soft, non-tender, non-distended  Extremities : No LE Edema  Neuro : A&Ox3  Skin: Normal color and turgor    LABS:                        11.4   9.49  )-----------( 171      ( 15 Jul 2024 06:56 )             35.0     07-15    136  |  95<L>  |  30<H>  ----------------------------<  95  4.2   |  27  |  6.71<H>    Ca    9.4      15 Jul 2024 06:58  Phos  5.0     07-15  Mg     2.0     07-15    TPro  6.5  /  Alb  3.3  /  TBili  1.1  /  DBili  x   /  AST  19  /  ALT  9<L>  /  AlkPhos  119  07-14      CARDIAC MARKERS ( 13 Jul 2024 19:24 )  x     / x     / 168 U/L / x     / 3.7 ng/mL      Urinalysis Basic - ( 15 Jul 2024 06:58 )    Color: x / Appearance: x / SG: x / pH: x  Gluc: 95 mg/dL / Ketone: x  / Bili: x / Urobili: x   Blood: x / Protein: x / Nitrite: x   Leuk Esterase: x / RBC: x / WBC x   Sq Epi: x / Non Sq Epi: x / Bacteria: x          COORDINATION OF CARE:  Care Discussed with Consultants/Other Providers [Y/N]:  Prior or Outpatient Records Reviewed [Y/N]: ***************************************************************  Asad Cooper  (PGY1) Internal Medicine  On TEAMS  ***************************************************************    PROGRESS NOTE:     Patient is a 64y old  Male who presents with a chief complaint of SOB, Leg weakness (14 Jul 2024 10:12)      INTERVAL EVENTS: SOB this am and was placed on 2L NC  SUBJECTIVE / OVERNIGHT EVENTS: No acute overnight events. Patient was seen and examined by the bedside this AM. This am, increased WOB and was just placed on 2LNC. He does not endorse any improvement with oxygen.   OXYGEN:   TELEMETRY:       MEDICATIONS  (STANDING):  aspirin enteric coated 81 milliGRAM(s) Oral daily  atorvastatin 80 milliGRAM(s) Oral at bedtime  calcium acetate 667 milliGRAM(s) Oral three times a day with meals  cinacalcet 30 milliGRAM(s) Oral daily  heparin   Injectable 5000 Unit(s) SubCutaneous every 12 hours  labetalol 600 milliGRAM(s) Oral three times a day  NIFEdipine XL 90 milliGRAM(s) Oral two times a day    MEDICATIONS  (PRN):  acetaminophen     Tablet .. 650 milliGRAM(s) Oral every 6 hours PRN Temp greater or equal to 38C (100.4F), Mild Pain (1 - 3)  melatonin 3 milliGRAM(s) Oral at bedtime PRN Insomnia      CAPILLARY BLOOD GLUCOSE        I&O's Summary    14 Jul 2024 07:01  -  15 Jul 2024 07:00  --------------------------------------------------------  IN: 740 mL / OUT: 0 mL / NET: 740 mL        PHYSICAL EXAM:  Vital Signs Last 24 Hrs  T(C): 36.4 (15 Jul 2024 04:56), Max: 36.7 (14 Jul 2024 20:39)  T(F): 97.5 (15 Jul 2024 04:56), Max: 98 (14 Jul 2024 20:39)  HR: 57 (15 Jul 2024 04:56) (55 - 62)  BP: 124/72 (15 Jul 2024 04:56) (124/72 - 165/58)  BP(mean): --  RR: 18 (15 Jul 2024 04:56) (16 - 18)  SpO2: 91% (15 Jul 2024 04:56) (91% - 98%)    Parameters below as of 15 Jul 2024 04:56  Patient On (Oxygen Delivery Method): room air        General : NAD  CV: RRR no R/M/G  Lungs: CTAB  Abd : Soft, non-tender, non-distended  Extremities : No LE Edema  Neuro : A&Ox3  Skin: Normal color and turgor    LABS:                        11.4   9.49  )-----------( 171      ( 15 Jul 2024 06:56 )             35.0     07-15    136  |  95<L>  |  30<H>  ----------------------------<  95  4.2   |  27  |  6.71<H>    Ca    9.4      15 Jul 2024 06:58  Phos  5.0     07-15  Mg     2.0     07-15    TPro  6.5  /  Alb  3.3  /  TBili  1.1  /  DBili  x   /  AST  19  /  ALT  9<L>  /  AlkPhos  119  07-14      CARDIAC MARKERS ( 13 Jul 2024 19:24 )  x     / x     / 168 U/L / x     / 3.7 ng/mL      Urinalysis Basic - ( 15 Jul 2024 06:58 )    Color: x / Appearance: x / SG: x / pH: x  Gluc: 95 mg/dL / Ketone: x  / Bili: x / Urobili: x   Blood: x / Protein: x / Nitrite: x   Leuk Esterase: x / RBC: x / WBC x   Sq Epi: x / Non Sq Epi: x / Bacteria: x          COORDINATION OF CARE:  Care Discussed with Consultants/Other Providers [Y/N]:  Prior or Outpatient Records Reviewed [Y/N]:

## 2024-07-15 NOTE — PROGRESS NOTE ADULT - ATTENDING COMMENTS
a/w sob, s/p fall, elevated BNP  #ESRD on HD  on TTHSAT  plan UF today to see if improvement in SOB  the HD tuesday   #access- avf  #cardiac echo  #anemia in ckd  confirm outpt jason dose  monitor hb trend  #hyperphosphatemia  on phos binder, continue  monitor phos trend  #secondary hyperpth renal origin  cont sensipar  monitor pth  monitor calcium trends a/w sob, s/p fall, elevated BNP  #ESRD on HD  on TTHSAT  plan UF today to see if improvement in SOB  the HD tuesday   #access- avf  #cardiac echo  #anemia in ckd  confirm outpt jason dose  monitor hb trend  #hyperphosphatemia  on phos binder, continue  monitor phos trend  #secondary hyperpth renal origin  cont sensipar  monitor pth  monitor calcium trends  #pain at periumbilical hernia on palpation- follow up per med team

## 2024-07-15 NOTE — PROGRESS NOTE ADULT - PROBLEM SELECTOR PLAN 2
- Pt reporting LE weakness w/ mechanical fall. RVP negative, CK wnl. Hgb wnl.   - May be 2/2 edema previously however pt w/o physical exam signs c/f volume OL at this time. CT cervical spine w/ subluxation and foraminal narrowing of C5-C6 however given how high this finding is, would lead to more generalized weakness and not primarily LE weakness. Suspect weakness might be due to debility, chronic process.   - Cardiac work-up as above  - T4/TSH, B12, Folate testing  - PT optimization  - If patient has worsening leg weakness, will consider MRI to r/u spinal cord involvement. - Pt reporting LE weakness w/ mechanical fall. RVP negative, CK wnl. Hgb wnl.   - May be 2/2 edema previously however pt w/o physical exam signs c/f volume OL at this time. CT cervical spine w/ subluxation and foraminal narrowing of C5-C6 however given how high this finding is, would lead to more generalized weakness and not primarily LE weakness. Suspect weakness might be due to debility, chronic process.   - Cardiac work-up as above  - T4/TSH, B12, Folate wnl    - PT optimization (Home PT)  - If patient has worsening leg weakness, will consider MRI to r/u spinal cord involvement.

## 2024-07-16 DIAGNOSIS — I50.9 HEART FAILURE, UNSPECIFIED: ICD-10-CM

## 2024-07-16 LAB
ANION GAP SERPL CALC-SCNC: 15 MMOL/L — SIGNIFICANT CHANGE UP (ref 5–17)
BUN SERPL-MCNC: 37 MG/DL — HIGH (ref 7–23)
CALCIUM SERPL-MCNC: 9.1 MG/DL — SIGNIFICANT CHANGE UP (ref 8.4–10.5)
CALCIUM SERPL-MCNC: 9.4 MG/DL — SIGNIFICANT CHANGE UP (ref 8.4–10.5)
CHLORIDE SERPL-SCNC: 95 MMOL/L — LOW (ref 96–108)
CO2 SERPL-SCNC: 24 MMOL/L — SIGNIFICANT CHANGE UP (ref 22–31)
CREAT SERPL-MCNC: 7.39 MG/DL — HIGH (ref 0.5–1.3)
EGFR: 8 ML/MIN/1.73M2 — LOW
GLUCOSE SERPL-MCNC: 82 MG/DL — SIGNIFICANT CHANGE UP (ref 70–99)
HCT VFR BLD CALC: 33.8 % — LOW (ref 39–50)
HGB BLD-MCNC: 11.4 G/DL — LOW (ref 13–17)
MAGNESIUM SERPL-MCNC: 2.1 MG/DL — SIGNIFICANT CHANGE UP (ref 1.6–2.6)
MCHC RBC-ENTMCNC: 25 PG — LOW (ref 27–34)
MCHC RBC-ENTMCNC: 33.7 GM/DL — SIGNIFICANT CHANGE UP (ref 32–36)
MCV RBC AUTO: 74.1 FL — LOW (ref 80–100)
NRBC # BLD: 0 /100 WBCS — SIGNIFICANT CHANGE UP (ref 0–0)
PHOSPHATE SERPL-MCNC: 5.3 MG/DL — HIGH (ref 2.5–4.5)
PLATELET # BLD AUTO: 193 K/UL — SIGNIFICANT CHANGE UP (ref 150–400)
POTASSIUM SERPL-MCNC: 4.8 MMOL/L — SIGNIFICANT CHANGE UP (ref 3.5–5.3)
POTASSIUM SERPL-SCNC: 4.8 MMOL/L — SIGNIFICANT CHANGE UP (ref 3.5–5.3)
PTH-INTACT FLD-MCNC: 48 PG/ML — SIGNIFICANT CHANGE UP (ref 15–65)
RBC # BLD: 4.56 M/UL — SIGNIFICANT CHANGE UP (ref 4.2–5.8)
RBC # FLD: 18.8 % — HIGH (ref 10.3–14.5)
RPR SER-TITR: (no result)
RPR SERPL-ACNC: REACTIVE
SODIUM SERPL-SCNC: 134 MMOL/L — LOW (ref 135–145)
T PALLIDUM AB TITR SER: POSITIVE
WBC # BLD: 5.48 K/UL — SIGNIFICANT CHANGE UP (ref 3.8–10.5)
WBC # FLD AUTO: 5.48 K/UL — SIGNIFICANT CHANGE UP (ref 3.8–10.5)

## 2024-07-16 PROCEDURE — 93455 CORONARY ART/GRFT ANGIO S&I: CPT | Mod: 26

## 2024-07-16 PROCEDURE — 93567 NJX CAR CTH SPRVLV AORTGRPHY: CPT

## 2024-07-16 PROCEDURE — 99233 SBSQ HOSP IP/OBS HIGH 50: CPT | Mod: GC

## 2024-07-16 PROCEDURE — 99232 SBSQ HOSP IP/OBS MODERATE 35: CPT | Mod: GC

## 2024-07-16 PROCEDURE — 99233 SBSQ HOSP IP/OBS HIGH 50: CPT

## 2024-07-16 PROCEDURE — 99152 MOD SED SAME PHYS/QHP 5/>YRS: CPT

## 2024-07-16 RX ORDER — HYDRALAZINE HYDROCHLORIDE 50 MG/1
10 TABLET ORAL THREE TIMES A DAY
Refills: 0 | Status: DISCONTINUED | OUTPATIENT
Start: 2024-07-16 | End: 2024-07-17

## 2024-07-16 RX ORDER — CARVEDILOL PHOSPHATE 80 MG/1
12.5 CAPSULE, EXTENDED RELEASE ORAL EVERY 12 HOURS
Refills: 0 | Status: DISCONTINUED | OUTPATIENT
Start: 2024-07-16 | End: 2024-07-19

## 2024-07-16 RX ADMIN — CARVEDILOL PHOSPHATE 12.5 MILLIGRAM(S): 80 CAPSULE, EXTENDED RELEASE ORAL at 18:45

## 2024-07-16 RX ADMIN — ATORVASTATIN CALCIUM 80 MILLIGRAM(S): 20 TABLET, FILM COATED ORAL at 21:59

## 2024-07-16 RX ADMIN — CALCIUM ACETATE 667 MILLIGRAM(S): 667 CAPSULE ORAL at 08:32

## 2024-07-16 RX ADMIN — ASPIRIN 81 MILLIGRAM(S): 325 TABLET, FILM COATED ORAL at 14:02

## 2024-07-16 RX ADMIN — HYDRALAZINE HYDROCHLORIDE 10 MILLIGRAM(S): 50 TABLET ORAL at 21:59

## 2024-07-16 RX ADMIN — CALCIUM ACETATE 667 MILLIGRAM(S): 667 CAPSULE ORAL at 14:02

## 2024-07-16 RX ADMIN — HEPARIN SODIUM 5000 UNIT(S): 50 INJECTION, SOLUTION INTRAVENOUS at 05:52

## 2024-07-16 RX ADMIN — Medication 90 MILLIGRAM(S): at 05:53

## 2024-07-16 RX ADMIN — CINACALCET HYDROCHLORIDE 30 MILLIGRAM(S): 60 TABLET, COATED ORAL at 14:02

## 2024-07-16 RX ADMIN — HYDRALAZINE HYDROCHLORIDE 10 MILLIGRAM(S): 50 TABLET ORAL at 14:03

## 2024-07-16 RX ADMIN — CALCIUM ACETATE 667 MILLIGRAM(S): 667 CAPSULE ORAL at 18:45

## 2024-07-16 NOTE — PROGRESS NOTE ADULT - PROBLEM SELECTOR PLAN 5
- Continue home Labetalol 600 mg TID and Nifedipine 90 mg BID with hold parameters - Pt w/ elevated troponin 400-500's on admit w/o chest pain. ECG w/ LVH w/ ST and T-wave changes c/w LVH.   - Pt w/ prior troponins 400-700's, likely elevated in s/o ESRD. Downtrended.   - CTM for chest pain    - [facility] as above

## 2024-07-16 NOTE — PROGRESS NOTE ADULT - SUBJECTIVE AND OBJECTIVE BOX
***************************************************************  Asad Cooper  (PGY1) Internal Medicine  On TEAMS  ***************************************************************    PROGRESS NOTE:     Patient is a 64y old  Male who presents with a chief complaint of dyspnea on exertion; generalized weakness (15 Jul 2024 14:50)      INTERVAL EVENTS:   SUBJECTIVE / OVERNIGHT EVENTS: No acute overnight events. Patient was seen and examined by the bedside this AM.   OXYGEN:   TELEMETRY:       MEDICATIONS  (STANDING):  aspirin enteric coated 81 milliGRAM(s) Oral daily  atorvastatin 80 milliGRAM(s) Oral at bedtime  calcium acetate 667 milliGRAM(s) Oral three times a day with meals  cinacalcet 30 milliGRAM(s) Oral daily  heparin   Injectable 5000 Unit(s) SubCutaneous every 12 hours  ibuprofen  Tablet. 400 milliGRAM(s) Oral once  labetalol 600 milliGRAM(s) Oral three times a day  NIFEdipine XL 90 milliGRAM(s) Oral two times a day    MEDICATIONS  (PRN):  acetaminophen     Tablet .. 650 milliGRAM(s) Oral every 6 hours PRN Temp greater or equal to 38C (100.4F), Mild Pain (1 - 3)  melatonin 3 milliGRAM(s) Oral at bedtime PRN Insomnia      CAPILLARY BLOOD GLUCOSE        I&O's Summary    15 Jul 2024 07:01  -  16 Jul 2024 07:00  --------------------------------------------------------  IN: 660 mL / OUT: 1500 mL / NET: -840 mL        PHYSICAL EXAM:  Vital Signs Last 24 Hrs  T(C): 36.3 (16 Jul 2024 04:43), Max: 36.7 (15 Jul 2024 11:40)  T(F): 97.4 (16 Jul 2024 04:43), Max: 98 (15 Jul 2024 11:40)  HR: 59 (16 Jul 2024 04:43) (53 - 62)  BP: 131/63 (16 Jul 2024 04:43) (120/62 - 137/70)  BP(mean): --  RR: 18 (16 Jul 2024 04:43) (18 - 18)  SpO2: 94% (16 Jul 2024 04:43) (90% - 97%)    Parameters below as of 16 Jul 2024 04:43  Patient On (Oxygen Delivery Method): nasal cannula        General : NAD  CV: RRR no R/M/G  Lungs: CTAB  Abd : Soft, non-tender, non-distended  Extremities : No LE Edema  Neuro : A&Ox3  Skin: Normal color and turgor    LABS:                        11.4   5.48  )-----------( 193      ( 16 Jul 2024 06:20 )             33.8     07-16    134<L>  |  95<L>  |  37<H>  ----------------------------<  82  4.8   |  24  |  7.39<H>    Ca    9.4      16 Jul 2024 06:20  Phos  5.3     07-16  Mg     2.1     07-16            Urinalysis Basic - ( 16 Jul 2024 06:20 )    Color: x / Appearance: x / SG: x / pH: x  Gluc: 82 mg/dL / Ketone: x  / Bili: x / Urobili: x   Blood: x / Protein: x / Nitrite: x   Leuk Esterase: x / RBC: x / WBC x   Sq Epi: x / Non Sq Epi: x / Bacteria: x          COORDINATION OF CARE:  Care Discussed with Consultants/Other Providers [Y/N]:  Prior or Outpatient Records Reviewed [Y/N]: ***************************************************************  Asad Cooper  (PGY1) Internal Medicine  On TEAMS  ***************************************************************    PROGRESS NOTE:     Patient is a 64y old  Male who presents with a chief complaint of dyspnea on exertion; generalized weakness (15 Jul 2024 14:50)      INTERVAL EVENTS:   SUBJECTIVE / OVERNIGHT EVENTS: No acute overnight events. Patient was seen and examined by the bedside this AM.   OXYGEN:   TELEMETRY:       MEDICATIONS  (STANDING):  aspirin enteric coated 81 milliGRAM(s) Oral daily  atorvastatin 80 milliGRAM(s) Oral at bedtime  calcium acetate 667 milliGRAM(s) Oral three times a day with meals  cinacalcet 30 milliGRAM(s) Oral daily  heparin   Injectable 5000 Unit(s) SubCutaneous every 12 hours  ibuprofen  Tablet. 400 milliGRAM(s) Oral once  labetalol 600 milliGRAM(s) Oral three times a day  NIFEdipine XL 90 milliGRAM(s) Oral two times a day    MEDICATIONS  (PRN):  acetaminophen     Tablet .. 650 milliGRAM(s) Oral every 6 hours PRN Temp greater or equal to 38C (100.4F), Mild Pain (1 - 3)  melatonin 3 milliGRAM(s) Oral at bedtime PRN Insomnia      CAPILLARY BLOOD GLUCOSE        I&O's Summary    15 Jul 2024 07:01  -  16 Jul 2024 07:00  --------------------------------------------------------  IN: 660 mL / OUT: 1500 mL / NET: -840 mL        PHYSICAL EXAM:  Vital Signs Last 24 Hrs  T(C): 36.3 (16 Jul 2024 04:43), Max: 36.7 (15 Jul 2024 11:40)  T(F): 97.4 (16 Jul 2024 04:43), Max: 98 (15 Jul 2024 11:40)  HR: 59 (16 Jul 2024 04:43) (53 - 62)  BP: 131/63 (16 Jul 2024 04:43) (120/62 - 137/70)  BP(mean): --  RR: 18 (16 Jul 2024 04:43) (18 - 18)  SpO2: 94% (16 Jul 2024 04:43) (90% - 97%)    Parameters below as of 16 Jul 2024 04:43  Patient On (Oxygen Delivery Method): nasal cannula        General : NAD  CV: RRR no R/M/G  Lungs: CTAB  Abd : Soft, non-tender, non-distended  Extremities : No LE Edema  Neuro : A&Ox3  Skin: Normal color and turgor. AVF in left arm good thrill.     LABS:                        11.4   5.48  )-----------( 193      ( 16 Jul 2024 06:20 )             33.8     07-16    134<L>  |  95<L>  |  37<H>  ----------------------------<  82  4.8   |  24  |  7.39<H>    Ca    9.4      16 Jul 2024 06:20  Phos  5.3     07-16  Mg     2.1     07-16            Urinalysis Basic - ( 16 Jul 2024 06:20 )    Color: x / Appearance: x / SG: x / pH: x  Gluc: 82 mg/dL / Ketone: x  / Bili: x / Urobili: x   Blood: x / Protein: x / Nitrite: x   Leuk Esterase: x / RBC: x / WBC x   Sq Epi: x / Non Sq Epi: x / Bacteria: x          COORDINATION OF CARE:  Care Discussed with Consultants/Other Providers [Y/N]:  Prior or Outpatient Records Reviewed [Y/N]:

## 2024-07-16 NOTE — PROGRESS NOTE ADULT - PROBLEM SELECTOR PLAN 1
- Pt w/ 2 months of SOB however not hypoxic in ED. No fevers, cough, RVP negative. CXR on admit w/ stable cardiomegaly and b/l pleural effusions. Last Echo in 2023 w/ EF 60%'s, however elevated BNP on admit (likely 2/2 ESRD).    - Pt not appearing volume overloaded, w/o edema or crackles. Per pt report not producing urine.   - TTE showing mod-severely reduced LVSF with EF 35%, regional WMA in septum and inferior wall. Severely enlarged V with reduced RVSF. Severely dilated LA and RA.    Plan:  - [facility] tentatively planned for 07/16  - start coreg 12.5 mg bid and hydralazine 10 mg tid; uptitrate as BP tolerates    - Stop Labatelol and Nifedipine  - unable to tolerate ARNI/MRA/SGLT2i 2/2 renal function   - Defer diuretics as pt not producing urine

## 2024-07-16 NOTE — PROGRESS NOTE ADULT - PROBLEM SELECTOR PLAN 6
- Pt w/ elevated troponin 400-500's on admit w/o chest pain. ECG w/ LVH w/ ST and T-wave changes c/w LVH.   - Pt w/ prior troponins 400-700's, likely elevated in s/o ESRD. Downtrended.   - CTM for chest pain DVT PPx: Heparin 5000 mg SubQ BID  Diet: Renal Diet  Bowel Regimen: PRN  Code: Full  Dispo: PT/OT due to fall  Pharmacy:  PCP:   Communication:

## 2024-07-16 NOTE — PROGRESS NOTE ADULT - PROBLEM SELECTOR PLAN 4
- Pt w/ hx of ESRD on HD, T/Th/Sat. Last HD 7/13 on Sat    - Plan for ultrafiltration today 07/15  - check PTH   - Continue home Cincalcet, Calcium tabs  - Nephrology following for HD  - Phoslo 667 TID w/ meals  - daily phos level - start coreg 12.5 mg bid and hydralazine 10 mg tid; uptitrate as BP tolerates

## 2024-07-16 NOTE — PROGRESS NOTE ADULT - ASSESSMENT
64M with CAD, severe AS s/p AVR, CABG x 2 RSVG-Ramus, RSVG-OM! (2021), ESRD on HD, HepC s/p tx, latent TB, HTN, presenting with SOB, leg heaviness, and difficulty with ambulation over the last 2 months, admitted for dyspnea on exertion and generalize weakness. Cardiology consulted for ischemic evaluation iso newly reduced EF with WMA.    #Newly reduced EF with WMA  Appears euvolemic on exam.  Trop 518 --> 488  BNP 320k  EKG sinus billy to 57; LVH with QRS widening and repolarization abnormality. EKG in 2021 with LBBB  TTE showing mod-severely reduced LVSF with EF 35%, regional WMA in septum and inferior wall. Severely enlarged V with reduced RVSF. Severely dilated LA and RA.  CXR with stable cardiomegaly and trace bilateral pleural effusions.    - continue asa, statin  - dc nifedipine and labetalol  - start coreg 12.5 mg bid and hydralazine 10 mg tid; uptitrate as BP tolerates    - continue HD per nephro  - LHC tentatively planned for 7/16    Preliminary note. Recommendations not finalized until attending attestation     Jimenez Rick MD  PGY3 64M with CAD, severe AS s/p AVR, CABG x 2 RSVG-Ramus, RSVG-OM! (2021), ESRD on HD, HepC s/p tx, latent TB, HTN, presenting with SOB, leg heaviness, and difficulty with ambulation over the last 2 months, admitted for dyspnea on exertion and generalize weakness. Cardiology consulted for ischemic evaluation iso newly reduced EF with WMA.    #Newly reduced EF with WMA  Appears euvolemic on exam.  Trop 518 --> 488  BNP 320k  EKG sinus billy to 57; LVH with QRS widening and repolarization abnormality. EKG in 2021 with LBBB  TTE showing mod-severely reduced LVSF with EF 35%, regional WMA in septum and inferior wall. Severely enlarged V with reduced RVSF. Severely dilated LA and RA.  CXR with stable cardiomegaly and trace bilateral pleural effusions.    - continue asa, statin  - dc nifedipine and labetalol  - start coreg 12.5 mg bid and hydralazine 10 mg tid; uptitrate as BP tolerates    - continue HD per nephro  - LHC tentatively planned for 7/16    Preliminary note. Recommendations not finalized until attending attestation     Jimenez Rick MD  PGY3    This patient was seen and examined personally by me and the plan was discussed with the fellow and/or resident above. Amendments were made as necessary to the above. Agree with the excellent note and plan above. LHC pending.    Marlon Hopkins MD, MPhil, [facility]  Cardiologist, [facility]  ; [place] [facility] and [facility]  Email: [email or web address] on-service contact/call information, go to [email or web address] and use [email or web address]" to login.  Outpatient Cardiology appointments, call [phone] to arrange with a colleague; I do not have outpatient Cardiology clinic.

## 2024-07-16 NOTE — PROGRESS NOTE ADULT - SUBJECTIVE AND OBJECTIVE BOX
Subjective/interval events:  No overnight events. Pt now reports chest pain only present with exertion, not present at rest. Still with some shortness of breath, however reports he is comfortable on 2L NC.    ROS negative except as above    Vital Signs Last 24 Hrs  T(C): 36.3 (16 Jul 2024 04:43), Max: 36.7 (15 Jul 2024 11:40)  T(F): 97.4 (16 Jul 2024 04:43), Max: 98 (15 Jul 2024 11:40)  HR: 59 (16 Jul 2024 04:43) (53 - 60)  BP: 131/63 (16 Jul 2024 04:43) (120/62 - 137/70)  BP(mean): --  RR: 18 (16 Jul 2024 04:43) (18 - 18)  SpO2: 94% (16 Jul 2024 04:43) (93% - 97%)    Parameters below as of 16 Jul 2024 04:43  Patient On (Oxygen Delivery Method): nasal cannula        MEDICATIONS  (STANDING):  aspirin enteric coated 81 milliGRAM(s) Oral daily  atorvastatin 80 milliGRAM(s) Oral at bedtime  calcium acetate 667 milliGRAM(s) Oral three times a day with meals  cinacalcet 30 milliGRAM(s) Oral daily  heparin   Injectable 5000 Unit(s) SubCutaneous every 12 hours  labetalol 600 milliGRAM(s) Oral three times a day  NIFEdipine XL 90 milliGRAM(s) Oral two times a day    MEDICATIONS  (PRN):  acetaminophen     Tablet .. 650 milliGRAM(s) Oral every 6 hours PRN Temp greater or equal to 38C (100.4F), Mild Pain (1 - 3)  melatonin 3 milliGRAM(s) Oral at bedtime PRN Insomnia      I&O's Summary    15 Jul 2024 07:01  -  16 Jul 2024 07:00  --------------------------------------------------------  IN: 660 mL / OUT: 1500 mL / NET: -840 mL    16 Jul 2024 07:01  -  16 Jul 2024 10:31  --------------------------------------------------------  IN: 240 mL / OUT: 0 mL / NET: 240 mL          PHYSICAL EXAM:  General: NAD, resting comfortably in bed  Neck: no JVD  Lungs: CTAB, normal WOB  Heart: RRR, no M/R/G  Extremities: LUE AVF palpable thrill. no lower extremity swelling   Neuro: AOx3    ECG:    TTE:    LABS:                        11.4   5.48  )-----------( 193      ( 16 Jul 2024 06:20 )             33.8     07-16    134<L>  |  95<L>  |  37<H>  ----------------------------<  82  4.8   |  24  |  7.39<H>    Ca    9.4      16 Jul 2024 06:20  Phos  5.3     07-16  Mg     2.1     07-16            Creatinine Trend: 7.39<--, 6.71<--, 5.19<--, 4.55<--  I&O's Summary    15 Jul 2024 07:01  -  16 Jul 2024 07:00  --------------------------------------------------------  IN: 660 mL / OUT: 1500 mL / NET: -840 mL    16 Jul 2024 07:01  -  16 Jul 2024 10:31  --------------------------------------------------------  IN: 240 mL / OUT: 0 mL / NET: 240 mL      BNP  RADIOLOGY & ADDITIONAL STUDIES:

## 2024-07-16 NOTE — PROGRESS NOTE ADULT - ATTENDING COMMENTS
a/w sob, s/p fall, elevated BNP  undergoing cardiac workup  #ESRD on HD  on HD TTHSAT  UF 7/15, no major improvement in sob   plan HD today   #access- avf  #anemia in ckd  confirm outpt jason dose  JASON on hold for now  hb at goal  monitor hb trend  #hyperphosphatemia  on phos binder, continue  monitor phos trend  #secondary hyperpth renal origin  cont sensipar  monitor pth  monitor calcium trends

## 2024-07-16 NOTE — PROGRESS NOTE ADULT - ATTENDING COMMENTS
64M PMHX of CAD and severe AS s/p AVR and CABG x2 RSVG-Ramus, RSVG-OM1 (2021), ESRD on HD, Hep C s/p treatment, latent TB, HTN, who presents to ED w/ SOB and neck/back/leg soreness w/ fall 2 days ago due to weakness, elevated BNP and troponin, TTE showing new decreased LF systolic dysfunction with EF 35% and regional WMA c/f ischemia; now pending LHC.   Still feels SOB, no acute chest pain, no abd pain, tolerated UF yest.    - SOB likely 2/2 new heart failure and ischemic disease  - cardio following, planning for LHC today 7/16  - c/w ASA, statin for CAD  - start coreg and hydralazine for GDMT; unable to tolerate ARNI/MRA/SGLT2i 2/2 renal function   - c/w HD per renal   - small abdominal hernia on exam, +TTP, but reproducible - non surgical at this time   - monitor symptoms, wean O2.

## 2024-07-16 NOTE — PROGRESS NOTE ADULT - ASSESSMENT
64-yo M PMHX of CAD and severe AS s/p AVR and CABG x2 RSVG-Ramus, RSVG-OM1 (2021), ESRD on HD , Hep C s/p treatment, latent TB, HTN, who presents to ED w/ SOB and neck/back/leg soreness w/ fall.    #ESRD  - On HD TTS for over 10 year (at [facility] , Access: LUE fistula , Outpatient Nephrologist: Dr. Valdovinos ). Last outpatient HD Session: 7/13. Patient sessions are usually 3 hours in length. Patient does not make urine.   - BNP found to be 320, 912. CXR with trace bilateral effusions.   - ECHO 7/15: LEF 35% with RWMA c/f ischemic disease. LV diastolic dysfunction present. RV severely dilated with reduced function. LA and RA severe dilation. Cardiology to follow up.   - Discussed with the patient that he will require RRT/HD, risks and benefits associated with RRT/HD explained at length. Consented for continuation of HD inpatient and placed into chart.  - UF 7/15 1.5L removal as tolerated.   - HD today 7/16 with 1.5L fluid removal     #MBD  - Phos goal: 3.5-5.5. Monitor serum phosphorus daily.  - Continue with phos binder phoslo 667 TID with meals.   - C/w sensipar 30mg daily   - Check PTH and phos.       Feel free to contact me via TEAMS.     Heidi Walters MD, PGY-4  Nephrology Fellow      64-yo M PMHX of CAD and severe AS s/p AVR and CABG x2 RSVG-Ramus, RSVG-OM1 (2021), ESRD on HD , Hep C s/p treatment, latent TB, HTN, who presents to ED w/ SOB and neck/back/leg soreness w/ fall.    #ESRD  - On HD TTS for over 10 year (at [facility] , Access: LUE fistula , Outpatient Nephrologist: Dr. Valdovinos ). Last outpatient HD Session: 7/13. Patient sessions are usually 3 hours in length. Patient does not make urine.   - BNP found to be 320, 912. CXR with trace bilateral effusions.   - ECHO 7/15: LEF 35% with RWMA c/f ischemic disease. LV diastolic dysfunction present. RV severely dilated with reduced function. LA and RA severe dilation. Cardiology to follow up.   - Discussed with the patient that he will require RRT/HD, risks and benefits associated with RRT/HD explained at length. Consented for continuation of HD inpatient and placed into chart.  - UF 7/15 1.5L removal as tolerated.   - HD today 7/16 with 1.5L fluid removal     #MBD  - Phos goal: 3.5-5.5. Monitor serum phosphorus daily.  - Continue with phos binder phoslo 667 TID with meals.   - C/w sensipar 30mg daily   - Check PTH and phos.      Feel free to contact me via TEAMS.     Heidi Walters MD, PGY-4  Nephrology Fellow   After 5PM/Weekends/Holidays please contact the on call fellow.

## 2024-07-16 NOTE — PROGRESS NOTE ADULT - ASSESSMENT
64-yo M PMHX of CAD and severe AS s/p AVR and CABG x2 RSVG-Ramus, RSVG-OM1 (2021), ESRD on HD, Hep C s/p treatment, latent TB, HTN, who presents to ED w/ SOB and neck/back/leg soreness w/ fall 2 days ago due to weakness, elevated BNP and troponin w/ c/f CHF. Awaiting C now.

## 2024-07-16 NOTE — PROGRESS NOTE ADULT - PROBLEM SELECTOR PLAN 2
- Pt w/ 2 months of SOB however not hypoxic in ED. No fevers, cough, RVP negative. CXR on admit w/ stable cardiomegaly and b/l pleural effusions. Last Echo in 2023 w/ EF 60%'s, however elevated BNP on admit (likely 2/2 ESRD).    - Was evaluated by Cardiology in 2023 for chest pain and SOB after HD, thought to be "concerning for possible obstructive CAD vs microvascular disease exacerbated by volume shifts during HD and exertion"  - Pt not appearing volume overloaded, w/o edema or crackles. Per pt report not producing urine.   - c/f CHF w/ pleural effusions, SOB might be 2/2 effusions.     Plan:  - Pending TTE read  - Can consider CT chest if TTE wnl  - Defer diuretics as pt not producing urine  - Ultrafiltration today (07/15) per nephro - Pt reporting LE weakness w/ mechanical fall. RVP negative, CK wnl. Hgb wnl.   - May be 2/2 edema previously however pt w/o physical exam signs c/f volume OL at this time. CT cervical spine w/ subluxation and foraminal narrowing of C5-C6 however given how high this finding is, would lead to more generalized weakness and not primarily LE weakness. Suspect weakness might be due to debility, chronic process.   - Cardiac work-up as above  - T4/TSH, B12, Folate wnl    - PT optimization (Home PT)  - If patient has worsening leg weakness, will consider MRI to r/u spinal cord involvement.

## 2024-07-16 NOTE — PROGRESS NOTE ADULT - SUBJECTIVE AND OBJECTIVE BOX
[facility] Division of Kidney Diseases & Hypertension  FOLLOW UP NOTE  [phone]--------------------------------------------------------------------------------  Chief Complaint:Other form of dyspnea        24 hour events/subjective: No acute events overnight. Pt seen and examined at bedside this AM. Had UF session yesterday however states breathing is the same as prior to UF.        PAST HISTORY  --------------------------------------------------------------------------------  No significant changes to PMH, PSH, FHx, SHx from H&P unless otherwise noted.    ALLERGIES & MEDICATIONS  --------------------------------------------------------------------------------  Allergies    No Known Drug Allergies  adhesives (Other)    Intolerances      Standing Inpatient Medications  aspirin enteric coated 81 milliGRAM(s) Oral daily  atorvastatin 80 milliGRAM(s) Oral at bedtime  calcium acetate 667 milliGRAM(s) Oral three times a day with meals  carvedilol 12.5 milliGRAM(s) Oral every 12 hours  cinacalcet 30 milliGRAM(s) Oral daily  heparin   Injectable 5000 Unit(s) SubCutaneous every 12 hours  hydrALAZINE 10 milliGRAM(s) Oral three times a day    PRN Inpatient Medications  acetaminophen     Tablet .. 650 milliGRAM(s) Oral every 6 hours PRN  melatonin 3 milliGRAM(s) Oral at bedtime PRN      REVIEW OF SYSTEMS  --------------------------------------------------------------------------------    All other systems were reviewed and are negative, except as noted above.    VITALS/PHYSICAL EXAM  --------------------------------------------------------------------------------  T(C): 36.6 (07-16-24 @ 10:15), Max: 36.7 (07-15-24 @ 11:40)  HR: 56 (07-16-24 @ 10:15) (53 - 60)  BP: 104/65 (07-16-24 @ 10:15) (104/65 - 137/70)  RR: 17 (07-16-24 @ 10:15) (17 - 18)  SpO2: 99% (07-16-24 @ 10:15) (93% - 99%)  Wt(kg): --        07-15-24 @ 07:01  -  07-16-24 @ 07:00  --------------------------------------------------------  IN: 660 mL / OUT: 1500 mL / NET: -840 mL    07-16-24 @ 07:01  -  07-16-24 @ 11:11  --------------------------------------------------------  IN: 240 mL / OUT: 0 mL / NET: 240 mL      Physical Exam:  	Gen: NAD, well-appearing  	Pulm: L sided rales.   	CV: RRR, S1S2;  	Abd: +BS, soft, nontender/nondistended, +umbilical hernia with tenderness on palpation.   	: No suprapubic tenderness              Extremities: no bilateral LE edema noted.                Neuro: No focal deficits  	Skin: Warm, without rashes  	Vascular access: LUE fistula    LABS/STUDIES  --------------------------------------------------------------------------------              11.4   5.48  >-----------<  193      [07-16-24 @ 06:20]              33.8     134  |  95  |  37  ----------------------------<  82      [07-16-24 @ 06:20]  4.8   |  24  |  7.39        Ca     9.4     [07-16-24 @ 06:20]      Mg     2.1     [07-16-24 @ 06:20]      Phos  5.3     [07-16-24 @ 06:20]            Creatinine Trend:  SCr 7.39 [07-16 @ 06:20]  SCr 6.71 [07-15 @ 06:58]  SCr 5.19 [07-14 @ 06:00]  SCr 4.55 [07-13 @ 17:17]    Urinalysis - [07-16-24 @ 06:20]      Color  / Appearance  / SG  / pH       Gluc 82 / Ketone   / Bili  / Urobili        Blood  / Protein  / Leuk Est  / Nitrite       RBC  / WBC  / Hyaline  / Gran  / Sq Epi  / Non Sq Epi  / Bacteria       TSH 4.12      [07-14-24 @ 07:52]    HIV Nonreact      [07-14-24 @ 10:11]        IMAGING/RADIOLOGY:

## 2024-07-16 NOTE — PROGRESS NOTE ADULT - PROBLEM SELECTOR PLAN 3
- Pt reporting LE weakness w/ mechanical fall. RVP negative, CK wnl. Hgb wnl.   - May be 2/2 edema previously however pt w/o physical exam signs c/f volume OL at this time. CT cervical spine w/ subluxation and foraminal narrowing of C5-C6 however given how high this finding is, would lead to more generalized weakness and not primarily LE weakness. Suspect weakness might be due to debility, chronic process.   - Cardiac work-up as above  - T4/TSH, B12, Folate wnl    - PT optimization (Home PT)  - If patient has worsening leg weakness, will consider MRI to r/u spinal cord involvement. - Pt w/ hx of ESRD on HD, T/Th/Sat. Last HD 7/13 on Sat. PTH (07/16) wnl.    - Continue home Cincalcet, Calcium tabs  - Nephrology following for HD  - Phoslo 667 TID w/ meals  - daily phos level

## 2024-07-16 NOTE — PRE-OP CHECKLIST - IDENTIFICATION BAND VERIFIED
COVID Screen    Does Patient OR patient's household members have any of the following:    · Temperature: Fever greater than or equal to 100.4 F   No   · Respiratory symptoms: New or worsening cough, shortness of breath, or sore throat   No   · GI Symptoms: New onset of nausea, vomiting or diarrhea   No   · Miscellaneous: New onset of loss of taste or smell, chills, repeated shaking with chills, muscle pain or headache   No   · Contact with anyone who has tested positive or suspected of having covid?   No   · Travel in the last 14 days   No     If patient is scheduled for a non virtual appointment (in clinic):  • Patient asked to bring own mask if available or told a mask will be given at arrival to clinic   No   • Patient informed of visitor restrictions (if a visitor/chaperone is necessary please list name of person)   No            done

## 2024-07-17 LAB
ANION GAP SERPL CALC-SCNC: 14 MMOL/L — SIGNIFICANT CHANGE UP (ref 5–17)
BUN SERPL-MCNC: 24 MG/DL — HIGH (ref 7–23)
CALCIUM SERPL-MCNC: 9.5 MG/DL — SIGNIFICANT CHANGE UP (ref 8.4–10.5)
CHLORIDE SERPL-SCNC: 96 MMOL/L — SIGNIFICANT CHANGE UP (ref 96–108)
CO2 SERPL-SCNC: 26 MMOL/L — SIGNIFICANT CHANGE UP (ref 22–31)
CREAT SERPL-MCNC: 5.64 MG/DL — HIGH (ref 0.5–1.3)
EGFR: 10 ML/MIN/1.73M2 — LOW
GLUCOSE SERPL-MCNC: 87 MG/DL — SIGNIFICANT CHANGE UP (ref 70–99)
HCT VFR BLD CALC: 37.8 % — LOW (ref 39–50)
HGB BLD-MCNC: 11.9 G/DL — LOW (ref 13–17)
MAGNESIUM SERPL-MCNC: 2.1 MG/DL — SIGNIFICANT CHANGE UP (ref 1.6–2.6)
MCHC RBC-ENTMCNC: 24.2 PG — LOW (ref 27–34)
MCHC RBC-ENTMCNC: 31.5 GM/DL — LOW (ref 32–36)
MCV RBC AUTO: 77 FL — LOW (ref 80–100)
NRBC # BLD: 0 /100 WBCS — SIGNIFICANT CHANGE UP (ref 0–0)
PHOSPHATE SERPL-MCNC: 4.3 MG/DL — SIGNIFICANT CHANGE UP (ref 2.5–4.5)
PLATELET # BLD AUTO: 223 K/UL — SIGNIFICANT CHANGE UP (ref 150–400)
POTASSIUM SERPL-MCNC: 4.8 MMOL/L — SIGNIFICANT CHANGE UP (ref 3.5–5.3)
POTASSIUM SERPL-SCNC: 4.8 MMOL/L — SIGNIFICANT CHANGE UP (ref 3.5–5.3)
RBC # BLD: 4.91 M/UL — SIGNIFICANT CHANGE UP (ref 4.2–5.8)
RBC # FLD: 18.8 % — HIGH (ref 10.3–14.5)
SODIUM SERPL-SCNC: 136 MMOL/L — SIGNIFICANT CHANGE UP (ref 135–145)
WBC # BLD: 3.84 K/UL — SIGNIFICANT CHANGE UP (ref 3.8–10.5)
WBC # FLD AUTO: 3.84 K/UL — SIGNIFICANT CHANGE UP (ref 3.8–10.5)

## 2024-07-17 PROCEDURE — 99233 SBSQ HOSP IP/OBS HIGH 50: CPT

## 2024-07-17 PROCEDURE — 99222 1ST HOSP IP/OBS MODERATE 55: CPT

## 2024-07-17 PROCEDURE — 99232 SBSQ HOSP IP/OBS MODERATE 35: CPT | Mod: GC

## 2024-07-17 RX ORDER — ISOSORBIDE DINITRATE 5 MG/1
10 TABLET ORAL THREE TIMES A DAY
Refills: 0 | Status: DISCONTINUED | OUTPATIENT
Start: 2024-07-17 | End: 2024-07-18

## 2024-07-17 RX ORDER — HYDRALAZINE HYDROCHLORIDE 50 MG/1
50 TABLET ORAL THREE TIMES A DAY
Refills: 0 | Status: DISCONTINUED | OUTPATIENT
Start: 2024-07-17 | End: 2024-07-18

## 2024-07-17 RX ORDER — HYDRALAZINE HYDROCHLORIDE 50 MG/1
5 TABLET ORAL ONCE
Refills: 0 | Status: COMPLETED | OUTPATIENT
Start: 2024-07-17 | End: 2024-07-17

## 2024-07-17 RX ORDER — HYDRALAZINE HYDROCHLORIDE 50 MG/1
25 TABLET ORAL THREE TIMES A DAY
Refills: 0 | Status: DISCONTINUED | OUTPATIENT
Start: 2024-07-17 | End: 2024-07-17

## 2024-07-17 RX ADMIN — ISOSORBIDE DINITRATE 10 MILLIGRAM(S): 5 TABLET ORAL at 18:26

## 2024-07-17 RX ADMIN — CARVEDILOL PHOSPHATE 12.5 MILLIGRAM(S): 80 CAPSULE, EXTENDED RELEASE ORAL at 18:26

## 2024-07-17 RX ADMIN — HYDRALAZINE HYDROCHLORIDE 25 MILLIGRAM(S): 50 TABLET ORAL at 10:28

## 2024-07-17 RX ADMIN — ASPIRIN 81 MILLIGRAM(S): 325 TABLET, FILM COATED ORAL at 10:28

## 2024-07-17 RX ADMIN — ISOSORBIDE DINITRATE 10 MILLIGRAM(S): 5 TABLET ORAL at 11:31

## 2024-07-17 RX ADMIN — HYDRALAZINE HYDROCHLORIDE 5 MILLIGRAM(S): 50 TABLET ORAL at 21:12

## 2024-07-17 RX ADMIN — ATORVASTATIN CALCIUM 80 MILLIGRAM(S): 20 TABLET, FILM COATED ORAL at 19:58

## 2024-07-17 RX ADMIN — CALCIUM ACETATE 667 MILLIGRAM(S): 667 CAPSULE ORAL at 07:57

## 2024-07-17 RX ADMIN — HEPARIN SODIUM 5000 UNIT(S): 50 INJECTION, SOLUTION INTRAVENOUS at 06:05

## 2024-07-17 RX ADMIN — HEPARIN SODIUM 5000 UNIT(S): 50 INJECTION, SOLUTION INTRAVENOUS at 18:26

## 2024-07-17 RX ADMIN — HYDRALAZINE HYDROCHLORIDE 5 MILLIGRAM(S): 50 TABLET ORAL at 23:15

## 2024-07-17 RX ADMIN — CALCIUM ACETATE 667 MILLIGRAM(S): 667 CAPSULE ORAL at 10:31

## 2024-07-17 RX ADMIN — CALCIUM ACETATE 667 MILLIGRAM(S): 667 CAPSULE ORAL at 18:26

## 2024-07-17 RX ADMIN — HYDRALAZINE HYDROCHLORIDE 25 MILLIGRAM(S): 50 TABLET ORAL at 19:58

## 2024-07-17 RX ADMIN — CARVEDILOL PHOSPHATE 12.5 MILLIGRAM(S): 80 CAPSULE, EXTENDED RELEASE ORAL at 06:05

## 2024-07-17 RX ADMIN — HYDRALAZINE HYDROCHLORIDE 5 MILLIGRAM(S): 50 TABLET ORAL at 22:11

## 2024-07-17 RX ADMIN — CINACALCET HYDROCHLORIDE 30 MILLIGRAM(S): 60 TABLET, COATED ORAL at 10:31

## 2024-07-17 RX ADMIN — HYDRALAZINE HYDROCHLORIDE 10 MILLIGRAM(S): 50 TABLET ORAL at 06:05

## 2024-07-17 RX ADMIN — Medication 3 MILLIGRAM(S): at 20:54

## 2024-07-17 NOTE — PROGRESS NOTE ADULT - SUBJECTIVE AND OBJECTIVE BOX
Subjective/interval events:  [facility] showing severe 2V disease (RI and LCx).  Patient reports no chest pain at rest. Breathing comfortable on 1-2L NC.     ROS negative except as above    Vital Signs Last 24 Hrs  T(C): 36.5 (17 Jul 2024 05:47), Max: 36.7 (16 Jul 2024 14:45)  T(F): 97.7 (17 Jul 2024 05:47), Max: 98.1 (16 Jul 2024 14:45)  HR: 62 (17 Jul 2024 05:47) (58 - 68)  BP: 150/70 (17 Jul 2024 05:47) (128/61 - 162/65)  BP(mean): --  RR: 18 (17 Jul 2024 05:47) (14 - 18)  SpO2: 95% (17 Jul 2024 05:47) (95% - 100%)    Parameters below as of 17 Jul 2024 05:47  Patient On (Oxygen Delivery Method): nasal cannula        MEDICATIONS  (STANDING):  aspirin enteric coated 81 milliGRAM(s) Oral daily  atorvastatin 80 milliGRAM(s) Oral at bedtime  calcium acetate 667 milliGRAM(s) Oral three times a day with meals  carvedilol 12.5 milliGRAM(s) Oral every 12 hours  cinacalcet 30 milliGRAM(s) Oral daily  heparin   Injectable 5000 Unit(s) SubCutaneous every 12 hours  hydrALAZINE 25 milliGRAM(s) Oral three times a day    MEDICATIONS  (PRN):  acetaminophen     Tablet .. 650 milliGRAM(s) Oral every 6 hours PRN Temp greater or equal to 38C (100.4F), Mild Pain (1 - 3)  melatonin 3 milliGRAM(s) Oral at bedtime PRN Insomnia      I&O's Summary    16 Jul 2024 07:01  -  17 Jul 2024 07:00  --------------------------------------------------------  IN: 240 mL / OUT: 1500 mL / NET: -1260 mL    17 Jul 2024 07:01  -  17 Jul 2024 10:38  --------------------------------------------------------  IN: 360 mL / OUT: 0 mL / NET: 360 mL          PHYSICAL EXAM:  General: NAD, resting comfortably in bed  Neck: no JVD  Lungs: CTAB, normal WOB  Heart: RRR, no M/R/G  Extremities: LUE AVF; no lower extremity swelling   Neuro: AOx3    ECG:    TTE:    LABS:                        11.9   3.84  )-----------( 223      ( 17 Jul 2024 05:57 )             37.8     07-17    136  |  96  |  24<H>  ----------------------------<  87  4.8   |  26  |  5.64<H>    Ca    9.5      17 Jul 2024 05:57  Phos  4.3     07-17  Mg     2.1     07-17            Creatinine Trend: 5.64<--, 7.39<--, 6.71<--, 5.19<--, 4.55<--  I&O's Summary    16 Jul 2024 07:01  -  17 Jul 2024 07:00  --------------------------------------------------------  IN: 240 mL / OUT: 1500 mL / NET: -1260 mL    17 Jul 2024 07:01  -  17 Jul 2024 10:38  --------------------------------------------------------  IN: 360 mL / OUT: 0 mL / NET: 360 mL      BNP  RADIOLOGY & ADDITIONAL STUDIES:

## 2024-07-17 NOTE — PROGRESS NOTE ADULT - SUBJECTIVE AND OBJECTIVE BOX
[facility] Division of Kidney Diseases & Hypertension  FOLLOW UP NOTE  [phone]--------------------------------------------------------------------------------  Chief Complaint:Other form of dyspnea        24 hour events/subjective: No acute events overnight. Pt seen and examined at bedside this AM. C done 7/16 with 2v disease  s/p prior patent SVG grafts. Patient reports only SOB on exertion. s/p HD yesterday with 1.5L removed.         PAST HISTORY  --------------------------------------------------------------------------------  No significant changes to PMH, PSH, FHx, SHx from H&P unless otherwise noted.    ALLERGIES & MEDICATIONS  --------------------------------------------------------------------------------  Allergies    No Known Drug Allergies  adhesives (Other)    Intolerances      Standing Inpatient Medications  aspirin enteric coated 81 milliGRAM(s) Oral daily  atorvastatin 80 milliGRAM(s) Oral at bedtime  calcium acetate 667 milliGRAM(s) Oral three times a day with meals  carvedilol 12.5 milliGRAM(s) Oral every 12 hours  cinacalcet 30 milliGRAM(s) Oral daily  heparin   Injectable 5000 Unit(s) SubCutaneous every 12 hours  hydrALAZINE 25 milliGRAM(s) Oral three times a day  isosorbide   dinitrate Tablet (ISORDIL) 10 milliGRAM(s) Oral three times a day    PRN Inpatient Medications  acetaminophen     Tablet .. 650 milliGRAM(s) Oral every 6 hours PRN  melatonin 3 milliGRAM(s) Oral at bedtime PRN      REVIEW OF SYSTEMS  --------------------------------------------------------------------------------    All other systems were reviewed and are negative, except as noted above.    VITALS/PHYSICAL EXAM  --------------------------------------------------------------------------------  T(C): 36.5 (07-17-24 @ 05:47), Max: 36.7 (07-16-24 @ 14:45)  HR: 62 (07-17-24 @ 05:47) (58 - 68)  BP: 150/70 (07-17-24 @ 05:47) (128/61 - 162/65)  RR: 18 (07-17-24 @ 05:47) (14 - 18)  SpO2: 95% (07-17-24 @ 05:47) (95% - 100%)  Wt(kg): --  Height (cm): 185.4 (07-16-24 @ 14:45)  Weight (kg): 66 (07-16-24 @ 14:45)  BMI (kg/m2): 19.2 (07-16-24 @ 14:45)  BSA (m2): 1.88 (07-16-24 @ 14:45)      07-16-24 @ 07:01  -  07-17-24 @ 07:00  --------------------------------------------------------  IN: 240 mL / OUT: 1500 mL / NET: -1260 mL    07-17-24 @ 07:01  -  07-17-24 @ 11:35  --------------------------------------------------------  IN: 360 mL / OUT: 0 mL / NET: 360 mL      Physical Exam:  Gen: NAD, well-appearing  Pulm: CTA B/L  CV: RRR, S1S2;  Abd: +BS, soft, nontender/nondistended  : No suprapubic tenderness  Extremities: trace LE edema.   Neuro: No focal deficits  Skin: Warm, without rashes  Vascular access: LUE fistula    LABS/STUDIES  --------------------------------------------------------------------------------              11.9   3.84  >-----------<  223      [07-17-24 @ 05:57]              37.8     136  |  96  |  24  ----------------------------<  87      [07-17-24 @ 05:57]  4.8   |  26  |  5.64        Ca     9.5     [07-17-24 @ 05:57]      Mg     2.1     [07-17-24 @ 05:57]      Phos  4.3     [07-17-24 @ 05:57]            Creatinine Trend:  SCr 5.64 [07-17 @ 05:57]  SCr 7.39 [07-16 @ 06:20]  SCr 6.71 [07-15 @ 06:58]  SCr 5.19 [07-14 @ 06:00]  SCr 4.55 [07-13 @ 17:17]    Urinalysis - [07-17-24 @ 05:57]      Color  / Appearance  / SG  / pH       Gluc 87 / Ketone   / Bili  / Urobili        Blood  / Protein  / Leuk Est  / Nitrite       RBC  / WBC  / Hyaline  / Gran  / Sq Epi  / Non Sq Epi  / Bacteria       PTH -- (Ca 9.1)      [07-16-24 @ 06:20]   48  TSH 4.12      [07-14-24 @ 07:52]    HIV Nonreact      [07-14-24 @ 10:11]    Syphilis Screen (Treponema Pallidum Ab) Positive      [07-14-24 @ 10:11]  Syphilis Screen (RPR Titer) 1:2      [07-14-24 @ 10:11]      IMAGING/RADIOLOGY:

## 2024-07-17 NOTE — PROGRESS NOTE ADULT - PROBLEM SELECTOR PLAN 6
DVT PPx: Heparin 5000 mg SubQ BID  Diet: Renal Diet  Bowel Regimen: PRN  Code: Full  Dispo: Home PT  Pharmacy:  PCP:   Communication:

## 2024-07-17 NOTE — PROGRESS NOTE ADULT - PROBLEM SELECTOR PLAN 1
- Pt w/ 2 months of SOB however not hypoxic in ED. No fevers, cough, RVP negative. CXR on admit w/ stable cardiomegaly and b/l pleural effusions. Last Echo in 2023 w/ EF 60%'s, however elevated BNP on admit (likely 2/2 ESRD).    - Pt not appearing volume overloaded, w/o edema or crackles. Per pt report not producing urine.   - TTE showing mod-severely reduced LVSF with EF 35%, regional WMA in septum and inferior wall. Severely enlarged V with reduced RVSF. Severely dilated LA and RA.  - LHC showing severe two vessel disease with patent SVG-RI and patent SVG-LCx. LM 20%, LAD 55%, RCA 50%. No stents placed.    Plan:  - Continue coreg 12.5 mg bid and increase hydralazine to 25 mg tid; uptitrate as BP tolerates    - Start isordil 10mg TID  - Atorvastatin 80mg and ASA 81  - unable to tolerate ARNI/MRA/SGLT2i 2/2 renal function

## 2024-07-17 NOTE — PROGRESS NOTE ADULT - ATTENDING COMMENTS
64M PMHX of CAD and severe AS s/p AVR and CABG x2 RSVG-Ramus, RSVG-OM1 (2021), ESRD on HD, Hep C s/p treatment, latent TB, HTN, who presents to ED w/ SOB and neck/back/leg soreness w/ fall 2 days ago due to weakness, elevated BNP and troponin, TTE showing new decreased LV systolic dysfunction with EF 35% and regional WMA c/f ischemia; s/p LHC 7/16 showing 2 vessel disease but with patent SVG - no intervention performed. Currently optimizing GDMT.     - SOB likely 2/2 new heart failure and ischemic disease  - LHC as above, no intervention performed   - optimizing GDMT - c/w coreg, increase hydralazine and add on isordil. Unable to tolerate ARNI/MRA/SGLT2i 2/2 renal function   - c/w ASA, statin for CAD  - c/w HD per renal   - found with +RPR - will consult ID for further testing and to establish care for outpt f/u and treatment   - small abdominal hernia on exam, +TTP, but reproducible - non surgical at this time   - monitor symptoms, wean O2    d/c planning in next 1-2 days, needs HD resumed, outpt cardio f/u     d/w HS

## 2024-07-17 NOTE — PROVIDER CONTACT NOTE (OTHER) - ASSESSMENT
Patient is A&OX4, no distress, no dizziness, /100 all other VSS,, see in flow sheets. Patient is A&OX4, no distress, no dizziness, /89 all other VSS, see in flow sheets.

## 2024-07-17 NOTE — PROGRESS NOTE ADULT - SUBJECTIVE AND OBJECTIVE BOX
***************************************************************  Asad Cooper  (PGY1) Internal Medicine  On TEAMS  ***************************************************************    PROGRESS NOTE:     Patient is a 64y old  Male who presents with a chief complaint of Shortness of Breath w/ leg weakness (16 Jul 2024 07:31)      INTERVAL EVENTS:   SUBJECTIVE / OVERNIGHT EVENTS: No acute overnight events. Patient was seen and examined by the bedside this AM.   OXYGEN:   TELEMETRY:       MEDICATIONS  (STANDING):  aspirin enteric coated 81 milliGRAM(s) Oral daily  atorvastatin 80 milliGRAM(s) Oral at bedtime  calcium acetate 667 milliGRAM(s) Oral three times a day with meals  carvedilol 12.5 milliGRAM(s) Oral every 12 hours  cinacalcet 30 milliGRAM(s) Oral daily  heparin   Injectable 5000 Unit(s) SubCutaneous every 12 hours  hydrALAZINE 10 milliGRAM(s) Oral three times a day    MEDICATIONS  (PRN):  acetaminophen     Tablet .. 650 milliGRAM(s) Oral every 6 hours PRN Temp greater or equal to 38C (100.4F), Mild Pain (1 - 3)  melatonin 3 milliGRAM(s) Oral at bedtime PRN Insomnia      CAPILLARY BLOOD GLUCOSE        I&O's Summary    16 Jul 2024 07:01  -  17 Jul 2024 07:00  --------------------------------------------------------  IN: 240 mL / OUT: 1500 mL / NET: -1260 mL        PHYSICAL EXAM:  Vital Signs Last 24 Hrs  T(C): 36.5 (17 Jul 2024 05:47), Max: 36.7 (16 Jul 2024 14:45)  T(F): 97.7 (17 Jul 2024 05:47), Max: 98.1 (16 Jul 2024 14:45)  HR: 62 (17 Jul 2024 05:47) (56 - 68)  BP: 150/70 (17 Jul 2024 05:47) (104/65 - 162/65)  BP(mean): --  RR: 18 (17 Jul 2024 05:47) (14 - 18)  SpO2: 95% (17 Jul 2024 05:47) (95% - 100%)    Parameters below as of 17 Jul 2024 05:47  Patient On (Oxygen Delivery Method): nasal cannula        General : NAD  CV: RRR no R/M/G  Lungs: CTAB  Abd : Soft, non-tender, non-distended  Extremities : No LE Edema  Neuro : A&Ox3  Skin: Normal color and turgor    LABS:                        11.9   3.84  )-----------( 223      ( 17 Jul 2024 05:57 )             37.8     07-17    136  |  96  |  24<H>  ----------------------------<  87  4.8   |  26  |  5.64<H>    Ca    9.5      17 Jul 2024 05:57  Phos  4.3     07-17  Mg     2.1     07-17            Urinalysis Basic - ( 17 Jul 2024 05:57 )    Color: x / Appearance: x / SG: x / pH: x  Gluc: 87 mg/dL / Ketone: x  / Bili: x / Urobili: x   Blood: x / Protein: x / Nitrite: x   Leuk Esterase: x / RBC: x / WBC x   Sq Epi: x / Non Sq Epi: x / Bacteria: x          COORDINATION OF CARE:  Care Discussed with Consultants/Other Providers [Y/N]:  Prior or Outpatient Records Reviewed [Y/N]: ***************************************************************  Asad Cooper  (PGY1) Internal Medicine  On TEAMS  ***************************************************************    PROGRESS NOTE:     Patient is a 64y old  Male who presents with a chief complaint of Shortness of Breath w/ leg weakness (16 Jul 2024 07:31)      INTERVAL EVENTS:   SUBJECTIVE / OVERNIGHT EVENTS: No acute overnight events. Patient was seen and examined by the bedside this AM. Comfortable in chair this am.  OXYGEN:   TELEMETRY:       MEDICATIONS  (STANDING):  aspirin enteric coated 81 milliGRAM(s) Oral daily  atorvastatin 80 milliGRAM(s) Oral at bedtime  calcium acetate 667 milliGRAM(s) Oral three times a day with meals  carvedilol 12.5 milliGRAM(s) Oral every 12 hours  cinacalcet 30 milliGRAM(s) Oral daily  heparin   Injectable 5000 Unit(s) SubCutaneous every 12 hours  hydrALAZINE 10 milliGRAM(s) Oral three times a day    MEDICATIONS  (PRN):  acetaminophen     Tablet .. 650 milliGRAM(s) Oral every 6 hours PRN Temp greater or equal to 38C (100.4F), Mild Pain (1 - 3)  melatonin 3 milliGRAM(s) Oral at bedtime PRN Insomnia      CAPILLARY BLOOD GLUCOSE        I&O's Summary    16 Jul 2024 07:01  -  17 Jul 2024 07:00  --------------------------------------------------------  IN: 240 mL / OUT: 1500 mL / NET: -1260 mL        PHYSICAL EXAM:  Vital Signs Last 24 Hrs  T(C): 36.5 (17 Jul 2024 05:47), Max: 36.7 (16 Jul 2024 14:45)  T(F): 97.7 (17 Jul 2024 05:47), Max: 98.1 (16 Jul 2024 14:45)  HR: 62 (17 Jul 2024 05:47) (56 - 68)  BP: 150/70 (17 Jul 2024 05:47) (104/65 - 162/65)  BP(mean): --  RR: 18 (17 Jul 2024 05:47) (14 - 18)  SpO2: 95% (17 Jul 2024 05:47) (95% - 100%)    Parameters below as of 17 Jul 2024 05:47  Patient On (Oxygen Delivery Method): nasal cannula        General : NAD  CV: RRR no R/M/G  Lungs: CTAB  Abd : Soft, non-tender, non-distended  Extremities : No LE Edema  Neuro : A&Ox3  Skin: Normal color and turgor    LABS:                        11.9   3.84  )-----------( 223      ( 17 Jul 2024 05:57 )             37.8     07-17    136  |  96  |  24<H>  ----------------------------<  87  4.8   |  26  |  5.64<H>    Ca    9.5      17 Jul 2024 05:57  Phos  4.3     07-17  Mg     2.1     07-17            Urinalysis Basic - ( 17 Jul 2024 05:57 )    Color: x / Appearance: x / SG: x / pH: x  Gluc: 87 mg/dL / Ketone: x  / Bili: x / Urobili: x   Blood: x / Protein: x / Nitrite: x   Leuk Esterase: x / RBC: x / WBC x   Sq Epi: x / Non Sq Epi: x / Bacteria: x          COORDINATION OF CARE:  Care Discussed with Consultants/Other Providers [Y/N]:  Prior or Outpatient Records Reviewed [Y/N]:

## 2024-07-17 NOTE — CONSULT NOTE ADULT - ASSESSMENT
+FTA/RPR  DDx includes  "Serofast" previously treated individual- seems unlikely  Late latent syphilis- most charleneley  Neurologic presentation of syphilis- low suspicion but treament would be IV PCN for 10-14 days and require LP    Suggestions  Check urine GC/Chlamydia  Favor neurology input here  No urgency to Rx at present  Trend WBC- leukocytopenic    Reviewed with Dr. De Santiago  Reviewed with patient, all questions answered.    Thank you for the courtesy of this referral.  Kodak Hurt MD  Attending Physician  [facility]  Division of Infectious Diseases  [phone]

## 2024-07-17 NOTE — PROGRESS NOTE ADULT - ASSESSMENT
64M with CAD, severe AS s/p AVR, CABG x 2 RSVG-Ramus, RSVG-OM! (2021), ESRD on HD, HepC s/p tx, latent TB, HTN, presenting with SOB, leg heaviness, and difficulty with ambulation over the last 2 months, admitted for dyspnea on exertion and generalize weakness. Cardiology consulted for ischemic evaluation iso newly reduced EF with WMA.    #Newly reduced EF with WMA  Appears euvolemic on exam.  Trop 518 --> 488  BNP 320k  EKG sinus billy to 57; LVH with QRS widening and repolarization abnormality. EKG in 2021 with LBBB  TTE showing mod-severely reduced LVSF with EF 35%, regional WMA in septum and inferior wall. Severely enlarged V with reduced RVSF. Severely dilated LA and RA.  LHC showing severe two vessel disease with patent SVG-RI and patent SVG-LCx. LM 20%, LAD 55%, RCA 50%. Will treat with medical management     - continue asa, statin  - continue coreg 12.5 mg bid   - increase hydralazine to 25 mg tid  - start isordil 10 mg tid    - HD per nephro    Preliminary note. Recommendations not finalized until attending attestation     Jimenez Rick MD  PGY3 64M with CAD, severe AS s/p AVR, CABG x 2 RSVG-Ramus, RSVG-OM! (2021), ESRD on HD, HepC s/p tx, latent TB, HTN, presenting with SOB, leg heaviness, and difficulty with ambulation over the last 2 months, admitted for dyspnea on exertion and generalize weakness. Cardiology consulted for ischemic evaluation iso newly reduced EF with WMA.    #Newly reduced EF with WMA  Appears euvolemic on exam.  Trop 518 --> 488  BNP 320k  EKG sinus billy to 57; LVH with QRS widening and repolarization abnormality. EKG in 2021 with LBBB  TTE showing mod-severely reduced LVSF with EF 35%, regional WMA in septum and inferior wall. Severely enlarged V with reduced RVSF. Severely dilated LA and RA.  LHC showing severe two vessel disease with patent SVG-RI and patent SVG-LCx. LM 20%, LAD 55%, RCA 50%. Will treat with medical management     - continue asa, statin  - continue coreg 12.5 mg bid   - increase hydralazine to 25 mg tid  - start isordil 10 mg tid    - HD per nephro    Preliminary note. Recommendations not finalized until attending attestation     Jimenez Rick MD  PGY3    This patient was seen and examined personally by me and the plan was discussed with the fellow and/or resident above. Amendments were made as necessary to the above. Agree with the excellent note and plan above. GDMT as above.    Marlon Hopkins MD, MPhil, [facility]  Cardiologist, [facility]  ; [place] and \[facility] [place] [facility]\""/[facility]  Email: [email or web address] and Cardiovascular Surgery on-service contact/call information, go to [email or web address] and use "[email or web address]" to login.  Outpatient Cardiology appointments, call [phone] to arrange with a colleague; I do not have outpatient Cardiology clinic.

## 2024-07-17 NOTE — PROGRESS NOTE ADULT - PROBLEM SELECTOR PLAN 3
- Pt w/ hx of ESRD on HD, T/Th/Sat. Last HD 7/16 on Sat. PTH (07/16) wnl.    - Continue home Cincalcet, Calcium tabs  - Nephrology following for HD  - Phoslo 667 TID w/ meals  - daily phos level

## 2024-07-17 NOTE — PROVIDER CONTACT NOTE (OTHER) - ASSESSMENT
Patient is A&OX4, no distress, no dizziness, /100 all other VSS,, see in flow sheets. Patient is A&OX4, no distress, no dizziness, /95 all other VSS, see in flow sheets.

## 2024-07-17 NOTE — PROGRESS NOTE ADULT - PROBLEM SELECTOR PLAN 5
- Pt w/ elevated troponin 400-500's on admit w/o chest pain. ECG w/ LVH w/ ST and T-wave changes c/w LVH.   - Pt w/ prior troponins 400-700's, likely elevated in s/o ESRD. Downtrended.   - CTM for chest pain    - [facility] as above

## 2024-07-17 NOTE — PROGRESS NOTE ADULT - PROBLEM SELECTOR PLAN 2
- Pt reporting LE weakness w/ mechanical fall. RVP negative, CK wnl. Hgb wnl.   - May be 2/2 edema previously however pt w/o physical exam signs c/f volume OL at this time. CT cervical spine w/ subluxation and foraminal narrowing of C5-C6 however given how high this finding is, would lead to more generalized weakness and not primarily LE weakness. Suspect weakness might be due to debility, chronic process.   - Cardiac work-up as above  - T4/TSH, B12, Folate wnl  - RPR and syphilis ab positive. Possible associate with leg weakness.    Plan:  - ID consulted for syphilis. Appreciate recs  - PT optimization (Home PT)  - If patient has worsening leg weakness, will consider MRI to r/u spinal cord involvement.

## 2024-07-17 NOTE — PROGRESS NOTE ADULT - PROBLEM SELECTOR PLAN 4
- Continue coreg 12.5 mg bid and increase to hydralazine 25 mg tid; uptitrate as BP tolerates  - Start isordil 10 mg TID

## 2024-07-17 NOTE — PROGRESS NOTE ADULT - ASSESSMENT
64-yo M PMHX of CAD and severe AS s/p AVR and CABG x2 RSVG-Ramus, RSVG-OM1 (2021), ESRD on HD, Hep C s/p treatment, latent TB, HTN, who presents to ED w/ SOB and neck/back/leg soreness w/ fall 2 days ago due to weakness, elevated BNP and troponin w/ c/f CHF. Awaiting ID rec for new RPR and syphilis antibody positive.

## 2024-07-17 NOTE — CONSULT NOTE ADULT - SUBJECTIVE AND OBJECTIVE BOX
[facility]  Division of Infectious Diseases  [phone]    KLAUS DUNN  64y, Male  [id number]    HPI--  64M ESRD/HD via LUE AVF. HTN, CAD, CABG/AVR, HCV s/p Rx, LTBI, admitted s/p fall with dyspnea and difficulty walking due to 'leg heaviness'. ECHO revealed new reduced EF and cardiac workup initiated. He is now s/p cardiac catheterization showing 2-vessel disease to be managed medically. Incidentally noted +FTA/RPR, presumably done in W/U for LE weakness. HIV-negative (also not clear why done).     Patient relates history of gonorrhea from a new partner ~10 years ago for which he was treated. Does not think he was tested for syphilis at that time. Denies any history c/w genital ulcer disease. Does not recall inramuscular injections for syphilis. Review of HIE no prior titers. Denies any other STI diagnosis or treatment.       PMH/PSH--  Benign Essential Hypertension    End Stage Renal Disease on Dialysis    HTN (hypertension)    Chronic renal failure    Hepatitis C    TB (tuberculosis)    CHF (congestive heart failure)    AVF (arteriovenous fistula)    S/P appendectomy        Allergies--  No Known Drug Allergies  adhesives (Other)      Medications--  Antibiotics:   Immunologic:   Other: acetaminophen     Tablet .. 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   Review of systems otherwise negative except as previously noted.    Physical Exam--  Vital Signs: T(F): 97.4 (07-17-24 @ 12:12), Max: 98.1 (07-16-24 @ 14:45)  HR: 60 (07-17-24 @ 12:12)  BP: 160/83 (07-17-24 @ 12:12)  RR: 18 (07-17-24 @ 12:12)  SpO2: 100% (07-17-24 @ 12:12)  Wt(kg): --  General: Chronically ill-appearing Male in no acute distress.  HEENT: AT/NC. Anicteric. Conjunctiva pink and moist. Oropharynx clear. Dentition poor.   Neck: Not rigid. No sense of mass.  Nodes: None palpable.  Lungs: Clear bilaterally  Heart: Regular rate and rhythm.  Abdomen: Bowel sounds present and normoactive. Soft. Nondistended. Nontender.   Extremities: No cyanosis or clubbing. No edema. Somewhat wasted UE. +thrill AVF.   Skin: Warm. Dry. Good turgor. No rash. No vasculitic stigmata.  Psychiatric: Grossly appropriate affect and mood for situation.       Laboratory & Imaging Data--  CBC                        11.9   3.84  )-----------( 223      ( 17 Jul 2024 05:57 )             37.8     WBC trend  WBC Count: 3.84 K/uL (07-17-24 @ 05:57)  WBC Count: 5.48 K/uL (07-16-24 @ 06:20)  WBC Count: 9.49 K/uL (07-15-24 @ 06:56)  WBC Count: 12.74 K/uL (07-14-24 @ 06:00)  WBC Count: 13.28 K/uL (07-13-24 @ 17:17)        Chemistries  07-17    136  |  96  |  24<H>  ----------------------------<  87  4.8   |  26  |  5.64<H>    Ca    9.5      17 Jul 2024 05:57  Phos  4.3     07-17  Mg     2.1     07-17    Rapid HIV-1/2 Antibody (07.14.24 @ 10:11)    Rapid HIV-1/2 Antibody: Nonreact    Syphilis Screen (07.14.24 @ 10:11)    Treponema Pallidum Antibody Interpretation: Positive: This is the recommended initial screening test for syphilis following the  reverse-sequence algorithm.  Negative: No serologic evidence of Treponema pallidum antibodies. No  follow-up is necessary unless clinically indicated (e.g., incubating or  early primary infection).  Positive: Detectable presence of Treponema pallidum antibodies.  Additional testing according to the reverse-sequence syphilis screening  algorithm will follow and must be considered before making a final  diagnosis. This includes a nontreponemal antibody test (i.e., RPR with  reflex to RPR titer if reactive). Reactive RPR indicates serologic  evidence of new, inadequately treated, or persistent syphilis infection.  If RPR is non-reactive, a second treponemal antibody test will be  performed.    RPR Titer: 1:2 (07.14.24 @ 10:11)          Culture Data  None

## 2024-07-17 NOTE — PROGRESS NOTE ADULT - ASSESSMENT
64-yo M PMHX of CAD and severe AS s/p AVR and CABG x2 RSVG-Ramus, RSVG-OM1 (2021), ESRD on HD , Hep C s/p treatment, latent TB, HTN, who presents to ED w/ SOB and neck/back/leg soreness w/ fall.    #ESRD  - On HD TTS for over 10 year (at [facility] , Access: LUE fistula , Outpatient Nephrologist: Dr. Valdovinos ). Last outpatient HD Session: 7/13. Patient sessions are usually 3 hours in length. Patient does not make urine.   - BNP found to be 320, 912. CXR with trace bilateral effusions.   - ECHO 7/15: LEF 35% with RWMA c/f ischemic disease. LV diastolic dysfunction present. RV severely dilated with reduced function. LA and RA severe dilation.   - s/p LHC 7/16: 2v disease with patient SVG grafts to both. Medical management indicated.  - Discussed with the patient that he will require RRT/HD, risks and benefits associated with RRT/HD explained at length. Consented for continuation of HD inpatient and placed into chart.  - UF 7/15 1.5L removal as tolerated. Last HD 7/16.  - No HD for today. Next HD session 7/18.     #MBD  - Phos goal: 3.5-5.5. Monitor serum phosphorus daily.  - Continue with phos binder phoslo 667 TID with meals.   - C/w sensipar 30mg daily   - PTH 48, Phos within goal.     Feel free to contact me via TEAMS.     Heidi Walters MD, PGY-4  Nephrology Fellow   After 5PM/Weekends/Holidays please contact the on call fellow.

## 2024-07-17 NOTE — PROGRESS NOTE ADULT - ATTENDING COMMENTS
64-yo M PMHX of CAD and severe AS s/p AVR and CABG x2 RSVG-Ramus, RSVG-OM1 (2021), ESRD on HD , Hep C s/p treatment, latent TB, HTN, who presents to ED w/ SOB and neck/back/leg soreness w/ fall.    ESRD on HD  Labs reviewed  Tolerated HD with 1.5 L of UF  Next HD in am   Monitor closely for now     Access- avf    Anemia:  Hg at goal  TONEY on hold for now     MBD: Phos ok. Maintain Binder  PTH Low. Stop sensipar     Rest as per Dr. Walters's Note  Ricci May MD  O: [phone]  Contact me on teams

## 2024-07-18 ENCOUNTER — TRANSCRIPTION ENCOUNTER (OUTPATIENT)
Age: 64
End: 2024-07-18

## 2024-07-18 DIAGNOSIS — A53.9 SYPHILIS, UNSPECIFIED: ICD-10-CM

## 2024-07-18 LAB
ANION GAP SERPL CALC-SCNC: 17 MMOL/L — SIGNIFICANT CHANGE UP (ref 5–17)
BUN SERPL-MCNC: 32 MG/DL — HIGH (ref 7–23)
CALCIUM SERPL-MCNC: 9.1 MG/DL — SIGNIFICANT CHANGE UP (ref 8.4–10.5)
CHLORIDE SERPL-SCNC: 94 MMOL/L — LOW (ref 96–108)
CO2 SERPL-SCNC: 23 MMOL/L — SIGNIFICANT CHANGE UP (ref 22–31)
CREAT SERPL-MCNC: 6.92 MG/DL — HIGH (ref 0.5–1.3)
EGFR: 8 ML/MIN/1.73M2 — LOW
GLUCOSE SERPL-MCNC: 73 MG/DL — SIGNIFICANT CHANGE UP (ref 70–99)
HCT VFR BLD CALC: 34.6 % — LOW (ref 39–50)
HGB BLD-MCNC: 11.4 G/DL — LOW (ref 13–17)
MAGNESIUM SERPL-MCNC: 2.1 MG/DL — SIGNIFICANT CHANGE UP (ref 1.6–2.6)
MCHC RBC-ENTMCNC: 24.6 PG — LOW (ref 27–34)
MCHC RBC-ENTMCNC: 32.9 GM/DL — SIGNIFICANT CHANGE UP (ref 32–36)
MCV RBC AUTO: 74.7 FL — LOW (ref 80–100)
NRBC # BLD: 0 /100 WBCS — SIGNIFICANT CHANGE UP (ref 0–0)
PHOSPHATE SERPL-MCNC: 4 MG/DL — SIGNIFICANT CHANGE UP (ref 2.5–4.5)
PLATELET # BLD AUTO: 208 K/UL — SIGNIFICANT CHANGE UP (ref 150–400)
POTASSIUM SERPL-MCNC: 4.6 MMOL/L — SIGNIFICANT CHANGE UP (ref 3.5–5.3)
POTASSIUM SERPL-SCNC: 4.6 MMOL/L — SIGNIFICANT CHANGE UP (ref 3.5–5.3)
RBC # BLD: 4.63 M/UL — SIGNIFICANT CHANGE UP (ref 4.2–5.8)
RBC # FLD: 18.6 % — HIGH (ref 10.3–14.5)
SODIUM SERPL-SCNC: 134 MMOL/L — LOW (ref 135–145)
WBC # BLD: 4.03 K/UL — SIGNIFICANT CHANGE UP (ref 3.8–10.5)
WBC # FLD AUTO: 4.03 K/UL — SIGNIFICANT CHANGE UP (ref 3.8–10.5)

## 2024-07-18 PROCEDURE — 99233 SBSQ HOSP IP/OBS HIGH 50: CPT

## 2024-07-18 PROCEDURE — 99232 SBSQ HOSP IP/OBS MODERATE 35: CPT | Mod: GC

## 2024-07-18 PROCEDURE — 99232 SBSQ HOSP IP/OBS MODERATE 35: CPT

## 2024-07-18 RX ORDER — BISMUTH SUBSALICYLATE 262MG/15ML
15 SUSPENSION, ORAL (FINAL DOSE FORM) ORAL
Refills: 0 | Status: DISCONTINUED | OUTPATIENT
Start: 2024-07-18 | End: 2024-07-19

## 2024-07-18 RX ORDER — HYDRALAZINE HYDROCHLORIDE 50 MG/1
50 TABLET ORAL THREE TIMES A DAY
Refills: 0 | Status: DISCONTINUED | OUTPATIENT
Start: 2024-07-18 | End: 2024-07-19

## 2024-07-18 RX ORDER — PENICILLIN G BENZATHINE 2.4MM/4ML
2400000 SYRINGE (ML) INTRAMUSCULAR ONCE
Refills: 0 | Status: COMPLETED | OUTPATIENT
Start: 2024-07-18 | End: 2024-07-18

## 2024-07-18 RX ORDER — ISOSORBIDE DINITRATE 5 MG/1
20 TABLET ORAL THREE TIMES A DAY
Refills: 0 | Status: DISCONTINUED | OUTPATIENT
Start: 2024-07-18 | End: 2024-07-19

## 2024-07-18 RX ADMIN — CARVEDILOL PHOSPHATE 12.5 MILLIGRAM(S): 80 CAPSULE, EXTENDED RELEASE ORAL at 05:22

## 2024-07-18 RX ADMIN — Medication 15 MILLILITER(S): at 13:38

## 2024-07-18 RX ADMIN — Medication 650 MILLIGRAM(S): at 19:54

## 2024-07-18 RX ADMIN — ISOSORBIDE DINITRATE 20 MILLIGRAM(S): 5 TABLET ORAL at 18:23

## 2024-07-18 RX ADMIN — CALCIUM ACETATE 667 MILLIGRAM(S): 667 CAPSULE ORAL at 10:52

## 2024-07-18 RX ADMIN — HYDRALAZINE HYDROCHLORIDE 50 MILLIGRAM(S): 50 TABLET ORAL at 05:25

## 2024-07-18 RX ADMIN — CALCIUM ACETATE 667 MILLIGRAM(S): 667 CAPSULE ORAL at 07:58

## 2024-07-18 RX ADMIN — CARVEDILOL PHOSPHATE 12.5 MILLIGRAM(S): 80 CAPSULE, EXTENDED RELEASE ORAL at 18:23

## 2024-07-18 RX ADMIN — HYDRALAZINE HYDROCHLORIDE 50 MILLIGRAM(S): 50 TABLET ORAL at 20:11

## 2024-07-18 RX ADMIN — ISOSORBIDE DINITRATE 20 MILLIGRAM(S): 5 TABLET ORAL at 10:52

## 2024-07-18 RX ADMIN — Medication 2400000 UNIT(S): at 13:11

## 2024-07-18 RX ADMIN — CALCIUM ACETATE 667 MILLIGRAM(S): 667 CAPSULE ORAL at 18:25

## 2024-07-18 RX ADMIN — ISOSORBIDE DINITRATE 20 MILLIGRAM(S): 5 TABLET ORAL at 07:37

## 2024-07-18 RX ADMIN — ASPIRIN 81 MILLIGRAM(S): 325 TABLET, FILM COATED ORAL at 10:55

## 2024-07-18 RX ADMIN — HEPARIN SODIUM 5000 UNIT(S): 50 INJECTION, SOLUTION INTRAVENOUS at 18:23

## 2024-07-18 RX ADMIN — Medication 650 MILLIGRAM(S): at 20:21

## 2024-07-18 RX ADMIN — HYDRALAZINE HYDROCHLORIDE 50 MILLIGRAM(S): 50 TABLET ORAL at 17:28

## 2024-07-18 RX ADMIN — ISOSORBIDE DINITRATE 10 MILLIGRAM(S): 5 TABLET ORAL at 05:22

## 2024-07-18 RX ADMIN — HEPARIN SODIUM 5000 UNIT(S): 50 INJECTION, SOLUTION INTRAVENOUS at 05:23

## 2024-07-18 RX ADMIN — ATORVASTATIN CALCIUM 80 MILLIGRAM(S): 20 TABLET, FILM COATED ORAL at 20:10

## 2024-07-18 NOTE — DIETITIAN INITIAL EVALUATION ADULT - ADD RECOMMEND
-- Recommend Nephro-Vamsi supplement, pending no medical contraindications, for micronutrient support.   -- Monitor PO intake, GI tolerance, skin integrity, labs, weight, and bowel movement regularity.

## 2024-07-18 NOTE — PROGRESS NOTE ADULT - SUBJECTIVE AND OBJECTIVE BOX
[facility]  Division of Infectious Diseases  [phone]    Name: KLAUS DUNN  Age: 64y  Gender: Male  MRN: [id number]    Interval History--  Notes reviewed. Feeling ok. No new complaints. Reviewed with primary team. Slated for discharge home today.       Past Medical History--  Benign Essential Hypertension    End Stage Renal Disease on Dialysis    HTN (hypertension)    Chronic renal failure    Hepatitis C    TB (tuberculosis)    CHF (congestive heart failure)    AVF (arteriovenous fistula)    S/P appendectomy        For details regarding the patient's social history, family history, and other miscellaneous elements, please refer the initial infectious diseases consultation and/or the admitting history and physical examination for this admission.    Allergies    No Known Drug Allergies  adhesives (Other)    Intolerances        Medications--  Antibiotics:  penicillin   G benzathine Injectable [id number] Unit(s) IntraMuscular once    Immunologic:    Other:  acetaminophen     Tablet .. PRN  aspirin enteric coated  atorvastatin  calcium acetate  carvedilol  heparin   Injectable  hydrALAZINE  isosorbide   dinitrate Tablet (ISORDIL)  melatonin PRN      Review of Systems--  A 10-point review of systems was obtained.   Review of systems otherwise unchanged compared to prior visit except as previously noted.    Physical Examination--  Vital Signs: T(F): 97.5 (07-18-24 @ 04:26), Max: 97.5 (07-18-24 @ 04:26)  HR: 69 (07-18-24 @ 07:33)  BP: 162/80 (07-18-24 @ 07:33)  RR: 18 (07-18-24 @ 04:26)  SpO2: 98% (07-18-24 @ 04:26)  Wt(kg): --  General: Chronically ill-appearing Male in no acute distress.  HEENT: AT/NC. Anicteric. Conjunctiva pink and moist. Oropharynx clear. Dentition poor.   Neck: Not rigid. No sense of mass.  Nodes: None palpable.  Lungs: Clear bilaterally  Heart: Regular rate and rhythm.  Abdomen: Bowel sounds present and normoactive. Soft. Nondistended. Nontender.   Extremities: No cyanosis or clubbing. No edema. Somewhat wasted UE. +thrill AVF.   Skin: Warm. Dry. Good turgor. No rash. No vasculitic stigmata.  Psychiatric: Grossly appropriate affect and mood for situation.         Laboratory Studies--  CBC                        11.4   4.03  )-----------( 208      ( 18 Jul 2024 06:22 )             34.6       Chemistries  07-18    134<L>  |  94<L>  |  32<H>  ----------------------------<  73  4.6   |  23  |  6.92<H>    Ca    9.1      18 Jul 2024 06:25  Phos  4.0     07-18  Mg     2.1     07-18        Culture Data

## 2024-07-18 NOTE — PROGRESS NOTE ADULT - PROBLEM SELECTOR PLAN 6
- Pt w/ elevated troponin 400-500's on admit w/o chest pain. ECG w/ LVH w/ ST and T-wave changes c/w LVH.   - Pt w/ prior troponins 400-700's, likely elevated in s/o ESRD. Downtrended.   - CTM for chest pain    - [facility] as above

## 2024-07-18 NOTE — DIETITIAN INITIAL EVALUATION ADULT - ORAL NUTRITION SUPPLEMENTS
Recommend Nepro 1x daily (425 kcals, 19 g protein) to provide additional calories and nutrients to encourage PO intake while in house.

## 2024-07-18 NOTE — DIETITIAN INITIAL EVALUATION ADULT - ENERGY INTAKE
Flowsheets document fluctuating PO intake from % since admission, pt may benefit from oral nutrition supplements at this time.

## 2024-07-18 NOTE — DIETITIAN INITIAL EVALUATION ADULT - PROBLEM SELECTOR PLAN 4
- Pt w/ hx of ESRD on HD, T/Th/Sat. Last HD 7/13 on Sat  - Continue home Cincalcet, Calcium tabs  - Nephrology consult in AM for HD

## 2024-07-18 NOTE — PROGRESS NOTE ADULT - PROBLEM SELECTOR PLAN 2
RPR and syphilis ab positive. Pt denies recent sexual encounter (last one 10 years ago). 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 Unlikely neurosyphilis and no neurological symptoms.     Plan:  - Penicillin once per week for 3 doses. Will receive one dose in patient and plan for 2 doses outpatient  - Appreciate ID recs RPR and syphilis ab positive. Pt denies recent sexual encounter (last one 10 years ago). 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 Unlikely neurosyphilis as no neurological symptoms. Neurological exam showed no deficits.     Plan:  - Penicillin 2.4 million units once per week for 3 doses. Will receive one dose in patient and plan for 2 doses outpatient  - Appreciate ID recs

## 2024-07-18 NOTE — DIETITIAN INITIAL EVALUATION ADULT - PROBLEM SELECTOR PLAN 1
- Pt w/ 2 months of SOB however not hypoxic in ED. No fevers, cough, RVP negative. CXR on admit w/ stable cardiomegaly and b/l pleural effusions. Last Echo in 2023 w/ EF 60%'s, however elevated BNP on admit.    - Was evaluated by Cardiology in 2023 for chest pain and SOB after HD, thought to be "concerning for possible obstructive CAD vs microvascular disease exacerbated by volume shifts during HD and exertion"  - Pt not appearing volume overloaded, w/o edema or crackles. Per pt report not producing urine.   - c/f CHF w/ pleural effusions, SOB might be 2/2 effusions.   - TTE  - Defer diuretics as pt not producing urine

## 2024-07-18 NOTE — DIETITIAN INITIAL EVALUATION ADULT - PERTINENT LABORATORY DATA
07-18    134<L>  |  94<L>  |  32<H>  ----------------------------<  73  4.6   |  23  |  6.92<H>    Ca    9.1      18 Jul 2024 06:25  Phos  4.0     07-18  Mg     2.1     07-18

## 2024-07-18 NOTE — DISCHARGE NOTE NURSING/CASE MANAGEMENT/SOCIAL WORK - NSDCFUADDAPPT_GEN_ALL_CORE_FT
APPTS ARE READY TO BE MADE: [ ] YES    Best Family or Patient Contact (if needed):    Additional Information about above appointments (if needed):    1: Dr. Swift or his colleague at [facility] internal medicine clinic within 1-2 weeks  2:   3:     Other comments or requests:

## 2024-07-18 NOTE — DIETITIAN INITIAL EVALUATION ADULT - NSFNSGIASSESSMENTFT_GEN_A_CORE
noted with diarrhea per progress note 7/18, ordered for Pepto bismol today, will continue to monitor GI function.

## 2024-07-18 NOTE — CHART NOTE - NSCHARTNOTEFT_GEN_A_CORE
7/18-Patient was outreached but did not answer. A voicemail was left for the patient to return our call.
RD CHF education chart note    Patient was visited by RD for CHF education however pt was off unit for hemodialysis during visit. RD will follow-up with Congestive Heart Failure education as able.    RD remains available upon request and will follow-up per protocol:  Kristin Hoover MS, RDN, CDN (Teams)

## 2024-07-18 NOTE — DIETITIAN INITIAL EVALUATION ADULT - PERTINENT MEDS FT
MEDICATIONS  (STANDING):  aspirin enteric coated 81 milliGRAM(s) Oral daily  atorvastatin 80 milliGRAM(s) Oral at bedtime  calcium acetate 667 milliGRAM(s) Oral three times a day with meals  carvedilol 12.5 milliGRAM(s) Oral every 12 hours  heparin   Injectable 5000 Unit(s) SubCutaneous every 12 hours  hydrALAZINE 50 milliGRAM(s) Oral three times a day  isosorbide   dinitrate Tablet (ISORDIL) 20 milliGRAM(s) Oral three times a day    MEDICATIONS  (PRN):  acetaminophen     Tablet .. 650 milliGRAM(s) Oral every 6 hours PRN Temp greater or equal to 38C (100.4F), Mild Pain (1 - 3)  bismuth subsalicylate Liquid 15 milliLiter(s) Oral four times a day PRN Diarrhea  melatonin 3 milliGRAM(s) Oral at bedtime PRN Insomnia

## 2024-07-18 NOTE — PROGRESS NOTE ADULT - ASSESSMENT
64M with CAD, severe AS s/p AVR, CABG x 2 RSVG-Ramus, RSVG-OM! (2021), ESRD on HD, HepC s/p tx, latent TB, HTN, presenting with SOB, leg heaviness, and difficulty with ambulation over the last 2 months, admitted for dyspnea on exertion and generalized weakness. Cardiology consulted for ischemic evaluation iso newly reduced EF with WMA.    #Newly reduced EF with WMA  Appears euvolemic on exam.  Trop 518 --> 488  BNP 320k  EKG sinus billy to 57; LVH with QRS widening and repolarization abnormality. EKG in 2021 with LBBB  TTE showing mod-severely reduced LVSF with EF 35%, regional WMA in septum and inferior wall. Severely enlarged V with reduced RVSF. Severely dilated LA and RA.  LHC showing severe two vessel disease with patent SVG-RI and patent SVG-LCx. LM 20%, LAD 55%, RCA 50%. Will treat with medical management     - continue asa, statin  - continue coreg 12.5 mg bid; if HR remains above 60 can increase to 25 mg bid   - increase hydralazine to 50 mg tid  - increase isordil to 20 mg tid    - HD per nephro    Preliminary note. Recommendations not finalized until attending attestation     Jimenez Rick MD  PGY3 64M with CAD, severe AS s/p AVR, CABG x 2 RSVG-Ramus, RSVG-OM! (2021), ESRD on HD, HepC s/p tx, latent TB, HTN, presenting with SOB, leg heaviness, and difficulty with ambulation over the last 2 months, admitted for dyspnea on exertion and generalized weakness. Cardiology consulted for ischemic evaluation iso newly reduced EF with WMA.    #Newly reduced EF with WMA  Appears euvolemic on exam.  Trop 518 --> 488  BNP 320k  EKG sinus billy to 57; LVH with QRS widening and repolarization abnormality. EKG in 2021 with LBBB  TTE showing mod-severely reduced LVSF with EF 35%, regional WMA in septum and inferior wall. Severely enlarged V with reduced RVSF. Severely dilated LA and RA.  LHC showing severe two vessel disease with patent SVG-RI and patent SVG-LCx. LM 20%, LAD 55%, RCA 50%. Will treat with medical management     - continue asa, statin  - continue coreg 12.5 mg bid; if HR remains above 60 can increase to 25 mg bid   - increase hydralazine to 50 mg tid  - increase isordil to 20 mg tid    - HD per nephro    Preliminary note. Recommendations not finalized until attending attestation     Jimenez Rick MD  PGY3    This patient was seen and examined personally by me and the plan was discussed with the fellow and/or resident above. Amendments were made as necessary to the above. Agree with the excellent note and plan above. GDMT as above.    Marlon Hopkins MD, MPhil, [facility]  Cardiologist, [facility]  ; [place] [facility] and [facility] [place]/[facility]  Email: [email or web address] Card[facility] and Cardiovascular Surgery on-service contact/call information, go to [email or web address] and use "[email or web address]" to login.  Outpatient Cardiology appointments, call [phone] to arrange with a colleague; I do not have outpatient Cardiology clinic.

## 2024-07-18 NOTE — DIETITIAN INITIAL EVALUATION ADULT - OTHER INFO
-- hemodialysis: on Phoslo, noted hyponatremia today   -- GI: Pepto bismol ordered today for diarrhea

## 2024-07-18 NOTE — PROGRESS NOTE ADULT - ATTENDING COMMENTS
64M PMHX of CAD and severe AS s/p AVR and CABG x2 RSVG-Ramus, RSVG-OM1 (2021), ESRD on HD, Hep C s/p treatment, latent TB, HTN, who presents to ED w/ SOB and neck/back/leg soreness w/ fall 2 days ago due to weakness, elevated BNP and troponin, TTE showing new decreased LV systolic dysfunction with EF 35% and regional WMA c/f ischemia; s/p LHC 7/16 showing 2 vessel disease but with patent SVG - no intervention performed. Currently optimizing GDMT.     - SOB likely 2/2 new heart failure and ischemic disease  - LHC as above, no intervention performed   - optimizing GDMT - c/w coreg, increase hydralazine and isordil. 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

## 2024-07-18 NOTE — DIETITIAN INITIAL EVALUATION ADULT - REASON INDICATOR FOR ASSESSMENT
Pt seen for consult for nutrition assessment/education for STAR Congestive Heart Failure. Pt off unit to hemodialysis during visit. Information obtained from RN, electronic medical record. Chart reviewed, events noted.

## 2024-07-18 NOTE — DIETITIAN INITIAL EVALUATION ADULT - PHYSCIAL ASSESSMENT
Drug Dosing Weight  Height (cm): 185.4 (16 Jul 2024 14:45)  Weight (kg): 66 (16 Jul 2024 14:45)  BMI (kg/m2): 19.2 (16 Jul 2024 14:45)    Daily wt (standing Weight in kG): 64.6 (07-18 @ 13:58),   Daily wt (bed scale Weight in kG): 54 (07-16 @ 13:41), 71.9 (07-14 @ 20:39)    UBW: Unable to assess as pt off unit during visit   Wt history from previous RD notes: 72.5kg (10/20/22), 66.4kg (9/19/22), 70.3kg (8/20/10)   Wt history from [facility]: unable to load "[employer]E at present     ** weight fluctuation expected with hemodialysis, a66.0lso ? accuracy of bed scale wt in house. RD will continue to monitor wt trends as available/able.     IBW: 184lb, 79% IBW

## 2024-07-18 NOTE — PROGRESS NOTE ADULT - ASSESSMENT
64-yo M PMHX of CAD and severe AS s/p AVR and CABG x2 RSVG-Ramus, RSVG-OM1 (2021), ESRD on HD , Hep C s/p treatment, latent TB, HTN, who presents to ED w/ SOB and neck/back/leg soreness w/ fall.    ESRD on HD  Labs reviewed  For HD today. Will increase UP to 3 L given HTN    Monitor closely for now     Access- avf    Anemia:  Hg at goal  TONEY on hold for now     MBD: Phos ok. Maintain Binder  PTH Low. Sensipar stopped    Vol/HTN: BP running very high  Will increase UF with HD today. May need additional UF if SBP remains consistently elevated  Maintain coreg/hydralazine       Ricci May MD  O: [phone]  Contact me on teams

## 2024-07-18 NOTE — PROGRESS NOTE ADULT - SUBJECTIVE AND OBJECTIVE BOX
Subjective/interval events:  Overnight, hypertensive to SBP 200s, given hydralazine 5 mg IV x 2. Hydralazine increased to 50 mg tid and isordil increased to 20 mg tid.   Reports he is chest pain free. He does not have any SOB, orthopnea. Slept well throughout the night.    ROS negative except as above    Vital Signs Last 24 Hrs  T(C): 36.4 (18 Jul 2024 04:26), Max: 36.4 (18 Jul 2024 04:26)  T(F): 97.5 (18 Jul 2024 04:26), Max: 97.5 (18 Jul 2024 04:26)  HR: 69 (18 Jul 2024 07:33) (60 - 71)  BP: 162/80 (18 Jul 2024 07:33) (160/83 - 205/99)  BP(mean): --  RR: 18 (18 Jul 2024 04:26) (16 - 18)  SpO2: 98% (18 Jul 2024 04:26) (98% - 100%)    Parameters below as of 18 Jul 2024 04:26  Patient On (Oxygen Delivery Method): room air        MEDICATIONS  (STANDING):  aspirin enteric coated 81 milliGRAM(s) Oral daily  atorvastatin 80 milliGRAM(s) Oral at bedtime  calcium acetate 667 milliGRAM(s) Oral three times a day with meals  carvedilol 12.5 milliGRAM(s) Oral every 12 hours  heparin   Injectable 5000 Unit(s) SubCutaneous every 12 hours  hydrALAZINE 50 milliGRAM(s) Oral three times a day  isosorbide   dinitrate Tablet (ISORDIL) 20 milliGRAM(s) Oral three times a day  penicillin   G benzathine Injectable 1365611 Unit(s) IntraMuscular once    MEDICATIONS  (PRN):  acetaminophen     Tablet .. 650 milliGRAM(s) Oral every 6 hours PRN Temp greater or equal to 38C (100.4F), Mild Pain (1 - 3)  melatonin 3 milliGRAM(s) Oral at bedtime PRN Insomnia      I&O's Summary    17 Jul 2024 07:01  -  18 Jul 2024 07:00  --------------------------------------------------------  IN: 940 mL / OUT: 0 mL / NET: 940 mL          PHYSICAL EXAM:  General: NAD, resting comfortably in bed  Neck: no JVD  Lungs: CTAB, normal WOB  Heart: RRR, no M/R/G  Extremities: brisk capillary refill, no edema. LUE AVF noted   Neuro: AOx3    ECG:    TTE:    LABS:                        11.4   4.03  )-----------( 208      ( 18 Jul 2024 06:22 )             34.6     07-18    134<L>  |  94<L>  |  32<H>  ----------------------------<  73  4.6   |  23  |  6.92<H>    Ca    9.1      18 Jul 2024 06:25  Phos  4.0     07-18  Mg     2.1     07-18            Creatinine Trend: 6.92<--, 5.64<--, 7.39<--, 6.71<--, 5.19<--, 4.55<--  I&O's Summary    17 Jul 2024 07:01  -  18 Jul 2024 07:00  --------------------------------------------------------  IN: 940 mL / OUT: 0 mL / NET: 940 mL      BNP  RADIOLOGY & ADDITIONAL STUDIES:

## 2024-07-18 NOTE — PROGRESS NOTE ADULT - PROBLEM SELECTOR PLAN 3
- Pt reporting LE weakness w/ mechanical fall. RVP negative, CK wnl. Hgb wnl.   - May be 2/2 edema previously however pt w/o physical exam signs c/f volume OL at this time. CT cervical spine w/ subluxation and foraminal narrowing of C5-C6 however given how high this finding is, would lead to more generalized weakness and not primarily LE weakness. Suspect weakness might be due to debility, chronic process.   - Cardiac work-up as above  - T4/TSH, B12, Folate wnl  - RPR and syphilis ab positive. Unlikely association with weakness as no neuro symptoms    Plan:  - Late latent syphilis treatment as above  - PT optimization (Home PT)  - If patient has worsening leg weakness, will consider MRI to r/u spinal cord involvement.

## 2024-07-18 NOTE — DISCHARGE NOTE NURSING/CASE MANAGEMENT/SOCIAL WORK - PATIENT PORTAL LINK FT
You can access the FollowMyHealth Patient Portal offered by [facility] by registering at the following website: [email or web address]. By joining [employer]’s FollowMyHealth portal, you will also be able to view your health information using other applications (apps) compatible with our system.

## 2024-07-18 NOTE — PROGRESS NOTE ADULT - PROBLEM SELECTOR PLAN 4
- Pt w/ hx of ESRD on HD, T/Th/Sat. HD today (07/18). PTH (07/16) wnl.    - Continue home Cincalcet, Calcium tabs  - Nephrology following for HD  - Phoslo 667 TID w/ meals  - daily phos level

## 2024-07-18 NOTE — PROGRESS NOTE ADULT - ASSESSMENT
+FTA/RPR  DDx includes  "Serofast" previously treated individual- seems unlikely  Late latent syphilis- most likley  Neurologic presentation of syphilis- low suspicion but treament would be IV PCN for 10-14 days and require LP    7/18: Reviewed with primary team, who do not think patient's presentation is consistent with neurologic syphilis. As such would treat vs. late latent syphilis. WBC now normal.    Suggestions  Benzathine penicillin G 2.4 million units IM weekly x3 doses (7/18, 7/25, 8/1)  Remain vigilant for Jarisch-Herxheimer reaction. D/W patient in layman's terms. It's unpredictable and typically occurs within 24 hours of treatment, though it is more common in individuals with early syphilis rather than late as we are contemplating here.  The reaction is characterized  by HA, myalgias, diaphoresis, rigors, and rash. Rarely more severe reactions can occur. This usually resolves without treatment within about a day. NSAIDS can be used for symptomatic relief.   RPR will need to be trended to monitor response to therapy.  Check urine GC/Chlamydia  Reviewed with Dr. De Santiago    Reviewed with patient, all questions answered.    Thank you for the courtesy of this referral.  Happy to see patient in office in follow up.  I'll sign off at this time.     Kodak Hurt MD  Attending Physician  [facility]  Division of Infectious Diseases  [phone]

## 2024-07-18 NOTE — DIETITIAN INITIAL EVALUATION ADULT - EDUCATION DIETARY MODIFICATIONS
pt off unit during visit, will follow-up with nutrition education as able./(0) unable to meet; needs instruction

## 2024-07-18 NOTE — PROGRESS NOTE ADULT - PROBLEM SELECTOR PLAN 1
- Pt w/ 2 months of SOB however not hypoxic in ED. No fevers, cough, RVP negative. CXR on admit w/ stable cardiomegaly and b/l pleural effusions. Last Echo in 2023 w/ EF 60%'s, however elevated BNP on admit (likely 2/2 ESRD).    - Pt not appearing volume overloaded, w/o edema or crackles. Per pt report not producing urine.   - TTE showing mod-severely reduced LVSF with EF 35%, regional WMA in septum and inferior wall. Severely enlarged V with reduced RVSF. Severely dilated LA and RA.  - LHC showing severe two vessel disease with patent SVG-RI and patent SVG-LCx. LM 20%, LAD 55%, RCA 50%. No stents placed.    Plan:  - Continue coreg 12.5 mg bid and hydralazine to 50 mg tid; uptitrate as BP tolerates    - Continue isordil 20mg TID  - Atorvastatin 80mg and ASA 81  - unable to tolerate ARNI/MRA/SGLT2i 2/2 renal function

## 2024-07-18 NOTE — DIETITIAN INITIAL EVALUATION ADULT - NS FNS DIET ORDER
Diet, Renal Restrictions:   For patients receiving Renal Replacement - No Protein Restr, No Conc K, No Conc Phos, Low Sodium (07-14-24 @ 06:54) [Active]

## 2024-07-18 NOTE — GOALS OF CARE CONVERSATION - ADVANCED CARE PLANNING - CONVERSATION DETAILS
Spoke with patient about his medical conditions and current treatments. Patient understood the prognosis of his condition and was clear about what he wants done. Patient stated he wants everything done to "bring him back" in the even of an emergency. He expressed understanding of what CPR entails and would like CPR plus everything else done if his heart was to stop functioning appropriately. Additionally, patient expressed understanding of what intubation would involve and wanted everything done to help him breathe,

## 2024-07-18 NOTE — PROGRESS NOTE ADULT - PROBLEM SELECTOR PLAN 5
- Continue coreg 12.5 mg bid and increase to hydralazine 50 mg tid; uptitrate as BP tolerates  - Start isordil 20 mg TID

## 2024-07-18 NOTE — DIETITIAN INITIAL EVALUATION ADULT - OTHER CALCULATIONS
Fluid needs deferred to team. Energy and protein needs based on dosing wt of 66kg in consideration of Increased nutrient needs.

## 2024-07-18 NOTE — DIETITIAN INITIAL EVALUATION ADULT - PROBLEM SELECTOR PLAN 2
- Pt reporting LE weakness w/ mechanical fall. RVP negative, CK wnl. Hgb wnl.   - May be 2/2 edema previously however pt w/o physical exam signs c/f volume OL at this time. CT cervical spine w/ subluxation and foraminal narrowing of C5-C6 however given how high this finding is, would lead to more generalized weakness and not primarily LE weakness. Suspect weakness might be due to debility, chronic process.   - Cardiac work-up as above  - T4/TSH, B12, Folate testing  - PT optimization

## 2024-07-18 NOTE — PROGRESS NOTE ADULT - SUBJECTIVE AND OBJECTIVE BOX
[facility] DIVISION OF KIDNEY DISEASES AND HYPERTENSION -- FOLLOW UP NOTE  --------------------------------------------------------------------------------  Chief Complaint:ESRD    24 hour events/subjective:  BP has been high  Feels fair        PAST HISTORY  --------------------------------------------------------------------------------  No significant changes to PMH, PSH, FHx, SHx, unless otherwise noted    ALLERGIES & MEDICATIONS  --------------------------------------------------------------------------------  Allergies    No Known Drug Allergies  adhesives (Other)    Intolerances      Standing Inpatient Medications  aspirin enteric coated 81 milliGRAM(s) Oral daily  atorvastatin 80 milliGRAM(s) Oral at bedtime  calcium acetate 667 milliGRAM(s) Oral three times a day with meals  carvedilol 12.5 milliGRAM(s) Oral every 12 hours  heparin   Injectable 5000 Unit(s) SubCutaneous every 12 hours  hydrALAZINE 50 milliGRAM(s) Oral three times a day  isosorbide   dinitrate Tablet (ISORDIL) 20 milliGRAM(s) Oral three times a day  penicillin   G benzathine Injectable [id number] Unit(s) IntraMuscular once    PRN Inpatient Medications  acetaminophen     Tablet .. 650 milliGRAM(s) Oral every 6 hours PRN  bismuth subsalicylate Liquid 15 milliLiter(s) Oral four times a day PRN  melatonin 3 milliGRAM(s) Oral at bedtime PRN      REVIEW OF SYSTEMS  --------------------------------------------------------------------------------  none    All other systems were reviewed and are negative, except as noted.    VITALS/PHYSICAL EXAM  --------------------------------------------------------------------------------  T(C): 36.6 (07-18-24 @ 12:07), Max: 36.6 (07-18-24 @ 12:07)  HR: 60 (07-18-24 @ 12:07) (60 - 71)  BP: 179/88 (07-18-24 @ 12:07) (162/80 - 205/99)  RR: 18 (07-18-24 @ 12:07) (16 - 18)  SpO2: 99% (07-18-24 @ 12:07) (98% - 100%)  Wt(kg): --  Height (cm): 185.4 (07-16-24 @ 14:45)  Weight (kg): 66 (07-16-24 @ 14:45)  BMI (kg/m2): 19.2 (07-16-24 @ 14:45)  BSA (m2): 1.88 (07-16-24 @ 14:45)      07-17-24 @ 07:01  -  07-18-24 @ 07:00  --------------------------------------------------------  IN: 940 mL / OUT: 0 mL / NET: 940 mL      Physical Exam:  	Gen: NAD, well-appearing  Pulm: CTA B/L  CV: RRR, S1S2;  Abd: +BS, soft, nontender/nondistended  : No suprapubic tenderness  Extremities: trace LE edema.   Neuro: No focal deficits  Skin: Warm, without rashes  Vascular access: LUE fistula    LABS/STUDIES  --------------------------------------------------------------------------------              11.4   4.03  >-----------<  208      [07-18-24 @ 06:22]              34.6     134  |  94  |  32  ----------------------------<  73      [07-18-24 @ 06:25]  4.6   |  23  |  6.92        Ca     9.1     [07-18-24 @ 06:25]      Mg     2.1     [07-18-24 @ 06:25]      Phos  4.0     [07-18-24 @ 06:25]            Creatinine Trend:  SCr 6.92 [07-18 @ 06:25]  SCr 5.64 [07-17 @ 05:57]  SCr 7.39 [07-16 @ 06:20]  SCr 6.71 [07-15 @ 06:58]  SCr 5.19 [07-14 @ 06:00]    Urinalysis - [07-18-24 @ 06:25]      Color  / Appearance  / SG  / pH       Gluc 73 / Ketone   / Bili  / Urobili        Blood  / Protein  / Leuk Est  / Nitrite       RBC  / WBC  / Hyaline  / Gran  / Sq Epi  / Non Sq Epi  / Bacteria       PTH -- (Ca 9.1)      [07-16-24 @ 06:20]   48  TSH 4.12      [07-14-24 @ 07:52]    HIV Nonreact      [07-14-24 @ 10:11]    Syphilis Screen (Treponema Pallidum Ab) Positive      [07-14-24 @ 10:11]  Syphilis Screen (RPR Titer) 1:2      [07-14-24 @ 10:11]

## 2024-07-18 NOTE — DISCHARGE NOTE NURSING/CASE MANAGEMENT/SOCIAL WORK - NSDCPEFALRISK_GEN_ALL_CORE
For information on Fall & Injury Prevention, visit: [email or web address] OR  [email or web address] OR  https://www.cdc.gov/steadi/patient.html

## 2024-07-18 NOTE — PROGRESS NOTE ADULT - PROBLEM SELECTOR PLAN 7
DVT PPx: Heparin 5000 mg SubQ BID  Diet: Renal Diet  Bowel Regimen: PRN  Code: Full  Dispo: Home PT  Pharmacy:  PCP:   Communication: DVT PPx: Heparin 5000 mg SubQ BID  Diet: Renal Diet  Bowel Regimen: PRN  Code: Full  Dispo: Home PT  Pharmacy:  PCP:   Communication:    Goals of Care:

## 2024-07-18 NOTE — PROGRESS NOTE ADULT - SUBJECTIVE AND OBJECTIVE BOX
***************************************************************  Asad Cooper  (PGY1) Internal Medicine  On TEAMS  ***************************************************************    PROGRESS NOTE:     Patient is a 64y old  Male who presents with a chief complaint of CP (17 Jul 2024 07:42)      INTERVAL EVENTS:   SUBJECTIVE / OVERNIGHT EVENTS: No acute overnight events. Patient was seen and examined by the bedside this AM.   OXYGEN:   TELEMETRY:       MEDICATIONS  (STANDING):  aspirin enteric coated 81 milliGRAM(s) Oral daily  atorvastatin 80 milliGRAM(s) Oral at bedtime  calcium acetate 667 milliGRAM(s) Oral three times a day with meals  carvedilol 12.5 milliGRAM(s) Oral every 12 hours  heparin   Injectable 5000 Unit(s) SubCutaneous every 12 hours  hydrALAZINE 50 milliGRAM(s) Oral three times a day  isosorbide   dinitrate Tablet (ISORDIL) 20 milliGRAM(s) Oral three times a day    MEDICATIONS  (PRN):  acetaminophen     Tablet .. 650 milliGRAM(s) Oral every 6 hours PRN Temp greater or equal to 38C (100.4F), Mild Pain (1 - 3)  melatonin 3 milliGRAM(s) Oral at bedtime PRN Insomnia      CAPILLARY BLOOD GLUCOSE        I&O's Summary    17 Jul 2024 07:01  -  18 Jul 2024 07:00  --------------------------------------------------------  IN: 940 mL / OUT: 0 mL / NET: 940 mL        PHYSICAL EXAM:  Vital Signs Last 24 Hrs  T(C): 36.4 (18 Jul 2024 04:26), Max: 36.4 (18 Jul 2024 04:26)  T(F): 97.5 (18 Jul 2024 04:26), Max: 97.5 (18 Jul 2024 04:26)  HR: 65 (18 Jul 2024 06:00) (60 - 71)  BP: 164/77 (18 Jul 2024 06:00) (160/83 - 205/99)  BP(mean): --  RR: 18 (18 Jul 2024 04:26) (16 - 18)  SpO2: 98% (18 Jul 2024 04:26) (98% - 100%)    Parameters below as of 18 Jul 2024 04:26  Patient On (Oxygen Delivery Method): room air        General : NAD  CV: RRR no R/M/G  Lungs: CTAB  Abd : Soft, non-tender, non-distended  Extremities : No LE Edema  Neuro : A&Ox3  Skin: Normal color and turgor    LABS:                        11.4   4.03  )-----------( 208      ( 18 Jul 2024 06:22 )             34.6     07-18    134<L>  |  94<L>  |  32<H>  ----------------------------<  73  4.6   |  23  |  6.92<H>    Ca    9.1      18 Jul 2024 06:25  Phos  4.0     07-18  Mg     2.1     07-18            Urinalysis Basic - ( 18 Jul 2024 06:25 )    Color: x / Appearance: x / SG: x / pH: x  Gluc: 73 mg/dL / Ketone: x  / Bili: x / Urobili: x   Blood: x / Protein: x / Nitrite: x   Leuk Esterase: x / RBC: x / WBC x   Sq Epi: x / Non Sq Epi: x / Bacteria: x          COORDINATION OF CARE:  Care Discussed with Consultants/Other Providers [Y/N]:  Prior or Outpatient Records Reviewed [Y/N]: ***************************************************************  Asad Cooper  (PGY1) Internal Medicine  On TEAMS  ***************************************************************    PROGRESS NOTE:     Patient is a 64y old  Male who presents with a chief complaint of CP (17 Jul 2024 07:42)      INTERVAL EVENTS: Patient hypertensive overnight to 200 systolic but was asymptomatic. Given 2 pushed of 5mg hydral and Hydralazine dose increased to 50mg TID. Blood pressure started to come down by the am  SUBJECTIVE / OVERNIGHT EVENTS: No acute overnight events. Patient was seen and examined by the bedside this AM. Talked to patient this morning about importance of going to all scheduled appointments to get receive his 2 doses of penicillin. Patient expressed understanding.    OXYGEN:   TELEMETRY:       MEDICATIONS  (STANDING):  aspirin enteric coated 81 milliGRAM(s) Oral daily  atorvastatin 80 milliGRAM(s) Oral at bedtime  calcium acetate 667 milliGRAM(s) Oral three times a day with meals  carvedilol 12.5 milliGRAM(s) Oral every 12 hours  heparin   Injectable 5000 Unit(s) SubCutaneous every 12 hours  hydrALAZINE 50 milliGRAM(s) Oral three times a day  isosorbide   dinitrate Tablet (ISORDIL) 20 milliGRAM(s) Oral three times a day    MEDICATIONS  (PRN):  acetaminophen     Tablet .. 650 milliGRAM(s) Oral every 6 hours PRN Temp greater or equal to 38C (100.4F), Mild Pain (1 - 3)  melatonin 3 milliGRAM(s) Oral at bedtime PRN Insomnia      CAPILLARY BLOOD GLUCOSE        I&O's Summary    17 Jul 2024 07:01  -  18 Jul 2024 07:00  --------------------------------------------------------  IN: 940 mL / OUT: 0 mL / NET: 940 mL        PHYSICAL EXAM:  Vital Signs Last 24 Hrs  T(C): 36.4 (18 Jul 2024 04:26), Max: 36.4 (18 Jul 2024 04:26)  T(F): 97.5 (18 Jul 2024 04:26), Max: 97.5 (18 Jul 2024 04:26)  HR: 65 (18 Jul 2024 06:00) (60 - 71)  BP: 164/77 (18 Jul 2024 06:00) (160/83 - 205/99)  BP(mean): --  RR: 18 (18 Jul 2024 04:26) (16 - 18)  SpO2: 98% (18 Jul 2024 04:26) (98% - 100%)    Parameters below as of 18 Jul 2024 04:26  Patient On (Oxygen Delivery Method): room air        General : NAD  CV: RRR no R/M/G  Lungs: CTAB  Abd : Soft, non-tender, non-distended  Extremities : No LE Edema  Neuro : A&Ox3  Skin: Normal color and turgor. AVF with good thrill and clean dressing    LABS:                        11.4   4.03  )-----------( 208      ( 18 Jul 2024 06:22 )             34.6     07-18    134<L>  |  94<L>  |  32<H>  ----------------------------<  73  4.6   |  23  |  6.92<H>    Ca    9.1      18 Jul 2024 06:25  Phos  4.0     07-18  Mg     2.1     07-18            Urinalysis Basic - ( 18 Jul 2024 06:25 )    Color: x / Appearance: x / SG: x / pH: x  Gluc: 73 mg/dL / Ketone: x  / Bili: x / Urobili: x   Blood: x / Protein: x / Nitrite: x   Leuk Esterase: x / RBC: x / WBC x   Sq Epi: x / Non Sq Epi: x / Bacteria: x          COORDINATION OF CARE:  Care Discussed with Consultants/Other Providers [Y/N]:  Prior or Outpatient Records Reviewed [Y/N]:

## 2024-07-19 VITALS
TEMPERATURE: 98 F | SYSTOLIC BLOOD PRESSURE: 120 MMHG | OXYGEN SATURATION: 98 % | DIASTOLIC BLOOD PRESSURE: 63 MMHG | HEART RATE: 52 BPM | RESPIRATION RATE: 18 BRPM

## 2024-07-19 LAB
ANION GAP SERPL CALC-SCNC: 16 MMOL/L — SIGNIFICANT CHANGE UP (ref 5–17)
BUN SERPL-MCNC: 17 MG/DL — SIGNIFICANT CHANGE UP (ref 7–23)
CALCIUM SERPL-MCNC: 8.9 MG/DL — SIGNIFICANT CHANGE UP (ref 8.4–10.5)
CHLORIDE SERPL-SCNC: 96 MMOL/L — SIGNIFICANT CHANGE UP (ref 96–108)
CO2 SERPL-SCNC: 25 MMOL/L — SIGNIFICANT CHANGE UP (ref 22–31)
CREAT SERPL-MCNC: 4.86 MG/DL — HIGH (ref 0.5–1.3)
EGFR: 13 ML/MIN/1.73M2 — LOW
GLUCOSE SERPL-MCNC: 86 MG/DL — SIGNIFICANT CHANGE UP (ref 70–99)
HCT VFR BLD CALC: 33.1 % — LOW (ref 39–50)
HGB BLD-MCNC: 10.7 G/DL — LOW (ref 13–17)
MAGNESIUM SERPL-MCNC: 2 MG/DL — SIGNIFICANT CHANGE UP (ref 1.6–2.6)
MCHC RBC-ENTMCNC: 24.7 PG — LOW (ref 27–34)
MCHC RBC-ENTMCNC: 32.3 GM/DL — SIGNIFICANT CHANGE UP (ref 32–36)
MCV RBC AUTO: 76.4 FL — LOW (ref 80–100)
NRBC # BLD: 0 /100 WBCS — SIGNIFICANT CHANGE UP (ref 0–0)
PHOSPHATE SERPL-MCNC: 3 MG/DL — SIGNIFICANT CHANGE UP (ref 2.5–4.5)
PLATELET # BLD AUTO: 209 K/UL — SIGNIFICANT CHANGE UP (ref 150–400)
POTASSIUM SERPL-MCNC: 3.8 MMOL/L — SIGNIFICANT CHANGE UP (ref 3.5–5.3)
POTASSIUM SERPL-SCNC: 3.8 MMOL/L — SIGNIFICANT CHANGE UP (ref 3.5–5.3)
RBC # BLD: 4.33 M/UL — SIGNIFICANT CHANGE UP (ref 4.2–5.8)
RBC # FLD: 18.7 % — HIGH (ref 10.3–14.5)
SODIUM SERPL-SCNC: 137 MMOL/L — SIGNIFICANT CHANGE UP (ref 135–145)
WBC # BLD: 3.84 K/UL — SIGNIFICANT CHANGE UP (ref 3.8–10.5)
WBC # FLD AUTO: 3.84 K/UL — SIGNIFICANT CHANGE UP (ref 3.8–10.5)

## 2024-07-19 PROCEDURE — 84484 ASSAY OF TROPONIN QUANT: CPT

## 2024-07-19 PROCEDURE — C1887: CPT

## 2024-07-19 PROCEDURE — 93567 NJX CAR CTH SPRVLV AORTGRPHY: CPT

## 2024-07-19 PROCEDURE — 83735 ASSAY OF MAGNESIUM: CPT

## 2024-07-19 PROCEDURE — 99233 SBSQ HOSP IP/OBS HIGH 50: CPT

## 2024-07-19 PROCEDURE — 84100 ASSAY OF PHOSPHORUS: CPT

## 2024-07-19 PROCEDURE — 87637 SARSCOV2&INF A&B&RSV AMP PRB: CPT

## 2024-07-19 PROCEDURE — 93306 TTE W/DOPPLER COMPLETE: CPT

## 2024-07-19 PROCEDURE — 70450 CT HEAD/BRAIN W/O DYE: CPT | Mod: MC

## 2024-07-19 PROCEDURE — 71046 X-RAY EXAM CHEST 2 VIEWS: CPT

## 2024-07-19 PROCEDURE — 80048 BASIC METABOLIC PNL TOTAL CA: CPT

## 2024-07-19 PROCEDURE — 80053 COMPREHEN METABOLIC PANEL: CPT

## 2024-07-19 PROCEDURE — 85027 COMPLETE CBC AUTOMATED: CPT

## 2024-07-19 PROCEDURE — 83970 ASSAY OF PARATHORMONE: CPT

## 2024-07-19 PROCEDURE — 86703 HIV-1/HIV-2 1 RESULT ANTBDY: CPT

## 2024-07-19 PROCEDURE — 82746 ASSAY OF FOLIC ACID SERUM: CPT

## 2024-07-19 PROCEDURE — 85025 COMPLETE CBC W/AUTO DIFF WBC: CPT

## 2024-07-19 PROCEDURE — 99261: CPT

## 2024-07-19 PROCEDURE — 97116 GAIT TRAINING THERAPY: CPT

## 2024-07-19 PROCEDURE — 72125 CT NECK SPINE W/O DYE: CPT | Mod: MC

## 2024-07-19 PROCEDURE — 86592 SYPHILIS TEST NON-TREP QUAL: CPT

## 2024-07-19 PROCEDURE — 97530 THERAPEUTIC ACTIVITIES: CPT

## 2024-07-19 PROCEDURE — 97162 PT EVAL MOD COMPLEX 30 MIN: CPT

## 2024-07-19 PROCEDURE — 93455 CORONARY ART/GRFT ANGIO S&I: CPT

## 2024-07-19 PROCEDURE — 86593 SYPHILIS TEST NON-TREP QUANT: CPT

## 2024-07-19 PROCEDURE — 36415 COLL VENOUS BLD VENIPUNCTURE: CPT

## 2024-07-19 PROCEDURE — 82553 CREATINE MB FRACTION: CPT

## 2024-07-19 PROCEDURE — 83880 ASSAY OF NATRIURETIC PEPTIDE: CPT

## 2024-07-19 PROCEDURE — 84436 ASSAY OF TOTAL THYROXINE: CPT

## 2024-07-19 PROCEDURE — 99232 SBSQ HOSP IP/OBS MODERATE 35: CPT | Mod: GC

## 2024-07-19 PROCEDURE — 93356 MYOCRD STRAIN IMG SPCKL TRCK: CPT

## 2024-07-19 PROCEDURE — 84443 ASSAY THYROID STIM HORMONE: CPT

## 2024-07-19 PROCEDURE — 82550 ASSAY OF CK (CPK): CPT

## 2024-07-19 PROCEDURE — 82310 ASSAY OF CALCIUM: CPT

## 2024-07-19 PROCEDURE — 82607 VITAMIN B-12: CPT

## 2024-07-19 PROCEDURE — 99285 EMERGENCY DEPT VISIT HI MDM: CPT

## 2024-07-19 PROCEDURE — 99233 SBSQ HOSP IP/OBS HIGH 50: CPT | Mod: GC

## 2024-07-19 PROCEDURE — C1769: CPT

## 2024-07-19 PROCEDURE — 86780 TREPONEMA PALLIDUM: CPT

## 2024-07-19 PROCEDURE — C1894: CPT

## 2024-07-19 RX ORDER — ISOSORBIDE DINITRATE 5 MG/1
2 TABLET ORAL
Qty: 180 | Refills: 0
Start: 2024-07-19 | End: 2024-08-17

## 2024-07-19 RX ORDER — ISOSORBIDE DINITRATE 5 MG/1
1 TABLET ORAL
Qty: 90 | Refills: 0
Start: 2024-07-19 | End: 2024-08-17

## 2024-07-19 RX ORDER — POTASSIUM CHLORIDE 600 MG/1
20 TABLET, FILM COATED, EXTENDED RELEASE ORAL ONCE
Refills: 0 | Status: COMPLETED | OUTPATIENT
Start: 2024-07-19 | End: 2024-07-19

## 2024-07-19 RX ORDER — ISOSORBIDE DINITRATE 5 MG/1
20 TABLET ORAL ONCE
Refills: 0 | Status: COMPLETED | OUTPATIENT
Start: 2024-07-19 | End: 2024-07-19

## 2024-07-19 RX ORDER — HYDRALAZINE HYDROCHLORIDE 50 MG/1
1 TABLET ORAL
Qty: 90 | Refills: 0
Start: 2024-07-19 | End: 2024-08-17

## 2024-07-19 RX ORDER — HYDRALAZINE HYDROCHLORIDE 50 MG/1
50 TABLET ORAL ONCE
Refills: 0 | Status: COMPLETED | OUTPATIENT
Start: 2024-07-19 | End: 2024-07-19

## 2024-07-19 RX ORDER — CARVEDILOL PHOSPHATE 80 MG/1
1 CAPSULE, EXTENDED RELEASE ORAL
Qty: 60 | Refills: 0
Start: 2024-07-19 | End: 2024-08-17

## 2024-07-19 RX ORDER — ISOSORBIDE DINITRATE 5 MG/1
40 TABLET ORAL THREE TIMES A DAY
Refills: 0 | Status: DISCONTINUED | OUTPATIENT
Start: 2024-07-19 | End: 2024-07-19

## 2024-07-19 RX ORDER — HYDRALAZINE HYDROCHLORIDE 50 MG/1
100 TABLET ORAL THREE TIMES A DAY
Refills: 0 | Status: DISCONTINUED | OUTPATIENT
Start: 2024-07-19 | End: 2024-07-19

## 2024-07-19 RX ADMIN — CARVEDILOL PHOSPHATE 12.5 MILLIGRAM(S): 80 CAPSULE, EXTENDED RELEASE ORAL at 04:59

## 2024-07-19 RX ADMIN — HEPARIN SODIUM 5000 UNIT(S): 50 INJECTION, SOLUTION INTRAVENOUS at 05:03

## 2024-07-19 RX ADMIN — POTASSIUM CHLORIDE 20 MILLIEQUIVALENT(S): 600 TABLET, FILM COATED, EXTENDED RELEASE ORAL at 08:49

## 2024-07-19 RX ADMIN — ISOSORBIDE DINITRATE 40 MILLIGRAM(S): 5 TABLET ORAL at 13:37

## 2024-07-19 RX ADMIN — ISOSORBIDE DINITRATE 20 MILLIGRAM(S): 5 TABLET ORAL at 04:59

## 2024-07-19 RX ADMIN — Medication 650 MILLIGRAM(S): at 07:07

## 2024-07-19 RX ADMIN — CALCIUM ACETATE 667 MILLIGRAM(S): 667 CAPSULE ORAL at 08:49

## 2024-07-19 RX ADMIN — HYDRALAZINE HYDROCHLORIDE 100 MILLIGRAM(S): 50 TABLET ORAL at 13:36

## 2024-07-19 RX ADMIN — Medication 650 MILLIGRAM(S): at 06:01

## 2024-07-19 RX ADMIN — HYDRALAZINE HYDROCHLORIDE 50 MILLIGRAM(S): 50 TABLET ORAL at 08:49

## 2024-07-19 RX ADMIN — HYDRALAZINE HYDROCHLORIDE 50 MILLIGRAM(S): 50 TABLET ORAL at 04:59

## 2024-07-19 NOTE — PROGRESS NOTE ADULT - SUBJECTIVE AND OBJECTIVE BOX
***************************************************************  Asad Cooper  (PGY1) Internal Medicine  On TEAMS  ***************************************************************    PROGRESS NOTE:     Patient is a 64y old  Male who presents with a chief complaint of Other form of dyspnea     (18 Jul 2024 16:47)      INTERVAL EVENTS:   SUBJECTIVE / OVERNIGHT EVENTS: No acute overnight events. Patient was seen and examined by the bedside this AM.   OXYGEN:   TELEMETRY:       MEDICATIONS  (STANDING):  aspirin enteric coated 81 milliGRAM(s) Oral daily  atorvastatin 80 milliGRAM(s) Oral at bedtime  calcium acetate 667 milliGRAM(s) Oral three times a day with meals  carvedilol 12.5 milliGRAM(s) Oral every 12 hours  heparin   Injectable 5000 Unit(s) SubCutaneous every 12 hours  hydrALAZINE 100 milliGRAM(s) Oral three times a day  isosorbide   dinitrate Tablet (ISORDIL) 20 milliGRAM(s) Oral three times a day    MEDICATIONS  (PRN):  acetaminophen     Tablet .. 650 milliGRAM(s) Oral every 6 hours PRN Temp greater or equal to 38C (100.4F), Mild Pain (1 - 3)  bismuth subsalicylate Liquid 15 milliLiter(s) Oral four times a day PRN Diarrhea  melatonin 3 milliGRAM(s) Oral at bedtime PRN Insomnia      CAPILLARY BLOOD GLUCOSE        I&O's Summary    18 Jul 2024 07:01  -  19 Jul 2024 07:00  --------------------------------------------------------  IN: 1260 mL / OUT: 3900 mL / NET: -2640 mL        PHYSICAL EXAM:  Vital Signs Last 24 Hrs  T(C): 36.9 (19 Jul 2024 04:24), Max: 36.9 (19 Jul 2024 04:24)  T(F): 98.5 (19 Jul 2024 04:24), Max: 98.5 (19 Jul 2024 04:24)  HR: 67 (19 Jul 2024 04:24) (58 - 68)  BP: 181/91 (19 Jul 2024 04:24) (169/85 - 189/96)  BP(mean): --  RR: 18 (19 Jul 2024 04:24) (18 - 18)  SpO2: 98% (19 Jul 2024 04:24) (97% - 99%)    Parameters below as of 19 Jul 2024 04:24  Patient On (Oxygen Delivery Method): room air        General : NAD  CV: RRR no R/M/G  Lungs: CTAB  Abd : Soft, non-tender, non-distended  Extremities : No LE Edema  Neuro : A&Ox3  Skin: Normal color and turgor    LABS:                        10.7   3.84  )-----------( 209      ( 19 Jul 2024 07:07 )             33.1     07-18    134<L>  |  94<L>  |  32<H>  ----------------------------<  73  4.6   |  23  |  6.92<H>    Ca    9.1      18 Jul 2024 06:25  Phos  4.0     07-18  Mg     2.1     07-18            Urinalysis Basic - ( 18 Jul 2024 06:25 )    Color: x / Appearance: x / SG: x / pH: x  Gluc: 73 mg/dL / Ketone: x  / Bili: x / Urobili: x   Blood: x / Protein: x / Nitrite: x   Leuk Esterase: x / RBC: x / WBC x   Sq Epi: x / Non Sq Epi: x / Bacteria: x          COORDINATION OF CARE:  Care Discussed with Consultants/Other Providers [Y/N]:  Prior or Outpatient Records Reviewed [Y/N]: ***************************************************************  Asad Cooper  (PGY1) Internal Medicine  On TEAMS  ***************************************************************    PROGRESS NOTE:     Patient is a 64y old  Male who presents with a chief complaint of Other form of dyspnea     (18 Jul 2024 16:47)      INTERVAL EVENTS: Complaining of headache overnight with BP >160 systolic. Received hydralazine dose earlier. This am got an extra dose of hydral 50mg and isordil 20mg  SUBJECTIVE / OVERNIGHT EVENTS: No acute overnight events. Patient was seen and examined by the bedside this AM. Doing good this am with no diarrhea. Room air overnight and this am  OXYGEN:   TELEMETRY:       MEDICATIONS  (STANDING):  aspirin enteric coated 81 milliGRAM(s) Oral daily  atorvastatin 80 milliGRAM(s) Oral at bedtime  calcium acetate 667 milliGRAM(s) Oral three times a day with meals  carvedilol 12.5 milliGRAM(s) Oral every 12 hours  heparin   Injectable 5000 Unit(s) SubCutaneous every 12 hours  hydrALAZINE 100 milliGRAM(s) Oral three times a day  isosorbide   dinitrate Tablet (ISORDIL) 20 milliGRAM(s) Oral three times a day    MEDICATIONS  (PRN):  acetaminophen     Tablet .. 650 milliGRAM(s) Oral every 6 hours PRN Temp greater or equal to 38C (100.4F), Mild Pain (1 - 3)  bismuth subsalicylate Liquid 15 milliLiter(s) Oral four times a day PRN Diarrhea  melatonin 3 milliGRAM(s) Oral at bedtime PRN Insomnia      CAPILLARY BLOOD GLUCOSE        I&O's Summary    18 Jul 2024 07:01  -  19 Jul 2024 07:00  --------------------------------------------------------  IN: 1260 mL / OUT: 3900 mL / NET: -2640 mL        PHYSICAL EXAM:  Vital Signs Last 24 Hrs  T(C): 36.9 (19 Jul 2024 04:24), Max: 36.9 (19 Jul 2024 04:24)  T(F): 98.5 (19 Jul 2024 04:24), Max: 98.5 (19 Jul 2024 04:24)  HR: 67 (19 Jul 2024 04:24) (58 - 68)  BP: 181/91 (19 Jul 2024 04:24) (169/85 - 189/96)  BP(mean): --  RR: 18 (19 Jul 2024 04:24) (18 - 18)  SpO2: 98% (19 Jul 2024 04:24) (97% - 99%)    Parameters below as of 19 Jul 2024 04:24  Patient On (Oxygen Delivery Method): room air        General : NAD  CV: RRR no R/M/G  Lungs: CTAB  Abd : Soft, non-tender, non-distended  Extremities : No LE Edema  Neuro : A&Ox3  Skin: Normal color and turgor. AVF nonerythematous and good thrill    LABS:                        10.7   3.84  )-----------( 209      ( 19 Jul 2024 07:07 )             33.1     07-18    134<L>  |  94<L>  |  32<H>  ----------------------------<  73  4.6   |  23  |  6.92<H>    Ca    9.1      18 Jul 2024 06:25  Phos  4.0     07-18  Mg     2.1     07-18            Urinalysis Basic - ( 18 Jul 2024 06:25 )    Color: x / Appearance: x / SG: x / pH: x  Gluc: 73 mg/dL / Ketone: x  / Bili: x / Urobili: x   Blood: x / Protein: x / Nitrite: x   Leuk Esterase: x / RBC: x / WBC x   Sq Epi: x / Non Sq Epi: x / Bacteria: x          COORDINATION OF CARE:  Care Discussed with Consultants/Other Providers [Y/N]:  Prior or Outpatient Records Reviewed [Y/N]: ***************************************************************  Asad Cooper  (PGY1) Internal Medicine  On TEAMS  ***************************************************************    PROGRESS NOTE:     Patient is a 64y old  Male who presents with a chief complaint of Other form of dyspnea     (18 Jul 2024 16:47)      INTERVAL EVENTS: Complaining of headache overnight with BP >160 systolic. Received hydralazine dose earlier. This am got an extra dose of hydral 50mg and isordil 20mg  SUBJECTIVE / OVERNIGHT EVENTS: No acute overnight events. Patient was seen and examined by the bedside this AM. Doing good this am with no diarrhea or abdominal pain. Room air overnight and this am  OXYGEN:   TELEMETRY:       MEDICATIONS  (STANDING):  aspirin enteric coated 81 milliGRAM(s) Oral daily  atorvastatin 80 milliGRAM(s) Oral at bedtime  calcium acetate 667 milliGRAM(s) Oral three times a day with meals  carvedilol 12.5 milliGRAM(s) Oral every 12 hours  heparin   Injectable 5000 Unit(s) SubCutaneous every 12 hours  hydrALAZINE 100 milliGRAM(s) Oral three times a day  isosorbide   dinitrate Tablet (ISORDIL) 20 milliGRAM(s) Oral three times a day    MEDICATIONS  (PRN):  acetaminophen     Tablet .. 650 milliGRAM(s) Oral every 6 hours PRN Temp greater or equal to 38C (100.4F), Mild Pain (1 - 3)  bismuth subsalicylate Liquid 15 milliLiter(s) Oral four times a day PRN Diarrhea  melatonin 3 milliGRAM(s) Oral at bedtime PRN Insomnia      CAPILLARY BLOOD GLUCOSE        I&O's Summary    18 Jul 2024 07:01  -  19 Jul 2024 07:00  --------------------------------------------------------  IN: 1260 mL / OUT: 3900 mL / NET: -2640 mL        PHYSICAL EXAM:  Vital Signs Last 24 Hrs  T(C): 36.9 (19 Jul 2024 04:24), Max: 36.9 (19 Jul 2024 04:24)  T(F): 98.5 (19 Jul 2024 04:24), Max: 98.5 (19 Jul 2024 04:24)  HR: 67 (19 Jul 2024 04:24) (58 - 68)  BP: 181/91 (19 Jul 2024 04:24) (169/85 - 189/96)  BP(mean): --  RR: 18 (19 Jul 2024 04:24) (18 - 18)  SpO2: 98% (19 Jul 2024 04:24) (97% - 99%)    Parameters below as of 19 Jul 2024 04:24  Patient On (Oxygen Delivery Method): room air        General : NAD  CV: RRR no R/M/G  Lungs: CTAB  Abd : Soft, non-tender, non-distended  Extremities : No LE Edema  Neuro : A&Ox3  Skin: Normal color and turgor. AVF nonerythematous and good thrill    LABS:                        10.7   3.84  )-----------( 209      ( 19 Jul 2024 07:07 )             33.1     07-18    134<L>  |  94<L>  |  32<H>  ----------------------------<  73  4.6   |  23  |  6.92<H>    Ca    9.1      18 Jul 2024 06:25  Phos  4.0     07-18  Mg     2.1     07-18            Urinalysis Basic - ( 18 Jul 2024 06:25 )    Color: x / Appearance: x / SG: x / pH: x  Gluc: 73 mg/dL / Ketone: x  / Bili: x / Urobili: x   Blood: x / Protein: x / Nitrite: x   Leuk Esterase: x / RBC: x / WBC x   Sq Epi: x / Non Sq Epi: x / Bacteria: x          COORDINATION OF CARE:  Care Discussed with Consultants/Other Providers [Y/N]:  Prior or Outpatient Records Reviewed [Y/N]:

## 2024-07-19 NOTE — PROGRESS NOTE ADULT - ATTENDING COMMENTS
64M PMHX of CAD and severe AS s/p AVR and CABG x2 RSVG-Ramus, RSVG-OM1 (2021), ESRD on HD, Hep C s/p treatment, latent TB, HTN, who presents to ED w/ SOB and neck/back/leg soreness w/ fall 2 days ago due to weakness, elevated BNP and troponin, TTE showing new decreased LV systolic dysfunction with EF 35% and regional WMA c/f ischemia; s/p LHC 7/16 showing 2 vessel disease but with patent SVG - no intervention performed. Currently optimizing GDMT.     - SOB likely 2/2 new heart failure and ischemic disease  - LHC as above, no intervention performed   - optimizing GDMT - c/w coreg, increase hydralazine and isordil further today. 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

## 2024-07-19 NOTE — PROVIDER CONTACT NOTE (OTHER) - NAME OF MD/NP/PA/DO NOTIFIED:
Resident Becky Haskins

## 2024-07-19 NOTE — PROGRESS NOTE ADULT - PROBLEM SELECTOR PLAN 5
Resistant hypertension. Will require close outpatient followup and med titration.     - Continue coreg 12.5 mg bid and increase to hydralazine 100 mg tid; uptitrate as BP tolerates  - Start isordil 40 mg TID

## 2024-07-19 NOTE — PROVIDER CONTACT NOTE (OTHER) - ACTION/TREATMENT ORDERED:
Provider aware. Do not administer additional BP medication. Administer 50 mg PO hydralazine as ordered.
Administer IV push hydralazine 5 mg as ordered.
Administer 22:00 hydralazine now.
Administer IV push hydralazine 5 mg as ordered.
Administer IV push hydralazine 5 mg as ordered.

## 2024-07-19 NOTE — PROGRESS NOTE ADULT - ASSESSMENT
64-yo M PMHX of CAD and severe AS s/p AVR and CABG x2 RSVG-Ramus, RSVG-OM1 (2021), ESRD on HD , Hep C s/p treatment, latent TB, HTN, who presents to ED w/ SOB and neck/back/leg soreness w/ fall.    #ESRD  - On HD TTS for over 10 year (at [facility] , Access: LUE fistula , Outpatient Nephrologist: Dr. Valdovinos ). Last outpatient HD Session: 7/13. Patient sessions are usually 3 hours in length. Patient does not make urine.   - BNP found to be 320, 912. CXR with trace bilateral effusions.   - ECHO 7/15: LEF 35% with RWMA c/f ischemic disease. LV diastolic dysfunction present. RV severely dilated with reduced function. LA and RA severe dilation.   - s/p LHC 7/16: 2v disease with patient SVG grafts to both. Medical management indicated.  - UF 7/15 1.5L removal as tolerated. Last HD 7/18 with 3L removed.  - If patient is note discharged today 7/19 will do additional UF only session for volume removal and full HD session 7/20. Discussed with primary team.     #MBD  - Phos goal: 3.5-5.5. Monitor serum phosphorus daily.  - Continue with phos binder phoslo 667 TID with meals.   - C/w sensipar 30mg daily   - PTH 48, Phos within goal.     Feel free to contact me via TEAMS.     eHidi Walters MD, PGY-4  Nephrology Fellow   After 5PM/Weekends/Holidays please contact the on call fellow.      64-yo M PMHX of CAD and severe AS s/p AVR and CABG x2 RSVG-Ramus, RSVG-OM1 (2021), ESRD on HD , Hep C s/p treatment, latent TB, HTN, who presents to ED w/ SOB and neck/back/leg soreness w/ fall.    #ESRD  - On HD TTS for over 10 year (at [facility] , Access: LUE fistula , Outpatient Nephrologist: Dr. Valdovinos ). Last outpatient HD Session: 7/13. Patient sessions are usually 3 hours in length. Patient does not make urine.   - BNP found to be 320, 912. CXR with trace bilateral effusions.   - ECHO 7/15: LEF 35% with RWMA c/f ischemic disease. LV diastolic dysfunction present. RV severely dilated with reduced function. LA and RA severe dilation.   - s/p LHC 7/16: 2v disease with patient SVG grafts to both. Medical management indicated.  - UF 7/15 1.5L removal as tolerated. Last HD 7/18 with 3L removed.  - If patient is note discharged today 7/19 will do additional UF only session for volume removal and full HD session 7/20. Discussed with primary team.     #MBD  - Phos goal: 3.5-5.5. Monitor serum phosphorus daily.  - Continue with phos binder phoslo 667 TID with meals. Phos within goal.   - PTH suppressed. Sensipar stopped     Feel free to contact me via TEAMS.     Heidi Walters MD, PGY-4  Nephrology Fellow   After 5PM/Weekends/Holidays please contact the on call fellow.

## 2024-07-19 NOTE — PROGRESS NOTE ADULT - PROBLEM SELECTOR PLAN 1
- Pt w/ 2 months of SOB however not hypoxic in ED. No fevers, cough, RVP negative. CXR on admit w/ stable cardiomegaly and b/l pleural effusions. Last Echo in 2023 w/ EF 60%'s, however elevated BNP on admit (likely 2/2 ESRD).    - Pt not appearing volume overloaded, w/o edema or crackles. Per pt report not producing urine.   - TTE showing mod-severely reduced LVSF with EF 35%, regional WMA in septum and inferior wall. Severely enlarged V with reduced RVSF. Severely dilated LA and RA.  - LHC showing severe two vessel disease with patent SVG-RI and patent SVG-LCx. LM 20%, LAD 55%, RCA 50%. No stents placed.    Plan:  - Continue coreg 12.5 mg bid and increase hydralazine to 100 mg tid; uptitrate as BP tolerates    - Increase to isordil 40mg TID  - Atorvastatin 80mg and ASA 81  - unable to tolerate ARNI/MRA/SGLT2i 2/2 renal function

## 2024-07-19 NOTE — PROGRESS NOTE ADULT - ASSESSMENT
64M with CAD, severe AS s/p AVR, CABG x 2 RSVG-Ramus, RSVG-OM! (2021), ESRD on HD, HepC s/p tx, latent TB, HTN, presenting with SOB, leg heaviness, and difficulty with ambulation over the last 2 months, admitted for dyspnea on exertion and generalized weakness. Cardiology consulted for ischemic evaluation iso newly reduced EF with WMA.    #Newly reduced EF with WMA  Appears euvolemic on exam.  Trop 518 --> 488  BNP 320k  EKG sinus billy to 57; LVH with QRS widening and repolarization abnormality. EKG in 2021 with LBBB  TTE showing mod-severely reduced LVSF with EF 35%, regional WMA in septum and inferior wall. Severely enlarged V with reduced RVSF. Severely dilated LA and RA.  LHC showing severe two vessel disease with patent SVG-RI and patent SVG-LCx. LM 20%, LAD 55%, RCA 50%. Will treat with medical management     - continue asa, statin  - continue coreg 12.5 mg bid  - increase hydralazine to 100 mg tid  - increase isordil to 40 mg tid    - further titration of BP meds with outpatient cardiologist Dr. Yasmin Mckeon; please arrange for outpatient followup     Preliminary note. Recommendations not finalized until attending attestation     Jimenez Rick MD  PGY3 64M with CAD, severe AS s/p AVR, CABG x 2 RSVG-Ramus, RSVG-OM! (2021), ESRD on HD, HepC s/p tx, latent TB, HTN, presenting with SOB, leg heaviness, and difficulty with ambulation over the last 2 months, admitted for dyspnea on exertion and generalized weakness. Cardiology consulted for ischemic evaluation iso newly reduced EF with WMA.    #Newly reduced EF with WMA  Appears euvolemic on exam.  Trop 518 --> 488  BNP 320k  EKG sinus billy to 57; LVH with QRS widening and repolarization abnormality. EKG in 2021 with LBBB  TTE showing mod-severely reduced LVSF with EF 35%, regional WMA in septum and inferior wall. Severely enlarged V with reduced RVSF. Severely dilated LA and RA.  LHC showing severe two vessel disease with patent SVG-RI and patent SVG-LCx. LM 20%, LAD 55%, RCA 50%. Will treat with medical management     - continue asa, statin  - continue coreg 12.5 mg bid  - increase hydralazine to 100 mg tid  - increase isordil to 40 mg tid    - further titration of BP meds with outpatient cardiologist Dr. Yasmin Mckeon; please arrange for outpatient followup     Preliminary note. Recommendations not finalized until attending attestation     Jimenez Rick MD  PGY3    This patient was seen and examined personally by me and the plan was discussed with the fellow and/or resident above. Amendments were made as necessary to the above. Agree with the excellent note and plan above. BP/GDMT titration as above.     Marlon Hopkins MD, MPhil, [facility]  Cardiologist, [facility]  ; [facility] and [facility]  Email: [email or web address] on-service contact/call information, go to [email or web address] and use [email or web address]" to login.  Outpatient Cardiology appointments, call [phone] to arrange with a colleague; I do not have outpatient Cardiology clinic. Depth Of Biopsy: dermis

## 2024-07-19 NOTE — PROGRESS NOTE ADULT - SUBJECTIVE AND OBJECTIVE BOX
[facility] Division of Kidney Diseases & Hypertension  FOLLOW UP NOTE  [phone]--------------------------------------------------------------------------------  Chief Complaint:Other form of dyspnea        24 hour events/subjective: No acute events overnight. Pt seen and examined at bedside this AM. Still hypertensive overnight. s/p 3L fluid removal in HD yesterday.        PAST HISTORY  --------------------------------------------------------------------------------  No significant changes to PMH, PSH, FHx, SHx from H&P unless otherwise noted.    ALLERGIES & MEDICATIONS  --------------------------------------------------------------------------------  Allergies    No Known Drug Allergies  adhesives (Other)    Intolerances      Standing Inpatient Medications  aspirin enteric coated 81 milliGRAM(s) Oral daily  atorvastatin 80 milliGRAM(s) Oral at bedtime  calcium acetate 667 milliGRAM(s) Oral three times a day with meals  carvedilol 12.5 milliGRAM(s) Oral every 12 hours  heparin   Injectable 5000 Unit(s) SubCutaneous every 12 hours  hydrALAZINE 100 milliGRAM(s) Oral three times a day  isosorbide   dinitrate Tablet (ISORDIL) 40 milliGRAM(s) Oral three times a day  isosorbide   dinitrate Tablet (ISORDIL) 20 milliGRAM(s) Oral once    PRN Inpatient Medications  acetaminophen     Tablet .. 650 milliGRAM(s) Oral every 6 hours PRN  bismuth subsalicylate Liquid 15 milliLiter(s) Oral four times a day PRN  melatonin 3 milliGRAM(s) Oral at bedtime PRN      REVIEW OF SYSTEMS  --------------------------------------------------------------------------------    All other systems were reviewed and are negative, except as noted above.    VITALS/PHYSICAL EXAM  --------------------------------------------------------------------------------  T(C): 36.9 (07-19-24 @ 04:24), Max: 36.9 (07-19-24 @ 04:24)  HR: 62 (07-19-24 @ 08:42) (58 - 68)  BP: 176/74 (07-19-24 @ 08:42) (169/85 - 189/96)  RR: 18 (07-19-24 @ 04:24) (18 - 18)  SpO2: 98% (07-19-24 @ 04:24) (97% - 99%)  Wt(kg): --        07-18-24 @ 07:01  -  07-19-24 @ 07:00  --------------------------------------------------------  IN: 1260 mL / OUT: 3900 mL / NET: -2640 mL    07-19-24 @ 07:01  -  07-19-24 @ 12:04  --------------------------------------------------------  IN: 360 mL / OUT: 0 mL / NET: 360 mL      Physical Exam:  Gen: NAD, well-appearing  Pulm: CTA B/L  CV: RRR, S1S2;  Abd: +BS, soft, nontender/nondistended  : No suprapubic tenderness  Extremities: no bilateral LE edema noted.   Neuro: No focal deficits  Skin: Warm, without rashes  Vascular access:    LABS/STUDIES  --------------------------------------------------------------------------------              10.7   3.84  >-----------<  209      [07-19-24 @ 07:07]              33.1     137  |  96  |  17  ----------------------------<  86      [07-19-24 @ 07:07]  3.8   |  25  |  4.86        Ca     8.9     [07-19-24 @ 07:07]      Mg     2.0     [07-19-24 @ 07:07]      Phos  3.0     [07-19-24 @ 07:07]            Creatinine Trend:  SCr 4.86 [07-19 @ 07:07]  SCr 6.92 [07-18 @ 06:25]  SCr 5.64 [07-17 @ 05:57]  SCr 7.39 [07-16 @ 06:20]  SCr 6.71 [07-15 @ 06:58]    Urinalysis - [07-19-24 @ 07:07]      Color  / Appearance  / SG  / pH       Gluc 86 / Ketone   / Bili  / Urobili        Blood  / Protein  / Leuk Est  / Nitrite       RBC  / WBC  / Hyaline  / Gran  / Sq Epi  / Non Sq Epi  / Bacteria       PTH -- (Ca 9.1)      [07-16-24 @ 06:20]   48  TSH 4.12      [07-14-24 @ 07:52]    HIV Nonreact      [07-14-24 @ 10:11]    Syphilis Screen (Treponema Pallidum Ab) Positive      [07-14-24 @ 10:11]  Syphilis Screen (RPR Titer) 1:2      [07-14-24 @ 10:11]      IMAGING/RADIOLOGY:

## 2024-07-19 NOTE — PROGRESS NOTE ADULT - PROBLEM SELECTOR PROBLEM 1
Acute heart failure
Shortness of breath
Acute heart failure
Shortness of breath

## 2024-07-19 NOTE — PROVIDER CONTACT NOTE (OTHER) - RECOMMENDATIONS
Administer 22:00 hydralazine now.
Administer IV push hydralazine 5 mg as ordered.
Provider aware. Do not administer additional BP medication. Administer 50 mg PO hydralazine as ordered.

## 2024-07-19 NOTE — PROGRESS NOTE ADULT - PROVIDER SPECIALTY LIST ADULT
Cardiology
Internal Medicine
Internal Medicine
Nephrology
Cardiology
Cardiology
Internal Medicine
Nephrology
Nephrology
Cardiology
Infectious Disease
Internal Medicine

## 2024-07-19 NOTE — PROGRESS NOTE ADULT - PROBLEM SELECTOR PLAN 6
Resolved.  - Pt w/ elevated troponin 400-500's on admit w/o chest pain. ECG w/ LVH w/ ST and T-wave changes c/w LVH.   - Pt w/ prior troponins 400-700's, likely elevated in s/o ESRD. Downtrended.   - CTM for chest pain    - [facility] as above

## 2024-07-19 NOTE — PROGRESS NOTE ADULT - PROBLEM SELECTOR PLAN 4
- Pt w/ hx of ESRD on HD, T/Th/Sat. HD last (07/19). PTH (07/16) wnl.    - Continue home Cincalcet, Calcium tabs  - Nephrology following for HD  - Outpatient HD setup per CM  - Phoslo 667 TID w/ meals  - daily phos level

## 2024-07-19 NOTE — PROGRESS NOTE ADULT - ATTENDING COMMENTS
64-yo M PMHX of CAD and severe AS s/p AVR and CABG x2 RSVG-Ramus, RSVG-OM1 (2021), ESRD on HD , Hep C s/p treatment, latent TB, HTN, who presents to ED w/ SOB and neck/back/leg soreness w/ fall.    ESRD on HD  Labs reviewed  Underwent HD yesterday. Received 3 L of UF  Can do additional session of UF  to remove more fluid given high BP if patient is not dc home today    Access- avf    Anemia:  Hg at goal  TONEY on hold for now     MBD: Phos ok. Maintain Binder  PTH Low. Sensipar stopped    Vol/HTN: BP running very high  Will increase UF with HD today. May need additional UF if SBP remains consistently elevated  Maintain coreg/hydralazine. Titrate up per primary team       Ricci May MD  O: [phone]  Contact me on teams

## 2024-07-19 NOTE — PROGRESS NOTE ADULT - REASON FOR ADMISSION
SOB and leg weakness
Shortness of Breath w/ leg weakness
SOB and LE weakness
SOB, Leg weakness
SOB and leg weakness
CP

## 2024-07-19 NOTE — PROGRESS NOTE ADULT - ASSESSMENT
64-yo M PMHX of CAD and severe AS s/p AVR and CABG x2 RSVG-Ramus, RSVG-OM1 (2021), ESRD on HD, Hep C s/p treatment, latent TB, HTN, who presents to ED w/ SOB and neck/back/leg soreness w/ fall 2 days ago due to weakness, elevated BNP and troponin w/ c/f CHF. Plan for DC home with cards and PCP follow up scheduled.

## 2024-07-19 NOTE — PROGRESS NOTE ADULT - SUBJECTIVE AND OBJECTIVE BOX
Subjective/interval events:  - no events  - BP remains elevated 160-180s, though asymptomatic  - pt denies CP, SOB, lightheadedness, orthopnea    ROS negative except as above    Vital Signs Last 24 Hrs  T(C): 36.9 (19 Jul 2024 04:24), Max: 36.9 (19 Jul 2024 04:24)  T(F): 98.5 (19 Jul 2024 04:24), Max: 98.5 (19 Jul 2024 04:24)  HR: 62 (19 Jul 2024 08:42) (58 - 68)  BP: 176/74 (19 Jul 2024 08:42) (169/85 - 189/96)  BP(mean): --  RR: 18 (19 Jul 2024 04:24) (18 - 18)  SpO2: 98% (19 Jul 2024 04:24) (97% - 99%)    Parameters below as of 19 Jul 2024 04:24  Patient On (Oxygen Delivery Method): room air        MEDICATIONS  (STANDING):  aspirin enteric coated 81 milliGRAM(s) Oral daily  atorvastatin 80 milliGRAM(s) Oral at bedtime  calcium acetate 667 milliGRAM(s) Oral three times a day with meals  carvedilol 12.5 milliGRAM(s) Oral every 12 hours  heparin   Injectable 5000 Unit(s) SubCutaneous every 12 hours  hydrALAZINE 100 milliGRAM(s) Oral three times a day  isosorbide   dinitrate Tablet (ISORDIL) 20 milliGRAM(s) Oral once  isosorbide   dinitrate Tablet (ISORDIL) 40 milliGRAM(s) Oral three times a day    MEDICATIONS  (PRN):  acetaminophen     Tablet .. 650 milliGRAM(s) Oral every 6 hours PRN Temp greater or equal to 38C (100.4F), Mild Pain (1 - 3)  bismuth subsalicylate Liquid 15 milliLiter(s) Oral four times a day PRN Diarrhea  melatonin 3 milliGRAM(s) Oral at bedtime PRN Insomnia      I&O's Summary    18 Jul 2024 07:01  -  19 Jul 2024 07:00  --------------------------------------------------------  IN: 1260 mL / OUT: 3900 mL / NET: -2640 mL          PHYSICAL EXAM:  General: NAD, resting comfortably in bed  Neck: no JVD  Lungs: CTAB, normal WOB  Heart: RRR, no M/R/G  Extremities: brisk capillary refill, no edema. LUE AVF noted   Neuro: AOx3    ECG:    TTE:    LABS:                        10.7   3.84  )-----------( 209      ( 19 Jul 2024 07:07 )             33.1     07-19    137  |  96  |  17  ----------------------------<  86  3.8   |  25  |  4.86<H>    Ca    8.9      19 Jul 2024 07:07  Phos  3.0     07-19  Mg     2.0     07-19            Creatinine Trend: 4.86<--, 6.92<--, 5.64<--, 7.39<--, 6.71<--, 5.19<--  I&O's Summary    18 Jul 2024 07:01  -  19 Jul 2024 07:00  --------------------------------------------------------  IN: 1260 mL / OUT: 3900 mL / NET: -2640 mL      BNP  RADIOLOGY & ADDITIONAL STUDIES:

## 2024-07-19 NOTE — PROGRESS NOTE ADULT - PROBLEM SELECTOR PLAN 2
RPR and syphilis ab positive. Pt denies recent sexual encounter (last one 10 years ago). 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 Unlikely neurosyphilis as no neurological symptoms. Neurological exam showed no deficits.     Plan:  - Penicillin 2.4 million units once per week for 3 doses. Received one dose in patient and plan for 2 doses outpatient (Scheduled 07/25 1:30pm)  - Appreciate ID recs

## 2024-07-19 NOTE — PROGRESS NOTE ADULT - PROBLEM SELECTOR PLAN 7
DVT PPx: Heparin 5000 mg SubQ BID  Diet: Renal Diet  Bowel Regimen: PRN  Code: Full  Dispo: Home PT  Pharmacy:  PCP:   Communication:    Goals of Care: check note

## 2024-07-25 ENCOUNTER — APPOINTMENT (OUTPATIENT)
Dept: INTERNAL MEDICINE | Facility: CLINIC | Age: 64
End: 2024-07-25

## 2024-08-02 ENCOUNTER — APPOINTMENT (OUTPATIENT)
Dept: CARDIOLOGY | Facility: CLINIC | Age: 64
End: 2024-08-02

## 2024-08-06 ENCOUNTER — APPOINTMENT (OUTPATIENT)
Dept: VASCULAR SURGERY | Facility: CLINIC | Age: 64
End: 2024-08-06

## 2024-09-25 ENCOUNTER — APPOINTMENT (OUTPATIENT)
Dept: INFECTIOUS DISEASE | Facility: CLINIC | Age: 64
End: 2024-09-25

## 2024-10-01 NOTE — H&P ADULT - NSHPRISKHIVSCREEN_GEN_ALL_CORE
Heartburn-    - Famotidine 40 mg as needed  - Avoid all foods that make the symptoms worse- spicy foods, acidic foods (tomatoes, oranges, limes), caffeine, fatty foods, fried foods, chocolate, mint, alcohol.   - eat small meals throughout the day.   - elevate head of the bed  - wait at least 3-4 hours after the last meal before going to bed     Colonoscopy in 2029    Follow up as needed   Offered and patient accepted

## 2024-10-17 RX ORDER — HYDRALAZINE HYDROCHLORIDE 100 MG/1
100 TABLET ORAL
Qty: 90 | Refills: 3 | Status: ACTIVE | COMMUNITY
Start: 2024-10-17 | End: 1900-01-01

## 2025-01-14 ENCOUNTER — NON-APPOINTMENT (OUTPATIENT)
Age: 65
End: 2025-01-14

## 2025-03-06 ENCOUNTER — APPOINTMENT (OUTPATIENT)
Dept: VASCULAR SURGERY | Facility: CLINIC | Age: 65
End: 2025-03-06

## 2025-03-20 ENCOUNTER — APPOINTMENT (OUTPATIENT)
Dept: VASCULAR SURGERY | Facility: CLINIC | Age: 65
End: 2025-03-20

## 2025-05-18 NOTE — PATIENT PROFILE ADULT - MST SCORE
Physical Therapy  Facility/Department: [address] MED SURG/TELE  Physical Therapy Initial Assessment    Name: Stewart Mark  : 1952  MRN: [id number]  Date of Service: 2025        Patient Diagnosis(es): The primary encounter diagnosis was GIUSEPPE (acute kidney injury). A diagnosis of Acute cystitis without hematuria was also pertinent to this visit.  Past Medical History:  has a past medical history of Acute stroke due to ischemia (HCC), Anxiety, CAD in native artery, Depression, Diabetes mellitus (HCC), Hyperlipidemia, Hypertension, Moderate persistent asthma without complication, Obesity, Sleep apnea, Type 2 diabetes mellitus without complication (HCC), and Ulceration.  Past Surgical History:  has a past surgical history that includes hernia repair; Colonoscopy; joint replacement (Left); Revision total knee arthroplasty (Right, 2018); back surgery (10/2019); Coronary stent placement; ERCP (N/A, 2025); ERCP (2025); ERCP (2025); and CT PERC CHOLECYSTOSTOMY (2025).        Evaluating Therapist: Tasia Bello PT    Room #:  0545/0545-A  Diagnosis:  UTI (urinary tract infection) [N39.0]  GIUSEPPE (acute kidney injury) [N17.9]  Acute cystitis without hematuria [N30.00]  PMHx/PSHx:  HTN, CVA, CAD, DM  Precautions:  falls, alarm, Biliary drain      Social:  Pt admitted from [facility]. States he does not stand but then stated he stands to get in his wheelchair, unable to report if he does this with assist or on his own.  Pt states he does not propel his own wheelchair   Initial Evaluation  Date: 25 Treatment      Short Term/ Long Term   Goals   Was pt agreeable to Eval/treatment? yes     Does pt have pain? Pain at gary catheter     Bed Mobility  Rolling: max assist  Supine <> partial sit dependent  Scooting: dependent  Mod assist   Transfers Sit to stand: NT  Stand to sit: NT  Stand pivot: NT  Max assist of 2   Ambulation    NT  N/A   Stair Negotiation  Ascended and descended  NT   N/A   LE  0